# Patient Record
Sex: MALE | Race: WHITE | NOT HISPANIC OR LATINO | Employment: FULL TIME | URBAN - METROPOLITAN AREA
[De-identification: names, ages, dates, MRNs, and addresses within clinical notes are randomized per-mention and may not be internally consistent; named-entity substitution may affect disease eponyms.]

---

## 2017-01-04 ENCOUNTER — TRANSFERRED RECORDS (OUTPATIENT)
Dept: HEALTH INFORMATION MANAGEMENT | Facility: CLINIC | Age: 57
End: 2017-01-04

## 2017-01-04 LAB — EJECTION FRACTION: 28

## 2017-01-12 ENCOUNTER — TELEPHONE (OUTPATIENT)
Dept: INTERNAL MEDICINE | Facility: CLINIC | Age: 57
End: 2017-01-12

## 2017-01-12 ENCOUNTER — OFFICE VISIT (OUTPATIENT)
Dept: INTERNAL MEDICINE | Facility: CLINIC | Age: 57
End: 2017-01-12
Payer: COMMERCIAL

## 2017-01-12 VITALS
TEMPERATURE: 97.8 F | HEART RATE: 81 BPM | SYSTOLIC BLOOD PRESSURE: 102 MMHG | OXYGEN SATURATION: 96 % | DIASTOLIC BLOOD PRESSURE: 75 MMHG

## 2017-01-12 DIAGNOSIS — M79.2 NEUROPATHIC PAIN: ICD-10-CM

## 2017-01-12 DIAGNOSIS — E11.9 WELL CONTROLLED DIABETES MELLITUS (H): ICD-10-CM

## 2017-01-12 DIAGNOSIS — I10 BENIGN ESSENTIAL HYPERTENSION: ICD-10-CM

## 2017-01-12 DIAGNOSIS — G47.33 OSA (OBSTRUCTIVE SLEEP APNEA): ICD-10-CM

## 2017-01-12 DIAGNOSIS — S82.301E TYPE I OR II OPEN FRACTURE OF DISTAL END OF RIGHT TIBIA WITH ROUTINE HEALING, UNSPECIFIED FRACTURE MORPHOLOGY, SUBSEQUENT ENCOUNTER: Primary | ICD-10-CM

## 2017-01-12 DIAGNOSIS — I48.91 ATRIAL FIBRILLATION, UNSPECIFIED TYPE (H): ICD-10-CM

## 2017-01-12 PROCEDURE — 99214 OFFICE O/P EST MOD 30 MIN: CPT | Performed by: INTERNAL MEDICINE

## 2017-01-12 RX ORDER — OXYCODONE HYDROCHLORIDE 5 MG/1
10 TABLET ORAL EVERY 4 HOURS PRN
Qty: 108 TABLET | Refills: 0 | Status: SHIPPED | OUTPATIENT
Start: 2017-01-12 | End: 2017-05-22

## 2017-01-12 RX ORDER — DULOXETIN HYDROCHLORIDE 60 MG/1
CAPSULE, DELAYED RELEASE ORAL DAILY
COMMUNITY
End: 2018-11-19

## 2017-01-12 RX ORDER — SPIRONOLACTONE 25 MG
20 TABLET ORAL DAILY
COMMUNITY

## 2017-01-12 RX ORDER — LOSARTAN POTASSIUM 100 MG/1
100 TABLET ORAL DAILY
COMMUNITY
End: 2017-01-12

## 2017-01-12 RX ORDER — LEVOTHYROXINE SODIUM 100 UG/1
100 TABLET ORAL DAILY
COMMUNITY
End: 2017-05-22

## 2017-01-12 RX ORDER — FUROSEMIDE 40 MG
40 TABLET ORAL DAILY
COMMUNITY
End: 2017-05-05

## 2017-01-12 NOTE — PROGRESS NOTES
SUBJECTIVE:                                                    Rhett Elizabeth is a 56 year old male who presents to clinic today for the following health issues:        Two recent Hospitalizations, Follow-up Visit:    Hospital/Nursing Home/IP Rehab Facility: [facility] (in December) and then [facility] (in January)  Date of Admission: 12/25/2016 [facility]; 1.4.17 [facility].  Date of Discharge: 12/28/2016; 1.8.17 [facility].   Reason(s) for Admission: A-fib with RVR plus CHF at [facility];  Right tibial fracture s/p surgery at [facility].             Problems taking medications regularly:  None       Medication changes since discharge: yes additional meds       Problems adhering to non-medication therapy:  None    Summary of hospitalization:  CareEverywhere information obtained and reviewed  Diagnostic Tests/Treatments reviewed.  Follow up needed: none  Other Healthcare Providers Involved in Patient s Care:         None  Update since discharge: improved. Daughter is here with the patient, is helping the patient out at home.His wife is helping him, as well.       Post Discharge Medication Reconciliation: discharge medications reconciled and changed, per note/orders (see AVS).  Plan of care communicated with patient and family     Coding guidelines for this visit:  Type of Medical   Decision Making Face-to-Face Visit       within 7 Days of discharge Face-to-Face Visit        within 14 days of discharge   Moderate Complexity [id number]   High Complexity [id number]           A fib with RVR and CHF at [facility]: no s/s of Cad or of arrhythmia now. Has follow-up Cardiology appointment on 1.16.17.  Right tibial fracture s/e surgery; he has home P.T. And OT. He has follow-up with Orthopedics-the patient called his wife who checked by phone and confirmed the appointment will be on  1.20.17. He has a history of neuropathic chronic pain and opioids which per the daughter is managed by [facility] (recently? Reported that [facility]  was managing it but also requested a referral as the patient is switching healthcare systems) and his Neurologist (ie,these 2 specialists are the ones who usually prescribe the opioids), and an Internist; he is apparently in the process of switching PCPs.  He is asking for a bridge of his current dose of opioids after surgery, to last him until his Orthopedics appointment.  We will run a  today and will give enough (as per the documented discharge summary amount) to last him until 1.20.17 (the date of the ortho appointment) at which time, it will be managed by Orthopedics +/- his Neurologist. A referral to [facility] was placed today.                Problem list and histories reviewed & adjusted, as indicated.  Additional history: as documented    There is no problem list on file for this patient.    History reviewed. No pertinent past surgical history.    Social History   Substance Use Topics     Smoking status: Current Every Day Smoker     Types: Dip, chew, snus or snuff     Smokeless tobacco: Not on file     Alcohol Use: No     History reviewed. No pertinent family history.      Current Outpatient Prescriptions   Medication Sig Dispense Refill     aspirin 81 MG tablet Take by mouth daily       Glucose Blood (ACCU-CHEK MANUEL PLUS VI)        DULoxetine (CYMBALTA) 60 MG EC capsule Take by mouth daily       furosemide (LASIX) 40 MG tablet Take 40 mg by mouth daily       insulin aspart (NOVOLOG PEN) 100 UNIT/ML injection Inject 10 Units Subcutaneous 3 times daily (with meals)       blood glucose monitoring (ACCU-CHEK MULTICLIX) lancets 1 each by In Vitro route Use to test blood sugaras directed.       levothyroxine (SYNTHROID/LEVOTHROID) 100 MCG tablet Take 100 mcg by mouth daily       Lutein 20 MG CAPS        metFORMIN (GLUCOPHAGE) 500 MG tablet Take 500 mg by mouth 2 times daily (with meals)       Multiple Vitamin (MULTIVITAMINS PO)        NORTRIPTYLINE HCL PO Take 25 mg by mouth At Bedtime       Omega-3 Fatty Acids  (OMEGA-3 FISH OIL PO) Take 1 g by mouth       PREGABALIN PO Take 150 mg by mouth 2 times daily       SIMVASTATIN PO Take 20 mg by mouth       UNABLE TO FIND cpap       oxyCODONE (ROXICODONE) 5 MG IR tablet Take 2 tablets (10 mg) by mouth every 4 hours as needed for pain maximum 12 tablet(s) per day. To last until Orthopedic appointment. 108 tablet 0     Cholecalciferol (VITAMIN D-3 PO) Take 1,000 Units by mouth daily       Acetaminophen (TYLENOL PO) Take 1,000 mg by mouth 4 times daily       Methocarbamol (ROBAXIN PO) Take 500 mg by mouth every 6 hours as needed for muscle spasms       AMIODARONE HCL PO Take 200 mg by mouth 2 times daily       Metoprolol Succinate (TOPROL XL PO) Take 50 mg by mouth daily       rivaroxaban ANTICOAGULANT (XARELTO) 20 MG TABS tablet Take 20 mg by mouth daily (with dinner)       testosterone (TESTOSTERONE) 4 MG/24HR 24 hr patch Place 1 patch onto the skin At Bedtime       ==============================================================  ROS:  Constitutional, HEENT, cardiovascular, pulmonary, GI, , musculoskeletal, neuro, skin, endocrine and psych systems are negative, except as otherwise noted.       OBJECTIVE:                                                    /75 mmHg  Pulse 81  Temp(Src) 97.8  F (36.6  C) (Oral)  SpO2 96%  There is no height or weight on file to calculate BMI.     GENERAL APPEARANCE: healthy, alert and in no distress; patient is in a wheelchair today [d/t the broken leg]  EYES: Eyes grossly normal to inspection, and conjunctivae and sclerae normal  HENT: head normocephalic and atraumatic and mouth without ulcers or lesions, oropharynx clear and oral mucous membranes moist  NECK: no noticeable adenopathy, no asymmetry, masses, or scars   RESP: lungs clear to auscultation - no rales, rhonchi or wheezes  CV: regular rate and rhythm, normal S1 S2, no S3 or S4, no murmur, click or rub, no peripheral edema and peripheral pulses strong  ABDOMEN: soft, nontender, no  hepatosplenomegaly, no masses and bowel sounds normal  MS:   right lower extremity in a cast, elevated.  no musculoskeletal defects are noted and gait is age appropriate without ataxia  SKIN: no suspicious lesions or rashes  NEURO: mentation intact and speech normal  PSYCH: mentation appears normal and affect normal/bright.        ASSESSMENT/PLAN:                                                        ICD-10-CM    1. Type I or II open fracture of distal end of right tibia with routine healing, unspecified fracture morphology, subsequent encounter S82.301E    2. Neuropathic pain M79.2 PAIN MANAGEMENT EXTERNAL REFERRAL     oxyCODONE (ROXICODONE) 5 MG IR tablet   3. Atrial fibrillation, unspecified type (H) I48.91    4. Well controlled diabetes mellitus (H) E11.9    5. Benign essential hypertension I10    6. LANEY (obstructive sleep apnea) G47.33        Type I or II open fracture of distal end of right tibia with routine healing, unspecified fracture morphology, subsequent encounter    (M79.2) Neuropathic pain  (primary encounter diagnosis)  Comment:   see HPI;   healing progressing as expected.  Unclear if the fall which resulted in the leg fx correlated with hypoglycemia (see below).  Plan:   As per orders above and patient instructions below. -will run the  today.  PAIN MANAGEMENT EXTERNAL REFERRAL, oxyCODONE         (ROXICODONE) 5 MG IR tablet            (I48.91) Atrial fibrillation, unspecified type (H)  Comment: rate-controlled asympt; euvolemic  Plan: continue current regimen and As per orders above and patient instructions below.     (E11.9) Well controlled diabetes mellitus (H)  Comment:   Last a1c 6.2% 9/2016  He rarely uses his Novolog (sliding scale) nowadays   per outside records recent A1Cs are good but also history of intermittent hypoglycemic episodes   He had his JUNG d.pratibha and his metformin decreased to 500mg bid in 12/2016 but still has hypoglycemic episodes:checkes his BS  3-4 times a day and  Has BS  "< 80 almost daily. Per the daughter this is actually an ongoing issue for the past few months at least. His main symptom during these  episoes is \"cold hands\"  Plan: As per orders above and patient instructions below.     (I10) Benign essential hypertension  Comment: controlled asympt  Plan: continue current regimen and As per orders above and patient instructions below.     (G47.33) LANEY (obstructive sleep apnea)  Comment: report of recent apneic episodes at night, during sleep; the patient has a CPAP; he follows with a Sleep Specialist  Plan: As per orders above and patient instructions below.      Patient Instructions   I placed the referral to [facility] clinic. We will be able to prescribe oxycodone 10mg every 4 hours until your follow-up appointment with Orthopedics, once we know when that appointment is.  You have indicated that this appointment is on 1.20.17.    Please decrease your Metformin from 500mg twice a day to 500mg daily and let us know if your blood are still often below 80 or if it often above 180.    Keep the Cardiology appointment on 1.16.17.    Contact your Sleep doctor regarding your apneic episodes.    Please measure your blood pressures at home and let us know if they are consistently more than 140/90 or if they are ever less than 90/60 or if your pulse is ever less than 55.     We advise a return visit within a month.                          Gwen Saldana MD  [facility]  "

## 2017-01-12 NOTE — PATIENT INSTRUCTIONS
I placed the referral to [facility] clinic. We will be able to prescribe oxycodone 10mg every 4 hours until your follow-up appointment with Orthopedics, once we know when that appointment is.  You have indicated that this appointment is on 1.20.17.    Please decrease your Metformin from 500mg twice a day to 500mg daily and let us know if your blood are still often below 80 or if it often above 180.    Keep the Cardiology appointment on 1.16.17.    Contact your Sleep doctor regarding your apneic episodes.    Please measure your blood pressures at home and let us know if they are consistently more than 140/90 or if they are ever less than 90/60 or if your pulse is ever less than 55.     We advise a return visit within a month.

## 2017-01-12 NOTE — NURSING NOTE
Chief Complaint   Patient presents with     Hospital F/U     [facility]       Initial There were no vitals taken for this visit. There is no height or weight on file to calculate BMI.  BP completed using cuff size: penny Pruett CMA

## 2017-01-12 NOTE — MR AVS SNAPSHOT
After Visit Summary   1/12/2017    Rhett Elizabeth    MRN: [id number]           Patient Information     Date Of Birth          1960        Visit Information        Provider Department      1/12/2017 1:30 PM Gwen Saldana MD [facility]        Today's Diagnoses     Neuropathic pain    -  1       Care Instructions    I placed the referral to [facility] clinic. We will be able to prescribe oxycodone 10mg every 4 hours until your follow-up appointment with Orthopedics, once we know when that appointment is.  You have indicated that this appointment is on 1.20.17.    Please decrease your Metformin from 500mg twice a day to 500mg daily and let us know if your blood are still often below 80 or if it often above 180.    Keep the Cardiology appointment on 1.16.17.    Contact your Sleep doctor regarding your apneic episodes.    Please measure your blood pressures at home and let us know if they are consistently more than 140/90 or if they are ever less than 90/60 or if your pulse is ever less than 55.     We advise a return visit within a month.              Follow-ups after your visit        Additional Services     PAIN MANAGEMENT EXTERNAL REFERRAL       Your provider has referred you to: [facility]: [facility] - [facility] [place] [phone]   [email or web address]  [facility] [phone]   [email or web address]  [facility] [phone]   [email or web address]    Please be aware that coverage of these services is subject to the terms and limitations of your health insurance plan.  Call member services at your health plan with any benefit or coverage questions.      Please bring the following with you to your appointment:    (1) Any  "X-Rays, CTs or MRIs which have been performed.  Contact the facility where they were done to arrange for  prior to your scheduled appointment.  Any new CT, MRI or other procedures ordered by your specialist must be performed at a [place] facility or coordinated by your clinic's referral office.    (2) List of current medications   (3) This referral request   (4) Any documents/labs given to you for this referral                  Who to contact     If you have questions or need follow up information about today's clinic visit or your schedule please contact [facility] directly at [phone].  Normal or non-critical lab and imaging results will be communicated to you by MyChart, letter or phone within 4 business days after the clinic has received the results. If you do not hear from us within 7 days, please contact the clinic through [employer] or phone. If you have a critical or abnormal lab result, we will notify you by phone as soon as possible.  Submit refill requests through [employer] or call your pharmacy and they will forward the refill request to us. Please allow 3 business days for your refill to be completed.          Additional Information About Your Visit        MyChart Information     [employer] lets you send messages to your doctor, view your test results, renew your prescriptions, schedule appointments and more. To sign up, go to [email or web address] . Click on \"Log in\" on the left side of the screen, which will take you to the Welcome page. Then click on \"Sign up Now\" on the right side of the page.     You will be asked to enter the access code listed below, as well as some personal information. Please follow the directions to create your username and password.     Your access code is: [email or web address]  Expires: 2017  3:00 PM     Your access code will  in 90 days. If you need help or a new code, please call your [facility] or [phone].        Care EveryWhere ID     " This is your Care EveryWhere ID. This could be used by other organizations to access your [place] medical records  [id number]        Your Vitals Were     Pulse Temperature Pulse Oximetry             81 97.8  F (36.6  C) (Oral) 96%          Blood Pressure from Last 3 Encounters:   01/12/17 102/75    Weight from Last 3 Encounters:   No data found for Wt              We Performed the Following     PAIN MANAGEMENT EXTERNAL REFERRAL          Today's Medication Changes          These changes are accurate as of: 1/12/17  3:00 PM.  If you have any questions, ask your nurse or doctor.               Start taking these medicines.        Dose/Directions    oxyCODONE 5 MG IR tablet   Commonly known as:  ROXICODONE   Used for:  Neuropathic pain   Started by:  Gwen Saldana MD        Dose:  10 mg   Take 2 tablets (10 mg) by mouth every 4 hours as needed for pain maximum 12 tablet(s) per day. To last until Orthopedic appointment.   Quantity:  108 tablet   Refills:  0            Where to get your medicines      Some of these will need a paper prescription and others can be bought over the counter.  Ask your nurse if you have questions.     Bring a paper prescription for each of these medications    - oxyCODONE 5 MG IR tablet             Primary Care Provider Office Phone #    [facility] [phone]       No address on file        Thank you!     Thank you for [facility]  for your care. Our goal is always to provide you with excellent care. Hearing back from our patients is one way we can continue to improve our services. Please take a few minutes to complete the written survey that you may receive in the mail after your visit with us. Thank you!             Your Updated Medication List - Protect others around you: Learn how to safely use, store and throw away your medicines at www.disposemymeds.org.          This list is accurate as of: 1/12/17  3:00 PM.  Always use your most  recent med list.                   Brand Name Dispense Instructions for use    ACCU-CHEK MANUEL PLUS VI          aspirin 81 MG tablet      Take by mouth daily       blood glucose monitoring lancets      1 each by In Vitro route Use to test blood sugaras directed.       cholecalciferol 1000 UNIT tablet    vitamin D     Take by mouth daily       DULoxetine 60 MG EC capsule    CYMBALTA     Take by mouth daily       furosemide 40 MG tablet    LASIX     Take 40 mg by mouth daily       insulin aspart 100 UNIT/ML injection    NovoLOG PEN     Inject 10 Units Subcutaneous 3 times daily (with meals)       levothyroxine 100 MCG tablet    SYNTHROID/LEVOTHROID     Take 100 mcg by mouth daily       Lutein 20 MG Caps          metFORMIN 500 MG tablet    GLUCOPHAGE     Take 500 mg by mouth 2 times daily (with meals)       MULTIVITAMINS PO          NORTRIPTYLINE HCL PO      Take 25 mg by mouth At Bedtime       OMEGA-3 FISH OIL PO      Take 1 g by mouth       oxyCODONE 5 MG IR tablet    ROXICODONE    108 tablet    Take 2 tablets (10 mg) by mouth every 4 hours as needed for pain maximum 12 tablet(s) per day. To last until Orthopedic appointment.       PREGABALIN PO      Take 150 mg by mouth 2 times daily       SIMVASTATIN PO      Take 20 mg by mouth       UNABLE TO FIND      cpap

## 2017-01-15 PROBLEM — I48.91 ATRIAL FIBRILLATION, UNSPECIFIED TYPE (H): Status: ACTIVE | Noted: 2017-01-15

## 2017-01-15 PROBLEM — I10 BENIGN ESSENTIAL HYPERTENSION: Status: ACTIVE | Noted: 2017-01-15

## 2017-01-15 PROBLEM — E11.9 WELL CONTROLLED DIABETES MELLITUS (H): Status: ACTIVE | Noted: 2017-01-15

## 2017-01-15 PROBLEM — S82.301E: Status: ACTIVE | Noted: 2017-01-15

## 2017-01-15 PROBLEM — M79.2 NEUROPATHIC PAIN: Status: ACTIVE | Noted: 2017-01-15

## 2017-01-15 PROBLEM — E11.40 TYPE 2 DIABETES MELLITUS WITH DIABETIC NEUROPATHY, WITHOUT LONG-TERM CURRENT USE OF INSULIN (H): Status: ACTIVE | Noted: 2017-01-15

## 2017-01-15 PROBLEM — G47.33 OSA (OBSTRUCTIVE SLEEP APNEA): Status: ACTIVE | Noted: 2017-01-15

## 2017-01-17 ENCOUNTER — MEDICAL CORRESPONDENCE (OUTPATIENT)
Dept: HEALTH INFORMATION MANAGEMENT | Facility: CLINIC | Age: 57
End: 2017-01-17

## 2017-01-30 ENCOUNTER — MEDICAL CORRESPONDENCE (OUTPATIENT)
Dept: HEALTH INFORMATION MANAGEMENT | Facility: CLINIC | Age: 57
End: 2017-01-30

## 2017-01-31 ENCOUNTER — TELEPHONE (OUTPATIENT)
Dept: INTERNAL MEDICINE | Facility: CLINIC | Age: 57
End: 2017-01-31

## 2017-01-31 DIAGNOSIS — M79.2 NEUROPATHIC PAIN: Primary | ICD-10-CM

## 2017-01-31 RX ORDER — DULOXETIN HYDROCHLORIDE 60 MG/1
60 CAPSULE, DELAYED RELEASE ORAL DAILY
Qty: 30 CAPSULE | Status: CANCELLED | OUTPATIENT
Start: 2017-01-31

## 2017-01-31 RX ORDER — PREGABALIN 25 MG/1
150 CAPSULE ORAL 2 TIMES DAILY
Status: CANCELLED | OUTPATIENT
Start: 2017-01-31

## 2017-01-31 NOTE — TELEPHONE ENCOUNTER
Per Mya, patient is using both meds for neuropathy. Confirmed dosing for both meds with Mya. They are as pending.    There is no consent to communicate on file. Gave her info on how to complete this going forward. Will call patient once Dr. Gwen Saldana has reviewed message.     To provider to advise.  Shauna Varma, CHELLEN RN

## 2017-01-31 NOTE — TELEPHONE ENCOUNTER
Spouse calling patient is running low on his Cymbalta, Lyrica needs scrip called into pharmacy as first time spouse states Dr Saldana is filling this for patient. Please call with questions or to discuss.

## 2017-01-31 NOTE — TELEPHONE ENCOUNTER
Thank you for printing the -I will take a look at it when I am in clinic tomorrow.    In regards to Cymbalta, please ask the patient to contact his Neurologist for a refill (as per chart review, his Neurologist manages the Cymbalta).    In regards to the Lyrica, though the patient is requesting a refill of only the Lyrica and not of the Gabapentin, I have to review the  (that is the most easily available information right now and both Lyrica and Gabapentin will be on it), to see which doses he is on, as in the vast majority of cases, a patient is either on one or the other-use of both concomittantly is redundant and may even be unsafe, given their similarity.    Please let me know if the patient mentioned who has been prescribing his Lyrica and Gabapentin, most recently.    We should ask him to fill out an CHRIS for [facility] and for his Neurologist, so we can take a look at their notes and get a better idea of the longterm pain treatment plan.     Thank you,  Gwen Saldana MD

## 2017-01-31 NOTE — TELEPHONE ENCOUNTER
is placed on providers desk.  Per writers conversation, patient not requesting gabapentin, the 2 requested meds were Cymbalta and Lyrica.    To provider to advise.  RUDDY Alonzo RN

## 2017-01-31 NOTE — TELEPHONE ENCOUNTER
I have only seen this patient once (you may review my OV note) and my understanding was that he had several doctors manage his chronic pain, including [facility], his Neurologist and his prior PCP.  (I do not see any ROIs scanned in our system).     Gabapentin is not on his medication list and it is, as you have already noted, unusual for a patient to be on both Lyrica and Gabapentin. (Furthermore, the patient is on Cymbalta, and Pamelor and has had perioperative opioids recently...).    It appears that we can access his prior PCP notes via ROKT-I see that there was a refill request for Cymbalta sent to his PCP in October 2016 and that his PCP referred the patient to his Neurologist for refills of this medication.  Please ask the patient who has been filling his gabapentin and his lyrica and ask him to fill out an CHRIS for [facility] and for his Neurologist, so we can take a look at their notes and get a better idea of the longterm pain treatment plan.       I know that we ran the  about 2 weeks ago, but given the unusual reported medication regimen of gabapentin and Lyrica, and as the patient is currently in the midst of a transition of care, please rerun a  of this patient for the past 2 months (for all controlled substances) and place it on my desk.     Thank you,  Gwen Saldana MD

## 2017-02-01 ENCOUNTER — TRANSFERRED RECORDS (OUTPATIENT)
Dept: HEALTH INFORMATION MANAGEMENT | Facility: CLINIC | Age: 57
End: 2017-02-01

## 2017-02-01 NOTE — TELEPHONE ENCOUNTER
Ok, thank you for checking.  I looked at the  and the gabapentin is not on it-so he should not be on it.   The  shows that he gets Lyrica as 300mg capsules, 60 caps per month, and fills it aournd the 4th-5th of the month. The prescribing physician is Dr Robert Esquivel, who is a Neurologist-so please advise the patient to contact this physician for refills of both the Cymbalta and the Lyrica.    Thank you,  Gwen Saldana MD

## 2017-02-01 NOTE — TELEPHONE ENCOUNTER
Called patient, discussed the below. He did not understand why after one visit Dr. Gwen Saldana cannot refill all of his meds. After reviewing the below and discussing why we need notes to care for him responsibility and safely, patient stated he understood the plan.    1. Patient denied that Cymbalta was filed by neuro, then he grabbed the med bottle and saw neuro filled med.    2. He will call both [facility] and neuro and do CHRIS to us. Our address, phone and fax info was provided to patient.    3. Patient stated he does not know who was prescribing gabapentin and when he was last on it.     Shauna Varma, BSN RN

## 2017-02-01 NOTE — TELEPHONE ENCOUNTER
Called patient, informed pt of providers note as written below, pt verbalized good understanding.      He stated he will first call his insurance to see if Dr. ANDREAS Esquivel is a covered provider. He will ask him for the refill of both meds. If Dr. ANDREAS Esquivel is not covered provider, he will ask who is a covered provider and will request Dr. Gwen Saldana to write referral to his insurances preferred neurologist.   RUDDY Whittaker RN

## 2017-02-13 ENCOUNTER — MEDICAL CORRESPONDENCE (OUTPATIENT)
Dept: HEALTH INFORMATION MANAGEMENT | Facility: CLINIC | Age: 57
End: 2017-02-13

## 2017-02-20 ENCOUNTER — MEDICAL CORRESPONDENCE (OUTPATIENT)
Dept: HEALTH INFORMATION MANAGEMENT | Facility: CLINIC | Age: 57
End: 2017-02-20

## 2017-02-23 ENCOUNTER — TRANSFERRED RECORDS (OUTPATIENT)
Dept: HEALTH INFORMATION MANAGEMENT | Facility: CLINIC | Age: 57
End: 2017-02-23

## 2017-02-28 ENCOUNTER — TELEPHONE (OUTPATIENT)
Dept: OTHER | Facility: CLINIC | Age: 57
End: 2017-02-28

## 2017-02-28 NOTE — TELEPHONE ENCOUNTER
2/28/2017    Call Regarding Onboarding [employer]    Attempt 1    Message on voicemail     Comments: 2 DEP [18+]      Outreach   KV

## 2017-03-07 DIAGNOSIS — E11.40 TYPE 2 DIABETES MELLITUS WITH DIABETIC NEUROPATHY, WITHOUT LONG-TERM CURRENT USE OF INSULIN (H): Primary | ICD-10-CM

## 2017-03-07 NOTE — TELEPHONE ENCOUNTER
Per 1/2/17 ov, patient was to follow up in 1 month.     We made a follow up appointment for 3/22.    Since med is 1. Historical, and 2. patient is a Month overdue for follow up, Cannot refill per RN protocol     To provider to advise.   Shauna Varma, CHELLEN RN

## 2017-03-09 NOTE — TELEPHONE ENCOUNTER
Call Regarding Onboarding [employer]    Attempt 2    Message on voicemail     Comments: 2 dep      Outreach   Thais Nazario

## 2017-03-22 ENCOUNTER — OFFICE VISIT (OUTPATIENT)
Dept: INTERNAL MEDICINE | Facility: CLINIC | Age: 57
End: 2017-03-22
Payer: COMMERCIAL

## 2017-03-22 ENCOUNTER — MEDICAL CORRESPONDENCE (OUTPATIENT)
Dept: HEALTH INFORMATION MANAGEMENT | Facility: CLINIC | Age: 57
End: 2017-03-22

## 2017-03-22 VITALS
SYSTOLIC BLOOD PRESSURE: 112 MMHG | OXYGEN SATURATION: 99 % | WEIGHT: 315 LBS | HEART RATE: 99 BPM | TEMPERATURE: 99 F | DIASTOLIC BLOOD PRESSURE: 81 MMHG

## 2017-03-22 DIAGNOSIS — E11.40 TYPE 2 DIABETES MELLITUS WITH DIABETIC NEUROPATHY, WITHOUT LONG-TERM CURRENT USE OF INSULIN (H): Primary | ICD-10-CM

## 2017-03-22 DIAGNOSIS — E03.8 OTHER SPECIFIED HYPOTHYROIDISM: ICD-10-CM

## 2017-03-22 DIAGNOSIS — Z11.59 NEED FOR HEPATITIS C SCREENING TEST: ICD-10-CM

## 2017-03-22 LAB
ANION GAP SERPL CALCULATED.3IONS-SCNC: 10 MMOL/L (ref 3–14)
BUN SERPL-MCNC: 11 MG/DL (ref 7–30)
CALCIUM SERPL-MCNC: 9.9 MG/DL (ref 8.5–10.1)
CHLORIDE SERPL-SCNC: 100 MMOL/L (ref 94–109)
CHOLEST SERPL-MCNC: 114 MG/DL
CO2 SERPL-SCNC: 31 MMOL/L (ref 20–32)
CREAT SERPL-MCNC: 1.03 MG/DL (ref 0.66–1.25)
CREAT UR-MCNC: 250 MG/DL
GFR SERPL CREATININE-BSD FRML MDRD: 75 ML/MIN/1.7M2
GLUCOSE SERPL-MCNC: 149 MG/DL (ref 70–99)
HBA1C MFR BLD: 6.6 % (ref 4.3–6)
HDLC SERPL-MCNC: 44 MG/DL
LDLC SERPL CALC-MCNC: 52 MG/DL
MICROALBUMIN UR-MCNC: 130 MG/L
MICROALBUMIN/CREAT UR: 52 MG/G CR (ref 0–17)
NONHDLC SERPL-MCNC: 70 MG/DL
POTASSIUM SERPL-SCNC: 4.4 MMOL/L (ref 3.4–5.3)
SODIUM SERPL-SCNC: 141 MMOL/L (ref 133–144)
TRIGL SERPL-MCNC: 92 MG/DL
TSH SERPL DL<=0.005 MIU/L-ACNC: 1.64 MU/L (ref 0.4–4)

## 2017-03-22 PROCEDURE — 82043 UR ALBUMIN QUANTITATIVE: CPT | Performed by: INTERNAL MEDICINE

## 2017-03-22 PROCEDURE — 36415 COLL VENOUS BLD VENIPUNCTURE: CPT | Performed by: INTERNAL MEDICINE

## 2017-03-22 PROCEDURE — 86803 HEPATITIS C AB TEST: CPT | Performed by: INTERNAL MEDICINE

## 2017-03-22 PROCEDURE — 80048 BASIC METABOLIC PNL TOTAL CA: CPT | Performed by: INTERNAL MEDICINE

## 2017-03-22 PROCEDURE — 99214 OFFICE O/P EST MOD 30 MIN: CPT | Performed by: INTERNAL MEDICINE

## 2017-03-22 PROCEDURE — 84443 ASSAY THYROID STIM HORMONE: CPT | Performed by: INTERNAL MEDICINE

## 2017-03-22 PROCEDURE — 83036 HEMOGLOBIN GLYCOSYLATED A1C: CPT | Performed by: INTERNAL MEDICINE

## 2017-03-22 PROCEDURE — 80061 LIPID PANEL: CPT | Performed by: INTERNAL MEDICINE

## 2017-03-22 NOTE — MR AVS SNAPSHOT
After Visit Summary   3/22/2017    Rhett Elizabeth    MRN: [id number]           Patient Information     Date Of Birth          1960        Visit Information        Provider Department      3/22/2017 2:20 PM Gwen Saldana MD [facility]        Today's Diagnoses     Type 2 diabetes mellitus with diabetic neuropathy, without long-term current use of insulin (H)    -  1    Need for hepatitis C screening test        Other specified hypothyroidism          Care Instructions      We will let you know your test results as discussed: by phone for urgent results, otherwise by postal mail.  You have indicated that you have a follow-up appointment with your Cardiologist within the next 2 weeks and they are planning to start you on: Losartan.  Please keep that appointment.  If your hemoglobin A1C is below 7%, we will advise the next visit in 6 months and if it is 7% or above, we advise the next visit in 3 months.        Follow-ups after your visit        Who to contact     If you have questions or need follow up information about today's clinic visit or your schedule please contact [facility] directly at [phone].  Normal or non-critical lab and imaging results will be communicated to you by MyChart, letter or phone within 4 business days after the clinic has received the results. If you do not hear from us within 7 days, please contact the clinic through [employer] or phone. If you have a critical or abnormal lab result, we will notify you by phone as soon as possible.  Submit refill requests through [employer] or call your pharmacy and they will forward the refill request to us. Please allow 3 business days for your refill to be completed.          Additional Information About Your Visit        [employer] Information     [employer] lets you send messages to your doctor, view your test results, renew your prescriptions, schedule appointments and more. To sign up, go to  [email or web address] . Click on \"Log in\" on the left side of the screen, which will take you to the Welcome page. Then click on \"Sign up Now\" on the right side of the page.     You will be asked to enter the access code listed below, as well as some personal information. Please follow the directions to create your username and password.     Your access code is: [email or web address]  Expires: 2017  4:00 PM     Your access code will  in 90 days. If you need help or a new code, please call your [place] clinic or [phone].        Care EveryWhere ID     This is your Care EveryWhere ID. This could be used by other organizations to access your [facility] medical records  [id number]        Your Vitals Were     Pulse Temperature Pulse Oximetry             99 99  F (37.2  C) (Oral) 99%          Blood Pressure from Last 3 Encounters:   17 112/81   17 102/75    Weight from Last 3 Encounters:   17 (!) 321 lb (145.6 kg)              We Performed the Following     Albumin Random Urine Quantitative     Basic metabolic panel     Hemoglobin A1c     Hepatitis C antibody     Lipid Profile with reflex to direct LDL     TSH with free T4 reflex          Where to get your medicines      These medications were sent to [facility]/pharmacy #[id number] - [address],  [address] [address]     Phone:  [phone]     metFORMIN 500 MG tablet          Primary Care Provider Office Phone #    [facility] [phone]       No address on file        Thank you!     Thank you for choosing [facility]  for your care. Our goal is always to provide you with excellent care. Hearing back from our patients is one way we can continue to improve our services. Please take a few minutes to complete the written survey that you may receive in the mail after your visit with us. Thank you!             Your Updated Medication List - Protect " others around you: Learn how to safely use, store and throw away your medicines at www.disposemymeds.org.          This list is accurate as of: 3/22/17  4:21 PM.  Always use your most recent med list.                   Brand Name Dispense Instructions for use    ACCU-CHEK MANUEL PLUS VI          AMIODARONE HCL PO      Take 200 mg by mouth 2 times daily       aspirin 81 MG tablet      Take by mouth daily       blood glucose monitoring lancets      1 each by In Vitro route Use to test blood sugaras directed.       DULoxetine 60 MG EC capsule    CYMBALTA     Take by mouth daily       furosemide 40 MG tablet    LASIX     Take 40 mg by mouth daily       insulin aspart 100 UNIT/ML injection    NovoLOG PEN     Inject 10 Units Subcutaneous 3 times daily (with meals) Uses sliding scale. Usually about 2 units       levothyroxine 100 MCG tablet    SYNTHROID/LEVOTHROID     Take 100 mcg by mouth daily       Lutein 20 MG Caps      Patient states he takes 150 mg tablet twice a day.       metFORMIN 500 MG tablet    GLUCOPHAGE    90 tablet    Take 1 tablet (500 mg) by mouth daily (with breakfast)       MULTIVITAMINS PO          NORTRIPTYLINE HCL PO      Take 25 mg by mouth At Bedtime       OMEGA-3 FISH OIL PO      Take 1 g by mouth       oxyCODONE 5 MG IR tablet    ROXICODONE    108 tablet    Take 2 tablets (10 mg) by mouth every 4 hours as needed for pain maximum 12 tablet(s) per day. To last until Orthopedic appointment.       PREGABALIN PO      Take 150 mg by mouth 2 times daily       rivaroxaban ANTICOAGULANT 20 MG Tabs tablet    XARELTO     Take 20 mg by mouth daily (with dinner)       ROBAXIN PO      Take 500 mg by mouth every 6 hours as needed for muscle spasms Reported on 3/22/2017       SIMVASTATIN PO      Take 20 mg by mouth       testosterone 4 MG/24HR 24 hr patch   Generic drug:  testosterone      Place 1 patch onto the skin At Bedtime Reported on 3/22/2017       TOPROL XL PO      Take 75 mg by mouth daily       TYLENOL  PO      Take 1,000 mg by mouth 4 times daily       UNABLE TO FIND      cpap       VITAMIN D-3 PO      Take 1,000 Units by mouth daily

## 2017-03-22 NOTE — LETTER
[facility]  [address] [facility] [id number] [phone]        March 23, 2017    Rhett Elizabeth  [address] [address] [address]            Dear Rhett,     You do not have Hepatitis C.  The test for Hepatitis C test was done, as you will recall, because we are screening people born within a certain time period for this infection, according to new medical guidelines.  You have some extra protein in your urine-this may be due to your diabetes.  If the Cardiologist starts you on Losartan, it may help improve this number.  Your thyroid hormone level is within normal limits.   Your cholesterol levels are within normal limits for you.  Your kidney function and blood electrolytes are within normal limits.   Your hemoglobin A1C is less than 7%-please continue your current diabetes regimen and we will plan on seeing you in 6 months.  Otherwise, please continue the treatment plan that we discussed at your last clinic visit and let me know if you have any questions via [employer] or by calling [phone].      Gwen Saldana MD/ms      Results for orders placed or performed in visit on 03/22/17   Basic metabolic panel   Result Value Ref Range    Sodium 141 133 - 144 mmol/L    Potassium 4.4 3.4 - 5.3 mmol/L    Chloride 100 94 - 109 mmol/L    Carbon Dioxide 31 20 - 32 mmol/L    Anion Gap 10 3 - 14 mmol/L    Glucose 149 (H) 70 - 99 mg/dL    Urea Nitrogen 11 7 - 30 mg/dL    Creatinine 1.03 0.66 - 1.25 mg/dL    GFR Estimate 75 >60 mL/min/1.7m2    GFR Estimate If Black >90   GFR Calc   >60 mL/min/1.7m2    Calcium 9.9 8.5 - 10.1 mg/dL   Hemoglobin A1c   Result Value Ref Range    Hemoglobin A1C 6.6 (H) 4.3 - 6.0 %   Lipid Profile with reflex to direct LDL   Result Value Ref Range    Cholesterol 114 <200 mg/dL    Triglycerides 92 <150 mg/dL    HDL Cholesterol 44 >39 mg/dL    LDL Cholesterol Calculated 52 <100 mg/dL    Non HDL Cholesterol 70 <130 mg/dL   Albumin Random Urine  Quantitative   Result Value Ref Range    Creatinine Urine 250 mg/dL    Albumin Urine mg/L 130 mg/L    Albumin Urine mg/g Cr 52.00 (H) 0 - 17 mg/g Cr   TSH with free T4 reflex   Result Value Ref Range    TSH 1.64 0.40 - 4.00 mU/L   Hepatitis C antibody   Result Value Ref Range    Hepatitis C Antibody  NR     Nonreactive   Assay performance characteristics have not been established for newborns,   infants, and children

## 2017-03-22 NOTE — NURSING NOTE
Chief Complaint   Patient presents with     RECHECK     Chronic conditions- pastient is fasting.       Initial /81 (BP Location: Left arm, Patient Position: Other (Comments), Cuff Size: Adult Large)  Pulse 99  Temp 99  F (37.2  C) (Oral)  Wt (!) 321 lb (145.6 kg)  SpO2 99% There is no height or weight on file to calculate BMI.  Medication Reconciliation: complete    Shonda Pruett CMA

## 2017-03-22 NOTE — PROGRESS NOTES
SUBJECTIVE:                                                    Rhett Elizabeth is a 56 year old male who presents to clinic today for the following health issues:        Diabetes Follow-up    Patient is checking blood sugars: three times daily.   Blood sugar testing frequency justification: routine  Results are as follows:              Diabetic concerns: occasional lightheaded when stand up     Symptoms of hypoglycemia (low blood sugar): none     Paresthesias (numbness or burning in feet) or sores: Yes a lot     Date of last diabetic eye exam: < 1 year     BS better since we decr the metformin dose.    Hyperlipidemia Follow-Up      Rate your low fat/cholesterol diet?: good    Taking statin?  Yes, no muscle aches from statin    Other lipid medications/supplements?:  Fish oil/Omega 3,      Hypertension Follow-up      Outpatient blood pressures are being checked at home.  Results are 130/80 average.    Low Salt Diet: low salt         Amount of exercise or physical activity: None    Problems taking medications regularly: No    Medication side effects: none    Diet: regular (no restrictions) and low salt    Patient denies chest pain, chest pressure, shortness of breath, palpitations, headaches, visual changes, or any noted lower extremity swelling.     Chronic pain:  Patient is on opioids (hydrocodone prn and tramadol scheduled) and Cymbalta-all Rxed by Neurology  Also on Lyrica Rxed by Neurology  Also on Pamelor (rxed by a Tiffany Price)        Problem list and histories reviewed & adjusted, as indicated.  Additional history: as documented    Patient Active Problem List   Diagnosis     Neuropathic pain     Benign essential hypertension     Atrial fibrillation, unspecified type (H)     LANEY (obstructive sleep apnea)     Well controlled diabetes mellitus (H)     Type 2 diabetes mellitus with diabetic neuropathy, without long-term current use of insulin (H)     Type I or II open fracture of distal end of right  tibia with routine healing, unspecified fracture morphology, subsequent encounter     History reviewed. No pertinent surgical history.    Social History   Substance Use Topics     Smoking status: Current Every Day Smoker     Types: Dip, chew, snus or snuff     Smokeless tobacco: Not on file     Alcohol use No     History reviewed. No pertinent family history.      Current Outpatient Prescriptions   Medication Sig Dispense Refill     metFORMIN (GLUCOPHAGE) 500 MG tablet Take 1 tablet (500 mg) by mouth daily (with breakfast) 90 tablet 1     aspirin 81 MG tablet Take by mouth daily       Glucose Blood (ACCU-CHEK MANUEL PLUS VI)        DULoxetine (CYMBALTA) 60 MG EC capsule Take by mouth daily       furosemide (LASIX) 40 MG tablet Take 40 mg by mouth daily       insulin aspart (NOVOLOG PEN) 100 UNIT/ML injection Inject 10 Units Subcutaneous 3 times daily (with meals) Uses sliding scale. Usually about 2 units       blood glucose monitoring (ACCU-CHEK MULTICLIX) lancets 1 each by In Vitro route Use to test blood sugaras directed.       levothyroxine (SYNTHROID/LEVOTHROID) 100 MCG tablet Take 100 mcg by mouth daily       Lutein 20 MG CAPS Patient states he takes 150 mg tablet twice a day.       Multiple Vitamin (MULTIVITAMINS PO)        NORTRIPTYLINE HCL PO Take 25 mg by mouth At Bedtime       Omega-3 Fatty Acids (OMEGA-3 FISH OIL PO) Take 1 g by mouth       PREGABALIN PO Take 150 mg by mouth 2 times daily       SIMVASTATIN PO Take 20 mg by mouth       UNABLE TO FIND cpap       Cholecalciferol (VITAMIN D-3 PO) Take 1,000 Units by mouth daily       Acetaminophen (TYLENOL PO) Take 1,000 mg by mouth 4 times daily       AMIODARONE HCL PO Take 200 mg by mouth 2 times daily       Metoprolol Succinate (TOPROL XL PO) Take 75 mg by mouth daily        rivaroxaban ANTICOAGULANT (XARELTO) 20 MG TABS tablet Take 20 mg by mouth daily (with dinner)       oxyCODONE (ROXICODONE) 5 MG IR tablet Take 2 tablets (10 mg) by mouth every 4 hours  as needed for pain maximum 12 tablet(s) per day. To last until Orthopedic appointment. (Patient not taking: Reported on 3/22/2017) 108 tablet 0     Methocarbamol (ROBAXIN PO) Take 500 mg by mouth every 6 hours as needed for muscle spasms Reported on 3/22/2017       testosterone (TESTOSTERONE) 4 MG/24HR 24 hr patch Place 1 patch onto the skin At Bedtime Reported on 3/22/2017         Reviewed and updated as needed this visit by clinical staff       Reviewed and updated as needed this visit by Provider         ==============================================================  ROS:  Constitutional, HEENT, cardiovascular, pulmonary, GI, , musculoskeletal, neuro, skin, endocrine and psych systems are negative, except as otherwise noted.       OBJECTIVE:                                                    /81 (BP Location: Left arm, Patient Position: Other (Comments), Cuff Size: Adult Large)  Pulse 99  Temp 99  F (37.2  C) (Oral)  Wt (!) 321 lb (145.6 kg)  SpO2 99%  There is no height or weight on file to calculate BMI.     GENERAL APPEARANCE: healthy, alert and in no distress  EYES: Eyes grossly normal to inspection, and conjunctivae and sclerae normal  HENT: head normocephalic and atraumatic and mouth without ulcers or lesions, oropharynx clear and oral mucous membranes moist  NECK: no noticeable adenopathy, no asymmetry, masses, or scars   RESP: lungs clear to auscultation - no rales, rhonchi or wheezes  CV: regular rate and rhythm, normal S1 S2, no S3 or S4, no murmur, click or rub, no peripheral edema and peripheral pulses strong  ABDOMEN: soft, nontender, no hepatosplenomegaly, no masses and bowel sounds normal  MS: no musculoskeletal defects are noted and gait is age appropriate without ataxia  SKIN: no suspicious lesions or rashes  NEURO: mentation intact and speech normal  PSYCH: mentation appears normal and affect normal/bright.    Results for orders placed or performed in visit on 03/22/17   Basic  metabolic panel   Result Value Ref Range    Sodium 141 133 - 144 mmol/L    Potassium 4.4 3.4 - 5.3 mmol/L    Chloride 100 94 - 109 mmol/L    Carbon Dioxide 31 20 - 32 mmol/L    Anion Gap 10 3 - 14 mmol/L    Glucose 149 (H) 70 - 99 mg/dL    Urea Nitrogen 11 7 - 30 mg/dL    Creatinine 1.03 0.66 - 1.25 mg/dL    GFR Estimate 75 >60 mL/min/1.7m2    GFR Estimate If Black >90   GFR Calc   >60 mL/min/1.7m2    Calcium 9.9 8.5 - 10.1 mg/dL   Hemoglobin A1c   Result Value Ref Range    Hemoglobin A1C 6.6 (H) 4.3 - 6.0 %   Lipid Profile with reflex to direct LDL   Result Value Ref Range    Cholesterol 114 <200 mg/dL    Triglycerides 92 <150 mg/dL    HDL Cholesterol 44 >39 mg/dL    LDL Cholesterol Calculated 52 <100 mg/dL    Non HDL Cholesterol 70 <130 mg/dL   Albumin Random Urine Quantitative   Result Value Ref Range    Creatinine Urine 250 mg/dL    Albumin Urine mg/L 130 mg/L    Albumin Urine mg/g Cr 52.00 (H) 0 - 17 mg/g Cr   TSH with free T4 reflex   Result Value Ref Range    TSH 1.64 0.40 - 4.00 mU/L   Hepatitis C antibody   Result Value Ref Range    Hepatitis C Antibody  NR     Nonreactive   Assay performance characteristics have not been established for newborns,   infants, and children          ASSESSMENT/PLAN:                                                        ICD-10-CM    1. Type 2 diabetes mellitus with diabetic neuropathy, without long-term current use of insulin (H) E11.40 Basic metabolic panel     Hemoglobin A1c     Lipid Profile with reflex to direct LDL     Albumin Random Urine Quantitative     metFORMIN (GLUCOPHAGE) 500 MG tablet   2. Need for hepatitis C screening test Z11.59 Hepatitis C antibody   3. Other specified hypothyroidism E03.8 TSH with free T4 reflex     Time spent coordinating care was approximately 30 minutes out of a total of approximately 40 minutes (which was the total duration of the appointment) including the diagnosis, prognosis and treatment of the above medical  conditions.     (E11.40) Type 2 diabetes mellitus with diabetic neuropathy, without long-term current use of insulin (H)  (primary encounter diagnosis)    Lab Results   Component Value Date    A1C 6.6 03/22/2017       Comment: controlled with complications as noted  Plan:   As per orders above and patient instructions below. Follow-up in 6 months      Basic metabolic panel, Hemoglobin A1c, Lipid         Profile with reflex to direct LDL, Albumin         Random Urine Quantitative, metFORMIN         (GLUCOPHAGE) 500 MG tablet            (Z11.59) Need for hepatitis C screening test  Comment: negative:  Plan: Hepatitis C antibody            (E03.8) Other specified hypothyroidism  Comment: TSH within normal limits; no reported symptoms of hypo or hyperthyroidism     Plan: Continue current regimen.      Patient Instructions     We will let you know your test results as discussed: by phone for urgent results, otherwise by postal mail.  You have indicated that you have a follow-up appointment with your Cardiologist within the next 2 weeks and they are planning to start you on: Losartan.  Please keep that appointment.  If your hemoglobin A1C is below 7%, we will advise the next visit in 6 months and if it is 7% or above, we advise the next visit in 3 months.                    Gwen Saldana MD  [facility]

## 2017-03-22 NOTE — PATIENT INSTRUCTIONS
We will let you know your test results as discussed: by phone for urgent results, otherwise by postal mail.  You have indicated that you have a follow-up appointment with your Cardiologist within the next 2 weeks and they are planning to start you on: Losartan.  Please keep that appointment.  If your hemoglobin A1C is below 7%, we will advise the next visit in 6 months and if it is 7% or above, we advise the next visit in 3 months.

## 2017-03-22 NOTE — Clinical Note
Please abstract the following data from this visit with this patient into the appropriate field in Epic:  Colonoscopy done on this date: 4/20/02 (approximately), by this group: [facility], results were Normal.

## 2017-03-23 LAB — HCV AB SERPL QL IA: NORMAL

## 2017-03-23 NOTE — PROGRESS NOTES
Please send letter informing the patient and attach results:    Dear Rhett,     You do not have Hepatitis C.  The test for Hepatitis C test was done, as you will recall, because we are screening people born within a certain time period for this infection, according to new medical guidelines.  You have some extra protein in your urine-this may be due to your diabetes.  If the Cardiologist starts you on Losartan, it may help improve this number.  Your thyroid hormone level is within normal limits.   Your cholesterol levels are within normal limits for you.  Your kidney function and blood electrolytes are within normal limits.   Your hemoglobin A1C is less than 7%-please continue your current diabetes regimen and we will plan on seeing you in 6 months.  Otherwise, please continue the treatment plan that we discussed at your last clinic visit and let me know if you have any questions via On[employer] or by calling [phone].      Gwen Saldana MD

## 2017-03-31 ENCOUNTER — TELEPHONE (OUTPATIENT)
Dept: FAMILY MEDICINE | Facility: CLINIC | Age: 57
End: 2017-03-31

## 2017-03-31 DIAGNOSIS — S82.301E TYPE I OR II OPEN FRACTURE OF DISTAL END OF RIGHT TIBIA WITH ROUTINE HEALING, UNSPECIFIED FRACTURE MORPHOLOGY, SUBSEQUENT ENCOUNTER: Primary | ICD-10-CM

## 2017-03-31 NOTE — TELEPHONE ENCOUNTER
Called and informed Sami at [facility] that referral was done. Faxed referral to [phone].Belen Laboy MA/JOVITA

## 2017-03-31 NOTE — TELEPHONE ENCOUNTER
Need a referral to see Dr Tatiana Brown/[facility].  Referral is needed for [employer] purposes (in network) fax# [phone].

## 2017-03-31 NOTE — TELEPHONE ENCOUNTER
Spoke with Sami at [facility]. Patient has seen Dr. Brown for ankle surgery after fracturing his ankle in January. There is an area that is not healing well and they would like to order a bone growth stimulator to hopefully avoid surgery again. Insurance is requesting a referral be placed to Dr. Brown from the primary provider even though this patient has been seeing Dr. Brown for the past 3 months.     Referral has been pended and will routing to primary provider to review this request.     Lisette Gupta RN   [facility]

## 2017-04-06 NOTE — TELEPHONE ENCOUNTER
4/6/2017    Call Regarding Onboarding [employer]    Attempt 3    Message on voicemail     Comments:       Outreach   cara

## 2017-04-07 ENCOUNTER — TRANSFERRED RECORDS (OUTPATIENT)
Dept: HEALTH INFORMATION MANAGEMENT | Facility: CLINIC | Age: 57
End: 2017-04-07

## 2017-05-04 NOTE — PATIENT INSTRUCTIONS
You have indicated that you had already stopped your aspirin and Xarelto on 5.3.17. You may restart aspirin and Xarelto as soon as your surgeon tells you that it is OK to do so.  You have indicated that you take the Lasix (the water pill) only as needed for short-term weight gain-please avoid taking the lasix before surgery.  Please stop taking Metformin on the day of surgery and you may restart it when you are discharged home.    Please bring your CPAP machine with you, when you go in for surgery.     We do advise at least once every 6 month visits for chronic conditions.    Before Your Surgery      Call your surgeon if there is any change in your health. This includes signs of a cold or flu (such as a sore throat, runny nose, cough, rash or fever).    Do not smoke, drink alcohol or take over the counter medicine (unless your surgeon or primary care doctor tells you to) for the 24 hours before and after surgery.    If you take prescribed drugs: Follow your doctor s orders about which medicines to take and which to stop until after surgery.    Eating and drinking prior to surgery: follow the instructions from your surgeon    Take a shower or bath the night before surgery. Use the soap your surgeon gave you to gently clean your skin. If you do not have soap from your surgeon, use your regular soap. Do not shave or scrub the surgery site.  Wear clean pajamas and have clean sheets on your bed.

## 2017-05-04 NOTE — PROGRESS NOTES
[facility]  [address] [facility] [address]  [phone]  Dept: [phone]    PRE-OP EVALUATION:  Today's date: 2017    Rhett Elizabeth (: 1960) presents for pre-operative evaluation assessment as requested by orthopedic surgeon.  The patient requires evaluation and anesthesia risk assessment prior to undergoing surgery/procedure for treatment of right lower extremity with hardware and possible hardware infection.  Proposed procedure: right lower extremity, removal of old hardware and placement of new hardware.    Date of Surgery/ Procedure: 2017  Time of Surgery/ Procedure: 7:30AM  Hospital/Surgical Facility: [facility]  Fax number for surgical facility: [phone]  Primary Physician: [facility] Dr BERTRAM Saldana  Type of Anesthesia Anticipated: General    Patient has a Health Care Directive or Living Will:  NO    1. NO - Do you have a history of heart attack, stroke, stent, bypass or surgery on an artery in the head, neck, heart or legs?  2. NO - Do you ever have any pain or discomfort in your chest?  3. NO - Do you have a history of  Heart Failure?  4. NO - Are you troubled by shortness of breath when: walking on the level, up a slight hill or at night?  5. NO - Do you currently have a cold, bronchitis or other respiratory infection?  6. NO - Do you have a cough, shortness of breath or wheezing?  7. NO - Do you sometimes get pains in the calves of your legs when you walk?  8. NO - Do you or anyone in your family have previous history of blood clots?  9. NO - Do you or does anyone in your family have a serious bleeding problem such as prolonged bleeding following surgeries or cuts?  10. NO - Have you ever had problems with anemia or been told to take iron pills?  11. NO - Have you had any abnormal blood loss such as black, tarry or bloody stools, or abnormal vaginal bleeding?  12. NO - Have you ever had a blood transfusion?  13. NO - Have you or  any of your relatives ever had problems with anesthesia?  14. YES - Do you have sleep apnea, excessive snoring or daytime drowsiness? Patient has LANEY is compliant with CPAP  15. NO - Do you have any prosthetic heart valves?  16. NO - Do you have prosthetic joints?  17. NO - Is there any chance that you may be pregnant?      HPI:                                                      Brief HPI related to upcoming procedure: right lower extremity with hardware, now with persistent pain and in need of surgical procedure.No f/c.      DIABETES - Patient has a longstanding history of DiabetesType Type II . Patient is being treated with diet, exercise, metformin and insulin injections and ASA and denies significant side effects. Control has been good. Complicating factors include but are not limited to: foot infections.                                                                                                             .                                                                                                                                                                                   .                                                                                                                 .  CHF - Patient has a longstanding history of systolic CHF of moderate severity. Exacerbating conditions include atrial fibrilation. Currently the patient's condition is same. Current treatment regimen includes beta blocker, ASA and diuretic. The patient denies chest pain, edema, orthopnea, SOB or recent weight gain. Last 2D Echo 1/2017, EKG today.                                                                                                                                                                                                              .                                                                                                                                              .  HYPOTHYROIDISM -  Patient has a longstanding history of chronic Hypothyroidism. Patient has been doing well, noting no tremor, insomnia, hair loss or changes in skin texture. Last TSH value of 1.64. Continues to take medications as directed, without adverse reactions or side effects.                                                                                                                                                                                                                        .                               TSH   Date Value Ref Range Status   03/22/2017 1.64 0.40 - 4.00 mU/L Final   ]                                                                                               .  A-FIB - Patient has a longstanding history of chronic A-fib currently on rhythm control: on amiodarone but also on metoprolol. Patient does not take coumadin for stroke prevention and denies significant symptoms of lightheadedness, palpitations or dyspnea,but is on Xarelto and baby aspirin.                                                                                                                                          .                                                                                                                                     .    MEDICAL HISTORY:                                                      Patient Active Problem List    Diagnosis Date Noted     Neuropathic pain 01/15/2017     Priority: Medium     Benign essential hypertension 01/15/2017     Priority: Medium     Atrial fibrillation, unspecified type (H) 01/15/2017     Priority: Medium     LANEY (obstructive sleep apnea) 01/15/2017     Priority: Medium     Well controlled diabetes mellitus (H) 01/15/2017     Priority: Medium     Type 2 diabetes mellitus with diabetic neuropathy, without long-term current use of insulin (H) 01/15/2017     Priority: Medium     Type I or II open fracture of distal end of right tibia with routine healing, unspecified  fracture morphology, subsequent encounter 01/15/2017     Priority: Medium      No past medical history on file.  No past surgical history on file.  Current Outpatient Prescriptions   Medication Sig Dispense Refill     metFORMIN (GLUCOPHAGE) 500 MG tablet Take 1 tablet (500 mg) by mouth daily (with breakfast) 90 tablet 1     aspirin 81 MG tablet Take by mouth daily       Glucose Blood (ACCU-CHEK MANUEL PLUS VI)        DULoxetine (CYMBALTA) 60 MG EC capsule Take by mouth daily       furosemide (LASIX) 40 MG tablet Take 40 mg by mouth daily       insulin aspart (NOVOLOG PEN) 100 UNIT/ML injection Inject 10 Units Subcutaneous 3 times daily (with meals) Uses sliding scale. Usually about 2 units       blood glucose monitoring (ACCU-CHEK MULTICLIX) lancets 1 each by In Vitro route Use to test blood sugaras directed.       levothyroxine (SYNTHROID/LEVOTHROID) 100 MCG tablet Take 100 mcg by mouth daily       Lutein 20 MG CAPS Patient states he takes 150 mg tablet twice a day.       Multiple Vitamin (MULTIVITAMINS PO)        NORTRIPTYLINE HCL PO Take 25 mg by mouth At Bedtime       Omega-3 Fatty Acids (OMEGA-3 FISH OIL PO) Take 1 g by mouth       PREGABALIN PO Take 150 mg by mouth 2 times daily       SIMVASTATIN PO Take 20 mg by mouth       UNABLE TO FIND cpap       oxyCODONE (ROXICODONE) 5 MG IR tablet Take 2 tablets (10 mg) by mouth every 4 hours as needed for pain maximum 12 tablet(s) per day. To last until Orthopedic appointment. (Patient not taking: Reported on 3/22/2017) 108 tablet 0     Cholecalciferol (VITAMIN D-3 PO) Take 1,000 Units by mouth daily       Acetaminophen (TYLENOL PO) Take 1,000 mg by mouth 4 times daily       Methocarbamol (ROBAXIN PO) Take 500 mg by mouth every 6 hours as needed for muscle spasms Reported on 3/22/2017       AMIODARONE HCL PO Take 200 mg by mouth 2 times daily       Metoprolol Succinate (TOPROL XL PO) Take 75 mg by mouth daily        rivaroxaban ANTICOAGULANT (XARELTO) 20 MG TABS tablet  Take 20 mg by mouth daily (with dinner)       testosterone (TESTOSTERONE) 4 MG/24HR 24 hr patch Place 1 patch onto the skin At Bedtime Reported on 3/22/2017       OTC products: some acetaminophen    Allergies   Allergen Reactions     Ace Inhibitors Cough     Amlodipine      swelling      Latex Allergy: NO    Social History   Substance Use Topics     Smoking status: Current Every Day Smoker     Types: Dip, chew, snus or snuff     Smokeless tobacco: Not on file     Alcohol use No     History   Drug Use No       REVIEW OF SYSTEMS:                                                    Constitutional, neuro, ENT, endocrine, pulmonary, cardiac, gastrointestinal, genitourinary, musculoskeletal, integument and psychiatric systems are negative, except as otherwise noted.    EXAM:                                                    /71 (BP Location: Right arm, Patient Position: Chair, Cuff Size: Adult Large)  Pulse 77  Temp 98.7  F (37.1  C) (Oral)  Wt (!) 334 lb 10.4 oz (151.8 kg)  SpO2 94%    GENERAL APPEARANCE: healthy, alert and no distress     EYES: EOMI,  PERRL     HENT: ear canals and TM's normal and nose and mouth without ulcers or lesions     NECK: no adenopathy, no asymmetry, masses, or scars and thyroid normal to palpation     RESP: lungs clear to auscultation - no rales, rhonchi or wheezes     CV: regular rates and rhythm, normal S1 S2, no S3 or S4 and no murmur, click or rub     ABDOMEN:  soft, nontender, no HSM or masses and bowel sounds normal     MS: extremities normal- no gross deformities noted, no evidence of inflammation in joints, FROM in all extremities.     SKIN:   right lower extremity: 4x5cm area of erythema, tenderness and medial shin deformity  O/w,no suspicious lesions or rashes     NEURO: Normal strength and tone, sensory exam grossly normal, mentation intact and speech normal     PSYCH: mentation appears normal. and affect normal/bright     LYMPHATICS: No axillary, cervical, or  supraclavicular nodes    DIAGNOSTICS:                                                    EKG completed today, reviewed by me:  Sinus  Rhythm  -First degree A-V block   Von = 222  -Nonspecific QRS widening and right axis -consider right ventricular hypertrophy.    -Poor R-wave progression -nonspecific -consider old anterior infarct.  Similar to the 12.27.16 EKG.    Results for orders placed or performed in visit on 05/05/17   Hemoglobin   Result Value Ref Range    Hemoglobin 14.2 13.3 - 17.7 g/dL   Basic metabolic panel   Result Value Ref Range    Sodium  133 - 144 mmol/L     Canceled, Test credited   Test canceled by physician      Potassium  3.4 - 5.3 mmol/L     Canceled, Test credited   Test canceled by physician      Chloride  94 - 109 mmol/L     Canceled, Test credited   Test canceled by physician      Carbon Dioxide  20 - 32 mmol/L     Canceled, Test credited   Test canceled by physician      Anion Gap  6 - 17 mmol/L     Canceled, Test credited   Test canceled by physician      Glucose  70 - 99 mg/dL     Canceled, Test credited   Test canceled by physician      Urea Nitrogen  7 - 30 mg/dL     Canceled, Test credited   Test canceled by physician      Creatinine  0.66 - 1.25 mg/dL     Canceled, Test credited   Test canceled by physician      GFR Estimate  >60 mL/min/1.7m2     Canceled, Test credited   Test canceled by physician      GFR Estimate If Black  >60 mL/min/1.7m2     Canceled, Test credited   Test canceled by physician      Calcium  8.5 - 10.1 mg/dL     Canceled, Test credited   Test canceled by physician     Basic metabolic panel   Result Value Ref Range    Sodium 139 133 - 144 mmol/L    Potassium 4.0 3.4 - 5.3 mmol/L    Chloride 102 94 - 109 mmol/L    Carbon Dioxide 30 20 - 32 mmol/L    Anion Gap 7 3 - 14 mmol/L    Glucose 163 (H) 70 - 99 mg/dL    Urea Nitrogen 12 7 - 30 mg/dL    Creatinine 0.88 0.66 - 1.25 mg/dL    GFR Estimate 89 >60 mL/min/1.7m2    GFR Estimate If Black >90   GFR  Calc   >60 mL/min/1.7m2    Calcium 8.9 8.5 - 10.1 mg/dL        Recent Labs   Lab Test  03/22/17   1624   NA  141   POTASSIUM  4.4   CR  1.03   A1C  6.6*        IMPRESSION:                                                    Reason for surgery/procedure: see above  Diagnosis/reason for consult: see above    The proposed surgical procedure is considered INTERMEDIATE risk.    REVISED CARDIAC RISK INDEX  The patient has the following serious cardiovascular risks for perioperative complications such as (MI, PE, VFib and 3  AV Block):  Congestive Heart Failure (pulmonary edema, PND, s3 afshin, CXR with pulmonary congestion, basilar rales)  Diabetes Mellitus (on Insulin)  INTERPRETATION: 2 risks: Class III (moderate risk - 6.6% complication rate)    The patient has the following additional risks for perioperative complications:  No identified additional risks      ICD-10-CM    1. Preop general physical exam Z01.818 Hemoglobin     Basic metabolic panel     EKG 12-lead complete w/read - Clinics     Basic metabolic panel   2. Type I or II open fracture of distal end of right tibia with routine healing, unspecified fracture morphology, subsequent encounter S82.301E    3. Chronic systolic congestive heart failure (H) I50.22    4. Type 2 diabetes mellitus with diabetic neuropathy, without long-term current use of insulin (H) E11.40    5. Atrial fibrillation, unspecified type (H) I48.91    6. LANEY (obstructive sleep apnea) G47.33    7. Need for prophylactic vaccination against Streptococcus pneumoniae (pneumococcus) Z23        RECOMMENDATIONS:                                                      Due to the history of 1st degree AV block, I would recommend intraoperative and postoperative cardiac monitoring, for 2 to 4 hours after the surgical procedure.     Due to the history of LANEY, I would recommend intraoperative and postoperative oxymetry monitoring, for 2 to 4 hours after the surgical procedure.     Patient instructions:  You  have indicated that you had already stopped your aspirin and Xarelto on 5.3.17. You may restart aspirin and Xarelto as soon as your surgeon tells you that it is OK to do so.  You have indicated that you take the Lasix (the water pill) only as needed for short-term weight gain-please avoid taking the lasix before surgery.  Please stop taking Metformin on the day of surgery and you may restart it when you are discharged home.    Please bring your CPAP machine with you, when you go in for surgery.   [patient was advised to continue his other medications as usual]    We do advise at least once every 6 month visits for chronic conditions.      APPROVAL GIVEN to proceed with proposed procedure, without further diagnostic evaluation       Signed Electronically by: Gwen Saldana MD    Copy of this evaluation report is provided to requesting physician.    [facility] Preop Guidelines

## 2017-05-05 ENCOUNTER — TELEPHONE (OUTPATIENT)
Dept: INTERNAL MEDICINE | Facility: CLINIC | Age: 57
End: 2017-05-05

## 2017-05-05 ENCOUNTER — OFFICE VISIT (OUTPATIENT)
Dept: INTERNAL MEDICINE | Facility: CLINIC | Age: 57
End: 2017-05-05
Payer: COMMERCIAL

## 2017-05-05 VITALS
TEMPERATURE: 98.7 F | WEIGHT: 315 LBS | DIASTOLIC BLOOD PRESSURE: 71 MMHG | OXYGEN SATURATION: 94 % | SYSTOLIC BLOOD PRESSURE: 113 MMHG | HEART RATE: 77 BPM

## 2017-05-05 DIAGNOSIS — S82.301E TYPE I OR II OPEN FRACTURE OF DISTAL END OF RIGHT TIBIA WITH ROUTINE HEALING, UNSPECIFIED FRACTURE MORPHOLOGY, SUBSEQUENT ENCOUNTER: ICD-10-CM

## 2017-05-05 DIAGNOSIS — E11.40 TYPE 2 DIABETES MELLITUS WITH DIABETIC NEUROPATHY, WITHOUT LONG-TERM CURRENT USE OF INSULIN (H): ICD-10-CM

## 2017-05-05 DIAGNOSIS — Z01.818 PREOP GENERAL PHYSICAL EXAM: Primary | ICD-10-CM

## 2017-05-05 DIAGNOSIS — Z23 NEED FOR PROPHYLACTIC VACCINATION AGAINST STREPTOCOCCUS PNEUMONIAE (PNEUMOCOCCUS): ICD-10-CM

## 2017-05-05 DIAGNOSIS — I48.91 ATRIAL FIBRILLATION, UNSPECIFIED TYPE (H): ICD-10-CM

## 2017-05-05 DIAGNOSIS — G47.33 OSA (OBSTRUCTIVE SLEEP APNEA): ICD-10-CM

## 2017-05-05 DIAGNOSIS — I50.22 CHRONIC SYSTOLIC CONGESTIVE HEART FAILURE (H): ICD-10-CM

## 2017-05-05 LAB
ANION GAP SERPL CALCULATED.3IONS-SCNC: 7 MMOL/L (ref 3–14)
ANION GAP SERPL CALCULATED.3IONS-SCNC: NORMAL MMOL/L (ref 6–17)
BUN SERPL-MCNC: 12 MG/DL (ref 7–30)
BUN SERPL-MCNC: NORMAL MG/DL (ref 7–30)
CALCIUM SERPL-MCNC: 8.9 MG/DL (ref 8.5–10.1)
CALCIUM SERPL-MCNC: NORMAL MG/DL (ref 8.5–10.1)
CHLORIDE SERPL-SCNC: 102 MMOL/L (ref 94–109)
CHLORIDE SERPL-SCNC: NORMAL MMOL/L (ref 94–109)
CO2 SERPL-SCNC: 30 MMOL/L (ref 20–32)
CO2 SERPL-SCNC: NORMAL MMOL/L (ref 20–32)
CREAT SERPL-MCNC: 0.88 MG/DL (ref 0.66–1.25)
CREAT SERPL-MCNC: NORMAL MG/DL (ref 0.66–1.25)
GFR SERPL CREATININE-BSD FRML MDRD: 89 ML/MIN/1.7M2
GFR SERPL CREATININE-BSD FRML MDRD: NORMAL ML/MIN/1.7M2
GLUCOSE SERPL-MCNC: 163 MG/DL (ref 70–99)
GLUCOSE SERPL-MCNC: NORMAL MG/DL (ref 70–99)
HGB BLD-MCNC: 14.2 G/DL (ref 13.3–17.7)
POTASSIUM SERPL-SCNC: 4 MMOL/L (ref 3.4–5.3)
POTASSIUM SERPL-SCNC: NORMAL MMOL/L (ref 3.4–5.3)
SODIUM SERPL-SCNC: 139 MMOL/L (ref 133–144)
SODIUM SERPL-SCNC: NORMAL MMOL/L (ref 133–144)

## 2017-05-05 PROCEDURE — 93000 ELECTROCARDIOGRAM COMPLETE: CPT | Performed by: INTERNAL MEDICINE

## 2017-05-05 PROCEDURE — 99215 OFFICE O/P EST HI 40 MIN: CPT | Performed by: INTERNAL MEDICINE

## 2017-05-05 PROCEDURE — 36415 COLL VENOUS BLD VENIPUNCTURE: CPT | Performed by: INTERNAL MEDICINE

## 2017-05-05 PROCEDURE — 85018 HEMOGLOBIN: CPT | Performed by: INTERNAL MEDICINE

## 2017-05-05 PROCEDURE — 80048 BASIC METABOLIC PNL TOTAL CA: CPT | Performed by: INTERNAL MEDICINE

## 2017-05-05 RX ORDER — FUROSEMIDE 40 MG
40 TABLET ORAL PRN
Qty: 30 TABLET | COMMUNITY
Start: 2017-05-05 | End: 2018-02-09

## 2017-05-05 NOTE — TELEPHONE ENCOUNTER
Bhumika from [facility] is calling asking for patients pre-op note. Please fax to [phone]. Nurse will need this ASAP  Please advise  Thank you

## 2017-05-05 NOTE — NURSING NOTE
Chief Complaint   Patient presents with     Pre-Op Exam       Initial /71 (BP Location: Right arm, Patient Position: Chair, Cuff Size: Adult Large)  Pulse 77  Temp 98.7  F (37.1  C) (Oral)  Wt (!) 334 lb 10.4 oz (151.8 kg)  SpO2 94% There is no height or weight on file to calculate BMI.  Medication Reconciliation: complete    Shonda Pruett CMA

## 2017-05-05 NOTE — MR AVS SNAPSHOT
After Visit Summary   5/5/2017    Rhett Elizabeth    MRN: [id number]           Patient Information     Date Of Birth          1960        Visit Information        Provider Department      5/5/2017 9:30 AM Gwen Saldana MD [facility]        Today's Diagnoses     Preop general physical exam    -  1    Need for prophylactic vaccination against Streptococcus pneumoniae (pneumococcus)          Care Instructions    You have indicated that you had already stopped your aspirin and Xarelto on 5.3.17. You may restart aspirin and Xarelto as soon as your surgeon tells you that it is OK to do so.  You have indicated that you take the Lasix (the water pill) only as needed for short-term weight gain-please avoid taking the lasix before surgery.  Please stop taking Metformin on the day of surgery and you may restart it when you are discharged home.    Please bring your CPAP machine with you, when you go in for surgery.       We do advise at least once every 6 month visits for chronic conditions.    Before Your Surgery      Call your surgeon if there is any change in your health. This includes signs of a cold or flu (such as a sore throat, runny nose, cough, rash or fever).    Do not smoke, drink alcohol or take over the counter medicine (unless your surgeon or primary care doctor tells you to) for the 24 hours before and after surgery.    If you take prescribed drugs: Follow your doctor s orders about which medicines to take and which to stop until after surgery.    Eating and drinking prior to surgery: follow the instructions from your surgeon    Take a shower or bath the night before surgery. Use the soap your surgeon gave you to gently clean your skin. If you do not have soap from your surgeon, use your regular soap. Do not shave or scrub the surgery site.  Wear clean pajamas and have clean sheets on your bed.         Follow-ups after your visit        Who to contact     If you  "have questions or need follow up information about today's clinic visit or your schedule please contact [facility] directly at [phone].  Normal or non-critical lab and imaging results will be communicated to you by MyChart, letter or phone within 4 business days after the clinic has received the results. If you do not hear from us within 7 days, please contact the clinic through [employer] or phone. If you have a critical or abnormal lab result, we will notify you by phone as soon as possible.  Submit refill requests through [employer] or call your pharmacy and they will forward the refill request to us. Please allow 3 business days for your refill to be completed.          Additional Information About Your Visit        [employer] Information     [employer] lets you send messages to your doctor, view your test results, renew your prescriptions, schedule appointments and more. To sign up, go to [email or web address] . Click on \"Log in\" on the left side of the screen, which will take you to the Welcome page. Then click on \"Sign up Now\" on the right side of the page.     You will be asked to enter the access code listed below, as well as some personal information. Please follow the directions to create your username and password.     Your access code is: [email or web address]  Expires: 8/3/2017 10:50 AM     Your access code will  in 90 days. If you need help or a new code, please call your [facility] clinic or [phone].        Care EveryWhere ID     This is your Care EveryWhere ID. This could be used by other organizations to access your [facility] medical records  [id number]        Your Vitals Were     Pulse Temperature Pulse Oximetry             77 98.7  F (37.1  C) (Oral) 94%          Blood Pressure from Last 3 Encounters:   17 113/71   17 112/81   17 102/75    Weight from Last 3 Encounters:   17 (!) 334 lb 10.4 oz (151.8 kg)   17 (!) 321 lb (145.6 kg)              We Performed " the Following     Basic metabolic panel     Basic metabolic panel     EKG 12-lead complete w/read - Clinics     Hemoglobin          Today's Medication Changes          These changes are accurate as of: 5/5/17 10:50 AM.  If you have any questions, ask your nurse or doctor.               These medicines have changed or have updated prescriptions.        Dose/Directions    furosemide 40 MG tablet   Commonly known as:  LASIX   This may have changed:    - how much to take  - how to take this  - when to take this  - additional instructions   Changed by:  Gwen Saldana MD        Per Cardiologist, patient take 1 tab (40mg) per day only prn, for a weight gain of over 3 pounds over 1 day   Quantity:  30 tablet   Refills:  0                Primary Care Provider Office Phone #    [facility] [phone]       No address on file        Thank you!     [facility] you for [facility] [place]  [facility]. Our goal is always to provide you with excellent care. Hearing back from our patients is one way we can continue to improve our services. Please take a few minutes to complete the written survey that you may receive in the mail after your visit with us. Thank you!             Your Updated Medication List - Protect others around you: Learn how to safely use, store and throw away your medicines at www.disposemymeds.org.          This list is accurate as of: 5/5/17 10:50 AM.  Always use your most recent med list.                   Brand Name Dispense Instructions for use    ACCU-CHEK MANUEL PLUS VI          AMIODARONE HCL PO      Take 200 mg by mouth 2 times daily       aspirin 81 MG tablet      Take by mouth daily Reported on 5/5/2017       blood glucose monitoring lancets      1 each by In Vitro route Use to test blood sugaras directed.       DULoxetine 60 MG EC capsule    CYMBALTA     Take by mouth daily       ENSURE COMPLETE PO          furosemide 40 MG tablet    LASIX    30 tablet    Per  Cardiologist, patient take 1 tab (40mg) per day only prn, for a weight gain of over 3 pounds over 1 day       insulin aspart 100 UNIT/ML injection    NovoLOG PEN     Inject 10 Units Subcutaneous 3 times daily (with meals) Uses sliding scale. Usually about 2 units       levothyroxine 100 MCG tablet    SYNTHROID/LEVOTHROID     Take 100 mcg by mouth daily       Lutein 20 MG Caps      Patient states he takes 150 mg tablet twice a day.       metFORMIN 500 MG tablet    GLUCOPHAGE    90 tablet    Take 1 tablet (500 mg) by mouth daily (with breakfast)       MULTIVITAMINS PO      Reported on 5/5/2017       NORTRIPTYLINE HCL PO      Take 25 mg by mouth At Bedtime       OMEGA-3 FISH OIL PO      Take 1 g by mouth Reported on 5/5/2017       oxyCODONE 5 MG IR tablet    ROXICODONE    108 tablet    Take 2 tablets (10 mg) by mouth every 4 hours as needed for pain maximum 12 tablet(s) per day. To last until Orthopedic appointment.       PREGABALIN PO      Take 150 mg by mouth 2 times daily       rivaroxaban ANTICOAGULANT 20 MG Tabs tablet    XARELTO     Take 20 mg by mouth daily (with dinner) Reported on 5/5/2017       ROBAXIN PO      Take 500 mg by mouth every 6 hours as needed for muscle spasms Reported on 3/22/2017       SIMVASTATIN PO      Take 20 mg by mouth       TOPROL XL PO      Take 75 mg by mouth daily       TYLENOL PO      Take 1,000 mg by mouth 4 times daily       UNABLE TO FIND      cpap       VITAMIN D-3 PO      Take 1,000 Units by mouth daily

## 2017-05-07 PROBLEM — I50.22 CHRONIC SYSTOLIC CONGESTIVE HEART FAILURE (H): Status: ACTIVE | Noted: 2017-05-07

## 2017-05-07 PROBLEM — E03.9 HYPOTHYROIDISM: Status: ACTIVE | Noted: 2017-05-07

## 2017-05-07 NOTE — TELEPHONE ENCOUNTER
The patient presented less than 1 business day before his scheduled surgery.     The preoperative note was completed on 5.7.17, after the fax below was sent-please fax the *completed* preoperative note first thing on 5.8.17, along with the EKG, for the patient's surgery scheduled for that same morning, ie, 5.8.17 and please call the surgical facility, letting them know that the complete preop form is being faxed now.    In the future, please ensure that a preoperative note is completed (closed) before faxing to the surgical facility.    Thank you,  Gwen Saldana MD

## 2017-05-08 NOTE — TELEPHONE ENCOUNTER
Spoke with Bhumika at [facility] and notified her that I faxed the updated PreOp to [phone].  Stefani Garcia,

## 2017-05-16 ENCOUNTER — OFFICE VISIT (OUTPATIENT)
Dept: FAMILY MEDICINE | Facility: CLINIC | Age: 57
End: 2017-05-16
Payer: COMMERCIAL

## 2017-05-16 VITALS
RESPIRATION RATE: 15 BRPM | HEART RATE: 72 BPM | SYSTOLIC BLOOD PRESSURE: 120 MMHG | DIASTOLIC BLOOD PRESSURE: 68 MMHG | OXYGEN SATURATION: 98 % | TEMPERATURE: 98.4 F

## 2017-05-16 DIAGNOSIS — R47.81 SLURRED SPEECH: ICD-10-CM

## 2017-05-16 DIAGNOSIS — R44.3 HALLUCINATIONS: Primary | ICD-10-CM

## 2017-05-16 PROCEDURE — 99213 OFFICE O/P EST LOW 20 MIN: CPT | Performed by: PHYSICIAN ASSISTANT

## 2017-05-16 RX ORDER — TRAMADOL HYDROCHLORIDE 50 MG/1
TABLET ORAL
Refills: 0 | COMMUNITY
Start: 2017-04-15 | End: 2017-05-22

## 2017-05-16 RX ORDER — MORPHINE SULFATE 15 MG/1
TABLET, FILM COATED, EXTENDED RELEASE ORAL
Refills: 0 | COMMUNITY
Start: 2017-05-10 | End: 2017-05-22

## 2017-05-16 RX ORDER — HYDROCODONE BITARTRATE AND ACETAMINOPHEN 5; 325 MG/1; MG/1
TABLET ORAL
Refills: 0 | COMMUNITY
Start: 2017-05-04 | End: 2017-05-22

## 2017-05-16 ASSESSMENT — PAIN SCALES - GENERAL: PAINLEVEL: EXTREME PAIN (9)

## 2017-05-16 NOTE — MR AVS SNAPSHOT
"              After Visit Summary   2017    Rhett Elizabeth    MRN: [id number]           Patient Information     Date Of Birth          1960        Visit Information        Provider Department      2017 12:00 PM Larissa Klein PA-C [facility]        Today's Diagnoses     Hallucinations    -  1    Slurred speech           Follow-ups after your visit        Who to contact     If you have questions or need follow up information about today's clinic visit or your schedule please contact [facility] directly at [phone].  Normal or non-critical lab and imaging results will be communicated to you by Thu[employer], letter or phone within 4 business days after the clinic has received the results. If you do not hear from us within 7 days, please contact the clinic through [employer] or phone. If you have a critical or abnormal lab result, we will notify you by phone as soon as possible.  Submit refill requests through Exec or call your pharmacy and they will forward the refill request to us. Please allow 3 business days for your refill to be completed.          Additional Information About Your Visit        MyChart Information     Exec lets you send messages to your doctor, view your test results, renew your prescriptions, schedule appointments and more. To sign up, go to [email or web address] . Click on \"Log in\" on the left side of the screen, which will take you to the Welcome page. Then click on \"Sign up Now\" on the right side of the page.     You will be asked to enter the access code listed below, as well as some personal information. Please follow the directions to create your username and password.     Your access code is: [email or web address]  Expires: 8/3/2017 10:50 AM     Your access code will  in 90 days. If you need help or a new code, please call your [facility] or [phone].        Care EveryWhere ID     This is your Care EveryWhere ID. This could be " used by other organizations to access your [facility] medical records  [id number]        Your Vitals Were     Pulse Temperature Respirations Pulse Oximetry          72 98.4  F (36.9  C) (Tympanic) 15 98%         Blood Pressure from Last 3 Encounters:   05/16/17 120/68   05/05/17 113/71   03/22/17 112/81    Weight from Last 3 Encounters:   05/05/17 (!) 334 lb 10.4 oz (151.8 kg)   03/22/17 (!) 321 lb (145.6 kg)              Today, you had the following     No orders found for display       Primary Care Provider Office Phone #    Miracle Pang [facility] [phone]       No address on file        Thank you!     [facility] you for [facility]  [facility]. Our goal is always to provide you with excellent care. Hearing back from our patients is one way we can continue to improve our services. Please take a few minutes to complete the written survey that you may receive in the mail after your visit with us. Thank you!             Your Updated Medication List - Protect others around you: Learn how to safely use, store and throw away your medicines at www.disposemymeds.org.          This list is accurate as of: 5/16/17 12:42 PM.  Always use your most recent med list.                   Brand Name Dispense Instructions for use    ACCU-CHEK MANUEL PLUS VI          AMIODARONE HCL PO      Take 200 mg by mouth 2 times daily       aspirin 81 MG tablet      Take by mouth daily Reported on 5/5/2017       blood glucose monitoring lancets      1 each by In Vitro route Use to test blood sugaras directed.       DULoxetine 60 MG EC capsule    CYMBALTA     Take by mouth daily       ENSURE COMPLETE PO          furosemide 40 MG tablet    LASIX    30 tablet    Reported on 5/16/2017       HYDROcodone-acetaminophen 5-325 MG per tablet    NORCO     TAKE 1 TABLET BY MOUTH EVERY 4-6 HOURS AS NEEDED FOR PAIN.       insulin aspart 100 UNIT/ML injection    NovoLOG PEN     Inject 10 Units Subcutaneous 3 times daily (with meals) Uses  sliding scale. Usually about 2 units       levothyroxine 100 MCG tablet    SYNTHROID/LEVOTHROID     Take 100 mcg by mouth daily       Lutein 20 MG Caps      Patient states he takes 150 mg tablet twice a day.       metFORMIN 500 MG tablet    GLUCOPHAGE    90 tablet    Take 1 tablet (500 mg) by mouth daily (with breakfast)       morphine 15 MG 12 hr tablet    MS CONTIN     TAKE 1-3 TABLETS BY MOUTH EVERY 12 HOURS       MULTIVITAMINS PO      Reported on 5/5/2017       NORTRIPTYLINE HCL PO      Take 25 mg by mouth At Bedtime       OMEGA-3 FISH OIL PO      Take 1 g by mouth Reported on 5/5/2017       oxyCODONE 5 MG IR tablet    ROXICODONE    108 tablet    Take 2 tablets (10 mg) by mouth every 4 hours as needed for pain maximum 12 tablet(s) per day. To last until Orthopedic appointment.       PREGABALIN PO      Take 150 mg by mouth 2 times daily Reported on 5/16/2017       rivaroxaban ANTICOAGULANT 20 MG Tabs tablet    XARELTO     Take 20 mg by mouth daily (with dinner) Reported on 5/5/2017       ROBAXIN PO      Take 500 mg by mouth every 6 hours as needed for muscle spasms Reported on 3/22/2017       SIMVASTATIN PO      Take 20 mg by mouth       TOPROL XL PO      Take 75 mg by mouth daily       traMADol 50 MG tablet    ULTRAM     TAKE 1 TABLET BY MOUTH 4 TIMES A DAY AS NEEDED FOR SEVERE FOOT PAIN       TYLENOL PO      Take 1,000 mg by mouth 4 times daily       UNABLE TO FIND      cpap       VITAMIN D-3 PO      Take 1,000 Units by mouth daily

## 2017-05-16 NOTE — PROGRESS NOTES
SUBJECTIVE:                                                    Rhett Elizabeth is a 56 year old male who presents to clinic today for the following health issues:      Patient is here for possible side effects from pain medication.    Discharged from [facility] on Wed. 5/10/2017 for hardware removal and replacement R tib/fib. On morphine bid and oxycodone tid. Wife said Sat. 5/13/2017 he started to hallucinate. Seeing things, having arguments with people that are not there. He saw his daughter's name on TV and was concerned somebody might come after her.    He denies HA, dizziness, CP, SHORTNESS OF BREATH, vision changes. He says it is harder to speak, almost like his tongue is swollen.     PMX- chronic a fib. CHF, sleep apnea.  Meds- on amiodorone, metropool, coumadin    REVIEW OF SYSTEMS :   GENERAL: No change in weight, sleep or appetite.  Normal energy.  No fever or chills  RESP: No coughing, wheezing or shortness of breath  CV: No chest pains or palpitations  GI: No nausea, vomiting,  heartburn, abdominal pain, diarrhea, constipation or change in bowel habits  : No urinary frequency or dysuria, bladder or kidney problems  Neurologic: No headaches, numbness, tingling, weakness.  Psychiatric: No problems with anxiety, depression or mental health.    OBJECTIVE:  Blood pressure 120/68, pulse 72, temperature 98.4  F (36.9  C), temperature source Tympanic, resp. rate 15, SpO2 98 %.    General: No apparent distress, alert.  Chest:  Lungs clear to auscultation bilaterally.  Cardiovascular: regular rate and rhythm, no murmurs.  Musculoskeletal: no gross deformities, normal muscle tone. Splint on r lower leg  Psychological:  Affect and mentation normal right now. Makes jokes about his wife.  Speech seems somewhat slow.  Neurological: Negative pronator drift. Tongue does not deviate to one side when he sticks it out. Smile symmetric      ASSESSMENT:    ICD-10-CM    1. Hallucinations R44.3    2. Slurred speech R47.81         PLAN: To [facility] ER for further evaluation, called. Wife declines ambulance. She will drive him straight there. Need to rule out something more serious than just side effects from pain meds.  Larissa Klein PA-C

## 2017-05-17 ENCOUNTER — TRANSFERRED RECORDS (OUTPATIENT)
Dept: HEALTH INFORMATION MANAGEMENT | Facility: CLINIC | Age: 57
End: 2017-05-17

## 2017-05-17 LAB — TSH SERPL-ACNC: 0.87 UIU/ML (ref 0.36–3.74)

## 2017-05-18 ENCOUNTER — TELEPHONE (OUTPATIENT)
Dept: INTERNAL MEDICINE | Facility: CLINIC | Age: 57
End: 2017-05-18

## 2017-05-18 LAB
CREAT SERPL-MCNC: 0.85 MG/DL (ref 0.7–1.3)
GFR SERPL CREATININE-BSD FRML MDRD: >60 ML/MIN
POTASSIUM SERPL-SCNC: 3.9 MMOL/L (ref 3.5–5.1)

## 2017-05-18 NOTE — TELEPHONE ENCOUNTER
Per 5/17/17 ov, patient was to proceed to ER. Did patient go?   No ER/hospital follow up appointment pending. Needing more information from Marcy.    Left message for Marcy to call back on their VM, writers direct line given.   Shauna Varma RN

## 2017-05-18 NOTE — TELEPHONE ENCOUNTER
Marcy with [facility] is calling wanting to speak to a nurse to confirm some information about this patient and care that  provides and can continue to provide now with home health care. Please call to advise. Thank you

## 2017-05-19 ENCOUNTER — TELEPHONE (OUTPATIENT)
Dept: INTERNAL MEDICINE | Facility: CLINIC | Age: 57
End: 2017-05-19

## 2017-05-19 NOTE — TELEPHONE ENCOUNTER
[facility] home care nurse called and is taking over care for patient. Patient insurance will not cover home health inc.   Please see 5/19/17 encounter for details.   RUDDY Alonzo RN

## 2017-05-19 NOTE — TELEPHONE ENCOUNTER
After 5/16/7 ov at [place], patient was advised to go to ER. Patient was seen at [facility] and admitted due to Acute encephalopathy r/t polypharmacy. Patient was discharged on 5/18. Discharging physician ordered home care skilled nursing for assessment of  Neurological status, (due to neuro complications while in hospital) and wound care for lower right extremity.      No pending appointment with Dr. Gwen Saldana.     Advised that patient needs to make follow up appointment with Dr. Gwen Saldana.    Will Dr. Gwen Saldana provide orders below?    Thank you, Shauna Varma, CHELLEN RN

## 2017-05-19 NOTE — TELEPHONE ENCOUNTER
"Will be able to approve post-hospitalization orders once I see the patient.  Please have the patient make the appointment ASAP, and please postpone this telephone message, so I can provide the \"verbal\" orders when he is seen.    Thank you,  Gwen Saldana MD   "

## 2017-05-19 NOTE — TELEPHONE ENCOUNTER
Called  Odell, informed her of providers note as written below, she verbalized good understanding.   Offered to make appointment, Odell stated not now, she will call patient now to advise him that he needs hospital follow up appointment.  postponing message for Tuesday of next week.  Shauna Varma, CHELLEN RN

## 2017-05-19 NOTE — TELEPHONE ENCOUNTER
Odell from   Home care is calling to request verbal orders for skilled nursing. Patient was discharged 05/18/17. Please call to advise. Thank you.

## 2017-05-22 ENCOUNTER — TELEPHONE (OUTPATIENT)
Dept: INTERNAL MEDICINE | Facility: CLINIC | Age: 57
End: 2017-05-22

## 2017-05-22 ENCOUNTER — OFFICE VISIT (OUTPATIENT)
Dept: INTERNAL MEDICINE | Facility: CLINIC | Age: 57
End: 2017-05-22
Payer: COMMERCIAL

## 2017-05-22 VITALS
OXYGEN SATURATION: 98 % | DIASTOLIC BLOOD PRESSURE: 72 MMHG | HEART RATE: 72 BPM | TEMPERATURE: 97.7 F | SYSTOLIC BLOOD PRESSURE: 127 MMHG

## 2017-05-22 DIAGNOSIS — D64.9 ANEMIA, UNSPECIFIED TYPE: ICD-10-CM

## 2017-05-22 DIAGNOSIS — R41.0 CONFUSION: Primary | ICD-10-CM

## 2017-05-22 DIAGNOSIS — R74.8 ALKALINE PHOSPHATASE ELEVATION: ICD-10-CM

## 2017-05-22 DIAGNOSIS — E03.9 HYPOTHYROIDISM, UNSPECIFIED TYPE: ICD-10-CM

## 2017-05-22 DIAGNOSIS — E11.40 TYPE 2 DIABETES MELLITUS WITH DIABETIC NEUROPATHY, WITHOUT LONG-TERM CURRENT USE OF INSULIN (H): ICD-10-CM

## 2017-05-22 LAB
ALBUMIN SERPL-MCNC: 3.5 G/DL (ref 3.4–5)
ALP SERPL-CCNC: 187 U/L (ref 40–150)
ALT SERPL W P-5'-P-CCNC: 20 U/L (ref 0–70)
AST SERPL W P-5'-P-CCNC: 21 U/L (ref 0–45)
BASOPHILS # BLD AUTO: 0.1 10E9/L (ref 0–0.2)
BASOPHILS NFR BLD AUTO: 0.4 %
BILIRUB DIRECT SERPL-MCNC: 0.2 MG/DL (ref 0–0.2)
BILIRUB SERPL-MCNC: 0.5 MG/DL (ref 0.2–1.3)
DIFFERENTIAL METHOD BLD: ABNORMAL
EOSINOPHIL # BLD AUTO: 0.2 10E9/L (ref 0–0.7)
EOSINOPHIL NFR BLD AUTO: 1.6 %
ERYTHROCYTE [DISTWIDTH] IN BLOOD BY AUTOMATED COUNT: 14.3 % (ref 10–15)
HCT VFR BLD AUTO: 38.5 % (ref 40–53)
HGB BLD-MCNC: 12.6 G/DL (ref 13.3–17.7)
LYMPHOCYTES # BLD AUTO: 2.8 10E9/L (ref 0.8–5.3)
LYMPHOCYTES NFR BLD AUTO: 19.8 %
MCH RBC QN AUTO: 30.3 PG (ref 26.5–33)
MCHC RBC AUTO-ENTMCNC: 32.7 G/DL (ref 31.5–36.5)
MCV RBC AUTO: 93 FL (ref 78–100)
MONOCYTES # BLD AUTO: 1 10E9/L (ref 0–1.3)
MONOCYTES NFR BLD AUTO: 7.1 %
NEUTROPHILS # BLD AUTO: 10.2 10E9/L (ref 1.6–8.3)
NEUTROPHILS NFR BLD AUTO: 71.1 %
PLATELET # BLD AUTO: 350 10E9/L (ref 150–450)
PROT SERPL-MCNC: 7 G/DL (ref 6.8–8.8)
RBC # BLD AUTO: 4.16 10E12/L (ref 4.4–5.9)
VIT B12 SERPL-MCNC: 1811 PG/ML (ref 193–986)
WBC # BLD AUTO: 14.3 10E9/L (ref 4–11)

## 2017-05-22 PROCEDURE — 36415 COLL VENOUS BLD VENIPUNCTURE: CPT | Performed by: INTERNAL MEDICINE

## 2017-05-22 PROCEDURE — 80076 HEPATIC FUNCTION PANEL: CPT | Performed by: INTERNAL MEDICINE

## 2017-05-22 PROCEDURE — 82607 VITAMIN B-12: CPT | Performed by: INTERNAL MEDICINE

## 2017-05-22 PROCEDURE — 99214 OFFICE O/P EST MOD 30 MIN: CPT | Performed by: INTERNAL MEDICINE

## 2017-05-22 PROCEDURE — 85025 COMPLETE CBC W/AUTO DIFF WBC: CPT | Performed by: INTERNAL MEDICINE

## 2017-05-22 RX ORDER — CYANOCOBALAMIN (VITAMIN B-12) 1000 MCG
2 TABLET, SUBLINGUAL SUBLINGUAL DAILY
COMMUNITY
End: 2018-04-13

## 2017-05-22 RX ORDER — CYCLOBENZAPRINE HCL 5 MG
5 TABLET ORAL 2 TIMES DAILY PRN
COMMUNITY
End: 2017-08-11

## 2017-05-22 RX ORDER — LEVOTHYROXINE SODIUM 100 UG/1
100 TABLET ORAL DAILY
Qty: 90 TABLET | Refills: 3 | Status: SHIPPED | OUTPATIENT
Start: 2017-05-22 | End: 2018-06-28

## 2017-05-22 RX ORDER — UREA 10 %
500 LOTION (ML) TOPICAL DAILY
COMMUNITY
End: 2017-05-22

## 2017-05-22 NOTE — TELEPHONE ENCOUNTER
Patient was seen in clinic today for his post-hospital visit.  Requested orders approved.  Thank you,  Gwen Saldana MD

## 2017-05-22 NOTE — PROGRESS NOTES
SUBJECTIVE:                                                    Rhett Elizabeth is a 56 year old male who presents to clinic today for the following health issues:      PMH as noted, including Type 2 Diabetes Mellitus, a fib on xarelto, CHF on baby aspirin, LANEY, neuropathy,  and right lower extremity bone fracture (see below),     Patient is accompanied by his daughter.    Hospital Follow-up Visit:    Hospital/Nursing Home/IP Rehab Facility: [facility]  Date of Admission: 5/16/2017  Date of Discharge: 5/18/2017  Reason(s) for Admission: toxic encephalopathy            Problems taking medications regularly:  None       Medication changes since discharge: list up to date       Problems adhering to non-medication therapy:  None    Summary of hospitalization:  CareEverywhere information obtained and reviewed  Diagnostic Tests/Treatments reviewed.  Follow up needed: Ortho  Other Healthcare Providers Involved in Patient s Care:         Surgical follow-up appointment - see below  Update since discharge: fluctuating course.     Post Discharge Medication Reconciliation: discharge medications reconciled and changed, per note/orders (see AVS).  Plan of care communicated with patient and family     Coding guidelines for this visit:  Type of Medical   Decision Making Face-to-Face Visit       within 7 Days of discharge Face-to-Face Visit        within 14 days of discharge   Moderate Complexity [id number]   High Complexity [id number]          Patient was hospitalized for confusion and hallucinations, after his right lower extremity orthopedic surgery.  His mental status changes were thought to be d/t his post-operative opioid regimen (please note that his serum ETOH was also positive). His venous blood gases showed a pCO2 of 57, however, the pH was neutral, indicating that the patient likely has this degree of CO2 retention chronically. (although the opioids can certainly worsen CO2 retention). Several medication changes  were made during his hospitalization, including discontinuation of all of his opioids (ie, the prn tramadol, the prn oxycodone and the MS Contin), discontinuation of Robaxin-he still has prn Flexeril ordered, but has not used it.  Discontinuation of Pamelor (which did help him with his chronic neuropathic pain).  His lasix was changed to prn for swelling (though the last time that the patient had RAMIN was in 1/2017)-he has not used it.  He continues on his Cymbalta and Lyrica.   His head CT was within normal limits. His LFTs showed an elevation of the alk phos into the 190s, (as well as a slight elevation of direct bilirubin), though the alk phos elevation may be d/t the recent orthopedic surgery. His EEG was within normal limits. His BMP and magnesium were within normal limits. His blood sugars were in the 150s (he has not been measuring them at home, since his discharge). TSH in the hospital was within normal limits. CXR and UA within normal limits. He was noted to have acute anemia, with Hgb dropping from about 14 to 10.5 post-operatively. He denies any recently noted s/s of bleeding (no dark or bloody stools,etc). Today's CBC is back and shows that his Hgb is rebounding.  He has a history of vitamin B12 deficiency, is currently on supplements (he is not quite sure of his oral, daily dose). Given this history and his recent confusion, we checked his B12 today and it is a bit high, in fact (no known a/e from high B12, so he may continue his current supplements).  Per the hospital notes, the patient has a history of ETOH overuse, but the reported that he has not used ETOH in the past 6 months. However, upon more careful review of the labs showed that his serum ETOH level during this hospitalization was in fact elevated.   Folate level was not checked.  Utox (check for controlled substances) showed presence of opioids only c/w prescribed therapy.  He has a history of LANEY, reports being compliant with CPAP, but he  recently discovered that he is due for CPAP titration (which per the patient, the sleep center had not reminded him about). He and his wife are currently in the process of setting this up.      He still sometimes has hallucinations, though he is aware that they are not real.  This is an improvement from his pre-hospital state, where he was not aware that his hallucinations were not real.       Need for post-op pain control:  See Patient Instructions: advised today to continue prn ice packs and elevation of the right foot; advised to try Arthricream around the incisions.  Patient is also on 1,000mg acetaminophen, 4 times a day. Will recheck LFTs and CBC today-if LFTs normal (or nearly nl) can continue the acetaminophen-given the positive serum ETOH in the hospital, will reinforce for the patient to abstain from all ETOH use while he is using acetaminophen regularly. If Hgb is stable, can also advise for the patient to try to use a higher dose of aspirin (other than his baby aspirin) prn for pain, though this will also need to be a limited dose d/t the patient already being on Xarelto. Patient will ask Orthopedics regarding pain control tomorrow. He has tried lidocaine cream in the past and found it ineffective (though that was more for his chronic neuropathy and not his post-op pain). Would like to avoid restarting any opioids or ramping up prn muscle relaxant use until his hallucinations stop completely.    NOTE: the patient does *not* have CKD. NSAIDs may be considered, keeping in mind the increased bleeding risk while also on Xarelto.      Problem list and histories reviewed & adjusted, as indicated.  Additional history: as documented    Patient Active Problem List   Diagnosis     Neuropathic pain     Benign essential hypertension     Atrial fibrillation, unspecified type (H)     LANEY (obstructive sleep apnea)     Well controlled diabetes mellitus (H)     Type 2 diabetes mellitus with diabetic neuropathy, without  long-term current use of insulin (H)     Type I or II open fracture of distal end of right tibia with routine healing, unspecified fracture morphology, subsequent encounter     Chronic systolic congestive heart failure (H)     Hypothyroidism     History reviewed. No pertinent surgical history.    Social History   Substance Use Topics     Smoking status: Current Every Day Smoker     Types: Dip, chew, snus or snuff     Smokeless tobacco: Not on file     Alcohol use No     History reviewed. No pertinent family history.      Current Outpatient Prescriptions   Medication Sig Dispense Refill     ACE/ARB NOT PRESCRIBED, INTENTIONAL, Please choose reason not prescribed, below       cyclobenzaprine (FLEXERIL) 5 MG tablet Take 5 mg by mouth 2 times daily as needed for muscle spasms       Cyanocobalamin (VITAMIN B-12) 500 MCG SUBL Place 2 tablets under the tongue daily       levothyroxine (SYNTHROID/LEVOTHROID) 100 MCG tablet Take 1 tablet (100 mcg) by mouth daily 90 tablet 3     Nutritional Supplements (ENSURE COMPLETE PO)        furosemide (LASIX) 40 MG tablet 40 mg as needed Reported on 5/16/2017 30 tablet      metFORMIN (GLUCOPHAGE) 500 MG tablet Take 1 tablet (500 mg) by mouth daily (with breakfast) 90 tablet 1     aspirin 81 MG tablet Take by mouth daily Reported on 5/5/2017       Glucose Blood (ACCU-CHEK MANUEL PLUS VI)        DULoxetine (CYMBALTA) 60 MG EC capsule Take by mouth daily       insulin aspart (NOVOLOG PEN) 100 UNIT/ML injection Inject 10 Units Subcutaneous 3 times daily (with meals) Uses sliding scale. Usually about 2 units       blood glucose monitoring (ACCU-CHEK MULTICLIX) lancets 1 each by In Vitro route Use to test blood sugaras directed.       Lutein 20 MG CAPS Patient states he takes 150 mg tablet twice a day.       Omega-3 Fatty Acids (OMEGA-3 FISH OIL PO) Take 1 g by mouth Reported on 5/5/2017       PREGABALIN PO Take 300 mg by mouth 2 times daily Reported on 5/16/2017       SIMVASTATIN PO Take 20  mg by mouth       UNABLE TO FIND cpap       Cholecalciferol (VITAMIN D-3 PO) Take 1,000 Units by mouth daily       Acetaminophen (TYLENOL PO) Take 1,000 mg by mouth 4 times daily       AMIODARONE HCL PO Take 200 mg by mouth 2 times daily       Metoprolol Succinate (TOPROL XL PO) Take 75 mg by mouth daily        rivaroxaban ANTICOAGULANT (XARELTO) 20 MG TABS tablet Take 20 mg by mouth daily (with dinner) Reported on 5/5/2017       Multiple Vitamin (MULTIVITAMINS PO) Reported on 5/22/2017         Reviewed and updated as needed this visit by clinical staff       Reviewed and updated as needed this visit by Provider         ==============================================================  ROS:  Constitutional, HEENT, cardiovascular, pulmonary, GI, , musculoskeletal, neuro, skin, endocrine and psych systems are negative, except as otherwise noted.       OBJECTIVE:                                                    /72 (BP Location: Right arm, Patient Position: Chair, Cuff Size: Adult Regular)  Pulse 72  Temp 97.7  F (36.5  C) (Oral)  SpO2 98%  There is no height or weight on file to calculate BMI.     GEN: not in acute distress  NEURO: mentation and speech within normal limits.   PSYCH: appropriate mood and affect      Results for orders placed or performed in visit on 05/22/17   CBC with platelets and differential   Result Value Ref Range    WBC 14.3 (H) 4.0 - 11.0 10e9/L    RBC Count 4.16 (L) 4.4 - 5.9 10e12/L    Hemoglobin 12.6 (L) 13.3 - 17.7 g/dL    Hematocrit 38.5 (L) 40.0 - 53.0 %    MCV 93 78 - 100 fl    MCH 30.3 26.5 - 33.0 pg    MCHC 32.7 31.5 - 36.5 g/dL    RDW 14.3 10.0 - 15.0 %    Platelet Count 350 150 - 450 10e9/L    Diff Method Automated Method     % Neutrophils 71.1 %    % Lymphocytes 19.8 %    % Monocytes 7.1 %    % Eosinophils 1.6 %    % Basophils 0.4 %    Absolute Neutrophil 10.2 (H) 1.6 - 8.3 10e9/L    Absolute Lymphocytes 2.8 0.8 - 5.3 10e9/L    Absolute Monocytes 1.0 0.0 - 1.3 10e9/L     Absolute Eosinophils 0.2 0.0 - 0.7 10e9/L    Absolute Basophils 0.1 0.0 - 0.2 10e9/L   Hepatic panel (Albumin, ALT, AST, Bili, Alk Phos, TP)   Result Value Ref Range    Bilirubin Direct 0.2 0.0 - 0.2 mg/dL    Bilirubin Total 0.5 0.2 - 1.3 mg/dL    Albumin 3.5 3.4 - 5.0 g/dL    Protein Total 7.0 6.8 - 8.8 g/dL    Alkaline Phosphatase 187 (H) 40 - 150 U/L    ALT 20 0 - 70 U/L    AST 21 0 - 45 U/L   Vitamin B12   Result Value Ref Range    Vitamin B12 1811 (H) 193 - 986 pg/mL        ASSESSMENT/PLAN:                                                        ICD-10-CM    1. Confusion R41.0 CBC with platelets and differential     Hepatic panel (Albumin, ALT, AST, Bili, Alk Phos, TP)     Vitamin B12   2. Anemia, unspecified type D64.9 CBC with platelets and differential   3. Hypothyroidism, unspecified type E03.9 levothyroxine (SYNTHROID/LEVOTHROID) 100 MCG tablet   4. Alkaline phosphatase elevation R74.8 Hepatic panel (Albumin, ALT, AST, Bili, Alk Phos, TP)   5. Type 2 diabetes mellitus with diabetic neuropathy, without long-term current use of insulin (H) E11.40 ACE/ARB NOT PRESCRIBED, INTENTIONAL,     Time spent coordinating care was approximately 30 minutes out of a total of approximately 40 minutes (which was the total duration of the appointment) including the diagnosis, prognosis and treatment of the above medical conditions.     (R41.0) Confusion  (primary encounter diagnosis)  Comment: multifactorial, opioids +/- ETOH  Plan:   As per orders above and patient instructions below. -will remind patient to abstain from ETOH while using acetaminophen regularly.  CBC with platelets and differential, Hepatic         panel (Albumin, ALT, AST, Bili, Alk Phos, TP),         Vitamin B12            (D64.9) Anemia, unspecified type  Comment: improving; no s/s of bleeding  Plan: CBC with platelets and differential        As per orders above and patient instructions below.     (E03.9) Hypothyroidism, unspecified type  Comment: TSH in  the hospital within normal limits   Plan: continue current levothyroxine (SYNTHROID/LEVOTHROID) 100 MCG         tablet            (R74.8) Alkaline phosphatase elevation  Comment: slight elevation most likely d/t the recent orthopedic surgery  Plan:   will remind patient to abstain from ETOH while using acetaminophen regularly.  Hepatic panel (Albumin, ALT, AST, Bili, Alk         Phos, TP)            (E11.40) Type 2 diabetes mellitus with diabetic neuropathy, without long-term current use of insulin (H)  Lab Results   Component Value Date    A1C 6.6 03/22/2017   Comment: currently controlled  Plan: ACE/ARB NOT PRESCRIBED, INTENTIONAL,        Continue current regimen.      Patient Instructions   Please continue your current medications for now.   You can try to use ice packs and over the counter Arthricream near, but not at the incision sites, to have provider some antiinflammatory effects.     Measure your blood sugars daily and let us know of any extremes.     Please have your CPAP machine rechecked soon. Please contact the Sleep doctor (the doctor who prescribed) about this soon.  You have indicated that your sleep center is [facility] in [place].    We will let you know your test results as discussed: by phone for urgent results, otherwise by postal mail.    You may start using miralax as needed, if your bowel movements slow down (and you begin having bowel movements less often than at least once every 3 days).    Please contact our clinic if your confusion or hallucinations get worse.    Otherwise, we will plan on a follow-up visit in a month.                   Gwen Saldana MD  [facility]

## 2017-05-22 NOTE — MR AVS SNAPSHOT
After Visit Summary   5/22/2017    Rhett Elizabeth    MRN: [id number]           Patient Information     Date Of Birth          1960        Visit Information        Provider Department      5/22/2017 11:50 AM Gwen Saldana MD [facility]        Today's Diagnoses     Confusion    -  1    Anemia, unspecified type        Hypothyroidism, unspecified type        Alkaline phosphatase elevation          Care Instructions    Please continue your current medications for now.   You can try to use ice packs and over the counter Arthricream near, but not at the incision sites, to have provider some antiinflammatory effects.     Measure your blood sugars daily and let us know of any extremes.     Please have your CPAP machine rechecked soon. Please contact the Sleep doctor (the doctor who prescribed) about this soon.  You have indicated that your sleep center is [facility] in [place].    We will let you know your test results as discussed: by phone for urgent results, otherwise by postal mail.    You may start using miralax as needed, if your bowel movements slow down (and you begin having bowel movements less often than at least once every 3 days).    Please contact our clinic if your confusion or hallucinations get worse.    Otherwise, we will plan on a follow-up visit in a month.         Follow-ups after your visit        Who to contact     If you have questions or need follow up information about today's clinic visit or your schedule please contact [facility] directly at [phone].  Normal or non-critical lab and imaging results will be communicated to you by MyChart, letter or phone within 4 business days after the clinic has received the results. If you do not hear from us within 7 days, please contact the clinic through MyChart or phone. If you have a critical or abnormal lab result, we will notify you by phone as soon as possible.  Submit  "refill requests through [employer] or call your pharmacy and they will forward the refill request to us. Please allow 3 business days for your refill to be completed.          Additional Information About Your Visit        [employer] Information     [employer] lets you send messages to your doctor, view your test results, renew your prescriptions, schedule appointments and more. To sign up, go to [email or web address] . Click on \"Log in\" on the left side of the screen, which will take you to the Welcome page. Then click on \"Sign up Now\" on the right side of the page.     You will be asked to enter the access code listed below, as well as some personal information. Please follow the directions to create your username and password.     Your access code is: [email or web address]  Expires: 8/3/2017 10:50 AM     Your access code will  in 90 days. If you need help or a new code, please call your [facility] clinic or [phone].        Care EveryWhere ID     This is your Care EveryWhere ID. This could be used by other organizations to access your [facility] medical records  [id number]        Your Vitals Were     Pulse Temperature Pulse Oximetry             72 97.7  F (36.5  C) (Oral) 98%          Blood Pressure from Last 3 Encounters:   17 127/72   17 120/68   17 113/71    Weight from Last 3 Encounters:   17 (!) 334 lb 10.4 oz (151.8 kg)   17 (!) 321 lb (145.6 kg)              We Performed the Following     CBC with platelets and differential     Hepatic panel (Albumin, ALT, AST, Bili, Alk Phos, TP)     Vitamin B12          Today's Medication Changes          These changes are accurate as of: 17  1:30 PM.  If you have any questions, ask your nurse or doctor.               These medicines have changed or have updated prescriptions.        Dose/Directions    Vitamin B-12 500 MCG Subl   This may have changed:  Another medication with the same name was removed. Continue taking this medication, and " follow the directions you see here.   Changed by:  Gwen Saldana MD        Dose:  2 tablet   Place 2 tablets under the tongue daily   Refills:  0            Where to get your medicines      These medications [id number] [place] [facility] [address]  AT [address] OF [facility]  [address] [facility] [facility] [address] , [facility] [address]     Phone:  [phone]     levothyroxine 100 MCG tablet                Primary Care Provider Office Phone # Fax #    Gwen Saldana [phone]       [facility] [address] [address] [address]        Thank you!     Thank you for choosing [facility]  for your care. Our goal is always to provide you with excellent care. Hearing back from our patients is one way we can continue to improve our services. Please take a few minutes to complete the written survey that you may receive in the mail after your visit with us. Thank you!             Your Updated Medication List - Protect others around you: Learn how to safely use, store and throw away your medicines at www.disposemymeds.org.          This list is accurate as of: 5/22/17  1:30 PM.  Always use your most recent med list.                   Brand Name Dispense Instructions for use    ACCU-CHEK MANUEL PLUS VI          AMIODARONE HCL PO      Take 200 mg by mouth 2 times daily       aspirin 81 MG tablet      Take by mouth daily Reported on 5/5/2017       blood glucose monitoring lancets      1 each by In Vitro route Use to test blood sugaras directed.       cyclobenzaprine 5 MG tablet    FLEXERIL     Take 5 mg by mouth 2 times daily as needed for muscle spasms       DULoxetine 60 MG EC capsule    CYMBALTA     Take by mouth daily       ENSURE COMPLETE PO          furosemide 40 MG tablet    LASIX    30 tablet    40 mg as needed Reported on 5/16/2017       insulin aspart 100 UNIT/ML injection    NovoLOG PEN     Inject 10 Units  Subcutaneous 3 times daily (with meals) Uses sliding scale. Usually about 2 units       levothyroxine 100 MCG tablet    SYNTHROID/LEVOTHROID    90 tablet    Take 1 tablet (100 mcg) by mouth daily       Lutein 20 MG Caps      Patient states he takes 150 mg tablet twice a day.       metFORMIN 500 MG tablet    GLUCOPHAGE    90 tablet    Take 1 tablet (500 mg) by mouth daily (with breakfast)       MULTIVITAMINS PO      Reported on 5/22/2017       OMEGA-3 FISH OIL PO      Take 1 g by mouth Reported on 5/5/2017       PREGABALIN PO      Take 300 mg by mouth 2 times daily Reported on 5/16/2017       rivaroxaban ANTICOAGULANT 20 MG Tabs tablet    XARELTO     Take 20 mg by mouth daily (with dinner) Reported on 5/5/2017       SIMVASTATIN PO      Take 20 mg by mouth       TOPROL XL PO      Take 75 mg by mouth daily       TYLENOL PO      Take 1,000 mg by mouth 4 times daily       UNABLE TO FIND      cpap       Vitamin B-12 500 MCG Subl      Place 2 tablets under the tongue daily       VITAMIN D-3 PO      Take 1,000 Units by mouth daily

## 2017-05-22 NOTE — TELEPHONE ENCOUNTER
Please see today's telephone message, verbal orders were already called into [facility] ORTIZ who is covering for Odell today. Odell is not in office today.  Shauna Varma, CHELLEN RN

## 2017-05-22 NOTE — PATIENT INSTRUCTIONS
Please continue your current medications for now.   You can try to use ice packs and over the counter Arthricream near, but not at the incision sites, to have provide some antiinflammatory effects.     Measure your blood sugars daily and let us know of any extremes.     Please have your CPAP machine rechecked soon. Please contact the Sleep doctor (the doctor who prescribed) about this soon.  You have indicated that your sleep center is [facility] in [place].    We will let you know your test results as discussed: by phone for urgent results, otherwise by postal mail.    You may start using miralax as needed, if your bowel movements slow down (and you begin having bowel movements less often than at least once every 3 days).    Please contact our clinic if your confusion or hallucinations get worse.    Otherwise, we will plan on a follow-up visit in a month.

## 2017-05-23 ENCOUNTER — TELEPHONE (OUTPATIENT)
Dept: INTERNAL MEDICINE | Facility: CLINIC | Age: 57
End: 2017-05-23

## 2017-05-24 NOTE — TELEPHONE ENCOUNTER
Spoke with patient.  Made aware of lab results and provider's instructions/note as per below.  He states understanding of instructions.  No questions.  Iman Salamanca RN

## 2017-05-24 NOTE — TELEPHONE ENCOUNTER
Please call and advise the patient as follows, and also send a letter with the same text and attach recent test results [statements which are in bold and in square brackets, like this sentence, is for clinic staff and should not be included in the text of the letter to the patient]:    Dear Rhett,      Your vitamin B12 level is within normal limits.  Your anemia has improved.   Your liver numbers are normal, considering your recent orthopedic surgery (ie, the alkaline phosphatase can increase due to recent bone surgery and subsequent healing).    As you are currently taking acetaminophen (Tylenol) regularly, I strongly urge you to avoid all alcoholic beverages.  The combination of acetaminophen and alcohol can cause damage to your liver.     Otherwise, please continue the treatment plan that we discussed at your last clinic visit and let me know if you have any questions via [employer] or by calling [phone].      Gwen Saldana MD

## 2017-07-10 ENCOUNTER — TELEPHONE (OUTPATIENT)
Dept: INTERNAL MEDICINE | Facility: CLINIC | Age: 57
End: 2017-07-10

## 2017-07-10 NOTE — LETTER
[facility]  [address] [facility] [id number] [phone]          July 10, 2017    Rhett Elizabeth  [address] [facility] [address]            Our records indicate that you are due for a follow up appointment with Dr Saldana.   Monitoring and managing your preventative and chronic health conditions are very important to us.     If you have received your health care elsewhere, please provide us with that information so it can be documented in your chart.    Please call [phone] or message us through your [employer] account to schedule an appointment or provide information for your chart.     I look forward to seeing you and working with you on your health care needs.         Sincerely,       SHA Merchant   on behalf of   Gwen Saldana MD

## 2017-07-10 NOTE — TELEPHONE ENCOUNTER
Panel Management Review      Patient has the following on his problem list:     Diabetes    ASA: Passed    Last A1C  Lab Results   Component Value Date    A1C 6.6 03/22/2017     A1C tested: Passed    Last LDL:    Lab Results   Component Value Date    CHOL 114 03/22/2017     Lab Results   Component Value Date    HDL 44 03/22/2017     Lab Results   Component Value Date    LDL 52 03/22/2017     Lab Results   Component Value Date    TRIG 92 03/22/2017     No results found for: CHOLHDLRATIO  Lab Results   Component Value Date    NHDL 70 03/22/2017       Is the patient on a Statin? YES             Is the patient on Aspirin? YES    Medications     HMG CoA Reductase Inhibitors    SIMVASTATIN PO    Salicylates    aspirin 81 MG tablet          Last three blood pressure readings:  BP Readings from Last 3 Encounters:   05/22/17 127/72   05/16/17 120/68   05/05/17 113/71       Date of last diabetes office visit: 5/22/2017     Tobacco History:     History   Smoking Status     Current Every Day Smoker     Types: Dip, chew, snus or snuff   Smokeless Tobacco     Not on file         Hypertension   Last three blood pressure readings:  BP Readings from Last 3 Encounters:   05/22/17 127/72   05/16/17 120/68   05/05/17 113/71     Blood pressure: Passed    HTN Guidelines:  Age 18-59 BP range:  Less than 140/90  Age 60-85 with Diabetes:  Less than 140/90  Age 60-85 without Diabetes:  less than 150/90      Composite cancer screening  Chart review shows that this patient is due/due soon for the following None  Summary:    Patient is due/failing the following:   smoking    Action needed:   Patient needs office visit for follow up .    Type of outreach:    Sent letter.    Questions for provider review:    None                                                                                                                                    Shonda Pruett CMA       Chart routed to close .

## 2017-08-01 ENCOUNTER — OFFICE VISIT (OUTPATIENT)
Dept: FAMILY MEDICINE | Facility: CLINIC | Age: 57
End: 2017-08-01
Payer: COMMERCIAL

## 2017-08-01 ENCOUNTER — RADIANT APPOINTMENT (OUTPATIENT)
Dept: GENERAL RADIOLOGY | Facility: CLINIC | Age: 57
End: 2017-08-01
Attending: PHYSICIAN ASSISTANT
Payer: COMMERCIAL

## 2017-08-01 ENCOUNTER — TRANSFERRED RECORDS (OUTPATIENT)
Dept: HEALTH INFORMATION MANAGEMENT | Facility: CLINIC | Age: 57
End: 2017-08-01

## 2017-08-01 VITALS
DIASTOLIC BLOOD PRESSURE: 82 MMHG | WEIGHT: 315 LBS | HEART RATE: 68 BPM | SYSTOLIC BLOOD PRESSURE: 135 MMHG | OXYGEN SATURATION: 98 % | TEMPERATURE: 97.3 F

## 2017-08-01 DIAGNOSIS — M79.672 LEFT FOOT PAIN: Primary | ICD-10-CM

## 2017-08-01 DIAGNOSIS — M25.572 ACUTE LEFT ANKLE PAIN: ICD-10-CM

## 2017-08-01 LAB — EJECTION FRACTION: 38

## 2017-08-01 PROCEDURE — 99214 OFFICE O/P EST MOD 30 MIN: CPT | Performed by: PHYSICIAN ASSISTANT

## 2017-08-01 PROCEDURE — 73610 X-RAY EXAM OF ANKLE: CPT | Mod: LT

## 2017-08-01 PROCEDURE — 73630 X-RAY EXAM OF FOOT: CPT | Mod: LT

## 2017-08-01 NOTE — LETTER
[facility]  [address] [facility] [id number] [phone]    2017    Re: Rhett Elizabeth  [address] [facility] [address]  [phone] (home)     : 1960      To Whom It May Concern:      Rhett Elizabeth was seen in clinic today.  I would recommend seated duties for the next 2weeks until he has seen the foot and ankle specialist.  Please feel free to contact me via phone if you have any questions or concerns.      Sincerely,        Tete See GABE Steele

## 2017-08-01 NOTE — NURSING NOTE
Chief Complaint   Patient presents with     Musculoskeletal Problem       Initial /82  Pulse 68  Temp 97.3  F (36.3  C) (Oral)  Wt (!) 330 lb (149.7 kg)  SpO2 98% There is no height or weight on file to calculate BMI.  Medication Reconciliation: complete

## 2017-08-01 NOTE — MR AVS SNAPSHOT
After Visit Summary   8/1/2017    Rhett Elizabeth    MRN: [id number]           Patient Information     Date Of Birth          1960        Visit Information        Provider Department      8/1/2017 9:20 AM Tete Steele PA-C [facility]        Today's Diagnoses     Acute left ankle pain    -  1    Left foot pain           Follow-ups after your visit        Additional Services     ORTHO  REFERRAL       [facility] is referring you to the Orthopedic  Services at [facility].       The  Representative will assist you in the coordination of your Orthopedic and Musculoskeletal Care as prescribed by your physician.    The  Representative will call you within 1 business day to help schedule your appointment, or you may contact the  Representative at:    All areas ~ [phone]     Type of Referral : [facility] / Foot [facility]le [facility]       Timeframe requested: 1 - 2 days    Coverage of these services is subject to the terms and limitations of your health insurance plan.  Please call member services at your health plan with any benefit or coverage questions.      If X-rays, CT or MRI's have been performed, please contact the facility where they were done to arrange for , prior to your scheduled appointment.  Please bring this referral request to your appointment and present it to your specialist.                  Your next 10 appointments already scheduled     Aug 11, 2017  8:30 AM CDT   Office Visit with Gwen Saldana MD   [facility]    [address] [facility] [address] [phone]           Bring a current list of meds and any records pertaining to this visit. For Physicals, please bring immunization records and any forms needing to be filled out. Please arrive 10 minutes early to complete paperwork.              Who to  "contact     If you have questions or need follow up information about today's clinic visit or your schedule please contact [facility] directly at [phone].  Normal or non-critical lab and imaging results will be communicated to you by MyChart, letter or phone within 4 business days after the clinic has received the results. If you do not hear from us within 7 days, please contact the clinic through MyChart or phone. If you have a critical or abnormal lab result, we will notify you by phone as soon as possible.  Submit refill requests through [employer] or call your pharmacy and they will forward the refill request to us. Please allow 3 business days for your refill to be completed.          Additional Information About Your Visit        [employer] Information     [employer] lets you send messages to your doctor, view your test results, renew your prescriptions, schedule appointments and more. To sign up, go to [email or web address] . Click on \"Log in\" on the left side of the screen, which will take you to the Welcome page. Then click on \"Sign up Now\" on the right side of the page.     You will be asked to enter the access code listed below, as well as some personal information. Please follow the directions to create your username and password.     Your access code is: [email or web address]  Expires: 8/3/2017 10:50 AM     Your access code will  in 90 days. If you need help or a new code, please call your [facility] clinic or [phone].        Care EveryWhere ID     This is your Care EveryWhere ID. This could be used by other organizations to access your [facility] medical records  [id number]        Your Vitals Were     Pulse Temperature Pulse Oximetry             68 97.3  F (36.3  C) (Oral) 98%          Blood Pressure from Last 3 Encounters:   17 135/82   17 127/72   17 120/68    Weight from Last 3 Encounters:   17 (!) 330 lb (149.7 kg)   17 (!) 334 lb 10.4 oz (151.8 kg) "   03/22/17 (!) 321 lb (145.6 kg)              We Performed the Following     ORTHO  REFERRAL     XR Ankle Left G/E 3 Views     XR Foot Left G/E 3 Views        Primary Care Provider Office Phone # Fax #    Gwen Saldana [phone]       [facility] [address] [facility] [address]        Equal Access to Services     HANNAH GARCÍA : Hadii aad ku hadasho Soomaali, waaxda luqadaha, qaybta kaalmada adeegyada, waxay idiin hayaan adeeg kharash la'aan . So [facility] [phone].    ATENCIÓN: Si conradla joshua, tiene a tsang disposición servicios gratuitos de asistencia lingüística. Llame al [phone].    We comply with applicable federal civil rights laws and [place] laws. We do not discriminate on the basis of race, color, national origin, age, disability sex, sexual orientation or gender identity.            Thank you!     Thank you for choosing [facility]  for your care. Our goal is always to provide you with excellent care. Hearing back from our patients is one way we can continue to improve our services. Please take a few minutes to complete the written survey that you may receive in the mail after your visit with us. Thank you!             Your Updated Medication List - Protect others around you: Learn how to safely use, store and throw away your medicines at www.disposemymeds.org.          This list is accurate as of: 8/1/17 10:02 AM.  Always use your most recent med list.                   Brand Name Dispense Instructions for use Diagnosis    ACCU-CHEK MANUEL PLUS VI           ACE/ARB NOT PRESCRIBED (INTENTIONAL)      Please choose reason not prescribed, below    Type 2 diabetes mellitus with diabetic neuropathy, without long-term current use of insulin (H)       AMIODARONE HCL PO      Take 200 mg by mouth 2 times daily        aspirin 81 MG tablet      Take by mouth daily Reported on 5/5/2017        blood glucose monitoring lancets      1 each by  In Vitro route Use to test blood sugaras directed.        cyclobenzaprine 5 MG tablet    FLEXERIL     Take 5 mg by mouth 2 times daily as needed for muscle spasms        DULoxetine 60 MG EC capsule    CYMBALTA     Take by mouth daily        ENSURE COMPLETE PO           furosemide 40 MG tablet    LASIX    30 tablet    40 mg as needed Reported on 5/16/2017        insulin aspart 100 UNIT/ML injection    NovoLOG PEN     Inject 10 Units Subcutaneous 3 times daily (with meals) Uses sliding scale. Usually about 2 units        levothyroxine 100 MCG tablet    SYNTHROID/LEVOTHROID    90 tablet    Take 1 tablet (100 mcg) by mouth daily    Hypothyroidism, unspecified type       Lutein 20 MG Caps      Patient states he takes 150 mg tablet twice a day.        metFORMIN 500 MG tablet    GLUCOPHAGE    90 tablet    Take 1 tablet (500 mg) by mouth daily (with breakfast)    Type 2 diabetes mellitus with diabetic neuropathy, without long-term current use of insulin (H)       MULTIVITAMINS PO      Reported on 5/22/2017        OMEGA-3 FISH OIL PO      Take 1 g by mouth Reported on 5/5/2017        PREGABALIN PO      Take 300 mg by mouth 2 times daily Reported on 5/16/2017        rivaroxaban ANTICOAGULANT 20 MG Tabs tablet    XARELTO     Take 20 mg by mouth daily (with dinner) Reported on 5/5/2017        SIMVASTATIN PO      Take 20 mg by mouth        TOPROL XL PO      Take 75 mg by mouth daily        TYLENOL PO      Take 1,000 mg by mouth 4 times daily        UNABLE TO FIND      cpap        Vitamin B-12 500 MCG Subl      Place 2 tablets under the tongue daily        VITAMIN D-3 PO      Take 1,000 Units by mouth daily

## 2017-08-01 NOTE — PROGRESS NOTES
SUBJECTIVE:                                                      HPI  Rhett Elizabeth is a 56 year old male who presents to clinic today for the following health issues:  Joint Pain    Onset: left foot and ankle pain which started today.  No precipitating trauma or injuries.  Was just walking at work when he noticed a crackling sensation.  Reports hx of old L foot fracture that healed with conservative treatment.       Description:   Location: left foot and ankle  Character: Sharp    Intensity: moderate    Progression of Symptoms: better    Accompanying Signs & Symptoms:  Other symptoms: radiation of pain to up the calf.  Also reports numbness, tingling and weakness sensation which he contributes to his diabetic neuropathy.  Some swelling but no redness, drainage or fevers.       History:   Previous similar pain: no       Precipitating factors:   Trauma or overuse: no     Alleviating factors:  Improved by: rest/inactivity, ice and acetaminophen  Therapies Tried and outcome: have helped some       Reviewed PMH.  Patient Active Problem List   Diagnosis     Neuropathic pain     Benign essential hypertension     Atrial fibrillation, unspecified type (H)     LANEY (obstructive sleep apnea)     Well controlled diabetes mellitus (H)     Type 2 diabetes mellitus with diabetic neuropathy, without long-term current use of insulin (H)     Type I or II open fracture of distal end of right tibia with routine healing, unspecified fracture morphology, subsequent encounter     Chronic systolic congestive heart failure (H)     Hypothyroidism     Current Outpatient Prescriptions   Medication Sig Dispense Refill     ACE/ARB NOT PRESCRIBED, INTENTIONAL, Please choose reason not prescribed, below       cyclobenzaprine (FLEXERIL) 5 MG tablet Take 5 mg by mouth 2 times daily as needed for muscle spasms       Cyanocobalamin (VITAMIN B-12) 500 MCG SUBL Place 2 tablets under the tongue daily       levothyroxine (SYNTHROID/LEVOTHROID) 100  MCG tablet Take 1 tablet (100 mcg) by mouth daily 90 tablet 3     Nutritional Supplements (ENSURE COMPLETE PO)        furosemide (LASIX) 40 MG tablet 40 mg as needed Reported on 5/16/2017 30 tablet      metFORMIN (GLUCOPHAGE) 500 MG tablet Take 1 tablet (500 mg) by mouth daily (with breakfast) 90 tablet 1     aspirin 81 MG tablet Take by mouth daily Reported on 5/5/2017       Glucose Blood (ACCU-CHEK MANUEL PLUS VI)        DULoxetine (CYMBALTA) 60 MG EC capsule Take by mouth daily       insulin aspart (NOVOLOG PEN) 100 UNIT/ML injection Inject 10 Units Subcutaneous 3 times daily (with meals) Uses sliding scale. Usually about 2 units       blood glucose monitoring (ACCU-CHEK MULTICLIX) lancets 1 each by In Vitro route Use to test blood sugaras directed.       Lutein 20 MG CAPS Patient states he takes 150 mg tablet twice a day.       Multiple Vitamin (MULTIVITAMINS PO) Reported on 5/22/2017       Omega-3 Fatty Acids (OMEGA-3 FISH OIL PO) Take 1 g by mouth Reported on 5/5/2017       PREGABALIN PO Take 300 mg by mouth 2 times daily Reported on 5/16/2017       SIMVASTATIN PO Take 20 mg by mouth       UNABLE TO FIND cpap       Cholecalciferol (VITAMIN D-3 PO) Take 1,000 Units by mouth daily       Acetaminophen (TYLENOL PO) Take 1,000 mg by mouth 4 times daily       AMIODARONE HCL PO Take 200 mg by mouth 2 times daily       Metoprolol Succinate (TOPROL XL PO) Take 75 mg by mouth daily        rivaroxaban ANTICOAGULANT (XARELTO) 20 MG TABS tablet Take 20 mg by mouth daily (with dinner) Reported on 5/5/2017       Allergies   Allergen Reactions     Amlodipine Difficulty breathing     Swelling of the lips and tongue     Ace Inhibitors Cough       ROS  All other ROS are negative.      Physical Exam   Constitutional: He is oriented to person, place, and time and well-developed, well-nourished, and in no distress. No distress.   Musculoskeletal:        Right ankle: Normal. He exhibits normal range of motion, no swelling, no  ecchymosis, no deformity, no laceration and normal pulse. No tenderness. Achilles tendon normal.        Left ankle: He exhibits normal range of motion, no swelling, no ecchymosis, no deformity, no laceration and normal pulse. Tenderness. Lateral malleolus and AITFL tenderness found. No medial malleolus, no CF ligament, no posterior TFL, no head of 5th metatarsal and no proximal fibula tenderness found. Achilles tendon normal.        Left foot: There is tenderness and bony tenderness (along the 5th metatarsal without stepoffs). There is normal range of motion, no swelling, normal capillary refill, no crepitus, no deformity and no laceration.   Negative homans sign bilaterally.   Neurological: He is alert and oriented to person, place, and time. He has normal sensation, normal strength and normal reflexes. Gait normal. Gait normal.   Skin: Skin is warm, dry and intact.   Distal pulses are 2+ and symmetric.  No peripheral edema.   Nursing note and vitals reviewed.  3V of L ankle:  Degenerative changes.  No obvious fractures or dislocations.  No soft tissue swelling or masses.  Will send for overread.  3V of L foot:  Old fracture of the 5th metacarpal per patient hx.  Heel spurs present.  No acute fractures or dislocations.  No soft tissue swelling or masses.  Will send for overread.        Assessment/Plan:  Left foot pain:  ?Old fracture of the 5th metatarsal with possible malunion vs re-fracture.  He was placed in surgical shoe and will send to podiatry/orthopedics for further evaluation and management.   Recommend RICE and ibuprofen prn pain.  Recheck in clinic if symptoms worsen or if symptoms do not improve.   -     ORTHO  REFERRAL        -     XR Foot Left G/E 3 Views    Acute left ankle pain:  Same treatment as above.  Xrays are negative for acute injuries.  DJD present.      -     XR Ankle Left G/E 3 Views  -     ORTHO  REFERRAL        Tete See GABE Steele

## 2017-08-02 ENCOUNTER — OFFICE VISIT (OUTPATIENT)
Dept: PODIATRY | Facility: CLINIC | Age: 57
End: 2017-08-02
Payer: COMMERCIAL

## 2017-08-02 VITALS
BODY MASS INDEX: 40.43 KG/M2 | HEIGHT: 74 IN | DIASTOLIC BLOOD PRESSURE: 82 MMHG | WEIGHT: 315 LBS | TEMPERATURE: 98.2 F | SYSTOLIC BLOOD PRESSURE: 134 MMHG | HEART RATE: 64 BPM

## 2017-08-02 DIAGNOSIS — M25.572 ACUTE LEFT ANKLE PAIN: Primary | ICD-10-CM

## 2017-08-02 DIAGNOSIS — S99.912A ANKLE INJURY, LEFT, INITIAL ENCOUNTER: ICD-10-CM

## 2017-08-02 DIAGNOSIS — S92.902A FOOT FRACTURE, LEFT, CLOSED, INITIAL ENCOUNTER: ICD-10-CM

## 2017-08-02 PROCEDURE — 99244 OFF/OP CNSLTJ NEW/EST MOD 40: CPT | Performed by: PODIATRIST

## 2017-08-02 NOTE — LETTER
[facility]  [address] [place] [id number]  Phone: [phone]    August 2, 2017        Rhett Elizabeth  [address] [facility] [address]          To whom it may concern:    RE: Rhett Elizabeth    Patient was seen and treated today at our clinic.    Due to injury, please excuse pt from work until his follow up on 8/9/17.    Please contact me for questions or concerns.      Sincerely,        Ang Canales DPM

## 2017-08-02 NOTE — PATIENT INSTRUCTIONS
No weight bearing to L foot/ankle advised.  Follow up in 1 week.    PRICE Therapy    Many aches and pains throughout the foot and ankle can be helped with many simple treatments.  This is usually described as PRICE Therapy.      P - Protection - often times, inflammation/pain in the lower extremity is not able to improve simply because the areas involved are never allowed to rest.  Every step we take can bother the problematic area.  Protecting those areas is an important step in the healing process.  This may involve a walking cast boot, a special insert/orthotic device, an ankle brace, or simply avoiding barefoot walking.    R - Rest - in addition to protecting the foot/ankle, resting is an important, but often times difficult, treatment option.  Getting off your feet when they bother you, and specifically avoiding activities that cause pain/discomfort, are very beneficial to prevent, and treat, foot/ankle pain.      I - Ice - icing regularly can help to decrease inflammation and swelling in the foot, thus decreasing pain.  Using an ice pack or a bag of frozen peas works very well.  Ice for 20 minutes multiple times per day as needed.  Do not place the ice directly on the skin as this can cause tissue damage.    C - Compression - using a compression wrap or an ACE wrap can help to decrease swelling, which can help to decrease pain.  Wearing the wraps is generally not needed at night, but they should be worn on a regular basis when you are going to be on your feet for prolonged periods as gravity tends to pull fluids down to your feet/ankles.    E - Elevation - elevating your lower extremities multiple times daily for 15-20 minutes can help to decrease swelling, which works well in decreasing pain levels.        BLOOD CLOT PREVENTION - WEARING A CAST BOOT    Any brace that extends up to the knee can increase your chance of developing a blood clot. Blood clots generally occur in the large veins of your calf, thigh  or abdomen. A blood clot may form when blood is stagnant in the veins while your leg is immobilized in the brace. Emerson and relaxing the calf muscles by moving the ankle is the best method to avoid stagnant blood. Clarify with your doctor if you should be doing this as this may not be recommended for certain tendon surgeries.    Blood clots or deep venous thrombosis (DVT) can be life threatening if a portion of the clot breaks away and travels through the veins to your lungs. This is called a pulmonary embolism (PE) which can result in death.    Symptoms of a DVT may include swelling, tenderness, a warm feeling or redness in the leg.  DVT can occur in both legs, even if only one side is in a brace. If you have these symptoms, you should call your doctor immediately or go to the Emergency Room to have your leg scanned.     Symptoms of a pulmonary embolism (PE) include chest pain, shortness of breath or the need to breath rapidly that is not associated with exercise, difficulty breathing, rapid heart rate, coughing or coughing up blood, or a feeling of passing out. Chest pain can range from mild to knife-like pain while taking a deep breath. Pulmonary embolism can occur without any symptoms whatsoever. You need to be evaluated in a hospital emergency room immediately if you have symptoms of a PE. Please call 911 as PE is a life-threatening emergency.    Be certain that you understand how much ankle range of motion is allowed. Your foot and ankle problem is being immobilized by the brace, yet we do not want you to develop a blood clot. The velcro strap braces are intended to be removed for periodic exercise of the ankle. Your doctor will tell you if it is okay to do this depending on the type of surgery you had.    Your own personal risk of developing a DVT or PE is dependent on many factors. A personal or family history of previous blood clot or a clotting disorder (such as thrombophilia) puts you at risk. Other  risk factors include history of cancer, current use of estrogen medications (such as birth control pills or post menopausal medications), recent trauma or fracture, diabetes, recent heart attack, COPD, heart failure, pregnancy, obesity, advanced age, smoking, etc. Please make sure your doctor is aware if you have any of these risk factors.          Body Mass Index (BMI)  Many things can cause foot and ankle problems. Foot structure, activity level, foot mechanics and injuries are common causes of pain.  One very important issue that often goes unmentioned is body weight. Extra weight can cause increased stress on muscles, ligaments, bones and tendons. Sometimes just a few extra pounds is all it takes to put one over her/his threshold. Without reducing that stress, it can be difficult to alleviate pain.   Some people are uncomfortable addressing this issue, but we feel it is important for you to think about it. As Foot & Ankle specialists, our job is addressing the lower extremity problem and possible causes.   Regarding extra body weight, we encourage patients to discuss diet and weight management plans with their primary care doctors. It is this team approach that gives you the best opportunity for pain relief and getting you back on your feet.     SMOKING CESSATION    What's in cigarette smoke? - Cigarette smoke contains over 4,000 chemicals. Nicotine is one of the main ingredients which is an insecticide/herbicide. It is poisonous to our nervous system, increases blood clotting risk, and decreases the body's defenses to fight off infection. Another chemical is Carbon Monoxide is an asphyxiating gas that permanently binds to blood cells and blocks the transport of oxygen. This leads to tissue death and decreases your metabolism. Tar is a chemical that coats your lungs and trachea which impairs new oxygen coming in and carbon dioxide getting out of your body.   How does smoking impact surgery? - Smoking is  particularly hazardous with regards to surgery. Surgery puts stress on the body and a smoker's body is already under strain from these chemicals. Putting the two together, especially for an elective surgery, could be a recipe for disaster. Smoking before and after surgery increases your risk of heart problems, slow wound healing, delayed bone healing, blood clots, wound infection and anesthesia complications.   What are the benefits of quitting? - In 20 minutes your blood pressure will drop back down to normal. In 8 hours the carbon monoxide (a toxic gas) levels in your blood stream will drop by half, and oxygen levels will return to normal. In 48 hours your chance of having a heart attack will have decreased. All nicotine will have left your body. Your sense of taste and smell will return to a normal level. In 72 hours your bronchial tubes will relax, and your energy levels will increase. In 2 weeks your circulation will increase, and it will continue to improve for the next 10 weeks.    Recommendations for elective surgery - Ideally, patients should quit smoking 8 weeks before and at least 2 weeks after elective surgery in order to avoid complications. Simply cutting back on the amount of cigarettes smoked per day does not offer any benefit or decrease the risk of poor wound healing, heart problems, and infection. Smokers should also start taking Vitamin C and B for two weeks before surgery and two weeks after surgery.    Ways to Stop Smokin. Nicotine patches, lozenges, or gum  2. Support Groups  3. Medications (see below)    List of Medications:  1. Varenicline Tartrate (CHANTIX)   2. Bupropion HCL (WELLBUTRIN, ZYBAN)   note: make sure Wellbutrin is for smoking cessation and not other issues   3. Nicotine Patch (NICODERM)   4. Nicotine Inhaler (NICOTROL)   5. Nicotine Gum Nicotine Polacrilex   6. Nicotine Lozenge: Nicotine Polacrilex (COMMIT)   * [facility] offers a smoking support group as well!  Please  visit: [email or web address]  If you are interested in these, ask about getting a prescription or talk to your primary care doctor about what may be the best way for you to quit.

## 2017-08-02 NOTE — PROGRESS NOTES
"PATIENT HISTORY:  Rhett Elizabeth is a 56 year old male who presents to clinic for left foot and ankle injury.  I was requested to see this patient for this issue by Tete Steele PA-C.  Pt reports hearing a left foot/ankle \"crack\" when walking at work yesterday.  Hx of prior L 5th metatarsal fracture 11/2016 per pt \"that healed.\"  Pt reports pain is over the lateral ankle primarily where swelling is noted. 5-8/10 pain with walking.  No treatments per pt.  RLE tib/fib ORIF in March per pt.    Review of Systems:  Patient denies fever, chills, rash, wound, stiffness, limping, numbness, weakness, heart burn, blood in stool, chest pain with activity, calf pain when walking, shortness of breath with activity, chronic cough, easy bleeding/bruising, swelling of ankles, excessive thirst, fatigue, depression, anxiety.       PAST MEDICAL HISTORY: Smoking, chronic pain  Patient Active Problem List   Diagnosis     Neuropathic pain     Benign essential hypertension     Atrial fibrillation, unspecified type (H)     LANEY (obstructive sleep apnea)     Well controlled diabetes mellitus (H)     Type 2 diabetes mellitus with diabetic neuropathy, without long-term current use of insulin (H)     Type I or II open fracture of distal end of right tibia with routine healing, unspecified fracture morphology, subsequent encounter     Chronic systolic congestive heart failure (H)     Hypothyroidism        PAST SURGICAL HISTORY: History reviewed. No pertinent surgical history.     MEDICATIONS:   Current Outpatient Prescriptions:      order for [facility], [facility] aircast boot, Disp: 1 Device, Rfl: 0     ACE/ARB NOT PRESCRIBED, INTENTIONAL,, Please choose reason not prescribed, below, Disp: , Rfl:      cyclobenzaprine (FLEXERIL) 5 MG tablet, Take 5 mg by mouth 2 times daily as needed for muscle spasms, Disp: , Rfl:      Cyanocobalamin (VITAMIN B-12) 500 MCG SUBL, Place 2 tablets under the tongue daily, Disp: , Rfl:      levothyroxine (SYNTHROID/LEVOTHROID) " 100 MCG tablet, Take 1 tablet (100 mcg) by mouth daily, Disp: 90 tablet, Rfl: 3     Nutritional Supplements (ENSURE COMPLETE PO), , Disp: , Rfl:      furosemide (LASIX) 40 MG tablet, 40 mg as needed Reported on 5/16/2017, Disp: 30 tablet, Rfl:      metFORMIN (GLUCOPHAGE) 500 MG tablet, Take 1 tablet (500 mg) by mouth daily (with breakfast), Disp: 90 tablet, Rfl: 1     aspirin 81 MG tablet, Take by mouth daily Reported on 5/5/2017, Disp: , Rfl:      Glucose Blood (ACCU-CHEK MANUEL PLUS VI), , Disp: , Rfl:      DULoxetine (CYMBALTA) 60 MG EC capsule, Take by mouth daily, Disp: , Rfl:      insulin aspart (NOVOLOG PEN) 100 UNIT/ML injection, Inject 10 Units Subcutaneous 3 times daily (with meals) Uses sliding scale. Usually about 2 units, Disp: , Rfl:      blood glucose monitoring (ACCU-CHEK MULTICLIX) lancets, 1 each by In Vitro route Use to test blood sugaras directed., Disp: , Rfl:      Lutein 20 MG CAPS, Patient states he takes 150 mg tablet twice a day., Disp: , Rfl:      Multiple Vitamin (MULTIVITAMINS PO), Reported on 5/22/2017, Disp: , Rfl:      Omega-3 Fatty Acids (OMEGA-3 FISH OIL PO), Take 1 g by mouth Reported on 5/5/2017, Disp: , Rfl:      PREGABALIN PO, Take 300 mg by mouth 2 times daily Reported on 5/16/2017, Disp: , Rfl:      SIMVASTATIN PO, Take 20 mg by mouth, Disp: , Rfl:      UNABLE TO FIND, cpap, Disp: , Rfl:      Cholecalciferol (VITAMIN D-3 PO), Take 1,000 Units by mouth daily, Disp: , Rfl:      Acetaminophen (TYLENOL PO), Take 1,000 mg by mouth 4 times daily, Disp: , Rfl:      AMIODARONE HCL PO, Take 200 mg by mouth 2 times daily, Disp: , Rfl:      Metoprolol Succinate (TOPROL XL PO), Take 75 mg by mouth daily , Disp: , Rfl:      rivaroxaban ANTICOAGULANT (XARELTO) 20 MG TABS tablet, Take 20 mg by mouth daily (with dinner) Reported on 5/5/2017, Disp: , Rfl:      ALLERGIES:    Allergies   Allergen Reactions     Amlodipine Difficulty breathing     Swelling of the lips and tongue     Ace Inhibitors  "Cough        SOCIAL HISTORY:   Social History     Social History     Marital status:      Spouse name: N/A     Number of children: N/A     Years of education: N/A     Occupational History     Not on file.     Social History Main Topics     Smoking status: Current Every Day Smoker     Types: Dip, chew, snus or snuff     Smokeless tobacco: Current User     Alcohol use No     Drug use: No     Sexual activity: Not on file     Other Topics Concern     Not on file     Social History Narrative        FAMILY HISTORY: History reviewed. No pertinent family history.     EXAM:Vitals: /82 (Cuff Size: Adult Large)  Pulse 64  Temp 98.2  F (36.8  C) (Oral)  Ht 6' 2\" (1.88 m)  Wt (!) 328 lb (148.8 kg)  BMI 42.11 kg/m2  BMI= Body mass index is 42.11 kg/(m^2).    General appearance: Patient is alert and fully cooperative with history & exam.  No sign of distress is noted during the visit.     Psychiatric: Affect is pleasant & appropriate.  Patient appears motivated to improve health.     Respiratory: Breathing is regular & unlabored while sitting.     HEENT: Hearing is intact to spoken word.  Speech is clear.  No gross evidence of visual impairment that would impact ambulation.     Dermatologic: Skin is intact to both lower extremities without significant lesions, rash or abrasion.  No paronychia or evidence of soft tissue infection is noted.     Vascular: DP & PT pulses are intact & regular bilaterally.  Left lateral ankle edema noted along peroneal tendons.  No foot edema along 5th metatarsal.  CFT and skin temperature are normal to both lower extremities.     Neurologic: Lower extremity sensation is diminished to light touch b/l.     Musculoskeletal: L ankle pain with palpation along the peroneal tendons, distal fibula.  No medial ankle pain.  No pain at the 5th metatarsal base with palpation.  Patient is ambulatory without assistive device or brace.  No gross ankle deformity noted.  No foot or ankle joint " effusion is noted.    XRs of left foot/ankle reviewed with pt:    ANKLE LEFT THREE OR MORE VIEWS August 1, 2017 9:49 AM      HISTORY: Pain in left ankle and joints of left foot.     COMPARISON: None.         IMPRESSION: Mild to moderate degenerative changes in the ankle. No  evidence of fracture. The mortise is congruent. There is plantar and  posterior calcaneal spurring.     LARRY MARTINEZ MD      LEFT FOOT THREE OR MORE VIEWS  8/1/2017 9:56 AM      HISTORY: Pain in left ankle and joints of left foot.     COMPARISON: None.         IMPRESSION: There is an ununited fracture at the proximal shaft of the  fifth metatarsal. Degenerative changes in the ankle. There is  calcaneal spurring.     LARRY MARTINEZ MD     ASSESSMENT: L foot and ankle injury, question acute tendon/ligament injury, possible acute on chronic Noriega fracture     PLAN:  Reviewed patient's chart in epic.  Discussed condition and treatment options including pros and cons.    Ankle seems to be the most acute issue.  Tendon/ligament injury, possible occult fx.  I suspect the 5th metatarsal fracture is chronic as there is no pain or edema.  Neuropathy may be masking some pain.      Noriega fractures are notoriously slow to heal, and nonunion is possible.      Sometimes surgery is needed pending progress/findings.    Advised MRI to clarify acuity of findings.  Instructed pt to let MRI staff know he has hardware in his RLE and to check if this is MRI safe.  This was also placed in the order.      NWB advised.  Pt placed in Aircast boot.  He has crutches.  RICE.    F/u 1 wk.  Pt to be off work until that time.       Ang Canales, PAM, FACFAS

## 2017-08-02 NOTE — MR AVS SNAPSHOT
After Visit Summary   8/2/2017    Rhett Elizabeth    MRN: [id number]           Patient Information     Date Of Birth          1960        Visit Information        Provider Department      8/2/2017 9:40 AM Ang Canales DPM [facility]        Today's Diagnoses     Acute left ankle pain    -  1    Ankle injury, left, initial encounter        Foot fracture, left, closed, initial encounter          Care Instructions    No weight bearing to L foot/ankle advised.  Follow up in 1 week.    PRICE Therapy    Many aches and pains throughout the foot and ankle can be helped with many simple treatments.  This is usually described as PRICE Therapy.      P - Protection - often times, inflammation/pain in the lower extremity is not able to improve simply because the areas involved are never allowed to rest.  Every step we take can bother the problematic area.  Protecting those areas is an important step in the healing process.  This may involve a walking cast boot, a special insert/orthotic device, an ankle brace, or simply avoiding barefoot walking.    R - Rest - in addition to protecting the foot/ankle, resting is an important, but often times difficult, treatment option.  Getting off your feet when they bother you, and specifically avoiding activities that cause pain/discomfort, are very beneficial to prevent, and treat, foot/ankle pain.      I - Ice - icing regularly can help to decrease inflammation and swelling in the foot, thus decreasing pain.  Using an ice pack or a bag of frozen peas works very well.  Ice for 20 minutes multiple times per day as needed.  Do not place the ice directly on the skin as this can cause tissue damage.    C - Compression - using a compression wrap or an ACE wrap can help to decrease swelling, which can help to decrease pain.  Wearing the wraps is generally not needed at night, but they should be worn on a regular basis when you are going to be on  your feet for prolonged periods as gravity tends to pull fluids down to your feet/ankles.    E - Elevation - elevating your lower extremities multiple times daily for 15-20 minutes can help to decrease swelling, which works well in decreasing pain levels.        BLOOD CLOT PREVENTION - WEARING A CAST BOOT    Any brace that extends up to the knee can increase your chance of developing a blood clot. Blood clots generally occur in the large veins of your calf, thigh or abdomen. A blood clot may form when blood is stagnant in the veins while your leg is immobilized in the brace. Emerson and relaxing the calf muscles by moving the ankle is the best method to avoid stagnant blood. Clarify with your doctor if you should be doing this as this may not be recommended for certain tendon surgeries.    Blood clots or deep venous thrombosis (DVT) can be life threatening if a portion of the clot breaks away and travels through the veins to your lungs. This is called a pulmonary embolism (PE) which can result in death.    Symptoms of a DVT may include swelling, tenderness, a warm feeling or redness in the leg.  DVT can occur in both legs, even if only one side is in a brace. If you have these symptoms, you should call your doctor immediately or go to the Emergency Room to have your leg scanned.     Symptoms of a pulmonary embolism (PE) include chest pain, shortness of breath or the need to breath rapidly that is not associated with exercise, difficulty breathing, rapid heart rate, coughing or coughing up blood, or a feeling of passing out. Chest pain can range from mild to knife-like pain while taking a deep breath. Pulmonary embolism can occur without any symptoms whatsoever. You need to be evaluated in a hospital emergency room immediately if you have symptoms of a PE. Please call 911 as PE is a life-threatening emergency.    Be certain that you understand how much ankle range of motion is allowed. Your foot and ankle problem  is being immobilized by the brace, yet we do not want you to develop a blood clot. The velcro strap braces are intended to be removed for periodic exercise of the ankle. Your doctor will tell you if it is okay to do this depending on the type of surgery you had.    Your own personal risk of developing a DVT or PE is dependent on many factors. A personal or family history of previous blood clot or a clotting disorder (such as thrombophilia) puts you at risk. Other risk factors include history of cancer, current use of estrogen medications (such as birth control pills or post menopausal medications), recent trauma or fracture, diabetes, recent heart attack, COPD, heart failure, pregnancy, obesity, advanced age, smoking, etc. Please make sure your doctor is aware if you have any of these risk factors.          Body Mass Index (BMI)  Many things can cause foot and ankle problems. Foot structure, activity level, foot mechanics and injuries are common causes of pain.  One very important issue that often goes unmentioned is body weight. Extra weight can cause increased stress on muscles, ligaments, bones and tendons. Sometimes just a few extra pounds is all it takes to put one over her/his threshold. Without reducing that stress, it can be difficult to alleviate pain.   Some people are uncomfortable addressing this issue, but we feel it is important for you to think about it. As Foot & Ankle specialists, our job is addressing the lower extremity problem and possible causes.   Regarding extra body weight, we encourage patients to discuss diet and weight management plans with their primary care doctors. It is this team approach that gives you the best opportunity for pain relief and getting you back on your feet.     SMOKING CESSATION    What's in cigarette smoke? - Cigarette smoke contains over 4,000 chemicals. Nicotine is one of the main ingredients which is an insecticide/herbicide. It is poisonous to our nervous system,  increases blood clotting risk, and decreases the body's defenses to fight off infection. Another chemical is Carbon Monoxide is an asphyxiating gas that permanently binds to blood cells and blocks the transport of oxygen. This leads to tissue death and decreases your metabolism. Tar is a chemical that coats your lungs and trachea which impairs new oxygen coming in and carbon dioxide getting out of your body.   How does smoking impact surgery? - Smoking is particularly hazardous with regards to surgery. Surgery puts stress on the body and a smoker's body is already under strain from these chemicals. Putting the two together, especially for an elective surgery, could be a recipe for disaster. Smoking before and after surgery increases your risk of heart problems, slow wound healing, delayed bone healing, blood clots, wound infection and anesthesia complications.   What are the benefits of quitting? - In 20 minutes your blood pressure will drop back down to normal. In 8 hours the carbon monoxide (a toxic gas) levels in your blood stream will drop by half, and oxygen levels will return to normal. In 48 hours your chance of having a heart attack will have decreased. All nicotine will have left your body. Your sense of taste and smell will return to a normal level. In 72 hours your bronchial tubes will relax, and your energy levels will increase. In 2 weeks your circulation will increase, and it will continue to improve for the next 10 weeks.    Recommendations for elective surgery - Ideally, patients should quit smoking 8 weeks before and at least 2 weeks after elective surgery in order to avoid complications. Simply cutting back on the amount of cigarettes smoked per day does not offer any benefit or decrease the risk of poor wound healing, heart problems, and infection. Smokers should also start taking Vitamin C and B for two weeks before surgery and two weeks after surgery.    Ways to Stop Smokin. Nicotine  patches, lozenges, or gum  2. Support Groups  3. Medications (see below)    List of Medications:  1. Varenicline Tartrate (CHANTIX)   2. Bupropion HCL (WELLBUTRIN, ZYBAN) - note: make sure Wellbutrin is for smoking cessation and not other issues   3. Nicotine Patch (NICODERM)   4. Nicotine Inhaler (NICOTROL)   5. Nicotine Gum Nicotine Polacrilex   6. Nicotine Lozenge: Nicotine Polacrilex (COMMIT)   * [place] offers a smoking support group as well!  Please visit: [email or web address]  If you are interested in these, ask about getting a prescription or talk to your primary care doctor about what may be the best way for you to quit.                 Follow-ups after your visit        Your next 10 appointments already scheduled     Aug 11, 2017  8:30 AM CDT   [facility]    [address] [facility] [address] [address] [phone]           Bring a current list of meds and any records pertaining to this visit. For Physicals, please bring immunization records and any forms needing to be filled out. Please arrive 10 minutes early to complete paperwork.              Future tests that were ordered for you today     Open Future Orders        Priority Expected Expires Ordered    MR Ankle Left w/o & w Contrast Routine  8/2/2018 8/2/2017            Who to contact     If you have questions or need follow up information about today's clinic visit or your schedule please contact [facility] directly at [phone].  Normal or non-critical lab and imaging results will be communicated to you by MyChart, letter or phone within 4 business days after the clinic has received the results. If you do not hear from us within 7 days, please contact the clinic through MyChart or phone. If you have a critical or abnormal lab result, we will notify you by phone as soon as possible.  Submit refill requests through  "MyChart or call your pharmacy and they will forward the refill request to us. Please allow 3 business days for your refill to be completed.          Additional Information About Your Visit        MyChart Information     [email or web address] lets you send messages to your doctor, view your test results, renew your prescriptions, schedule appointments and more. To sign up, go to [email or web address] . Click on \"Log in\" on the left side of the screen, which will take you to the Welcome page. Then click on \"Sign up Now\" on the right side of the page.     You will be asked to enter the access code listed below, as well as some personal information. Please follow the directions to create your username and password.     Your access code is: [email or web address]  Expires: 8/3/2017 10:50 AM     Your access code will  in 90 days. If you need help or a new code, please call your [place] clinic or [phone].        Care EveryWhere ID     This is your Care EveryWhere ID. This could be used by other organizations to access your [place] medical records  [id number]        Your Vitals Were     Pulse Temperature Height BMI (Body Mass Index)          64 98.2  F (36.8  C) (Oral) 6' 2\" (1.88 m) 42.11 kg/m2         Blood Pressure from Last 3 Encounters:   17 134/82   17 135/82   17 127/72    Weight from Last 3 Encounters:   17 (!) 328 lb (148.8 kg)   17 (!) 330 lb (149.7 kg)   17 (!) 334 lb 10.4 oz (151.8 kg)               Primary Care Provider Office Phone # Fax #    Gwen Saldana [phone]       [facility] [address] [facility] [address]        Equal Access to Services     TONY GARCÍA : Aziza Lucio, pattie roberts, halley jones . So [facility] [phone].    ATENCIÓN: Si habla español, tiene a tsang disposición servicios gratuitos de asistencia lingüística. Llame al [phone].    We " comply with applicable federal civil rights laws and [place] laws. We do not discriminate on the basis of race, color, national origin, age, disability sex, sexual orientation or gender identity.            Thank you!     Thank you for choosing [facility]  for your care. Our goal is always to provide you with excellent care. Hearing back from our patients is one way we can continue to improve our services. Please take a few minutes to complete the written survey that you may receive in the mail after your visit with us. Thank you!             Your Updated Medication List - Protect others around you: Learn how to safely use, store and throw away your medicines at www.disposemymeds.org.          This list is accurate as of: 8/2/17 10:06 AM.  Always use your most recent med list.                   Brand Name Dispense Instructions for use Diagnosis    ACCU-CHEK MANUEL PLUS VI           ACE/ARB NOT PRESCRIBED (INTENTIONAL)      Please choose reason not prescribed, below    Type 2 diabetes mellitus with diabetic neuropathy, without long-term current use of insulin (H)       AMIODARONE HCL PO      Take 200 mg by mouth 2 times daily        aspirin 81 MG tablet      Take by mouth daily Reported on 5/5/2017        blood glucose monitoring lancets      1 each by In Vitro route Use to test blood sugaras directed.        cyclobenzaprine 5 MG tablet    FLEXERIL     Take 5 mg by mouth 2 times daily as needed for muscle spasms        DULoxetine 60 MG EC capsule    CYMBALTA     Take by mouth daily        ENSURE COMPLETE PO           furosemide 40 MG tablet    LASIX    30 tablet    40 mg as needed Reported on 5/16/2017        insulin aspart 100 UNIT/ML injection    NovoLOG PEN     Inject 10 Units Subcutaneous 3 times daily (with meals) Uses sliding scale. Usually about 2 units        levothyroxine 100 MCG tablet    SYNTHROID/LEVOTHROID    90 tablet    Take 1 tablet (100 mcg) by mouth daily    Hypothyroidism,  unspecified type       Lutein 20 MG Caps      Patient states he takes 150 mg tablet twice a day.        metFORMIN 500 MG tablet    GLUCOPHAGE    90 tablet    Take 1 tablet (500 mg) by mouth daily (with breakfast)    Type 2 diabetes mellitus with diabetic neuropathy, without long-term current use of insulin (H)       MULTIVITAMINS PO      Reported on 5/22/2017        OMEGA-3 FISH OIL PO      Take 1 g by mouth Reported on 5/5/2017        PREGABALIN PO      Take 300 mg by mouth 2 times daily Reported on 5/16/2017        rivaroxaban ANTICOAGULANT 20 MG Tabs tablet    XARELTO     Take 20 mg by mouth daily (with dinner) Reported on 5/5/2017        SIMVASTATIN PO      Take 20 mg by mouth        TOPROL XL PO      Take 75 mg by mouth daily        TYLENOL PO      Take 1,000 mg by mouth 4 times daily        UNABLE TO FIND      cpap        Vitamin B-12 500 MCG Subl      Place 2 tablets under the tongue daily        VITAMIN D-3 PO      Take 1,000 Units by mouth daily

## 2017-08-02 NOTE — LETTER
"    8/2/2017         RE: Rhett Elizabeth  [address] [address] [address] [address]        Dear Colleague,    Thank you for referring your patient, Rhett Elizabeth, to the [facility]. Please see a copy of my visit note below.    Weight management plan: Patient was referred to their PCP to discuss a diet and exercise plan.      PATIENT HISTORY:  Rhett Elizabeth is a 56 year old male who presents to clinic for left foot and ankle injury.  I was requested to see this patient for this issue by Tete Steele PA-C.  Pt reports hearing a left foot/ankle \"crack\" when walking at work yesterday.  Hx of prior L 5th metatarsal fracture 11/2016 per pt \"that healed.\"  Pt reports pain is over the lateral ankle primarily where swelling is noted. 5-8/10 pain with walking.  No treatments per pt.  RLE tib/fib ORIF in March per pt.    Review of Systems:  Patient denies fever, chills, rash, wound, stiffness, limping, numbness, weakness, heart burn, blood in stool, chest pain with activity, calf pain when walking, shortness of breath with activity, chronic cough, easy bleeding/bruising, swelling of ankles, excessive thirst, fatigue, depression, anxiety.       PAST MEDICAL HISTORY: Smoking, chronic pain  Patient Active Problem List   Diagnosis     Neuropathic pain     Benign essential hypertension     Atrial fibrillation, unspecified type (H)     LANEY (obstructive sleep apnea)     Well controlled diabetes mellitus (H)     Type 2 diabetes mellitus with diabetic neuropathy, without long-term current use of insulin (H)     Type I or II open fracture of distal end of right tibia with routine healing, unspecified fracture morphology, subsequent encounter     Chronic systolic congestive heart failure (H)     Hypothyroidism        PAST SURGICAL HISTORY: History reviewed. No pertinent surgical history.     MEDICATIONS:   Current Outpatient Prescriptions:      order for DME, Tall aircast boot, Disp: 1 Device, Rfl: 0     ACE/ARB " NOT PRESCRIBED, INTENTIONAL,, Please choose reason not prescribed, below, Disp: , Rfl:      cyclobenzaprine (FLEXERIL) 5 MG tablet, Take 5 mg by mouth 2 times daily as needed for muscle spasms, Disp: , Rfl:      Cyanocobalamin (VITAMIN B-12) 500 MCG SUBL, Place 2 tablets under the tongue daily, Disp: , Rfl:      levothyroxine (SYNTHROID/LEVOTHROID) 100 MCG tablet, Take 1 tablet (100 mcg) by mouth daily, Disp: 90 tablet, Rfl: 3     Nutritional Supplements (ENSURE COMPLETE PO), , Disp: , Rfl:      furosemide (LASIX) 40 MG tablet, 40 mg as needed Reported on 5/16/2017, Disp: 30 tablet, Rfl:      metFORMIN (GLUCOPHAGE) 500 MG tablet, Take 1 tablet (500 mg) by mouth daily (with breakfast), Disp: 90 tablet, Rfl: 1     aspirin 81 MG tablet, Take by mouth daily Reported on 5/5/2017, Disp: , Rfl:      Glucose Blood (ACCU-CHEK MANUEL PLUS VI), , Disp: , Rfl:      DULoxetine (CYMBALTA) 60 MG EC capsule, Take by mouth daily, Disp: , Rfl:      insulin aspart (NOVOLOG PEN) 100 UNIT/ML injection, Inject 10 Units Subcutaneous 3 times daily (with meals) Uses sliding scale. Usually about 2 units, Disp: , Rfl:      blood glucose monitoring (ACCU-CHEK MULTICLIX) lancets, 1 each by In Vitro route Use to test blood sugaras directed., Disp: , Rfl:      Lutein 20 MG CAPS, Patient states he takes 150 mg tablet twice a day., Disp: , Rfl:      Multiple Vitamin (MULTIVITAMINS PO), Reported on 5/22/2017, Disp: , Rfl:      Omega-3 Fatty Acids (OMEGA-3 FISH OIL PO), Take 1 g by mouth Reported on 5/5/2017, Disp: , Rfl:      PREGABALIN PO, Take 300 mg by mouth 2 times daily Reported on 5/16/2017, Disp: , Rfl:      SIMVASTATIN PO, Take 20 mg by mouth, Disp: , Rfl:      UNABLE TO FIND, cpap, Disp: , Rfl:      Cholecalciferol (VITAMIN D-3 PO), Take 1,000 Units by mouth daily, Disp: , Rfl:      Acetaminophen (TYLENOL PO), Take 1,000 mg by mouth 4 times daily, Disp: , Rfl:      AMIODARONE HCL PO, Take 200 mg by mouth 2 times daily, Disp: , Rfl:       "Metoprolol Succinate (TOPROL XL PO), Take 75 mg by mouth daily , Disp: , Rfl:      rivaroxaban ANTICOAGULANT (XARELTO) 20 MG TABS tablet, Take 20 mg by mouth daily (with dinner) Reported on 5/5/2017, Disp: , Rfl:      ALLERGIES:    Allergies   Allergen Reactions     Amlodipine Difficulty breathing     Swelling of the lips and tongue     Ace Inhibitors Cough        SOCIAL HISTORY:   Social History     Social History     Marital status:      Spouse name: N/A     Number of children: N/A     Years of education: N/A     Occupational History     Not on file.     Social History Main Topics     Smoking status: Current Every Day Smoker     Types: Dip, chew, snus or snuff     Smokeless tobacco: Current User     Alcohol use No     Drug use: No     Sexual activity: Not on file     Other Topics Concern     Not on file     Social History Narrative        FAMILY HISTORY: History reviewed. No pertinent family history.     EXAM:Vitals: /82 (Cuff Size: Adult Large)  Pulse 64  Temp 98.2  F (36.8  C) (Oral)  Ht 6' 2\" (1.88 m)  Wt (!) 328 lb (148.8 kg)  BMI 42.11 kg/m2  BMI= Body mass index is 42.11 kg/(m^2).    General appearance: Patient is alert and fully cooperative with history & exam.  No sign of distress is noted during the visit.     Psychiatric: Affect is pleasant & appropriate.  Patient appears motivated to improve health.     Respiratory: Breathing is regular & unlabored while sitting.     HEENT: Hearing is intact to spoken word.  Speech is clear.  No gross evidence of visual impairment that would impact ambulation.     Dermatologic: Skin is intact to both lower extremities without significant lesions, rash or abrasion.  No paronychia or evidence of soft tissue infection is noted.     Vascular: DP & PT pulses are intact & regular bilaterally.  Left lateral ankle edema noted along peroneal tendons.  No foot edema along 5th metatarsal.  CFT and skin temperature are normal to both lower extremities.   "   Neurologic: Lower extremity sensation is diminished to light touch b/l.     Musculoskeletal: L ankle pain with palpation along the peroneal tendons, distal fibula.  No medial ankle pain.  No pain at the 5th metatarsal base with palpation.  Patient is ambulatory without assistive device or brace.  No gross ankle deformity noted.  No foot or ankle joint effusion is noted.    XRs of left foot/ankle reviewed with pt:    ANKLE LEFT THREE OR MORE VIEWS August 1, 2017 9:49 AM      HISTORY: Pain in left ankle and joints of left foot.     COMPARISON: None.         IMPRESSION: Mild to moderate degenerative changes in the ankle. No  evidence of fracture. The mortise is congruent. There is plantar and  posterior calcaneal spurring.     LARRY MARTINEZ MD      LEFT FOOT THREE OR MORE VIEWS  8/1/2017 9:56 AM      HISTORY: Pain in left ankle and joints of left foot.     COMPARISON: None.         IMPRESSION: There is an ununited fracture at the proximal shaft of the  fifth metatarsal. Degenerative changes in the ankle. There is  calcaneal spurring.     LARRY MARTINEZ MD     ASSESSMENT: L foot and ankle injury, question acute tendon/ligament injury, possible acute on chronic Noriega fracture     PLAN:  Reviewed patient's chart in epic.  Discussed condition and treatment options including pros and cons.    Ankle seems to be the most acute issue.  Tendon/ligament injury, possible occult fx.  I suspect the 5th metatarsal fracture is chronic as there is no pain or edema.  Neuropathy may be masking some pain.      Noriega fractures are notoriously slow to heal, and nonunion is possible.      Sometimes surgery is needed pending progress/findings.    Advised MRI to clarify acuity of findings.  Instructed pt to let MRI staff know he has hardware in his RLE and to check if this is MRI safe.  This was also placed in the order.      NWB advised.  Pt placed in [employer] boot.  He has crutches.  RICE.    F/u 1 wk.  Pt to be off work until that  time.       Ang Canales DPM, FACFAS      Again, thank you for allowing me to participate in the care of your patient.        Sincerely,        Ang Canales DPM

## 2017-08-07 NOTE — PROGRESS NOTES
SUBJECTIVE:                                                    Rhett Elizabeth is a 56 year old male who presents to clinic today for the following health issues:        Diabetes Follow-up    Patient is checking blood sugars: twice daily.    Blood sugar testing frequency justification: Uncontrolled diabetes  Results are as follows:       am - 100       suppertime - 120        Diabetic concerns: None     Symptoms of hypoglycemia (low blood sugar): none     Paresthesias (numbness or burning in feet) or sores: Yes      Date of last diabetic eye exam: due    Note: patient had tested + for ETOH during an ED visit for confusion. He reports not recent ETOH use today.      Hyperlipidemia Follow-Up      Rate your low fat/cholesterol diet?: good    Taking statin?  Yes, no muscle aches from statin    Other lipid medications/supplements?:  Fish oil/Omega 3, without side effects    Hypertension Follow-up      Outpatient blood pressures are being checked at home.  Results are 130/80.  Low Salt Diet: no added salt      Amount of exercise or physical activity: not much due to foot injury    Problems taking medications regularly: No    Medication side effects: none  Diet: low salt, low fat/cholesterol and diabetic    Patient denies chest pain, chest pressure, shortness of breath, palpitations, headaches, visual changes, or any noted lower extremity swelling.     Problem list and histories reviewed & adjusted, as indicated.  Additional history: as documented    Patient Active Problem List   Diagnosis     Neuropathic pain     Benign essential hypertension     Atrial fibrillation, unspecified type (H)     LANEY (obstructive sleep apnea)     Well controlled diabetes mellitus (H)     Type 2 diabetes mellitus with diabetic neuropathy, without long-term current use of insulin (H)     Type I or II open fracture of distal end of right tibia with routine healing, unspecified fracture morphology, subsequent encounter     Chronic systolic  congestive heart failure (H)     Hypothyroidism     History reviewed. No pertinent surgical history.    Social History   Substance Use Topics     Smoking status: Current Every Day Smoker     Types: Dip, chew, snus or snuff     Smokeless tobacco: Current User     Alcohol use No     History reviewed. No pertinent family history.      Current Outpatient Prescriptions   Medication Sig Dispense Refill     losartan (COZAAR) 25 MG tablet Take 25 mg by mouth daily       order for DME Tall aircast boot 1 Device 0     ACE/ARB NOT PRESCRIBED, INTENTIONAL, Please choose reason not prescribed, below       Cyanocobalamin (VITAMIN B-12) 500 MCG SUBL Place 2 tablets under the tongue daily       levothyroxine (SYNTHROID/LEVOTHROID) 100 MCG tablet Take 1 tablet (100 mcg) by mouth daily 90 tablet 3     Nutritional Supplements (ENSURE COMPLETE PO)        furosemide (LASIX) 40 MG tablet 40 mg as needed Reported on 5/16/2017 30 tablet      metFORMIN (GLUCOPHAGE) 500 MG tablet Take 1 tablet (500 mg) by mouth daily (with breakfast) 90 tablet 1     aspirin 81 MG tablet Take by mouth daily Reported on 5/5/2017       Glucose Blood (ACCU-CHEK MANUEL PLUS VI)        DULoxetine (CYMBALTA) 60 MG EC capsule Take by mouth daily       blood glucose monitoring (ACCU-CHEK MULTICLIX) lancets 1 each by In Vitro route Use to test blood sugaras directed.       Lutein 20 MG CAPS Take 20 mg by mouth daily 8/11/2017- patient states he takes 1 20 mg tablet daily  Patient states he takes 150 mg tablet twice a day.       Multiple Vitamin (MULTIVITAMINS PO) Reported on 5/22/2017       Omega-3 Fatty Acids (OMEGA-3 FISH OIL PO) Take 1 g by mouth Reported on 5/5/2017       PREGABALIN PO Take 300 mg by mouth 2 times daily Reported on 5/16/2017       SIMVASTATIN PO Take 20 mg by mouth       UNABLE TO FIND cpap       Cholecalciferol (VITAMIN D-3 PO) Take 1,000 Units by mouth daily       Acetaminophen (TYLENOL PO) Take 1,000 mg by mouth 4 times daily       Metoprolol  Succinate (TOPROL XL PO) Take 50 mg by mouth daily        rivaroxaban ANTICOAGULANT (XARELTO) 20 MG TABS tablet Take 20 mg by mouth daily (with dinner) Reported on 5/5/2017       insulin aspart (NOVOLOG PEN) 100 UNIT/ML injection Inject 10 Units Subcutaneous 3 times daily (with meals) Uses sliding scale. Usually about 2 units           Reviewed and updated as needed this visit by clinical staff       Reviewed and updated as needed this visit by Provider         ==============================================================  ROS:  Constitutional, HEENT, cardiovascular, pulmonary, GI, , musculoskeletal, neuro, skin, endocrine and psych systems are negative, except as otherwise noted.         OBJECTIVE:                                                    /80 (BP Location: Right arm, Patient Position: Chair, Cuff Size: Adult Large)  Pulse 62  Temp 96.8  F (36  C) (Oral)  Wt (!) 336 lb 6.4 oz (152.6 kg)  SpO2 98%  BMI 43.19 kg/m2  Body mass index is 43.19 kg/(m^2).     GENERAL APPEARANCE: healthy, alert and in no distress  EYES: Eyes grossly normal to inspection, and conjunctivae and sclerae normal  HENT: head normocephalic and atraumatic and mouth without ulcers or lesions, oropharynx clear and oral mucous membranes moist  NECK: no noticeable adenopathy, no asymmetry, masses, or scars   RESP: lungs clear to auscultation - no rales, rhonchi or wheezes  CV: regular rate and rhythm, normal S1 S2, no S3 or S4, no murmur, click or rub, no peripheral edema and peripheral pulses strong  ABDOMEN: soft, nontender, no hepatosplenomegaly, no masses and bowel sounds normal  MS: no musculoskeletal defects are noted and gait is age appropriate without ataxia  SKIN:   right lower extremity skin changes are noted.   O/w, no suspicious lesions or rashes  NEURO: mentation intact and speech normal  PSYCH: mentation appears normal and affect normal/bright.     Results for orders placed or performed in visit on 08/11/17    Albumin Random Urine Quantitative   Result Value Ref Range    Creatinine Urine 72 mg/dL    Albumin Urine mg/L 9 mg/L    Albumin Urine mg/g Cr 12.81 0 - 17 mg/g Cr   Basic metabolic panel   Result Value Ref Range    Sodium 135 133 - 144 mmol/L    Potassium 4.1 3.4 - 5.3 mmol/L    Chloride 98 94 - 109 mmol/L    Carbon Dioxide 29 20 - 32 mmol/L    Anion Gap 8 3 - 14 mmol/L    Glucose 197 (H) 70 - 99 mg/dL    Urea Nitrogen 9 7 - 30 mg/dL    Creatinine 0.90 0.66 - 1.25 mg/dL    GFR Estimate 87 >60 mL/min/1.7m2    GFR Estimate If Black >90   GFR Calc   >60 mL/min/1.7m2    Calcium 9.0 8.5 - 10.1 mg/dL   Hemoglobin A1c   Result Value Ref Range    Hemoglobin A1C 7.3 (H) 4.3 - 6.0 %        ASSESSMENT/PLAN:                                                        ICD-10-CM    1. Well controlled diabetes mellitus (H) E11.9 Albumin Random Urine Quantitative     Basic metabolic panel     Hemoglobin A1c   2. Tobacco use Z72.0    3. Need for prophylactic vaccination against Streptococcus pneumoniae (pneumococcus) Z23 Pneumococcal vaccine 23 valent PPSV23  (Pneumovax) [56313]     ADMIN: Vaccine, Initial (71428)   4. Need for vaccination Z23      (E11.9) Well controlled diabetes mellitus (H)  (primary encounter diagnosis)  Lab Results   Component Value Date    A1C 7.3 08/11/2017    A1C 6.6 03/22/2017     Comment: still relatively well-controlled (now without lows).  Plan:   As per orders above and patient instructions below.   Albumin Random Urine Quantitative, Basic         metabolic panel, Hemoglobin A1c            (Z72.0) Tobacco use  Comment: as noted   Plan:   As per orders above and patient instructions below.   Will likely advise a return visit in 3 months to reassess DM control (as A1C is going up) and reassess tobacco use    (Z23) Need for prophylactic vaccination against Streptococcus pneumoniae (pneumococcus)  Comment: given today:  Plan: Pneumococcal vaccine 23 valent PPSV23          (Pneumovax) [[id number]],  ADMIN: Vaccine, Initial         (80425)            (Z23) Need for vaccination  Comment:   Plan:        Patient Instructions     We will let you know your test results as discussed: by phone for urgent results, otherwise by postal mail.  We'll advise the next visit based on the lab results.     We do advise at least once every 6 month visits for chronic conditions.    Have eye exams once a year at least (due to diabetes).    I strongly urge you to quit smoking. See tips below:      How to Quit Smoking  Smoking is one of the hardest habits to break. About half of all people who have ever smoked have been able to quit. Most people who still smoke want to quit. Here are some of the best ways to stop smoking.    Keep trying  It takes most smokers about eight tries before they can quit entirely. It s important not to give up.  Go cold turkey  Most former smokers quit cold turkey (all at once). Trying to cut back gradually doesn't seem to work as well, perhaps because it continues the smoking habit. Also, it is possible to inhale more while smoking fewer cigarettes. This results in the same amount of nicotine in your body!  Get support  Support programs can be a big help, especially for heavy smokers. These groups offer lectures, ways to change behavior, and peer support. Here are some ways to find a support program:    Free national quitline: 800-QUIT-NOW (854-522-4958).    [facility] quit-smoking programs.    American Lung Association: (769.559.3059).    American Cancer Society (129-605-0218).  Support at home is important too. Nonsmokers can offer praise and encouragement. If the smoker in your life finds it hard to quit, encourage them to keep trying!  Over-the-counter medicines  Nicotine replacement therapy may make quitting easier. Certain aids, such as the nicotine patch, gum, and lozenges, are available without a prescription. It is best to use these under a doctor s care, though. The skin patch provides a steady  "supply of nicotine. Nicotine gum and lozenges give temporary bursts of low levels of nicotine. Both methods reduce the craving for cigarettes. Warning: If you have nausea, vomiting, dizziness, weakness, or a fast heartbeat, stop using these products and see your doctor.  Prescription medicines  After reviewing your smoking patterns and prior attempts to quit, your doctor may offer a prescription medicine such as bupropion, varenicline, a nicotine inhaler, or nasal spray. Each has advantages and side effects. Your doctor can review these with you.  Health benefits of quitting  The benefits of quitting start right away and keep improving the longer you go without smoking. These benefits occur at any age.  So whether you are 17 or 70, quitting is a good decision. Some of the benefits include:    20 minutes: Blood pressure and pulse return to normal.    8 hours: Oxygen levels return to normal.    2 days: Ability to smell and taste begin to improve as damaged nerves regrow.    2 to 3 weeks: Circulation and lung function improve.    1 to 9 months: Coughing, congestion, and shortness of breath decrease; tiredness decreases.    1 year: Risk of heart attack decreases by half.    5 years: Risk of lung cancer decreases by half; risk of stroke becomes the same as a nonsmoker s.  For more on how to quit smoking, try these online resources:     Smokefree.gov    \"Clearing the Air\" booklet from the National Cancer [place]: smokefree.gov/sites/default/files/pdf/clearing-the-air-accessible.pdf  Date Last Reviewed: 4/28/2015 2000-2017 [employer]. [address] [place] [address], [place] [id number]. All rights reserved. This information is not intended as a substitute for professional medical care. Always follow your healthcare professional's instructions.        Planning to Quit Smoking  Your healthcare provider may have told you that you need to give up tobacco. Only you can decide if and when you are ready to quit. " Quitting is hard to do. But the benefits will be worth it. When you  decide to quit, come up with a plan that s right for you. Discuss your plan with your healthcare provider. And talk to your provider about medicines to help you quit.    Line up support  To quit smoking, you ll need a plan and some help. Pick a date within the next 2 to 4 weeks to quit. Use the time between now and that date to arrange for support.    Classes and counselors. Quit-smoking classes  people like you through the process. Get to know others in a class, and support each other beyond the class. Telephone counseling also helps you keep on track. Ask your healthcare provider, local hospital, or public health department to put you in touch with a class and a phone counselor.    Family and friends. Tell your family and friends about your quit date. Ask them to support your change. If they smoke, arrange to see them in smoke-free places. Forbid smoking in your home and car.     Finding something to replace cigarettes may be hard to do. Be aware that some things you choose may be as harmful as cigarettes:    Smokeless (chewing) tobacco is just as harmful as regular tobacco. Tobacco should not be used as a substitute for cigarettes.    Herbal medicines or teas may affect how your body handles nicotine. Talk to your healthcare provider before using these products.    E-cigarettes have less toxins than the smoke from a regular cigarette. But the FDA says that these devices may still have substances that can cause cancer. E-cigarettes are not well regulated. They have not been studied enough to know if they are a good aid to help you stop smoking. Talk with your healthcare provider before using these products.      Quit-smoking products  Many products can help you quit smoking. Some are prescription medicines that help curb your cravings and withdrawal symptoms. Other products slowly lessen the level of nicotine your body absorbs. Nicotine is  the highly addictive substance found in cigarettes, cigars, and chewing tobacco. Nicotine replacement products can help get your body used to slowly decreasing amounts of nicotine after you quit smoking. These products include a nicotine patch, gum, lozenge, nasal spray, and inhaler. Be sure you follow the directions for your medicine or product carefully. Your healthcare provider may tell you to start taking the prescription medicine a week before you plan to quit. Do not smoke while you use nicotine products. Doing so can be very harmful to your health.  For more information    https://smokefree.gov/kxbm-hd-ny-expert    National Cancer [place] Smoking Quitline: 877-44U-QUIT (841-592-7347)   Date Last Reviewed: 2/1/2017 2000-2017 [employer]. [address] [place] [address], [place] [address]. All rights reserved. This information is not intended as a substitute for professional medical care. Always follow your healthcare professional's instructions.        Tips for Quitting Smoking (Cardiovascular)  Quitting smoking is a gift to yourself, one of the best things you can do to keep your heart disease from getting worse. Smoking reduces oxygen flow to your heart by speeding the buildup of plaque and changing the health of your blood vessels. This increases your risk for heart attack, also known as acute myocardial infarction, or AMI. Quitting helps reduce smoking's harmful effects. You may have tried to quit before, but don t give up. Try again. Many smokers try 4 or 5 times before they succeed. It is never too early to benefit from smoking cessation, especially if you already have chronic conditions such as high blood pressure and high cholesterol that put you at increased risk for cardiovascular disease.     You ll have the best chance of success if you join a stop-smoking group and have the support of your doctor, family and friends.     Line up help    Ask for the support of your family and  friends.    Join a smoking cessation class, or ask your healthcare provider for a referral to a psychologist who specializes in helping people quit smoking.     Ask your healthcare provider about nicotine replacement products and prescription medicines that can help you quit.  Set a quit date    Choose a date within the next 2 to 4 weeks.    After picking a day, carmelita it in bold letters on a calendar.     Your quit list  Ideas to stop smoking include:  1. Start by giving up cigarettes at the times you least need them.  2. Keeping a piece of fruit close by at the times you are most vulnerable to reach for a cigarette.  3. Using a nicotine replacement product instead of a cigarette.  Write down a few more ideas.     Set limits    Limit where you can smoke. Pick one room or a porch, and smoke only in that place.    Make smoking outdoors a house rule. Other smokers won t tempt you as much.    Speak to smokers around you about your intent to stop smoking so they can show consideration for you and limit their smoking around you.    Hang a list of   quit benefits  in the spot where you smoke. Put one on the refrigerator and one on your car dashboard.     For more information    smokefree.gov/nywo-za-ay-expert    National Cancer [place] Smoking Quitline: 877-44U-QUIT (348-422-1352)      Date Last Reviewed: 3/26/2016    2061-7595 [employer]. [address] [place] [address], [place] [id number]. All rights reserved. This information is not intended as a substitute for professional medical care. Always follow your healthcare professional's instructions.                        Gwen Saldana MD  [facility]

## 2017-08-08 ENCOUNTER — TRANSFERRED RECORDS (OUTPATIENT)
Dept: HEALTH INFORMATION MANAGEMENT | Facility: CLINIC | Age: 57
End: 2017-08-08

## 2017-08-11 ENCOUNTER — OFFICE VISIT (OUTPATIENT)
Dept: INTERNAL MEDICINE | Facility: CLINIC | Age: 57
End: 2017-08-11
Payer: COMMERCIAL

## 2017-08-11 VITALS
DIASTOLIC BLOOD PRESSURE: 80 MMHG | HEART RATE: 62 BPM | TEMPERATURE: 96.8 F | BODY MASS INDEX: 43.19 KG/M2 | SYSTOLIC BLOOD PRESSURE: 133 MMHG | OXYGEN SATURATION: 98 % | WEIGHT: 315 LBS

## 2017-08-11 DIAGNOSIS — Z23 NEED FOR PROPHYLACTIC VACCINATION AGAINST STREPTOCOCCUS PNEUMONIAE (PNEUMOCOCCUS): ICD-10-CM

## 2017-08-11 DIAGNOSIS — Z23 NEED FOR VACCINATION: ICD-10-CM

## 2017-08-11 DIAGNOSIS — E11.9 WELL CONTROLLED DIABETES MELLITUS (H): Primary | ICD-10-CM

## 2017-08-11 DIAGNOSIS — Z72.0 TOBACCO USE: ICD-10-CM

## 2017-08-11 LAB
ANION GAP SERPL CALCULATED.3IONS-SCNC: 8 MMOL/L (ref 3–14)
BUN SERPL-MCNC: 9 MG/DL (ref 7–30)
CALCIUM SERPL-MCNC: 9 MG/DL (ref 8.5–10.1)
CHLORIDE SERPL-SCNC: 98 MMOL/L (ref 94–109)
CO2 SERPL-SCNC: 29 MMOL/L (ref 20–32)
CREAT SERPL-MCNC: 0.9 MG/DL (ref 0.66–1.25)
CREAT UR-MCNC: 72 MG/DL
GFR SERPL CREATININE-BSD FRML MDRD: 87 ML/MIN/1.7M2
GLUCOSE SERPL-MCNC: 197 MG/DL (ref 70–99)
HBA1C MFR BLD: 7.3 % (ref 4.3–6)
MICROALBUMIN UR-MCNC: 9 MG/L
MICROALBUMIN/CREAT UR: 12.81 MG/G CR (ref 0–17)
POTASSIUM SERPL-SCNC: 4.1 MMOL/L (ref 3.4–5.3)
SODIUM SERPL-SCNC: 135 MMOL/L (ref 133–144)

## 2017-08-11 PROCEDURE — 36415 COLL VENOUS BLD VENIPUNCTURE: CPT | Performed by: INTERNAL MEDICINE

## 2017-08-11 PROCEDURE — 82043 UR ALBUMIN QUANTITATIVE: CPT | Performed by: INTERNAL MEDICINE

## 2017-08-11 PROCEDURE — 83036 HEMOGLOBIN GLYCOSYLATED A1C: CPT | Performed by: INTERNAL MEDICINE

## 2017-08-11 PROCEDURE — 80048 BASIC METABOLIC PNL TOTAL CA: CPT | Performed by: INTERNAL MEDICINE

## 2017-08-11 PROCEDURE — 90471 IMMUNIZATION ADMIN: CPT | Performed by: INTERNAL MEDICINE

## 2017-08-11 PROCEDURE — 99214 OFFICE O/P EST MOD 30 MIN: CPT | Mod: 25 | Performed by: INTERNAL MEDICINE

## 2017-08-11 PROCEDURE — 90732 PPSV23 VACC 2 YRS+ SUBQ/IM: CPT | Performed by: INTERNAL MEDICINE

## 2017-08-11 RX ORDER — LOSARTAN POTASSIUM 25 MG/1
25 TABLET ORAL DAILY
COMMUNITY
Start: 2017-08-08 | End: 2018-11-19

## 2017-08-11 NOTE — MR AVS SNAPSHOT
After Visit Summary   8/11/2017    Rhett Elizabeth    MRN: [id number]           Patient Information     Date Of Birth          1960        Visit Information        Provider Department      8/11/2017 8:30 AM Gwen Saldana MD [facility]        Today's Diagnoses     Need for prophylactic vaccination against Streptococcus pneumoniae (pneumococcus)    -  1    Need for vaccination        Well controlled diabetes mellitus (H)          Care Instructions    We will let you know your test results as discussed: by phone for urgent results, otherwise by postal mail.  We'll advise the next visit based on the lab results.     We do advise at least once every 6 month visits for chronic conditions.    Have eye exams once a year at least (due to diabetes).    I strongly urge you to quit smoking. See tips below:      How to Quit Smoking  Smoking is one of the hardest habits to break. About half of all people who have ever smoked have been able to quit. Most people who still smoke want to quit. Here are some of the best ways to stop smoking.    Keep trying  It takes most smokers about eight tries before they can quit entirely. It s important not to give up.  Go cold turkey  Most former smokers quit cold turkey (all at once). Trying to cut back gradually doesn't seem to work as well, perhaps because it continues the smoking habit. Also, it is possible to inhale more while smoking fewer cigarettes. This results in the same amount of nicotine in your body!  Get support  Support programs can be a big help, especially for heavy smokers. These groups offer lectures, ways to change behavior, and peer support. Here are some ways to find a support program:    Free national quitline: 800-QUIT-NOW (320-678-3278).    Hospital quit-smoking programs.    American Lung Association: (511.693.4735).    American Cancer Society (003-901-2120).  Support at home is important too. Nonsmokers can offer  "praise and encouragement. If the smoker in your life finds it hard to quit, encourage them to keep trying!  Over-the-counter medicines  Nicotine replacement therapy may make quitting easier. Certain aids, such as the nicotine patch, gum, and lozenges, are available without a prescription. It is best to use these under a doctor s care, though. The skin patch provides a steady supply of nicotine. Nicotine gum and lozenges give temporary bursts of low levels of nicotine. Both methods reduce the craving for cigarettes. Warning: If you have nausea, vomiting, dizziness, weakness, or a fast heartbeat, stop using these products and see your doctor.  Prescription medicines  After reviewing your smoking patterns and prior attempts to quit, your doctor may offer a prescription medicine such as bupropion, varenicline, a nicotine inhaler, or nasal spray. Each has advantages and side effects. Your doctor can review these with you.  Health benefits of quitting  The benefits of quitting start right away and keep improving the longer you go without smoking. These benefits occur at any age.  So whether you are 17 or 70, quitting is a good decision. Some of the benefits include:    20 minutes: Blood pressure and pulse return to normal.    8 hours: Oxygen levels return to normal.    2 days: Ability to smell and taste begin to improve as damaged nerves regrow.    2 to 3 weeks: Circulation and lung function improve.    1 to 9 months: Coughing, congestion, and shortness of breath decrease; tiredness decreases.    1 year: Risk of heart attack decreases by half.    5 years: Risk of lung cancer decreases by half; risk of stroke becomes the same as a nonsmoker s.  For more on how to quit smoking, try these online resources:     Smokefree.gov    \"Clearing the Air\" booklet from the National Cancer [place]: smokefree.gov/sites/default/files/pdf/clearing-the-air-accessible.pdf  Date Last Reviewed: 4/28/2015 2000-2017 [employer], " [employer]. [address] [place] [address]. All rights reserved. This information is not intended as a substitute for professional medical care. Always follow your healthcare professional's instructions.        Planning to Quit Smoking  Your healthcare provider may have told you that you need to give up tobacco. Only you can decide if and when you are ready to quit. Quitting is hard to do. But the benefits will be worth it. When you  decide to quit, come up with a plan that s right for you. Discuss your plan with your healthcare provider. And talk to your provider about medicines to help you quit.    Line up support  To quit smoking, you ll need a plan and some help. Pick a date within the next 2 to 4 weeks to quit. Use the time between now and that date to arrange for support.    Classes and counselors. Quit-smoking classes  people like you through the process. Get to know others in a class, and support each other beyond the class. Telephone counseling also helps you keep on track. Ask your healthcare provider, local hospital, or public health department to put you in touch with a class and a phone counselor.    Family and friends. Tell your family and friends about your quit date. Ask them to support your change. If they smoke, arrange to see them in smoke-free places. Forbid smoking in your home and car.     Finding something to replace cigarettes may be hard to do. Be aware that some things you choose may be as harmful as cigarettes:    Smokeless (chewing) tobacco is just as harmful as regular tobacco. Tobacco should not be used as a substitute for cigarettes.    Herbal medicines or teas may affect how your body handles nicotine. Talk to your healthcare provider before using these products.    E-cigarettes have less toxins than the smoke from a regular cigarette. But the FDA says that these devices may still have substances that can cause cancer. E-cigarettes are not well regulated. They have not been  studied enough to know if they are a good aid to help you stop smoking. Talk with your healthcare provider before using these products.      Quit-smoking products  Many products can help you quit smoking. Some are prescription medicines that help curb your cravings and withdrawal symptoms. Other products slowly lessen the level of nicotine your body absorbs. Nicotine is the highly addictive substance found in cigarettes, cigars, and chewing tobacco. Nicotine replacement products can help get your body used to slowly decreasing amounts of nicotine after you quit smoking. These products include a nicotine patch, gum, lozenge, nasal spray, and inhaler. Be sure you follow the directions for your medicine or product carefully. Your healthcare provider may tell you to start taking the prescription medicine a week before you plan to quit. Do not smoke while you use nicotine products. Doing so can be very harmful to your health.  For more information    https://smokefree.gov/irat-tw-hl-expert    National Cancer [place] Smoking Quitline: 877-44U-QUIT (928-267-1156)   Date Last Reviewed: 2/1/2017 2000-2017 [employer]. [address] [place] [address]. All rights reserved. This information is not intended as a substitute for professional medical care. Always follow your healthcare professional's instructions.        Tips for Quitting Smoking (Cardiovascular)  Quitting smoking is a gift to yourself, one of the best things you can do to keep your heart disease from getting worse. Smoking reduces oxygen flow to your heart by speeding the buildup of plaque and changing the health of your blood vessels. This increases your risk for heart attack, also known as acute myocardial infarction, or AMI. Quitting helps reduce smoking's harmful effects. You may have tried to quit before, but don t give up. Try again. Many smokers try 4 or 5 times before they succeed. It is never too early to benefit from smoking  cessation, especially if you already have chronic conditions such as high blood pressure and high cholesterol that put you at increased risk for cardiovascular disease.     You ll have the best chance of success if you join a stop-smoking group and have the support of your doctor, family and friends.     Line up help    Ask for the support of your family and friends.    Join a smoking cessation class, or ask your healthcare provider for a referral to a psychologist who specializes in helping people quit smoking.     Ask your healthcare provider about nicotine replacement products and prescription medicines that can help you quit.  Set a quit date    Choose a date within the next 2 to 4 weeks.    After picking a day, carmelita it in bold letters on a calendar.     Your quit list  Ideas to stop smoking include:  1. Start by giving up cigarettes at the times you least need them.  2. Keeping a piece of fruit close by at the times you are most vulnerable to reach for a cigarette.  3. Using a nicotine replacement product instead of a cigarette.  Write down a few more ideas.     Set limits    Limit where you can smoke. Pick one room or a porch, and smoke only in that place.    Make smoking outdoors a house rule. Other smokers won t tempt you as much.    Speak to smokers around you about your intent to stop smoking so they can show consideration for you and limit their smoking around you.    Hang a list of   quit benefits  in the spot where you smoke. Put one on the refrigerator and one on your car dashboard.     For more information    smokefree.gov/rlkm-rb-hl-expert    National Cancer [place] Smoking Quitline: 877-44U-QUIT (864-266-8228)      Date Last Reviewed: 3/26/2016    0757-6126 [employer]. [address] [place] [address], [place] [address]. All rights reserved. This information is not intended as a substitute for professional medical care. Always follow your healthcare professional's instructions.                 "Follow-ups after your visit        Your next 10 appointments already scheduled     Aug 16, 2017  7:20 AM CDT   Return Visit with Ang Canales DPM   [facility]    [address] [place] [id number] [phone]              Who to contact     If you have questions or need follow up information about today's clinic visit or your schedule please contact [facility] directly at [phone].  Normal or non-critical lab and imaging results will be communicated to you by MyChart, letter or phone within 4 business days after the clinic has received the results. If you do not hear from us within 7 days, please contact the clinic through [employer] outdoorshart or phone. If you have a critical or abnormal lab result, we will notify you by phone as soon as possible.  Submit refill requests through Savedaily or call your pharmacy and they will forward the refill request to us. Please allow 3 business days for your refill to be completed.          Additional Information About Your Visit        MyCharSanaexpert Information     Savedaily lets you send messages to your doctor, view your test results, renew your prescriptions, schedule appointments and more. To sign up, go to [email or web address] . Click on \"Log in\" on the left side of the screen, which will take you to the Welcome page. Then click on \"Sign up Now\" on the right side of the page.     You will be asked to enter the access code listed below, as well as some personal information. Please follow the directions to create your username and password.     Your access code is: [email or web address]  Expires: 2017  6:31 AM     Your access code will  in 90 days. If you need help or a new code, please call your [facility] or [phone].        Care EveryWhere ID     This is your Care EveryWhere ID. This could be used by other organizations to access your [place] medical records  [id number]        Your " Vitals Were     Pulse Temperature Pulse Oximetry BMI (Body Mass Index)          62 96.8  F (36  C) (Oral) 98% 43.19 kg/m2         Blood Pressure from Last 3 Encounters:   08/11/17 133/80   08/02/17 134/82   08/01/17 135/82    Weight from Last 3 Encounters:   08/11/17 (!) 336 lb 6.4 oz (152.6 kg)   08/02/17 (!) 328 lb (148.8 kg)   08/01/17 (!) 330 lb (149.7 kg)              We Performed the Following     ADMIN: Vaccine, Initial (08113)     Albumin Random Urine Quantitative     Basic metabolic panel     Hemoglobin A1c     Pneumococcal vaccine 23 valent PPSV23  (Pneumovax) [69252]          Today's Medication Changes          These changes are accurate as of: 8/11/17  9:19 AM.  If you have any questions, ask your nurse or doctor.               Stop taking these medicines if you haven't already. Please contact your care team if you have questions.     AMIODARONE HCL PO   Stopped by:  Gwen Saldana MD                    Primary Care Provider Office Phone # Fax #    Gwen Saldana [phone] [address] [facility] [address]        Equal Access to Services     HANNAH GARCÍA : Hadii connie grayson hadasho Soomaali, waaxda luqadaha, qaybta kaalmada adeegyada, halley winchester. So [facility] [phone].    ATENCIÓN: Si habla español, tiene a tsang disposición servicios gratuitos de asistencia lingüística. Llame al [phone].    We comply with applicable federal civil rights laws and [place] laws. We do not discriminate on the basis of race, color, national origin, age, disability sex, sexual orientation or gender identity.            Thank you!     Thank you for choosing [facility]  for your care. Our goal is always to provide you with excellent care. Hearing back from our patients is one way we can continue to improve our services. Please take a few minutes to complete the written survey that you may receive in the mail after your visit with  us. Thank you!             Your Updated Medication List - Protect others around you: Learn how to safely use, store and throw away your medicines at www.disposemymeds.org.          This list is accurate as of: 8/11/17  9:19 AM.  Always use your most recent med list.                   Brand Name Dispense Instructions for use Diagnosis    ACCU-CHEK MANUEL PLUS VI           ACE/ARB NOT PRESCRIBED (INTENTIONAL)      Please choose reason not prescribed, below    Type 2 diabetes mellitus with diabetic neuropathy, without long-term current use of insulin (H)       aspirin 81 MG tablet      Take by mouth daily Reported on 5/5/2017        blood glucose monitoring lancets      1 each by In Vitro route Use to test blood sugaras directed.        DULoxetine 60 MG EC capsule    CYMBALTA     Take by mouth daily        ENSURE COMPLETE PO           furosemide 40 MG tablet    LASIX    30 tablet    40 mg as needed Reported on 5/16/2017        insulin aspart 100 UNIT/ML injection    NovoLOG PEN     Inject 10 Units Subcutaneous 3 times daily (with meals) Uses sliding scale. Usually about 2 units        levothyroxine 100 MCG tablet    SYNTHROID/LEVOTHROID    90 tablet    Take 1 tablet (100 mcg) by mouth daily    Hypothyroidism, unspecified type       losartan 25 MG tablet    COZAAR     Take 25 mg by mouth daily        Lutein 20 MG Caps      Take 20 mg by mouth daily 8/11/2017- patient states he takes 1 20 mg tablet daily Patient states he takes 150 mg tablet twice a day.        metFORMIN 500 MG tablet    GLUCOPHAGE    90 tablet    Take 1 tablet (500 mg) by mouth daily (with breakfast)    Type 2 diabetes mellitus with diabetic neuropathy, without long-term current use of insulin (H)       MULTIVITAMINS PO      Reported on 5/22/2017        OMEGA-3 FISH OIL PO      Take 1 g by mouth Reported on 5/5/2017        order for DME     1 Device    Tall aircast boot    Acute left ankle pain, Ankle injury, left, initial encounter, Foot fracture, left,  closed, initial encounter       PREGABALIN PO      Take 300 mg by mouth 2 times daily Reported on 5/16/2017        rivaroxaban ANTICOAGULANT 20 MG Tabs tablet    XARELTO     Take 20 mg by mouth daily (with dinner) Reported on 5/5/2017        SIMVASTATIN PO      Take 20 mg by mouth        TOPROL XL PO      Take 50 mg by mouth daily        TYLENOL PO      Take 1,000 mg by mouth 4 times daily        UNABLE TO FIND      cpap        Vitamin B-12 500 MCG Subl      Place 2 tablets under the tongue daily        VITAMIN D-3 PO      Take 1,000 Units by mouth daily

## 2017-08-11 NOTE — NURSING NOTE
"Chief Complaint   Patient presents with     RECHECK     Chronic conditions       Initial /80 (BP Location: Right arm, Patient Position: Chair, Cuff Size: Adult Large)  Pulse 62  Temp 96.8  F (36  C) (Oral)  Wt (!) 336 lb 6.4 oz (152.6 kg)  SpO2 98%  BMI 43.19 kg/m2 Estimated body mass index is 43.19 kg/(m^2) as calculated from the following:    Height as of 8/2/17: 6' 2\" (1.88 m).    Weight as of this encounter: 336 lb 6.4 oz (152.6 kg).  Medication Reconciliation: complete    Shonda Pruett CMA    Screening Questionnaire for Adult Immunization    Are you sick today?   No   Do you have allergies to medications, food, a vaccine component or latex?   No   Have you ever had a serious reaction after receiving a vaccination?   No   Do you have a long-term health problem with heart disease, lung disease, asthma, kidney disease, metabolic disease (e.g. diabetes), anemia, or other blood disorder?   Yes   Do you have cancer, leukemia, HIV/AIDS, or any other immune system problem?   No   In the past 3 months, have you taken medications that affect  your immune system, such as prednisone, other steroids, or anticancer drugs; drugs for the treatment of rheumatoid arthritis, Crohn s disease, or psoriasis; or have you had radiation treatments?   No   Have you had a seizure, or a brain or other nervous system problem?   No   During the past year, have you received a transfusion of blood or blood     products, or been given immune (gamma) globulin or antiviral drug?   No   For women: Are you pregnant or is there a chance you could become        pregnant during the next month?   No   Have you received any vaccinations in the past 4 weeks?   No     Immunization questionnaire was positive for at least one answer.  Notified Dr york.      MNVFC doesn't apply on this patient    Per orders of   injection of Pneumovax 23, given by Shonda Pruett. Patient instructed to remain in clinic for 15 minutes afterwards, and " to report any adverse reaction to me immediately.       Screening performed by Shonda Pruett on 8/11/2017 at 9:04 AM.

## 2017-08-11 NOTE — PATIENT INSTRUCTIONS
We will let you know your test results as discussed: by phone for urgent results, otherwise by postal mail.  We'll advise the next visit based on the lab results.     We do advise at least once every 6 month visits for chronic conditions.    Have eye exams once a year at least (due to diabetes).    I strongly urge you to quit smoking. See tips below:      How to Quit Smoking  Smoking is one of the hardest habits to break. About half of all people who have ever smoked have been able to quit. Most people who still smoke want to quit. Here are some of the best ways to stop smoking.    Keep trying  It takes most smokers about eight tries before they can quit entirely. It s important not to give up.  Go cold turkey  Most former smokers quit cold turkey (all at once). Trying to cut back gradually doesn't seem to work as well, perhaps because it continues the smoking habit. Also, it is possible to inhale more while smoking fewer cigarettes. This results in the same amount of nicotine in your body!  Get support  Support programs can be a big help, especially for heavy smokers. These groups offer lectures, ways to change behavior, and peer support. Here are some ways to find a support program:    Free national quitline: 800-QUIT-NOW (486-005-3590).    Hospital quit-smoking programs.    American Lung Association: (287.998.9140).    American Cancer Society (036-197-9321).  Support at home is important too. Nonsmokers can offer praise and encouragement. If the smoker in your life finds it hard to quit, encourage them to keep trying!  Over-the-counter medicines  Nicotine replacement therapy may make quitting easier. Certain aids, such as the nicotine patch, gum, and lozenges, are available without a prescription. It is best to use these under a doctor s care, though. The skin patch provides a steady supply of nicotine. Nicotine gum and lozenges give temporary bursts of low levels of nicotine. Both methods reduce the craving for  "cigarettes. Warning: If you have nausea, vomiting, dizziness, weakness, or a fast heartbeat, stop using these products and see your doctor.  Prescription medicines  After reviewing your smoking patterns and prior attempts to quit, your doctor may offer a prescription medicine such as bupropion, varenicline, a nicotine inhaler, or nasal spray. Each has advantages and side effects. Your doctor can review these with you.  Health benefits of quitting  The benefits of quitting start right away and keep improving the longer you go without smoking. These benefits occur at any age.  So whether you are 17 or 70, quitting is a good decision. Some of the benefits include:    20 minutes: Blood pressure and pulse return to normal.    8 hours: Oxygen levels return to normal.    2 days: Ability to smell and taste begin to improve as damaged nerves regrow.    2 to 3 weeks: Circulation and lung function improve.    1 to 9 months: Coughing, congestion, and shortness of breath decrease; tiredness decreases.    1 year: Risk of heart attack decreases by half.    5 years: Risk of lung cancer decreases by half; risk of stroke becomes the same as a nonsmoker s.  For more on how to quit smoking, try these online resources:     Smokefree.gov    \"Clearing the Air\" booklet from the National Cancer [place]: smokefree.gov/sites/default/files/pdf/clearing-the-air-accessible.pdf  Date Last Reviewed: 4/28/2015 2000-2017 [employer]. [address] [place] [address]. All rights reserved. This information is not intended as a substitute for professional medical care. Always follow your healthcare professional's instructions.        Planning to Quit Smoking  Your healthcare provider may have told you that you need to give up tobacco. Only you can decide if and when you are ready to quit. Quitting is hard to do. But the benefits will be worth it. When you  decide to quit, come up with a plan that s right for you. Discuss " your plan with your healthcare provider. And talk to your provider about medicines to help you quit.    Line up support  To quit smoking, you ll need a plan and some help. Pick a date within the next 2 to 4 weeks to quit. Use the time between now and that date to arrange for support.    Classes and counselors. Quit-smoking classes  people like you through the process. Get to know others in a class, and support each other beyond the class. Telephone counseling also helps you keep on track. Ask your healthcare provider, local hospital, or public health department to put you in touch with a class and a phone counselor.    Family and friends. Tell your family and friends about your quit date. Ask them to support your change. If they smoke, arrange to see them in smoke-free places. Forbid smoking in your home and car.     Finding something to replace cigarettes may be hard to do. Be aware that some things you choose may be as harmful as cigarettes:    Smokeless (chewing) tobacco is just as harmful as regular tobacco. Tobacco should not be used as a substitute for cigarettes.    Herbal medicines or teas may affect how your body handles nicotine. Talk to your healthcare provider before using these products.    E-cigarettes have less toxins than the smoke from a regular cigarette. But the FDA says that these devices may still have substances that can cause cancer. E-cigarettes are not well regulated. They have not been studied enough to know if they are a good aid to help you stop smoking. Talk with your healthcare provider before using these products.      Quit-smoking products  Many products can help you quit smoking. Some are prescription medicines that help curb your cravings and withdrawal symptoms. Other products slowly lessen the level of nicotine your body absorbs. Nicotine is the highly addictive substance found in cigarettes, cigars, and chewing tobacco. Nicotine replacement products can help get your body  used to slowly decreasing amounts of nicotine after you quit smoking. These products include a nicotine patch, gum, lozenge, nasal spray, and inhaler. Be sure you follow the directions for your medicine or product carefully. Your healthcare provider may tell you to start taking the prescription medicine a week before you plan to quit. Do not smoke while you use nicotine products. Doing so can be very harmful to your health.  For more information    https://smokefree.gov/ityk-rm-ln-expert    National Cancer [place] Smoking Quitline: 877-44U-QUIT (469-008-2206)   Date Last Reviewed: 2/1/2017 2000-2017 [employer]. [address] [place] [address], [place] [address]. All rights reserved. This information is not intended as a substitute for professional medical care. Always follow your healthcare professional's instructions.        Tips for Quitting Smoking (Cardiovascular)  Quitting smoking is a gift to yourself, one of the best things you can do to keep your heart disease from getting worse. Smoking reduces oxygen flow to your heart by speeding the buildup of plaque and changing the health of your blood vessels. This increases your risk for heart attack, also known as acute myocardial infarction, or AMI. Quitting helps reduce smoking's harmful effects. You may have tried to quit before, but don t give up. Try again. Many smokers try 4 or 5 times before they succeed. It is never too early to benefit from smoking cessation, especially if you already have chronic conditions such as high blood pressure and high cholesterol that put you at increased risk for cardiovascular disease.     You ll have the best chance of success if you join a stop-smoking group and have the support of your doctor, family and friends.     Line up help    Ask for the support of your family and friends.    Join a smoking cessation class, or ask your healthcare provider for a referral to a psychologist who specializes in helping people  quit smoking.     Ask your healthcare provider about nicotine replacement products and prescription medicines that can help you quit.  Set a quit date    Choose a date within the next 2 to 4 weeks.    After picking a day, carmelita it in bold letters on a calendar.     Your quit list  Ideas to stop smoking include:  1. Start by giving up cigarettes at the times you least need them.  2. Keeping a piece of fruit close by at the times you are most vulnerable to reach for a cigarette.  3. Using a nicotine replacement product instead of a cigarette.  Write down a few more ideas.     Set limits    Limit where you can smoke. Pick one room or a porch, and smoke only in that place.    Make smoking outdoors a house rule. Other smokers won t tempt you as much.    Speak to smokers around you about your intent to stop smoking so they can show consideration for you and limit their smoking around you.    Hang a list of   quit benefits  in the spot where you smoke. Put one on the refrigerator and one on your car dashboard.     For more information    smokefree.gov/cdty-rx-fe-expert    National Cancer [place] Smoking Quitline: 877-44U-QUIT (078-841-7217)      Date Last Reviewed: 3/26/2016    7914-8265 [employer]. [address] [place] [address], [place] [address]. All rights reserved. This information is not intended as a substitute for professional medical care. Always follow your healthcare professional's instructions.

## 2017-08-14 NOTE — PROGRESS NOTES
Dear Rhett,     Your test results are attached.      Please notify e via MyChart or contact the clinic at [phone] if you have any questions.    Gwen Saldana MD

## 2017-08-16 ENCOUNTER — RADIANT APPOINTMENT (OUTPATIENT)
Dept: GENERAL RADIOLOGY | Facility: CLINIC | Age: 57
End: 2017-08-16
Attending: PODIATRIST
Payer: COMMERCIAL

## 2017-08-16 ENCOUNTER — OFFICE VISIT (OUTPATIENT)
Dept: PODIATRY | Facility: CLINIC | Age: 57
End: 2017-08-16
Payer: COMMERCIAL

## 2017-08-16 VITALS
WEIGHT: 315 LBS | SYSTOLIC BLOOD PRESSURE: 128 MMHG | DIASTOLIC BLOOD PRESSURE: 80 MMHG | BODY MASS INDEX: 40.43 KG/M2 | HEIGHT: 74 IN

## 2017-08-16 DIAGNOSIS — S86.312D PERONEAL TENDON TEAR, LEFT, SUBSEQUENT ENCOUNTER: ICD-10-CM

## 2017-08-16 DIAGNOSIS — S99.912D INJURY OF ANKLE AND FOOT, LEFT, SUBSEQUENT ENCOUNTER: ICD-10-CM

## 2017-08-16 DIAGNOSIS — S92.902D FOOT FRACTURE, LEFT, WITH ROUTINE HEALING, SUBSEQUENT ENCOUNTER: Primary | ICD-10-CM

## 2017-08-16 DIAGNOSIS — S99.922D INJURY OF ANKLE AND FOOT, LEFT, SUBSEQUENT ENCOUNTER: ICD-10-CM

## 2017-08-16 DIAGNOSIS — S81.801A WOUND, OPEN, LEG, RIGHT, INITIAL ENCOUNTER: ICD-10-CM

## 2017-08-16 PROCEDURE — 99214 OFFICE O/P EST MOD 30 MIN: CPT | Performed by: PODIATRIST

## 2017-08-16 PROCEDURE — 73630 X-RAY EXAM OF FOOT: CPT | Mod: LT

## 2017-08-16 NOTE — LETTER
"    8/16/2017         RE: Rhett Elizabeth  [id number] [facility] [address]        Dear Colleague,    Thank you for referring your patient, Rhett Elizabeth, to the [facility]. Please see a copy of my visit note below.    Weight management plan: Patient was referred to their PCP to discuss a diet and exercise plan.     BERTRAM Carvalho MA August 16, 2017 7:43 AM      PATIENT HISTORY:  Rhett Elizabeth is a 56 year old male who presents to clinic for recheck of L foot and ankle injury.  Pt reports hearing a left foot/ankle \"crack\" when walking at work 8/2/17.  Hx of prior L 5th metatarsal fracture 11/2016 per pt \"that healed.\"   Here to discuss MRI.  Pt with minimal pain today.  He is in a boot, but admits to wt bearing.  He has been working, despite advice to be off work.  He doesn't have light duty options.  Pt also reports new wound to right anterior shin area, near where he had prior tib fib ORIF.  This is recurrent per pt.  Denies fevers, new injury.  He is limping,  Hx of DM with neuropathy.  Last HbA1c 7.3.  Nonsmoker.  Denies related family hx.       EXAM:Vitals: /80 (BP Location: Right arm, Patient Position: Chair, Cuff Size: Adult Large)  Ht 6' 2\" (1.88 m)  Wt (!) 336 lb 6.4 oz (152.6 kg)  BMI 43.19 kg/m2  BMI= Body mass index is 43.19 kg/(m^2).    General appearance: Patient is alert and fully cooperative with history & exam.  No sign of distress is noted during the visit.     Dermatologic: 2cm diameter wound w/skin breakdown to right anterior shin near prior surgical site.  No other wounds b/l.   No paronychia or evidence of soft tissue infection is noted.      Vascular: DP & PT pulses are intact & regular bilaterally.  Left lateral ankle edema improved today.  No foot edema along 5th metatarsal.  CFT and skin temperature are normal to both lower extremities.      Neurologic: Lower extremity sensation is diminished to light touch b/l.      Musculoskeletal: L ankle pain with " palpation along the peroneal tendons is minimal on exam.  Eversion strength of foot 5/5.  No medial ankle pain.  No pain at the 5th metatarsal base with palpation.  No gross ankle deformity noted.  No foot or ankle joint effusion is noted.    XRs of left foot reviewed with pt:  No significant change to L 5th metatarsal base fx.      MR reviewed with pt:    Impression:  1. High-grade, essentially full-thickness split tearing of the  peroneal brevis from myotendinous junction with marked tendinosis and  associated tenosynovitis. There is minimal distal reconstitution.  2. Constellation of findings consistent with remote ankle injury  involving the syndesmotic, spring, lateral and deltoid ligamentous  complexes including complete tearing of the anterior talofibular  ligament.  3. Redemonstration of fracture through the proximal shaft of the fifth  metatarsal with bony edema proximal and distal to the fracture site.   4. Bony edema in the base and proximal shaft of the second metatarsal  and to a lesser extent the proximal third and fourth metatarsals,  consistent with stress reaction.  5. 3 mm osteochondral lesions with overlying full-thickness cartilage  defect in the medial talar dome.     I have personally reviewed the examination and initial interpretation  and I agree with the findings.     BAUTISTA ROJAS     ASSESSMENT: L foot/ankle injury with peroneal tendon tear, ligamentous injuries of the ankle, likely acute on chronic 5th metatarsal base/Noriega fracture.    DM with hx of neuropathy.     New R lower leg wound.     PLAN:  Reviewed patient's chart in epic.  Discussed condition and treatment options including pros and cons.    We discussed findings in detail.  Discussed options for L foot/ankle including surgical repair vs nonoperative repair.    I am more reluctant to do surgical repair given his high A1C/risk of complications, and due to his neuropathy/lack of pain.  Discussed risk nonunion with 5th met  base fracture.  Pt elected to proceed with nonoperative care.  RICE.  NWB in [facility] advised.  F/u in 4 wks.    Pt to be off work during this time.  Advised f/u with Ortho regarding his recurrent RLE wound near prior surgical area; keep clean, dry, dressed daily and monitor for infection.     Ang Canales DPM, FACFAS          Again, thank you for allowing me to participate in the care of your patient.        Sincerely,        Ang Canales DPM

## 2017-08-16 NOTE — PROGRESS NOTES
"PATIENT HISTORY:  Rhett Elizabeth is a 56 year old male who presents to clinic for recheck of L foot and ankle injury.  Pt reports hearing a left foot/ankle \"crack\" when walking at work 8/2/17.  Hx of prior L 5th metatarsal fracture 11/2016 per pt \"that healed.\"  Here to discuss MRI.  Pt with minimal pain today.  He is in a boot, but admits to wt bearing.  He has been working, despite advice to be off work.  He doesn't have light duty options and has to work on his feet.  Pt also reports new wound to right anterior shin area, near where he had prior tib fib ORIF.  This is recurrent per pt.  Denies fevers, new injury.  He is limping,  Hx of DM with neuropathy.  Last HbA1c 7.3.  Nonsmoker.  Denies related family hx.       EXAM:Vitals: /80 (BP Location: Right arm, Patient Position: Chair, Cuff Size: Adult Large)  Ht 6' 2\" (1.88 m)  Wt (!) 336 lb 6.4 oz (152.6 kg)  BMI 43.19 kg/m2  BMI= Body mass index is 43.19 kg/(m^2).    General appearance: Patient is alert and fully cooperative with history & exam.  No sign of distress is noted during the visit.     Dermatologic: 2cm diameter wound w/skin breakdown to right anterior shin near prior surgical site.  No other wounds b/l.   No paronychia or evidence of soft tissue infection is noted.      Vascular: DP & PT pulses are intact & regular bilaterally.  Left lateral ankle edema improved today.  No foot edema along 5th metatarsal.  CFT and skin temperature are normal to both lower extremities.      Neurologic: Lower extremity sensation is diminished to light touch b/l.      Musculoskeletal: L ankle pain with palpation along the peroneal tendons is minimal on exam.  Eversion strength of foot 5/5.  No medial ankle pain.  No pain at the 5th metatarsal base with palpation.  No gross ankle deformity noted.  No foot or ankle joint effusion is noted.    XRs of left foot reviewed with pt:  No significant change to L 5th metatarsal base fx.      MR reviewed with " pt:    Impression:  1. High-grade, essentially full-thickness split tearing of the  peroneal brevis from myotendinous junction with marked tendinosis and  associated tenosynovitis. There is minimal distal reconstitution.  2. Constellation of findings consistent with remote ankle injury  involving the syndesmotic, spring, lateral and deltoid ligamentous  complexes including complete tearing of the anterior talofibular  ligament.  3. Redemonstration of fracture through the proximal shaft of the fifth  metatarsal with bony edema proximal and distal to the fracture site.   4. Bony edema in the base and proximal shaft of the second metatarsal  and to a lesser extent the proximal third and fourth metatarsals,  consistent with stress reaction.  5. 3 mm osteochondral lesions with overlying full-thickness cartilage  defect in the medial talar dome.     I have personally reviewed the examination and initial interpretation  and I agree with the findings.     BAUTISTA ROJAS     ASSESSMENT: L foot/ankle injury with peroneal tendon tear, ligamentous injuries of the ankle, likely acute on chronic 5th metatarsal base/Noriega fracture.    DM with hx of neuropathy.     New R lower leg wound.     PLAN:  Reviewed patient's chart in epic.  Discussed condition and treatment options including pros and cons.    We discussed findings in detail.  Discussed options for L foot/ankle including surgical repair vs nonoperative repair.    I am more reluctant to do surgical repair given his high A1C/risk of complications, and due to his neuropathy/lack of pain.  Discussed risk nonunion with 5th met base fracture.  Pt elected to proceed with nonoperative care.  RICE.  NWB in [facility] advised.  F/u in 4 wks.    Pt to be off work during this time.  Advised f/u with Ortho regarding his recurrent RLE wound near prior surgical area; keep clean, dry, dressed daily and monitor for infection.     Ang Canales, PAM, FACFAS

## 2017-08-16 NOTE — MR AVS SNAPSHOT
"              After Visit Summary   8/16/2017    Rhett Elizabeth    MRN: [id number]           Patient Information     Date Of Birth          1960        Visit Information        Provider Department      8/16/2017 7:20 AM Ang Canales DPM [facility]        Today's Diagnoses     Foot fracture, left, with routine healing, subsequent encounter    -  1    Peroneal tendon tear, left, subsequent encounter          Care Instructions    Non weight bearing in boot.  Follow up in 4 weeks.          Follow-ups after your visit        Your next 10 appointments already scheduled     Sep 07, 2017  8:00 AM CDT   [facility]    [address] [address] [phone]              Who to contact     If you have questions or need follow up information about today's clinic visit or your schedule please contact [facility] directly at [phone].  Normal or non-critical lab and imaging results will be communicated to you by MyChart, letter or phone within 4 business days after the clinic has received the results. If you do not hear from us within 7 days, please contact the clinic through [employer] or phone. If you have a critical or abnormal lab result, we will notify you by phone as soon as possible.  Submit refill requests through [employer] or call your pharmacy and they will forward the refill request to us. Please allow 3 business days for your refill to be completed.          Additional Information About Your Visit        MyChart Information     [employer] lets you send messages to your doctor, view your test results, renew your prescriptions, schedule appointments and more. To sign up, go to [email or web address] . Click on \"Log in\" on the left side of the screen, which will take you to the Welcome page. Then click on \"Sign up Now\" on the right side of the page.     You will be asked to " "enter the access code listed below, as well as some personal information. Please follow the directions to create your username and password.     Your access code is: [email or web address]  Expires: 2017  6:31 AM     Your access code will  in 90 days. If you need help or a new code, please call your [place] clinic or [phone].        Care EveryWhere ID     This is your Care EveryWhere ID. This could be used by other organizations to access your [place] medical records  [id number]        Your Vitals Were     Height BMI (Body Mass Index)                6' 2\" (1.88 m) 43.19 kg/m2           Blood Pressure from Last 3 Encounters:   17 128/80   17 133/80   17 134/82    Weight from Last 3 Encounters:   17 (!) 336 lb 6.4 oz (152.6 kg)   17 (!) 336 lb 6.4 oz (152.6 kg)   17 (!) 328 lb (148.8 kg)              We Performed the Following     XR Foot Left G/E 3 Views        Primary Care Provider Office Phone # Fax #    Gwen Saldana [phone] [address] [facility] [address]        Equal Access to Services     [facility]: Hadii aad ku hadasho Soomaali, waaxda luqadaha, qaybta kaalmada adeegyada, halley ortiz haypaul church . So [facility] [phone].    ATENCIÓN: Si habla español, tiene a tsang disposición servicios gratuitos de asistencia lingüística. Llame al [phone].    We comply with applicable federal civil rights laws and [place] laws. We do not discriminate on the basis of race, color, national origin, age, disability sex, sexual orientation or gender identity.            Thank you!     Thank you for choosing [facility]  for your care. Our goal is always to provide you with excellent care. Hearing back from our patients is one way we can continue to improve our services. Please take a few minutes to complete the written survey that you may receive in the mail after your visit with us. Thank you!      "        Your Updated Medication List - Protect others around you: Learn how to safely use, store and throw away your medicines at www.disposemymeds.org.          This list is accurate as of: 8/16/17  7:58 AM.  Always use your most recent med list.                   Brand Name Dispense Instructions for use Diagnosis    ACCU-CHEK MANUEL PLUS VI           ACE/ARB NOT PRESCRIBED (INTENTIONAL)      Please choose reason not prescribed, below    Type 2 diabetes mellitus with diabetic neuropathy, without long-term current use of insulin (H)       aspirin 81 MG tablet      Take by mouth daily Reported on 5/5/2017        blood glucose monitoring lancets      1 each by In Vitro route Use to test blood sugaras directed.        DULoxetine 60 MG EC capsule    CYMBALTA     Take by mouth daily        ENSURE COMPLETE PO           furosemide 40 MG tablet    LASIX    30 tablet    40 mg as needed Reported on 5/16/2017        insulin aspart 100 UNIT/ML injection    NovoLOG PEN     Inject 10 Units Subcutaneous 3 times daily (with meals) Uses sliding scale. Usually about 2 units        levothyroxine 100 MCG tablet    SYNTHROID/LEVOTHROID    90 tablet    Take 1 tablet (100 mcg) by mouth daily    Hypothyroidism, unspecified type       losartan 25 MG tablet    COZAAR     Take 25 mg by mouth daily        Lutein 20 MG Caps      Take 20 mg by mouth daily 8/11/2017- patient states he takes 1 20 mg tablet daily Patient states he takes 150 mg tablet twice a day.        metFORMIN 500 MG tablet    GLUCOPHAGE    90 tablet    Take 1 tablet (500 mg) by mouth daily (with breakfast)    Type 2 diabetes mellitus with diabetic neuropathy, without long-term current use of insulin (H)       MULTIVITAMINS PO      Reported on 5/22/2017        OMEGA-3 FISH OIL PO      Take 1 g by mouth Reported on 5/5/2017        order for DME     1 Device    Tall aircast boot    Acute left ankle pain, Ankle injury, left, initial encounter, Foot fracture, left, closed, initial  encounter       PREGABALIN PO      Take 300 mg by mouth 2 times daily Reported on 5/16/2017        rivaroxaban ANTICOAGULANT 20 MG Tabs tablet    XARELTO     Take 20 mg by mouth daily (with dinner) Reported on 5/5/2017        SIMVASTATIN PO      Take 20 mg by mouth        TOPROL XL PO      Take 50 mg by mouth daily        TYLENOL PO      Take 1,000 mg by mouth 4 times daily        UNABLE TO FIND      cpap        Vitamin B-12 500 MCG Subl      Place 2 tablets under the tongue daily        VITAMIN D-3 PO      Take 1,000 Units by mouth daily

## 2017-08-16 NOTE — LETTER
[address] [address]  Phone: [phone]    August 16, 2017        Rhett Elizabeth  [address] [address] [address]          To whom it may concern:    RE: Rhett Elizabeth    Patient was seen and treated today at our clinic.    He will need to be non weight bearing on his left foot in a CAM boot until his follow up in 4 weeks.  I recommend no work during this time.    Please contact me for questions or concerns.      Sincerely,        Ang Canales DPM

## 2017-08-16 NOTE — NURSING NOTE
"Chief Complaint   Patient presents with     RECHECK     Follow up L foot Fx       Initial /80 (BP Location: Right arm, Patient Position: Chair, Cuff Size: Adult Large)  Ht 6' 2\" (1.88 m)  Wt (!) 336 lb 6.4 oz (152.6 kg)  BMI 43.19 kg/m2 Estimated body mass index is 43.19 kg/(m^2) as calculated from the following:    Height as of this encounter: 6' 2\" (1.88 m).    Weight as of this encounter: 336 lb 6.4 oz (152.6 kg).  Medication Reconciliation: unable or not appropriate to perform    BERTRAM Carvalho MA August 16, 2017 7:42 AM  "

## 2017-08-16 NOTE — PROGRESS NOTES
Weight management plan: Patient was referred to their PCP to discuss a diet and exercise plan.     BERTRAM Caravlho MA August 16, 2017 7:43 AM

## 2017-08-17 ENCOUNTER — TELEPHONE (OUTPATIENT)
Dept: PODIATRY | Facility: CLINIC | Age: 57
End: 2017-08-17

## 2017-08-17 NOTE — TELEPHONE ENCOUNTER
New signed letter saved in Epic with changed phone number in body of letter, but unable to change number in header.  Numbers on other letter were automatically populated by Epic.    Please print from [facility] and fax per instructions.    Thanks.

## 2017-08-17 NOTE — TELEPHONE ENCOUNTER
"Reason for Call:  Other Note for Employer    Detailed comments: Patient saw Dr Canales at the [facility] on 08/16/2017.  Patient is making a request that the letter written by Dr Canales be faxed to: [facility], Attention: Ramo Cleary the fax number is [phone].  Patient states he tried calling the number on the letter [phone] and it states \"You have reached a number that has not been assigned.\"  He is requesting that the number be changed to a working number on the letter. so that if needed his employer can contact the clinic.  Patient is also wanting a call back to let him know that the fax has indeed fabrizio faxed to his employer.    Phone Number Patient can be reached at: Home number on file [phone] (home)    Best Time: ASA  Needs this done today please!    Can we leave a detailed message on this number? YES    Call taken on 8/17/2017 at 10:40 AM by Natalia Calvert      "

## 2017-08-17 NOTE — LETTER
[address] [address]  Phone: [phone]    August 17, 2017        Rhett Elizabeth  [address] [address] [address]          To whom it may concern:    RE: Rhett Elizabeth    Patient was seen and treated 8/16/17 at our clinic.    He will need to be non weight bearing on his left foot in a CAM boot until his follow up in 4 weeks.  I recommend no work during this time.    Please contact me for questions or concerns.      Sincerely,        Ang Canales DPM

## 2017-08-22 ENCOUNTER — TELEPHONE (OUTPATIENT)
Dept: PODIATRY | Facility: CLINIC | Age: 57
End: 2017-08-22

## 2017-08-22 NOTE — TELEPHONE ENCOUNTER
Faxed letter to [phone]. Left voicemail for pt to call back. Please inform pt that letter has been faxed.  BERTRAM Carvalho MA August 22, 2017 12:54 PM

## 2017-08-22 NOTE — TELEPHONE ENCOUNTER
Patient called in and stated that his work has never received the fax, the first fax number was written down wrong. The correct number is [phone]. Please refax to this number.    Thank you  Janelle Ochoa

## 2017-08-30 ENCOUNTER — TELEPHONE (OUTPATIENT)
Dept: INTERNAL MEDICINE | Facility: CLINIC | Age: 57
End: 2017-08-30

## 2017-08-30 NOTE — TELEPHONE ENCOUNTER
Spouse oli is calling would like to know if patients health screening form for insurance has been faxed to ins, visit occurred 08/11/17. Please call to discuss. Thank you.

## 2017-08-31 ENCOUNTER — TELEPHONE (OUTPATIENT)
Dept: INTERNAL MEDICINE | Facility: CLINIC | Age: 57
End: 2017-08-31

## 2017-08-31 NOTE — TELEPHONE ENCOUNTER
Reason for Call:  Other office visit     Detailed comments: spouse Apurva would like a copy of the confirmation sheet that shows notes were sent to [facility] be faxed to her @ [phone]    Phone Number Patient can be reached at: Home number on file [phone] (home)    Best Time:     Can we leave a detailed message on this number? YES    Call taken on 8/31/2017 at 1:52 PM by Anette Sinclair

## 2017-08-31 NOTE — TELEPHONE ENCOUNTER
I spoke to the patient and he states [employer] did not receive the first fax of his health screening form from us.  I faxed it again to [employer] @ [phone].  Stefani Garcia,

## 2017-09-07 ENCOUNTER — OFFICE VISIT (OUTPATIENT)
Dept: OPTOMETRY | Facility: CLINIC | Age: 57
End: 2017-09-07
Payer: COMMERCIAL

## 2017-09-07 DIAGNOSIS — H52.13 MYOPIA OF BOTH EYES: ICD-10-CM

## 2017-09-07 DIAGNOSIS — H52.223 REGULAR ASTIGMATISM OF BOTH EYES: ICD-10-CM

## 2017-09-07 DIAGNOSIS — E11.9 CONTROLLED TYPE 2 DIABETES MELLITUS WITHOUT COMPLICATION, WITHOUT LONG-TERM CURRENT USE OF INSULIN (H): Primary | ICD-10-CM

## 2017-09-07 DIAGNOSIS — H52.4 PRESBYOPIA: ICD-10-CM

## 2017-09-07 DIAGNOSIS — H35.362 DRUSEN OF MACULA, LEFT: ICD-10-CM

## 2017-09-07 DIAGNOSIS — H04.123 DRY EYES: ICD-10-CM

## 2017-09-07 PROCEDURE — 92004 COMPRE OPH EXAM NEW PT 1/>: CPT | Performed by: OPTOMETRIST

## 2017-09-07 PROCEDURE — 92015 DETERMINE REFRACTIVE STATE: CPT | Performed by: OPTOMETRIST

## 2017-09-07 ASSESSMENT — CONF VISUAL FIELD
METHOD: COUNTING FINGERS
OS_NORMAL: 1
OD_NORMAL: 1

## 2017-09-07 ASSESSMENT — REFRACTION_WEARINGRX
OS_SPHERE: -4.00
OD_SPHERE: -2.50
OS_AXIS: 080
OD_ADD: +2.25
OD_AXIS: 135
SPECS_TYPE: PAL
OD_CYLINDER: +1.75
OS_CYLINDER: +2.50
OS_ADD: +2.25

## 2017-09-07 ASSESSMENT — REFRACTION_MANIFEST
OD_CYLINDER: +2.75
METHOD_AUTOREFRACTION: 1
OS_SPHERE: -4.00
OS_CYLINDER: +2.50
OD_CYLINDER: +2.75
OS_AXIS: 074
OD_AXIS: 135
OS_AXIS: 075
OS_SPHERE: -4.25
OD_SPHERE: -3.50
OS_CYLINDER: +2.50
OD_SPHERE: -3.50
OD_AXIS: 129

## 2017-09-07 ASSESSMENT — TONOMETRY
IOP_METHOD: APPLANATION
OD_IOP_MMHG: 16
OS_IOP_MMHG: 16

## 2017-09-07 ASSESSMENT — EXTERNAL EXAM - RIGHT EYE: OD_EXAM: NORMAL

## 2017-09-07 ASSESSMENT — EXTERNAL EXAM - LEFT EYE: OS_EXAM: NORMAL

## 2017-09-07 ASSESSMENT — VISUAL ACUITY
OS_CC+: -1
OD_CC: 20/30
OS_CC: 20/25-2
OS_SC: 20/200
OS_SC+: -2
CORRECTION_TYPE: GLASSES
METHOD: SNELLEN - LINEAR
OS_CC: 20/20
OD_SC: 20/125
OD_CC: 20/20

## 2017-09-07 ASSESSMENT — KERATOMETRY
OS_AXISANGLE2_DEGREES: 167
OD_K1POWER_DIOPTERS: 43.00
OD_AXISANGLE2_DEGREES: 36
OD_K2POWER_DIOPTERS: 45.00
OS_K1POWER_DIOPTERS: 43.00
OS_K2POWER_DIOPTERS: 45.00

## 2017-09-07 ASSESSMENT — SLIT LAMP EXAM - LIDS
COMMENTS: NORMAL
COMMENTS: NORMAL

## 2017-09-07 ASSESSMENT — CUP TO DISC RATIO
OS_RATIO: 0.6
OD_RATIO: 0.6

## 2017-09-07 NOTE — PATIENT INSTRUCTIONS
Patient was advised of today's exam findings.  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  [facility]   [address] [place] [address] [phone]

## 2017-09-07 NOTE — PROGRESS NOTES
"Chief Complaint   Patient presents with     Diabetic Eye Exam     New to Eye Dept     Primary care provider Dr Cruz, sees her every 6 months    Lab Results   Component Value Date    A1C 7.3 08/11/2017    A1C 6.6 03/22/2017       Last Eye Exam: 1-2 years ago , last seen 9-11-14 [facility], OCT and salguero visual field tests done. History of being glaucoma suspect  Patient reports history 10 yrs ago of a \"bleed\" in one eye, not sure which one.     Dilated Previously: Yes    What are you currently using to see?  Glasses, wears glasses all of the time     Distance Vision Acuity: Patient unsure about vision changes, said that he has watering issues and so that makes it hard to tell     Near Vision Acuity: Satisfied with vision while reading and using computer with glasses, slight change if any     Eye Comfort: watery with any wind, worse in upon waking, C-PAP used, has dry mouth   Do you use eye drops? : No  Occupation or Hobbies:  - not much computer    [employer] Assistant      Medical, surgical and family histories reviewed and updated 9/7/2017.       OBJECTIVE: See Ophthalmology exam    ASSESSMENT:    ICD-10-CM    1. Controlled type 2 diabetes mellitus without complication, without long-term current use of insulin (H) E11.9 EYE EXAM (SIMPLE-NONBILLABLE)     REFRACTION   2. Dry eyes H04.123 EYE EXAM (SIMPLE-NONBILLABLE)     REFRACTION     polyethylene glycol 0.4%- propylene glycol 0.3% (SYSTANE ULTRA) 0.4-0.3 % SOLN ophthalmic solution   3. Drusen of macula, left H35.362 EYE EXAM (SIMPLE-NONBILLABLE)     REFRACTION   4. Myopia of both eyes H52.13 EYE EXAM (SIMPLE-NONBILLABLE)     REFRACTION   5. Regular astigmatism of both eyes H52.223 EYE EXAM (SIMPLE-NONBILLABLE)     REFRACTION   6. Presbyopia H52.4 EYE EXAM (SIMPLE-NONBILLABLE)     REFRACTION      PLAN:    Rhett Elizabeth aware  eye exam results will be sent to Gwen Saldana.  Patient Instructions   Patient " was advised of today's exam findings.  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  [facility]   [address] [place], [place] [address] [phone]

## 2017-09-07 NOTE — MR AVS SNAPSHOT
After Visit Summary   9/7/2017    Rhett Elizabeth    MRN: [id number]           Patient Information     Date Of Birth          1960        Visit Information        Provider Department      9/7/2017 8:00 AM Shonda Howard OD [facility]        Care Instructions    Patient was advised of today's exam findings.  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  [facility]   [address] [place], [place] [address] [phone]            Follow-ups after your visit        Your next 10 appointments already scheduled     Sep 13, 2017 10:00 AM CDT   [facility] Visit with Ang Canales DPM   [facility]    [address] [place] [address] [phone]              Who to contact     If you have questions or need follow up information about today's clinic visit or your schedule please contact [facility] directly at [phone].  Normal or non-critical lab and imaging results will be communicated to you by MyChart, letter or phone within 4 business days after the clinic has received the results. If you do not hear from us within 7 days, please contact the clinic through MyChart or phone. If you have a critical or abnormal lab result, we will notify you by phone as soon as possible.  Submit refill requests through [employer] or call your pharmacy and they will forward the refill request to us. Please allow 3 business days for your refill to be completed.          Additional Information About Your Visit        [employer] Information     [employer] lets you send  "messages to your doctor, view your test results, renew your prescriptions, schedule appointments and more. To sign up, go to [email or web address] . Click on \"Log in\" on the left side of the screen, which will take you to the Welcome page. Then click on \"Sign up Now\" on the right side of the page.     You will be asked to enter the access code listed below, as well as some personal information. Please follow the directions to create your username and password.     Your access code is: [email or web address]  Expires: 2017  6:31 AM     Your access code will  in 90 days. If you need help or a new code, please call your [place] clinic or [phone].        Care EveryWhere ID     This is your Care EveryWhere ID. This could be used by other organizations to access your [place] medical records  [id number]         Blood Pressure from Last 3 Encounters:   17 128/80   17 133/80   17 134/82    Weight from Last 3 Encounters:   17 (!) 152.6 kg (336 lb 6.4 oz)   17 (!) 152.6 kg (336 lb 6.4 oz)   17 (!) 148.8 kg (328 lb)              Today, you had the following     No orders found for display       Primary Care Provider Office Phone # Fax #    Gwen Saldana [phone] [address] [facility] [address]        Equal Access to Services     TONY GARCÍA : Hadii connie dominguezo Sodenise, waaxda luqadaha, qaybta kaalmada brianne, halley [place]. So [facility] [phone].    ATENCIÓN: Si habla español, tiene a tsang disposición servicios gratuitos de asistencia lingüística. Llame al [phone].    We comply with applicable federal civil rights laws and [place] laws. We do not discriminate on the basis of race, color, national origin, age, disability sex, sexual orientation or gender identity.            Thank you!     Thank you for choosing [facility]  for your care. Our goal is always to provide you with " excellent care. Hearing back from our patients is one way we can continue to improve our services. Please take a few minutes to complete the written survey that you may receive in the mail after your visit with us. Thank you!             Your Updated Medication List - Protect others around you: Learn how to safely use, store and throw away your medicines at www.disposemymeds.org.          This list is accurate as of: 9/7/17  9:39 AM.  Always use your most recent med list.                   Brand Name Dispense Instructions for use Diagnosis    ACCU-CHEK MANUEL PLUS VI           ACE/ARB NOT PRESCRIBED (INTENTIONAL)      Please choose reason not prescribed, below    Type 2 diabetes mellitus with diabetic neuropathy, without long-term current use of insulin (H)       aspirin 81 MG tablet      Take by mouth daily Reported on 5/5/2017        blood glucose monitoring lancets      1 each by In Vitro route Use to test blood sugaras directed.        DULoxetine 60 MG EC capsule    CYMBALTA     Take by mouth daily        ENSURE COMPLETE PO           furosemide 40 MG tablet    LASIX    30 tablet    40 mg as needed Reported on 5/16/2017        insulin aspart 100 UNIT/ML injection    NovoLOG PEN     Inject 10 Units Subcutaneous 3 times daily (with meals) Uses sliding scale. Usually about 2 units        levothyroxine 100 MCG tablet    SYNTHROID/LEVOTHROID    90 tablet    Take 1 tablet (100 mcg) by mouth daily    Hypothyroidism, unspecified type       losartan 25 MG tablet    COZAAR     Take 25 mg by mouth daily        Lutein 20 MG Caps      Take 20 mg by mouth daily 8/11/2017- patient states he takes 1 20 mg tablet daily Patient states he takes 150 mg tablet twice a day.        metFORMIN 500 MG tablet    GLUCOPHAGE    90 tablet    Take 1 tablet (500 mg) by mouth daily (with breakfast)    Type 2 diabetes mellitus with diabetic neuropathy, without long-term current use of insulin (H)       MULTIVITAMINS PO      Reported on 5/22/2017         OMEGA-3 FISH OIL PO      Take 1 g by mouth Reported on 5/5/2017        order for DME     1 Device    Tall aircast boot    Acute left ankle pain, Ankle injury, left, initial encounter, Foot fracture, left, closed, initial encounter       PREGABALIN PO      Take 300 mg by mouth 2 times daily Reported on 5/16/2017        rivaroxaban ANTICOAGULANT 20 MG Tabs tablet    XARELTO     Take 20 mg by mouth daily (with dinner) Reported on 5/5/2017        SIMVASTATIN PO      Take 20 mg by mouth        TOPROL XL PO      Take 50 mg by mouth daily        TYLENOL PO      Take 1,000 mg by mouth 4 times daily        UNABLE TO FIND      cpap        Vitamin B-12 500 MCG Subl      Place 2 tablets under the tongue daily        VITAMIN D-3 PO      Take 1,000 Units by mouth daily

## 2017-09-08 PROBLEM — H04.123 DRY EYES: Status: ACTIVE | Noted: 2017-09-08

## 2017-09-08 PROBLEM — H35.362: Status: ACTIVE | Noted: 2017-09-08

## 2017-09-08 ASSESSMENT — REFRACTION_MANIFEST
OS_ADD: +2.50
OD_ADD: +2.50

## 2017-09-08 ASSESSMENT — PACHYMETRY
OD_CT(UM): .501
OS_CT(UM): .509

## 2017-09-13 ENCOUNTER — OFFICE VISIT (OUTPATIENT)
Dept: PODIATRY | Facility: CLINIC | Age: 57
End: 2017-09-13
Payer: COMMERCIAL

## 2017-09-13 ENCOUNTER — RADIANT APPOINTMENT (OUTPATIENT)
Dept: GENERAL RADIOLOGY | Facility: CLINIC | Age: 57
End: 2017-09-13
Attending: PODIATRIST
Payer: COMMERCIAL

## 2017-09-13 VITALS
DIASTOLIC BLOOD PRESSURE: 70 MMHG | HEIGHT: 74 IN | SYSTOLIC BLOOD PRESSURE: 127 MMHG | BODY MASS INDEX: 40.43 KG/M2 | WEIGHT: 315 LBS

## 2017-09-13 DIAGNOSIS — E11.40 TYPE 2 DIABETES MELLITUS WITH DIABETIC NEUROPATHY, WITHOUT LONG-TERM CURRENT USE OF INSULIN (H): ICD-10-CM

## 2017-09-13 DIAGNOSIS — S92.902D FOOT FRACTURE, LEFT, WITH ROUTINE HEALING, SUBSEQUENT ENCOUNTER: Primary | ICD-10-CM

## 2017-09-13 DIAGNOSIS — S86.312D PERONEAL TENDON TEAR, LEFT, SUBSEQUENT ENCOUNTER: ICD-10-CM

## 2017-09-13 PROCEDURE — 73630 X-RAY EXAM OF FOOT: CPT | Mod: LT

## 2017-09-13 PROCEDURE — 99213 OFFICE O/P EST LOW 20 MIN: CPT | Performed by: PODIATRIST

## 2017-09-13 NOTE — PATIENT INSTRUCTIONS
Follow up in 2 weeks.  Continue Non-weight bearing in boot.    DR. CANTU'S CLINIC LOCATIONS     MONDAY  [place] TUESDAY  [place]   [address]   [address]   [place] [address] BHARTI Christine [address]   [phone]  [phone]  [phone]       [facility]EDNESDAY  [facility] [phone]   [address] APPOINTMENTS: [phone]   [place] [address] BILLING QUESTIONS: [phone]   [phone]           Body Mass Index (BMI)  Many things can cause foot and ankle problems. Foot structure, activity level, foot mechanics and injuries are common causes of pain.  One very important issue that often goes unmentioned is body weight. Extra weight can cause increased stress on muscles, ligaments, bones and tendons. Sometimes just a few extra pounds is all it takes to put one over her/his threshold. Without reducing that stress, it can be difficult to alleviate pain.   Some people are uncomfortable addressing this issue, but we feel it is important for you to think about it. As Foot & Ankle specialists, our job is addressing the lower extremity problem and possible causes.   Regarding extra body weight, we encourage patients to discuss diet and weight management plans with their primary care doctors. It is this team approach that gives you the best opportunity for pain relief and getting you back on your feet.     SMOKING CESSATION    What's in cigarette smoke? - Cigarette smoke contains over 4,000 chemicals. Nicotine is one of the main ingredients which is an insecticide/herbicide. It is poisonous to our nervous system, increases blood clotting risk, and decreases the body's defenses to fight off infection. Another chemical is Carbon Monoxide is an asphyxiating gas that permanently binds to blood cells and blocks the transport of oxygen. This leads to tissue death and decreases your metabolism. Tar is a chemical that coats your lungs  and trachea which impairs new oxygen coming in and carbon dioxide getting out of your body.   How does smoking impact surgery? - Smoking is particularly hazardous with regards to surgery. Surgery puts stress on the body and a smoker's body is already under strain from these chemicals. Putting the two together, especially for an elective surgery, could be a recipe for disaster. Smoking before and after surgery increases your risk of heart problems, slow wound healing, delayed bone healing, blood clots, wound infection and anesthesia complications.   What are the benefits of quitting? - In 20 minutes your blood pressure will drop back down to normal. In 8 hours the carbon monoxide (a toxic gas) levels in your blood stream will drop by half, and oxygen levels will return to normal. In 48 hours your chance of having a heart attack will have decreased. All nicotine will have left your body. Your sense of taste and smell will return to a normal level. In 72 hours your bronchial tubes will relax, and your energy levels will increase. In 2 weeks your circulation will increase, and it will continue to improve for the next 10 weeks.    Recommendations for elective surgery - Ideally, patients should quit smoking 8 weeks before and at least 2 weeks after elective surgery in order to avoid complications. Simply cutting back on the amount of cigarettes smoked per day does not offer any benefit or decrease the risk of poor wound healing, heart problems, and infection. Smokers should also start taking Vitamin C and B for two weeks before surgery and two weeks after surgery.    Ways to Stop Smokin. Nicotine patches, lozenges, or gum  2. Support Groups  3. Medications (see below)    List of Medications:  1. Varenicline Tartrate (CHANTIX)   2. Bupropion HCL (WELLBUTRIN, ZYBAN)   note: make sure Wellbutrin is for smoking cessation and not other issues   3. Nicotine Patch (NICODERM)   4. Nicotine Inhaler (NICOTROL)   5. Nicotine  Gum Nicotine Polacrilex   6. Nicotine Lozenge: Nicotine Polacrilex (COMMIT)   * [employer] offers a smoking support group as well!  Please visit: [email or web address]  If you are interested in these, ask about getting a prescription or talk to your primary care doctor about what may be the best way for you to quit.

## 2017-09-13 NOTE — NURSING NOTE
"Chief Complaint   Patient presents with     Foot Pain     4 week follow up L foot stress Fx       Initial /70 (BP Location: Right arm, Patient Position: Chair, Cuff Size: Adult Large)  Ht 6' 2\" (1.88 m)  Wt (!) 336 lb (152.4 kg)  BMI 43.14 kg/m2 Estimated body mass index is 43.14 kg/(m^2) as calculated from the following:    Height as of this encounter: 6' 2\" (1.88 m).    Weight as of this encounter: 336 lb (152.4 kg).  Medication Reconciliation: unable or not appropriate to perform    AArmando Carvalho MA September 13, 2017 10:01 AM  "

## 2017-09-13 NOTE — PROGRESS NOTES
"PATIENT HISTORY:  Rhett Elizabeth is a 56 year old male who presents to clinic for recheck of L foot and ankle injury, peroneal tendon tear, likely acute on chronic 5th met fx.  Pt reports hearing a left foot/ankle \"crack\" when walking at work 8/2/17.  Hx of prior L 5th metatarsal fracture 11/2016 per pt \"that healed.\" Presents walking in the boot, but states he has been off the foot.  He is off work.  Denies fevers, new injury.  He is limping,  Hx of DM with neuropathy.  Last HbA1c 7.3.  Nonsmoker.  Denies related family hx.       EXAM:Vitals: Ht 6' 2\" (1.88 m)  Wt (!) 336 lb (152.4 kg)  BMI 43.14 kg/m2  BMI= Body mass index is 43.14 kg/(m^2).    General appearance: Patient is alert and fully cooperative with history & exam.  No sign of distress is noted during the visit.     Dermatologic: Small areas of skin breakdown right anterior shin near prior surgical site.  No other wounds b/l.   No paronychia or evidence of soft tissue infection is noted.      Vascular: DP & PT pulses are intact & regular on the left.  Left lateral ankle edema improved today.  No foot edema along 5th metatarsal.  CFT and skin temperature are normal to both lower extremities.      Neurologic: Lower extremity sensation is diminished to light touch b/l.      Musculoskeletal: No L ankle pain with palpation along the peroneal tendons.  Eversion strength of foot 5/5.  No medial ankle pain.  No pain at the 5th metatarsal base with palpation.  No gross ankle deformity noted.  No foot or ankle joint effusion is noted.    XRs of left foot reviewed with pt:  No significant change to L 5th metatarsal base fx.      MR:    Impression:  1. High-grade, essentially full-thickness split tearing of the  peroneal brevis from myotendinous junction with marked tendinosis and  associated tenosynovitis. There is minimal distal reconstitution.  2. Constellation of findings consistent with remote ankle injury  involving the syndesmotic, spring, lateral and " deltoid ligamentous  complexes including complete tearing of the anterior talofibular  ligament.  3. Redemonstration of fracture through the proximal shaft of the fifth  metatarsal with bony edema proximal and distal to the fracture site.   4. Bony edema in the base and proximal shaft of the second metatarsal  and to a lesser extent the proximal third and fourth metatarsals,  consistent with stress reaction.  5. 3 mm osteochondral lesions with overlying full-thickness cartilage  defect in the medial talar dome.     I have personally reviewed the examination and initial interpretation  and I agree with the findings.     BAUTISTA ROJAS     ASSESSMENT: L foot/ankle injury with peroneal tendon tear, ligamentous injuries of the ankle, likely acute on chronic 5th metatarsal base/Noriega fracture.    DM with hx of neuropathy.     R lower leg wounds.     PLAN:  Reviewed patient's chart in epic.  Discussed condition and treatment options including pros and cons.    We discussed findings in detail.  Discussed risk of nonunion; this may be less of a concern for him given neuropathy.  Continue with nonoperative care.  RICE.  NWB in [facility] advised.  F/u in 2 wks.    Pt to be off work during this time.  Advised f/u with Ortho regarding his recurrent RLE wound near prior surgical area; keep clean, dry, dressed daily and monitor for infection.     Ang Canales, PAM, FACFAS

## 2017-09-13 NOTE — LETTER
"    9/13/2017         RE: Rhett Elizabeth  [address] [facility] [address]        Dear Colleague,    Thank you for referring your patient, Rhett Elizabeth, to the [facility]. Please see a copy of my visit note below.    Weight management plan: Patient was referred to their PCP to discuss a diet and exercise plan.         BERTRAM Carvalho MA September 13, 2017 9:54 AM      PATIENT HISTORY:  Rhett Elizabeth is a 56 year old male who presents to clinic for recheck of L foot and ankle injury, peroneal tendon tear, likely acute on chronic 5th met fx.  Pt reports hearing a left foot/ankle \"crack\" when walking at work 8/2/17.  Hx of prior L 5th metatarsal fracture 11/2016 per pt \"that healed.\" Presents walking in the boot, but states he has been off the foot.  He is off work.  Denies fevers, new injury.  He is limping,  Hx of DM with neuropathy.  Last HbA1c 7.3.  Nonsmoker.  Denies related family hx.       EXAM:Vitals: Ht 6' 2\" (1.88 m)  Wt (!) 336 lb (152.4 kg)  BMI 43.14 kg/m2  BMI= Body mass index is 43.14 kg/(m^2).    General appearance: Patient is alert and fully cooperative with history & exam.  No sign of distress is noted during the visit.     Dermatologic: Small areas of skin breakdown right anterior shin near prior surgical site.  No other wounds b/l.   No paronychia or evidence of soft tissue infection is noted.      Vascular: DP & PT pulses are intact & regular  on the left.  Left lateral ankle edema improved today.  No foot edema along 5th metatarsal.  CFT and skin temperature are normal to both lower extremities.      Neurologic: Lower extremity sensation is diminished to light touch b/l.      Musculoskeletal: No L ankle pain with palpation along the peroneal tendons.  Eversion strength of foot 5/5.  No medial ankle pain.  No pain at the 5th metatarsal base with palpation.  No gross ankle deformity noted.  No foot or ankle joint effusion is noted.    XRs of left foot reviewed with " pt:  No significant change to L 5th metatarsal base fx.      MR:    Impression:  1. High-grade, essentially full-thickness split tearing of the  peroneal brevis from myotendinous junction with marked tendinosis and  associated tenosynovitis. There is minimal distal reconstitution.  2. Constellation of findings consistent with remote ankle injury  involving the syndesmotic, spring, lateral and deltoid ligamentous  complexes including complete tearing of the anterior talofibular  ligament.  3. Redemonstration of fracture through the proximal shaft of the fifth  metatarsal with bony edema proximal and distal to the fracture site.   4. Bony edema in the base and proximal shaft of the second metatarsal  and to a lesser extent the proximal third and fourth metatarsals,  consistent with stress reaction.  5. 3 mm osteochondral lesions with overlying full-thickness cartilage  defect in the medial talar dome.     I have personally reviewed the examination and initial interpretation  and I agree with the findings.     BAUTISTA ROJAS     ASSESSMENT: L foot/ankle injury with peroneal tendon tear, ligamentous injuries of the ankle, likely acute on chronic 5th metatarsal base/Noriega fracture.    DM with hx of neuropathy.     R lower leg wounds.     PLAN:  Reviewed patient's chart in epic.  Discussed condition and treatment options including pros and cons.    We discussed findings in detail.  Discussed risk of nonunion; this may be less of a concern for him given neuropathy.  Continue with nonoperative care.  RICE.  NWB in [facility] advised.  F/u in 2 wks.    Pt to be off work during this time.  Advised f/u with Ortho regarding his recurrent RLE wound near prior surgical area; keep clean, dry, dressed daily and monitor for infection.     Ang Canales DPM, FACFAS          Again, thank you for allowing me to participate in the care of your patient.        Sincerely,        Ang Canales DPM

## 2017-09-13 NOTE — MR AVS SNAPSHOT
After Visit Summary   9/13/2017    Rhett Elizabeth    MRN: [id number]           Patient Information     Date Of Birth          1960        Visit Information        Provider Department      9/13/2017 10:00 AM Ang Cantu DPM [facility]        Today's Diagnoses     Foot fracture, left, with routine healing, subsequent encounter    -  1    Peroneal tendon tear, left, subsequent encounter          Care Instructions    Follow up in 2 weeks.    DR. CANTU'S CLINIC LOCATIONS     MONDAY  [place] TUESDAY  [place]   [address] [facility]   [address] [place] [address] [address]   [place] [address] [place] [address]   [phone]  [phone]       WEDNESDAY  [place] SCHEDULE SURGERY: [phone]   [address] [facility] APPOINTMENTS: [phone]   [place] [address] BILLING QUESTIONS: [phone]   [phone]           Body Mass Index (BMI)  Many things can cause foot and ankle problems. Foot structure, activity level, foot mechanics and injuries are common causes of pain.  One very important issue that often goes unmentioned is body weight. Extra weight can cause increased stress on muscles, ligaments, bones and tendons. Sometimes just a few extra pounds is all it takes to put one over her/his threshold. Without reducing that stress, it can be difficult to alleviate pain.   Some people are uncomfortable addressing this issue, but we feel it is important for you to think about it. As Foot & Ankle specialists, our job is addressing the lower extremity problem and possible causes.   Regarding extra body weight, we encourage patients to discuss diet and weight management plans with their primary care doctors. It is this team approach that gives you the best opportunity for pain relief and getting you back on your feet.     SMOKING CESSATION    What's in cigarette smoke? - Cigarette smoke contains over 4,000 chemicals.  Nicotine is one of the main ingredients which is an insecticide/herbicide. It is poisonous to our nervous system, increases blood clotting risk, and decreases the body's defenses to fight off infection. Another chemical is Carbon Monoxide is an asphyxiating gas that permanently binds to blood cells and blocks the transport of oxygen. This leads to tissue death and decreases your metabolism. Tar is a chemical that coats your lungs and trachea which impairs new oxygen coming in and carbon dioxide getting out of your body.   How does smoking impact surgery? - Smoking is particularly hazardous with regards to surgery. Surgery puts stress on the body and a smoker's body is already under strain from these chemicals. Putting the two together, especially for an elective surgery, could be a recipe for disaster. Smoking before and after surgery increases your risk of heart problems, slow wound healing, delayed bone healing, blood clots, wound infection and anesthesia complications.   What are the benefits of quitting? - In 20 minutes your blood pressure will drop back down to normal. In 8 hours the carbon monoxide (a toxic gas) levels in your blood stream will drop by half, and oxygen levels will return to normal. In 48 hours your chance of having a heart attack will have decreased. All nicotine will have left your body. Your sense of taste and smell will return to a normal level. In 72 hours your bronchial tubes will relax, and your energy levels will increase. In 2 weeks your circulation will increase, and it will continue to improve for the next 10 weeks.    Recommendations for elective surgery - Ideally, patients should quit smoking 8 weeks before and at least 2 weeks after elective surgery in order to avoid complications. Simply cutting back on the amount of cigarettes smoked per day does not offer any benefit or decrease the risk of poor wound healing, heart problems, and infection. Smokers should also start taking  "Vitamin C and B for two weeks before surgery and two weeks after surgery.    Ways to Stop Smokin. Nicotine patches, lozenges, or gum  2. Support Groups  3. Medications (see below)    List of Medications:  1. Varenicline Tartrate (CHANTIX)   2. Bupropion HCL (WELLBUTRIN, ZYBAN) - note: make sure Wellbutrin is for smoking cessation and not other issues   3. Nicotine Patch (NICODERM)   4. Nicotine Inhaler (NICOTROL)   5. Nicotine Gum Nicotine Polacrilex   6. Nicotine Lozenge: Nicotine Polacrilex (COMMIT)   * [place] offers a smoking support group as well!  Please visit: [email or web address]  If you are interested in these, ask about getting a prescription or talk to your primary care doctor about what may be the best way for you to quit.                 Follow-ups after your visit        Who to contact     If you have questions or need follow up information about today's clinic visit or your schedule please contact [facility] directly at [phone].  Normal or non-critical lab and imaging results will be communicated to you by [email or web address], letter or phone within 4 business days after the clinic has received the results. If you do not hear from us within 7 days, please contact the clinic through [employer] or phone. If you have a critical or abnormal lab result, we will notify you by phone as soon as possible.  Submit refill requests through [employer] or call your pharmacy and they will forward the refill request to us. Please allow 3 business days for your refill to be completed.          Additional Information About Your Visit        [employer] Information     [employer] lets you send messages to your doctor, view your test results, renew your prescriptions, schedule appointments and more. To sign up, go to [email or web address] . Click on \"Log in\" on the left side of the screen, which will take you to the Welcome page. Then click on \"Sign up Now\" on the right side of the page. " "    You will be asked to enter the access code listed below, as well as some personal information. Please follow the directions to create your username and password.     Your access code is: [email or web address]  Expires: 2017  6:31 AM     Your access code will  in 90 days. If you need help or a new code, please call your [place] clinic or [phone].        Care EveryWhere ID     This is your Care EveryWhere ID. This could be used by other organizations to access your [place] medical records  [id number]        Your Vitals Were     Height BMI (Body Mass Index)                6' 2\" (1.88 m) 43.14 kg/m2           Blood Pressure from Last 3 Encounters:   17 127/70   17 128/80   17 133/80    Weight from Last 3 Encounters:   17 (!) 336 lb (152.4 kg)   17 (!) 336 lb 6.4 oz (152.6 kg)   17 (!) 336 lb 6.4 oz (152.6 kg)              We Performed the Following     XR Foot Left G/E 3 Views        Primary Care Provider Office Phone # Fax #    Gwen Saldana [phone] [address] [facility] [address]        Equal Access to Services     [facility] RAQUEL : Hadii aad ku hadasho Soomaali, waaxda luqadaha, qaybta kaalmada adeegyada, [facility] . So [facility] [phone].    ATENCIÓN: Si habla español, tiene a tsang disposición servicios gratuitos de asistencia lingüística. Llame al [phone].    We comply with applicable federal civil rights laws and [place] laws. We do not discriminate on the basis of race, color, national origin, age, disability sex, sexual orientation or gender identity.            Thank you!     Thank you for choosing [facility]  for your care. Our goal is always to provide you with excellent care. Hearing back from our patients is one way we can continue to improve our services. Please take a few minutes to complete the written survey that you may receive in the mail after your visit " with us. Thank you!             Your Updated Medication List - Protect others around you: Learn how to safely use, store and throw away your medicines at www.disposemymeds.org.          This list is accurate as of: 9/13/17 10:14 AM.  Always use your most recent med list.                   Brand Name Dispense Instructions for use Diagnosis    ACCU-CHEK MANUEL PLUS VI           ACE/ARB NOT PRESCRIBED (INTENTIONAL)      Please choose reason not prescribed, below    Type 2 diabetes mellitus with diabetic neuropathy, without long-term current use of insulin (H)       aspirin 81 MG tablet      Take by mouth daily Reported on 5/5/2017        blood glucose monitoring lancets      1 each by In Vitro route Use to test blood sugaras directed.        DULoxetine 60 MG EC capsule    CYMBALTA     Take by mouth daily        ENSURE COMPLETE PO           furosemide 40 MG tablet    LASIX    30 tablet    40 mg as needed Reported on 5/16/2017        insulin aspart 100 UNIT/ML injection    NovoLOG PEN     Inject 10 Units Subcutaneous 3 times daily (with meals) Uses sliding scale. Usually about 2 units        levothyroxine 100 MCG tablet    SYNTHROID/LEVOTHROID    90 tablet    Take 1 tablet (100 mcg) by mouth daily    Hypothyroidism, unspecified type       losartan 25 MG tablet    COZAAR     Take 25 mg by mouth daily        Lutein 20 MG Caps      Take 20 mg by mouth daily 8/11/2017- patient states he takes 1 20 mg tablet daily Patient states he takes 150 mg tablet twice a day.        metFORMIN 500 MG tablet    GLUCOPHAGE    90 tablet    Take 1 tablet (500 mg) by mouth daily (with breakfast)    Type 2 diabetes mellitus with diabetic neuropathy, without long-term current use of insulin (H)       MULTIVITAMINS PO      Reported on 5/22/2017        OMEGA-3 FISH OIL PO      Take 1 g by mouth Reported on 5/5/2017        order for DME     1 Device    Tall aircast boot    Acute left ankle pain, Ankle injury, left, initial encounter, Foot fracture,  left, closed, initial encounter       polyethylene glycol 0.4%- propylene glycol 0.3% 0.4-0.3 % Soln ophthalmic solution    SYSTANE ULTRA     Place 1 drop into both eyes 3 times daily    Dry eyes       PREGABALIN PO      Take 300 mg by mouth 2 times daily Reported on 5/16/2017        rivaroxaban ANTICOAGULANT 20 MG Tabs tablet    XARELTO     Take 20 mg by mouth daily (with dinner) Reported on 5/5/2017        SIMVASTATIN PO      Take 20 mg by mouth        TOPROL XL PO      Take 50 mg by mouth daily        TYLENOL PO      Take 1,000 mg by mouth 4 times daily        UNABLE TO FIND      cpap        Vitamin B-12 500 MCG Subl      Place 2 tablets under the tongue daily        VITAMIN D-3 PO      Take 1,000 Units by mouth daily

## 2017-09-13 NOTE — PROGRESS NOTES
Weight management plan: Patient was referred to their PCP to discuss a diet and exercise plan.         BERTRAM Carvalho MA September 13, 2017 9:54 AM

## 2017-09-13 NOTE — LETTER
[address] [address]  Phone: [phone]    September 13, 2017        Rhett Elizabeth  [address] [facility] [address]          To whom it may concern:    RE: Rhett Elizabeth    Patient was seen and treated today at our clinic.    Due to injury he will need to be off work for the next 4 weeks.  He is to be non weightbearing in his boot until follow up in 2 weeks.    Please contact me for questions or concerns.      Sincerely,        Ang Canales DPM

## 2017-09-26 ENCOUNTER — TRANSFERRED RECORDS (OUTPATIENT)
Dept: HEALTH INFORMATION MANAGEMENT | Facility: CLINIC | Age: 57
End: 2017-09-26

## 2017-09-26 ENCOUNTER — TELEPHONE (OUTPATIENT)
Dept: INTERNAL MEDICINE | Facility: CLINIC | Age: 57
End: 2017-09-26

## 2017-09-26 DIAGNOSIS — E78.5 HYPERLIPIDEMIA, UNSPECIFIED HYPERLIPIDEMIA TYPE: Primary | ICD-10-CM

## 2017-09-26 RX ORDER — SIMVASTATIN 5 MG
20 TABLET ORAL AT BEDTIME
Qty: 90 TABLET | Refills: 1 | Status: SHIPPED | OUTPATIENT
Start: 2017-09-26 | End: 2017-09-26

## 2017-09-26 RX ORDER — SIMVASTATIN 20 MG
20 TABLET ORAL AT BEDTIME
Qty: 90 TABLET | Refills: 1 | Status: SHIPPED | OUTPATIENT
Start: 2017-09-26 | End: 2018-03-29

## 2017-09-26 NOTE — TELEPHONE ENCOUNTER
There is no known contraindication to statins for this patient -I sent a refill.  Thank you,  Gwen Saldana MD

## 2017-09-26 NOTE — TELEPHONE ENCOUNTER
Simvastatin 20 mg is listed as historical.   Cannot refill per RN protocol   Per med reconciliation Last dispensed 7/27/17 #30 by BALTAZAR TOMAS MD.  Have cards continue to prescribe or PCP?  RUDDY Alonzo RN

## 2017-09-26 NOTE — TELEPHONE ENCOUNTER
Called patient, informed pt of providers note as written below, pt verbalized good understanding. He will call cards to let them know he is taking.     The prescription sent this morning was for simvastatin 5 mg, take 4 tabs (20 mg) by mouth daily. #90 with 1 refill -this is only a 22.5 day supply with one refill    The pending prescription is for simvastatin 20 mg, take 1 tab (20 mg) by mouth daily. #90 with 1 refill.     To provider to advise.   CHELLE AlonzoN RN

## 2017-09-26 NOTE — TELEPHONE ENCOUNTER
Patient is calling to see if he is supposed to be taking the medication Simvastatin 20mg once per day. Patient was at cardiologist appointment today and was asked about this medication, and was not sure if he was taken off this medication or if he is to be taking this. If  wants him to be taking this, can her nurse call in a refill to [facility]? Please call patient to discuss and advise asap. Thank you!!

## 2017-09-27 ENCOUNTER — OFFICE VISIT (OUTPATIENT)
Dept: PODIATRY | Facility: CLINIC | Age: 57
End: 2017-09-27
Payer: COMMERCIAL

## 2017-09-27 ENCOUNTER — RADIANT APPOINTMENT (OUTPATIENT)
Dept: GENERAL RADIOLOGY | Facility: CLINIC | Age: 57
End: 2017-09-27
Attending: PODIATRIST
Payer: COMMERCIAL

## 2017-09-27 VITALS
WEIGHT: 315 LBS | HEIGHT: 74 IN | BODY MASS INDEX: 40.43 KG/M2 | DIASTOLIC BLOOD PRESSURE: 66 MMHG | SYSTOLIC BLOOD PRESSURE: 101 MMHG | HEART RATE: 97 BPM

## 2017-09-27 DIAGNOSIS — E11.40 TYPE 2 DIABETES MELLITUS WITH DIABETIC NEUROPATHY, WITHOUT LONG-TERM CURRENT USE OF INSULIN (H): ICD-10-CM

## 2017-09-27 DIAGNOSIS — S86.312D PERONEAL TENDON TEAR, LEFT, SUBSEQUENT ENCOUNTER: ICD-10-CM

## 2017-09-27 DIAGNOSIS — S92.902D FOOT FRACTURE, LEFT, WITH ROUTINE HEALING, SUBSEQUENT ENCOUNTER: Primary | ICD-10-CM

## 2017-09-27 DIAGNOSIS — L03.115 CELLULITIS OF RIGHT LOWER EXTREMITY: ICD-10-CM

## 2017-09-27 PROCEDURE — 99214 OFFICE O/P EST MOD 30 MIN: CPT | Performed by: PODIATRIST

## 2017-09-27 PROCEDURE — 73630 X-RAY EXAM OF FOOT: CPT | Mod: LT

## 2017-09-27 RX ORDER — CEPHALEXIN 500 MG/1
500 CAPSULE ORAL 3 TIMES DAILY
Qty: 30 CAPSULE | Refills: 0 | Status: SHIPPED | OUTPATIENT
Start: 2017-09-27 | End: 2018-02-09

## 2017-09-27 NOTE — PATIENT INSTRUCTIONS
Begin protected weight bearing as tolerated on the left side with CAM boot.  Follow up with Ortho ASAP regarding your right leg.  Seek immediate care for worsening redness, drainage, swelling, pain, fever, chills, nausea, vomiting.

## 2017-09-27 NOTE — LETTER
"    9/27/2017         RE: Rhett Elizabeth  [address] [facility] [address]        Dear Colleague,    Thank you for referring your patient, Rhett Elizabeth, to the [facility]. Please see a copy of my visit note below.    Weight management plan: Patient was referred to their PCP to discuss a diet and exercise plan.     BERTRAM Carvalho MA September 27, 2017 10:38 AM      PATIENT HISTORY:  Rhett Elizabeth is a 56 year old male who presents to clinic for recheck of L foot and ankle injury, peroneal tendon tear, likely acute on chronic 5th met fx.  Pt reports hearing a left foot/ankle \"crack\" when walking at work 8/2/17.  Hx of prior L 5th metatarsal fracture 11/2016 per pt \"that healed.\"  He is off work.  Denies fevers, new injury.  He reports being primarily NWB in his boot.  Hx of DM with neuropathy.  Last HbA1c 7.3.  Nonsmoker.  Denies related family hx.       EXAM:Vitals: /66 (BP Location: Left arm, Patient Position: Chair, Cuff Size: Adult Large)  Pulse 97  Ht 6' 2\" (1.88 m)  Wt (!) 341 lb (154.7 kg)  BMI 43.78 kg/m2  BMI= Body mass index is 43.78 kg/(m^2).    General appearance: Patient is alert and fully cooperative with history & exam.  No sign of distress is noted during the visit.     Dermatologic: Small area of skin breakdown right anterior shin near prior surgical site.  No other wounds b/l.    Some local erythema and swelling here.      Vascular: DP & PT pulses are intact & regular on the left.  Left lateral ankle edema improved today.  No foot edema along 5th metatarsal.  CFT and skin temperature are normal to both lower extremities.      Neurologic: Lower extremity sensation is diminished to light touch b/l.      Musculoskeletal: No L ankle pain with palpation along the peroneal tendons.  Eversion strength of foot 5/5.  No medial ankle pain.  No pain at the 5th metatarsal base with palpation.   No RLE pain.  No gross ankle deformity noted.  No foot or ankle joint " effusion is noted.    XRs of left foot reviewed with pt:  Mild increased bone density at L 5th metatarsal base fx.      MR:    Impression:  1. High-grade, essentially full-thickness split tearing of the  peroneal brevis from myotendinous junction with marked tendinosis and  associated tenosynovitis. There is minimal distal reconstitution.  2. Constellation of findings consistent with remote ankle injury  involving the syndesmotic, spring, lateral and deltoid ligamentous  complexes including complete tearing of the anterior talofibular  ligament.  3. Redemonstration of fracture through the proximal shaft of the fifth  metatarsal with bony edema proximal and distal to the fracture site.   4. Bony edema in the base and proximal shaft of the second metatarsal  and to a lesser extent the proximal third and fourth metatarsals,  consistent with stress reaction.  5. 3 mm osteochondral lesions with overlying full-thickness cartilage  defect in the medial talar dome.     I have personally reviewed the examination and initial interpretation  and I agree with the findings.     BAUTISTA ROJAS     ASSESSMENT: L foot/ankle injury with peroneal tendon tear, ligamentous injuries of the ankle, likely acute on chronic 5th metatarsal base/Noriega fracture.    DM with hx of neuropathy.     R lower leg wound, cellulitis.     PLAN:  Reviewed patient's chart in epic.  Discussed condition and treatment options including pros and cons.    We discussed findings in detail.  Again discussed risk of nonunion; this may be less of a concern for him given neuropathy.  Continue with nonoperative care.  RICE.  OK to begin wt bearing as tolerated in the boot.  F/u in 2 wks.    Pt to be off work during this time.  Advised f/u with Ortho asap regarding his recurrent RLE wound near prior surgical area; keep clean, dry, dressed daily.  Will start Keflex for some suspected cellulitis.  Seek immediate care for worsening redness, drainage, swelling, pain,  fever, chills, nausea, vomiting.  PT ordered at pt's request.       Ang Canales DPM, FACFAS          Again, thank you for allowing me to participate in the care of your patient.        Sincerely,        Ang Canales DPM

## 2017-09-27 NOTE — NURSING NOTE
"Chief Complaint   Patient presents with     Foot Pain     2 week follow up L foot Fx       Initial /66 (BP Location: Left arm, Patient Position: Chair, Cuff Size: Adult Large)  Pulse 97  Ht 6' 2\" (1.88 m)  Wt (!) 341 lb (154.7 kg)  BMI 43.78 kg/m2 Estimated body mass index is 43.78 kg/(m^2) as calculated from the following:    Height as of this encounter: 6' 2\" (1.88 m).    Weight as of this encounter: 341 lb (154.7 kg).  Medication Reconciliation: unable or not appropriate to perform    BERTRAM Carvalho MA September 27, 2017 10:38 AM  "

## 2017-09-27 NOTE — MR AVS SNAPSHOT
After Visit Summary   9/27/2017    Rhett Elizabeth    MRN: [id number]           Patient Information     Date Of Birth          1960        Visit Information        Provider Department      9/27/2017 10:15 AM Ang Canales DPM [facility]        Today's Diagnoses     Foot fracture, left, with routine healing, subsequent encounter    -  1    Type 2 diabetes mellitus with diabetic neuropathy, without long-term current use of insulin (H)        Peroneal tendon tear, left, subsequent encounter        Cellulitis of right lower extremity          Care Instructions    Begin protected weight bearing as tolerated on the left side with CAM boot.  Follow up with Ortho ASAP regarding your right leg.  Seek immediate care for worsening redness, drainage, swelling, pain, fever, chills, nausea, vomiting.            Follow-ups after your visit        Follow-up notes from your care team     Return in about 2 weeks (around 10/11/2017).      Who to contact     If you have questions or need follow up information about today's clinic visit or your schedule please contact [facility] directly at [phone].  Normal or non-critical lab and imaging results will be communicated to you by [employer], letter or phone within 4 business days after the clinic has received the results. If you do not hear from us within 7 days, please contact the clinic through [employer] or phone. If you have a critical or abnormal lab result, we will notify you by phone as soon as possible.  Submit refill requests through [employer] or call your pharmacy and they will forward the refill request to us. Please allow 3 business days for your refill to be completed.          Additional Information About Your Visit        MyChart Information     [employer] lets you send messages to your doctor, view your test results, renew your prescriptions, schedule appointments and more. To sign up, go to  "[email or web address] . Click on \"Log in\" on the left side of the screen, which will take you to the Welcome page. Then click on \"Sign up Now\" on the right side of the page.     You will be asked to enter the access code listed below, as well as some personal information. Please follow the directions to create your username and password.     Your access code is: [email or web address]  Expires: 2017  6:31 AM     Your access code will  in 90 days. If you need help or a new code, please call your [place] clinic or [phone].        Care EveryWhere ID     This is your Care EveryWhere ID. This could be used by other organizations to access your [place] medical records  [id number]        Your Vitals Were     Pulse Height BMI (Body Mass Index)             97 6' 2\" (1.88 m) 43.78 kg/m2          Blood Pressure from Last 3 Encounters:   17 101/66   17 127/70   17 128/80    Weight from Last 3 Encounters:   17 (!) 341 lb (154.7 kg)   17 (!) 336 lb (152.4 kg)   17 (!) 336 lb 6.4 oz (152.6 kg)              We Performed the Following     XR Foot Left G/E 3 Views          Today's Medication Changes          These changes are accurate as of: 17 11:01 AM.  If you have any questions, ask your nurse or doctor.               Start taking these medicines.        Dose/Directions    cephALEXin 500 MG capsule   Commonly known as:  KEFLEX   Used for:  Cellulitis of right lower extremity   Started by:  Ang Canales DPM        Dose:  500 mg   Take 1 capsule (500 mg) by mouth 3 times daily   Quantity:  30 capsule   Refills:  0            Where to get your medicines      These [id number] [place] [facility] [address],  [address] [place] [address], , [facility] [address]     Phone:  [phone]     cephALEXin 500 MG capsule                Primary Care Provider Office Phone # Fax #    Gwen Saldana MD " [phone]       [address] [address] [facility] [address]        Equal Access to Services     HANNAH GARCÍA : Hadii aad ku hadjens Lucio, wajodida armando, candido kajaimeda brianne, halley kang laCrystalpaul winchester. So [facility] [phone].    ATENCIÓN: Si habla español, tiene a tsang disposición servicios gratuitos de asistencia lingüística. Llame al [phone].    We comply with applicable federal civil rights laws and [place] laws. We do not discriminate on the basis of race, color, national origin, age, disability sex, sexual orientation or gender identity.            Thank you!     Thank you for choosing [facility]  for your care. Our goal is always to provide you with excellent care. Hearing back from our patients is one way we can continue to improve our services. Please take a few minutes to complete the written survey that you may receive in the mail after your visit with us. Thank you!             Your Updated Medication List - Protect others around you: Learn how to safely use, store and throw away your medicines at www.disposemymeds.org.          This list is accurate as of: 9/27/17 11:01 AM.  Always use your most recent med list.                   Brand Name Dispense Instructions for use Diagnosis    ACCU-CHEK MANUEL PLUS VI           ACE/ARB NOT PRESCRIBED (INTENTIONAL)      Please choose reason not prescribed, below    Type 2 diabetes mellitus with diabetic neuropathy, without long-term current use of insulin (H)       aspirin 81 MG tablet      Take by mouth daily Reported on 5/5/2017        blood glucose monitoring lancets      1 each by In Vitro route Use to test blood sugaras directed.        cephALEXin 500 MG capsule    KEFLEX    30 capsule    Take 1 capsule (500 mg) by mouth 3 times daily    Cellulitis of right lower extremity       DULoxetine 60 MG EC capsule    CYMBALTA     Take by mouth daily        ENSURE COMPLETE PO           furosemide 40 MG tablet     LASIX    30 tablet    40 mg as needed Reported on 5/16/2017        insulin aspart 100 UNIT/ML injection    NovoLOG PEN     Inject 10 Units Subcutaneous 3 times daily (with meals) Uses sliding scale. Usually about 2 units        levothyroxine 100 MCG tablet    SYNTHROID/LEVOTHROID    90 tablet    Take 1 tablet (100 mcg) by mouth daily    Hypothyroidism, unspecified type       losartan 25 MG tablet    COZAAR     Take 25 mg by mouth daily        Lutein 20 MG Caps      Take 20 mg by mouth daily 8/11/2017- patient states he takes 1 20 mg tablet daily Patient states he takes 150 mg tablet twice a day.        metFORMIN 500 MG tablet    GLUCOPHAGE    90 tablet    Take 1 tablet (500 mg) by mouth daily (with breakfast)    Type 2 diabetes mellitus with diabetic neuropathy, without long-term current use of insulin (H)       MULTIVITAMINS PO      Reported on 5/22/2017        OMEGA-3 FISH OIL PO      Take 1 g by mouth Reported on 5/5/2017        order for DME     1 Device    Tall aircast boot    Acute left ankle pain, Ankle injury, left, initial encounter, Foot fracture, left, closed, initial encounter       polyethylene glycol 0.4%- propylene glycol 0.3% 0.4-0.3 % Soln ophthalmic solution    SYSTANE ULTRA     Place 1 drop into both eyes 3 times daily    Dry eyes       PREGABALIN PO      Take 300 mg by mouth 2 times daily Reported on 5/16/2017        rivaroxaban ANTICOAGULANT 20 MG Tabs tablet    XARELTO     Take 20 mg by mouth daily (with dinner) Reported on 5/5/2017        simvastatin 20 MG tablet    ZOCOR    90 tablet    Take 1 tablet (20 mg) by mouth At Bedtime    Hyperlipidemia, unspecified hyperlipidemia type       TOPROL XL PO      Take 50 mg by mouth daily        TYLENOL PO      Take 1,000 mg by mouth 4 times daily        UNABLE TO FIND      cpap        Vitamin B-12 500 MCG Subl      Place 2 tablets under the tongue daily        VITAMIN D-3 PO      Take 1,000 Units by mouth daily

## 2017-09-27 NOTE — PROGRESS NOTES
"PATIENT HISTORY:  Rhett Elizabeth is a 56 year old male who presents to clinic for recheck of L foot and ankle injury, peroneal tendon tear, likely acute on chronic 5th met fx.  Pt reports hearing a left foot/ankle \"crack\" when walking at work 8/2/17.  Hx of prior L 5th metatarsal fracture 11/2016 per pt \"that healed.\"  He is off work.  Denies fevers, new injury.  He reports being primarily NWB in his boot.  Hx of DM with neuropathy.  Last HbA1c 7.3.  Nonsmoker.  Denies related family hx.       EXAM:Vitals: /66 (BP Location: Left arm, Patient Position: Chair, Cuff Size: Adult Large)  Pulse 97  Ht 6' 2\" (1.88 m)  Wt (!) 341 lb (154.7 kg)  BMI 43.78 kg/m2  BMI= Body mass index is 43.78 kg/(m^2).    General appearance: Patient is alert and fully cooperative with history & exam.  No sign of distress is noted during the visit.     Dermatologic: Small area of skin breakdown right anterior shin near prior surgical site.  No other wounds b/l.   Some local erythema and swelling here.      Vascular: DP & PT pulses are intact & regular on the left.  Left lateral ankle edema improved today.  No foot edema along 5th metatarsal.  CFT and skin temperature are normal to both lower extremities.      Neurologic: Lower extremity sensation is diminished to light touch b/l.      Musculoskeletal: No L ankle pain with palpation along the peroneal tendons.  Eversion strength of foot 5/5.  No medial ankle pain.  No pain at the 5th metatarsal base with palpation.  No RLE pain.  No gross ankle deformity noted.  No foot or ankle joint effusion is noted.    XRs of left foot reviewed with pt:  Mild increased bone density at L 5th metatarsal base fx.      MR:    Impression:  1. High-grade, essentially full-thickness split tearing of the  peroneal brevis from myotendinous junction with marked tendinosis and  associated tenosynovitis. There is minimal distal reconstitution.  2. Constellation of findings consistent with remote ankle " injury  involving the syndesmotic, spring, lateral and deltoid ligamentous  complexes including complete tearing of the anterior talofibular  ligament.  3. Redemonstration of fracture through the proximal shaft of the fifth  metatarsal with bony edema proximal and distal to the fracture site.   4. Bony edema in the base and proximal shaft of the second metatarsal  and to a lesser extent the proximal third and fourth metatarsals,  consistent with stress reaction.  5. 3 mm osteochondral lesions with overlying full-thickness cartilage  defect in the medial talar dome.     I have personally reviewed the examination and initial interpretation  and I agree with the findings.     BAUTISTA ROJAS     ASSESSMENT: L foot/ankle injury with peroneal tendon tear, ligamentous injuries of the ankle, likely acute on chronic 5th metatarsal base/Noriega fracture.    DM with hx of neuropathy.     R lower leg wound, cellulitis.     PLAN:  Reviewed patient's chart in epic.  Discussed condition and treatment options including pros and cons.    We discussed findings in detail.  Again discussed risk of nonunion; this may be less of a concern for him given neuropathy.  Continue with nonoperative care.  RICE.  OK to begin wt bearing as tolerated in the boot.  F/u in 2 wks.    Pt to be off work during this time.  Advised f/u with Ortho asap regarding his recurrent RLE wound near prior surgical area; keep clean, dry, dressed daily.  Will start Keflex for some suspected cellulitis.  Seek immediate care for worsening redness, drainage, swelling, pain, fever, chills, nausea, vomiting.  PT ordered at pt's request.       Ang Canales, PAM, FACFAS

## 2017-09-28 ENCOUNTER — TRANSFERRED RECORDS (OUTPATIENT)
Dept: HEALTH INFORMATION MANAGEMENT | Facility: CLINIC | Age: 57
End: 2017-09-28

## 2017-10-03 ENCOUNTER — THERAPY VISIT (OUTPATIENT)
Dept: PHYSICAL THERAPY | Facility: CLINIC | Age: 57
End: 2017-10-03
Payer: COMMERCIAL

## 2017-10-03 DIAGNOSIS — R26.9 GAIT ABNORMALITY: ICD-10-CM

## 2017-10-03 DIAGNOSIS — S92.902D FOOT FRACTURE, LEFT, WITH ROUTINE HEALING, SUBSEQUENT ENCOUNTER: ICD-10-CM

## 2017-10-03 DIAGNOSIS — M79.672 LEFT FOOT PAIN: Primary | ICD-10-CM

## 2017-10-03 PROCEDURE — 97161 PT EVAL LOW COMPLEX 20 MIN: CPT | Mod: GP | Performed by: PHYSICAL THERAPIST

## 2017-10-03 PROCEDURE — 97110 THERAPEUTIC EXERCISES: CPT | Mod: GP | Performed by: PHYSICAL THERAPIST

## 2017-10-03 NOTE — PROGRESS NOTES
[facility] Initial Evaluation    Subjective:    Patient is a 56 year old male presenting with rehab left ankle/foot hpi. The history is provided by the patient. No  was used.   Rhett Elizabeth is a 56 year old male with a left ankle and left foot condition.  Condition occurred with:  Other reason (walking).  Condition occurred: in the community.  This is a new condition  Patient reports onset of left foot pain beginning on 8/31/17 when he was walking and heard a crack in the foot. He reports that he does have neuropathy so doesn't always feel his feet; he isn't sure if he twisted the ankle. Most recently saw physician for this problem on 9/27/17.    Patient reports pain:  Posterior.  Radiates to:  No radiation.  Pain is described as aching and is intermittent and reported as 5/10.  Associated symptoms:  Loss of motion/stiffness and loss of strength. Pain is worse during the day.  Symptoms are exacerbated by walking, standing and weight bearing and relieved by rest.  Since onset symptoms are gradually improving.  Special tests:  X-ray and MRI (5th metatarsal fx, peroneal tendon tear).      General health as reported by patient is fair.  Pertinent medical history includes:  History of fractures, diabetes, overweight, high blood pressure, heart problems, thyroid problems and other (chewing, dizziness, numbness/tingling).  Medical allergies: yes (Norflex).  Other surgeries include:  Orthopedic surgery.  Current medications:  Cardiac medication, thyroid medication, sleep medication, pain medication, anti-depressants and high blood pressure medication.  Current occupation is   .  Patient is currently not working due to present treatment problem.  Primary job tasks include:  Prolonged standing, lifting, repetitive tasks, operating a machine and other (pushing/pulling, computer work).    Barriers include:  None as reported by patient.    Red flags:  None as reported by  patient.                        Objective:      Gait:  Patient ambulates into therapy today without CAM boot.  Gait Type:  Antalgic   Weight Bearing Status:  WBAT   Assistive Devices:  None  Deviations:  Ankle:  Push off decr L and push off decr RGeneral Deviations:  Stance time decr and stride length decr    Flexibility/Screens:       Lower Extremity:  Decreased left lower extremity flexibility:Gastroc and Soleus            Ankle/Foot Evaluation  ROM:    AROM:    Dorsiflexion:  Left:   12  Right:   12  Plantarflexion:  Left:  30    Right:  45  Inversion:  Left:  40     Right:  40  Eversion:  8     Right:  8        Strength:    Dorsiflexion:  Right: 5-/5    Pain:-  Plantarflexion: Right: 5-/5   Pain:-  Inversion:Right: 5-/5   Pain:-  Eversion:Right: 5-/5   Pain:-              Strength wnl ankle: Strength testing deferred on left ankle.      PALPATION: normal    EDEMA: normal                                                              General     ROS    Assessment/Plan:      Patient is a 56 year old male with left side ankle complaints.    Patient has the following significant findings with corresponding treatment plan.                Diagnosis 1:  Left foot fracture    Pain -  self management, education and home program  Decreased ROM/flexibility - manual therapy, therapeutic exercise and home program  Decreased strength - therapeutic exercise, therapeutic activities and home program  Impaired gait - gait training  Impaired muscle performance - neuro re-education and home program  Decreased function - therapeutic activities and home program    Therapy Evaluation Codes:   1) History comprised of:   Personal factors that impact the plan of care:      None.    Comorbidity factors that impact the plan of care are:      Diabetes, Heart problems, High blood pressure and Overweight.     Medications impacting care: Cardiac and High blood pressure.  2) Examination of Body Systems comprised of:   Body structures and  functions that impact the plan of care:      Ankle.   Activity limitations that impact the plan of care are:      Standing and Walking.  3) Clinical presentation characteristics are:   Stable/Uncomplicated.  4) Decision-Making    Low complexity using standardized patient assessment instrument and/or measureable assessment of functional outcome.  Cumulative Therapy Evaluation is: Low complexity.    Previous and current functional limitations:  (See Goal Flow Sheet for this information)    Short term and Long term goals: (See Goal Flow Sheet for this information)     Communication ability:  Patient appears to be able to clearly communicate and understand verbal and written communication and follow directions correctly.  Treatment Explanation - The following has been discussed with the patient:   RX ordered/plan of care  Anticipated outcomes  Possible risks and side effects  This patient would benefit from PT intervention to resume normal activities.   Rehab potential is good.    Frequency:  1 X week, once daily  Duration:  for 4 weeks tapering to 2 X a month over 4 weeks  Discharge Plan:  Achieve all LTG.  Independent in home treatment program.  Reach maximal therapeutic benefit.    Please refer to the daily flowsheet for treatment today, total treatment time and time spent performing 1:1 timed codes.

## 2017-10-03 NOTE — MR AVS SNAPSHOT
After Visit Summary   10/3/2017    Rhett Elizabeth    MRN: [id number]           Patient Information     Date Of Birth          1960        Visit Information        Provider Department      10/3/2017 10:40 AM Zoran Wright PT [facility] [place] [facility]        Today's Diagnoses     Left foot pain    -  1    Foot fracture, left, with routine healing, subsequent encounter        Gait abnormality           Follow-ups after your visit        Your next 10 appointments already scheduled     Oct 10, 2017 10:40 AM CDT   JACKIE Extremity with Zoran Wright PT   [facility] [place] [facility] ([facility] [place])    [address] [facility] [address]  [place] [address]   [phone]            Oct 11, 2017 10:30 AM CDT   Return Visit with Ang Canales DPM   [facility] ([facility]    [address] [place] [id number]   [phone]            Oct 17, 2017 10:40 AM CDT   JACKIE Extremity with Zoran Wright PT   [facility] [place] [facility] ([facility] [place])    [address] [facility] [address]  [place] [address]   [phone]            Oct 24, 2017 10:00 AM CDT   JACKIE Extremity with Zoran Wright PT   [facility] [place] [facility] ([facility] [place])    [address] [facility] [address]  [place] [address]   [phone]              Who to contact     If you have questions or need follow up information about today's clinic visit or your schedule please contact [facility] directly at [phone].  Normal or non-critical lab and imaging results will be communicated to you by MyChart, letter or phone within 4 business days after the clinic has received the results. If you do not hear from us within 7 days, please contact the clinic through MyChart or phone. If you have a critical or abnormal lab result, we will notify you by phone as soon as possible.  Submit  "refill requests through Lotame or call your pharmacy and they will forward the refill request to us. Please allow 3 business days for your refill to be completed.          Additional Information About Your Visit        Lotame Information     Lotame lets you send messages to your doctor, view your test results, renew your prescriptions, schedule appointments and more. To sign up, go to [email or web address] . Click on \"Log in\" on the left side of the screen, which will take you to the Welcome page. Then click on \"Sign up Now\" on the right side of the page.     You will be asked to enter the access code listed below, as well as some personal information. Please follow the directions to create your username and password.     Your access code is: [email or web address]  Expires: 2017  6:31 AM     Your access code will  in 90 days. If you need help or a new code, please call your [facility] clinic or [phone].        Care EveryWhere ID     This is your Care EveryWhere ID. This could be used by other organizations to access your [facility] medical records  [id number]         Blood Pressure from Last 3 Encounters:   17 101/66   17 127/70   17 128/80    Weight from Last 3 Encounters:   17 (!) 154.7 kg (341 lb)   17 (!) 152.4 kg (336 lb)   17 (!) 152.6 kg (336 lb 6.4 oz)              We Performed the Following     HC PT EVAL, LOW COMPLEXITY     JACKIE INITIAL EVAL REPORT     THERAPEUTIC EXERCISES        Primary Care Provider Office Phone # Fax #    Gwen Saldana [phone] [address] [facility] [address]        Equal Access to Services     HANNAH GARCÍA : Aziza Lucio, pattie roberts, halley jones. So [facility] [phone].    ATENCIÓN: Si habla español, tiene a tsang disposición servicios gratuitos de asistencia lingüística. Llame al [phone].    We comply with " applicable federal civil rights laws and [place] laws. We do not discriminate on the basis of race, color, national origin, age, disability, sex, sexual orientation, or gender identity.            Thank you!     Thank you for choosing [facility]  for your care. Our goal is always to provide you with excellent care. Hearing back from our patients is one way we can continue to improve our services. Please take a few minutes to complete the written survey that you may receive in the mail after your visit with us. Thank you!             Your Updated Medication List - Protect others around you: Learn how to safely use, store and throw away your medicines at www.disposemymeds.org.          This list is accurate as of: 10/3/17 11:59 PM.  Always use your most recent med list.                   Brand Name Dispense Instructions for use Diagnosis    ACCU-CHEK MANUEL PLUS VI           ACE/ARB NOT PRESCRIBED (INTENTIONAL)      Please choose reason not prescribed, below    Type 2 diabetes mellitus with diabetic neuropathy, without long-term current use of insulin (H)       aspirin 81 MG tablet      Take by mouth daily Reported on 5/5/2017        blood glucose monitoring lancets      1 each by In Vitro route Use to test blood sugaras directed.        cephALEXin 500 MG capsule    KEFLEX    30 capsule    Take 1 capsule (500 mg) by mouth 3 times daily    Cellulitis of right lower extremity       DULoxetine 60 MG EC capsule    CYMBALTA     Take by mouth daily        ENSURE COMPLETE PO           furosemide 40 MG tablet    LASIX    30 tablet    40 mg as needed Reported on 5/16/2017        insulin aspart 100 UNIT/ML injection    NovoLOG PEN     Inject 10 Units Subcutaneous 3 times daily (with meals) Uses sliding scale. Usually about 2 units        levothyroxine 100 MCG tablet    SYNTHROID/LEVOTHROID    90 tablet    Take 1 tablet (100 mcg) by mouth daily    Hypothyroidism, unspecified type       losartan 25  MG tablet    COZAAR     Take 25 mg by mouth daily        Lutein 20 MG Caps      Take 20 mg by mouth daily 8/11/2017- patient states he takes 1 20 mg tablet daily Patient states he takes 150 mg tablet twice a day.        metFORMIN 500 MG tablet    GLUCOPHAGE    90 tablet    Take 1 tablet (500 mg) by mouth daily (with breakfast)    Type 2 diabetes mellitus with diabetic neuropathy, without long-term current use of insulin (H)       MULTIVITAMINS PO      Reported on 5/22/2017        OMEGA-3 FISH OIL PO      Take 1 g by mouth Reported on 5/5/2017        order for DME     1 Device    Tall aircast boot    Acute left ankle pain, Ankle injury, left, initial encounter, Foot fracture, left, closed, initial encounter       polyethylene glycol 0.4%- propylene glycol 0.3% 0.4-0.3 % Soln ophthalmic solution    SYSTANE ULTRA     Place 1 drop into both eyes 3 times daily    Dry eyes       PREGABALIN PO      Take 300 mg by mouth 2 times daily Reported on 5/16/2017        rivaroxaban ANTICOAGULANT 20 MG Tabs tablet    XARELTO     Take 20 mg by mouth daily (with dinner) Reported on 5/5/2017        simvastatin 20 MG tablet    ZOCOR    90 tablet    Take 1 tablet (20 mg) by mouth At Bedtime    Hyperlipidemia, unspecified hyperlipidemia type       TOPROL XL PO      Take 50 mg by mouth daily        TYLENOL PO      Take 1,000 mg by mouth 4 times daily        UNABLE TO FIND      cpap        Vitamin B-12 500 MCG Subl      Place 2 tablets under the tongue daily        VITAMIN D-3 PO      Take 1,000 Units by mouth daily

## 2017-10-09 DIAGNOSIS — E11.40 TYPE 2 DIABETES MELLITUS WITH DIABETIC NEUROPATHY, WITHOUT LONG-TERM CURRENT USE OF INSULIN (H): ICD-10-CM

## 2017-10-10 ENCOUNTER — THERAPY VISIT (OUTPATIENT)
Dept: PHYSICAL THERAPY | Facility: CLINIC | Age: 57
End: 2017-10-10
Payer: COMMERCIAL

## 2017-10-10 DIAGNOSIS — S92.902D FOOT FRACTURE, LEFT, WITH ROUTINE HEALING, SUBSEQUENT ENCOUNTER: ICD-10-CM

## 2017-10-10 DIAGNOSIS — R26.9 GAIT ABNORMALITY: ICD-10-CM

## 2017-10-10 DIAGNOSIS — M79.672 LEFT FOOT PAIN: ICD-10-CM

## 2017-10-10 PROCEDURE — 97110 THERAPEUTIC EXERCISES: CPT | Mod: GP | Performed by: PHYSICAL THERAPIST

## 2017-10-11 ENCOUNTER — OFFICE VISIT (OUTPATIENT)
Dept: PODIATRY | Facility: CLINIC | Age: 57
End: 2017-10-11
Payer: COMMERCIAL

## 2017-10-11 ENCOUNTER — RADIANT APPOINTMENT (OUTPATIENT)
Dept: GENERAL RADIOLOGY | Facility: CLINIC | Age: 57
End: 2017-10-11
Attending: PODIATRIST
Payer: COMMERCIAL

## 2017-10-11 VITALS
SYSTOLIC BLOOD PRESSURE: 103 MMHG | HEIGHT: 74 IN | WEIGHT: 315 LBS | DIASTOLIC BLOOD PRESSURE: 67 MMHG | HEART RATE: 77 BPM | BODY MASS INDEX: 40.43 KG/M2

## 2017-10-11 DIAGNOSIS — S86.312D TEAR OF PERONEAL TENDON, LEFT, SUBSEQUENT ENCOUNTER: ICD-10-CM

## 2017-10-11 DIAGNOSIS — E11.40 TYPE 2 DIABETES MELLITUS WITH DIABETIC NEUROPATHY, WITHOUT LONG-TERM CURRENT USE OF INSULIN (H): ICD-10-CM

## 2017-10-11 DIAGNOSIS — S92.902D CLOSED FRACTURE OF LEFT FOOT WITH ROUTINE HEALING, SUBSEQUENT ENCOUNTER: Primary | ICD-10-CM

## 2017-10-11 PROCEDURE — 73630 X-RAY EXAM OF FOOT: CPT | Mod: LT

## 2017-10-11 PROCEDURE — 99213 OFFICE O/P EST LOW 20 MIN: CPT | Performed by: PODIATRIST

## 2017-10-11 NOTE — PATIENT INSTRUCTIONS
"Increase activity gradually as tolerated in supportive shoes.    DIABETES AND YOUR FEET    What effect does diabetes have on the feet?   Diabetes can result in several problems in the feet including ulcers (open sores) and amputations.  Two of the most important reasons why people develop foot problems when they have diabetes is :  1.  Neuropathy (loss of feeling) and 2.  Vascular disease (loss or decrease of blood flow).    What is neuropathy?  Neuropathy is a term used to describe a loss of nerve function.  Patients with diabetes are at risk of developing neuropathy if their sugars continue to run high and are above the normal value.  One theory for neuropathy is that the \"extra\" sugar in the body enters the nerves and is broken down.  These by-products build up in the nerve causing it to swell and impair nerve function.  Often times, this can be prevented by controlling your sugars, dieting and exercise.    How do I know if I have neuropathy?  When a person develops neuropathy, they usually begin to feel numbness or tingling in their feet and sometime in their legs.  Other symptoms may include painful burning or hot feet, tingling or feeling like insects or ants are crawling on your feet or legs.  If the diabetes is sever and the sugars run high for long periods of time, neuropathy can also occur in the hands.    What is vascular disease?  Vascular disease is a term used to describe a loss or decrease in circulation (blood flow).  There is a problem in getting blood and oxygen to areas that need it.  Similar to neuropathy, sugars can build up in the walls of the arteries (blood vessels) and cause them to become swollen, thickened and hardened.  This decreases the amount of blood that can go to an area that needs it.  Though this is common in the legs of diabetic patients, it can also affect other arteries (blood vessels) in the body such as in the heart and eyes.    How do I know if I have vascular disease?  In " the legs, vascular disease usually results in cramping.  Patients who develop leg cramps after walking the same distance every time (i.e. One block, half a mile, etc.) need to let their doctors know so that their circulation may be checked.  Cramps causing severe pain in the feet and/or legs while sleeping or cramps that go away when you stand may also be a sign of poor blood circulation.  Occasional cramping in cold weather or on rare occasions with activity may not be due to poor circulation, but you should inform your doctor.    How can these problems be prevented?  The key to prevention is good blood sugar control.  Poor blood sugar control is a big reason many of these problems start.  Physical activity (exercise) is a very good way to help decrease your blood sugars.  Exercise can lower your blood sugar, blood pressure, and cholesterol.  It also reduces your risk for heart disease and stroke, relieves stress, and strengthens your heart, muscles and bones.  In addition, regular activity helps insulin work better, improves your blood circulation, and keeps your joints flexible.  If you're trying to lose weight, a combination of exercise and wise food choices can help you reach your target weight and maintain it.      Diabetic Foot Care Recommendations    The following are recommendations for avoiding serious foot problems or injury    DO'S    1.Wash your feet with lukewarm water and a mild soap and then dry them thoroughly, especially between the toes.      2. Examine your feet daily looking for cuts, corns, blisters, cracks, etc..., especially after wearing new shoes.  Make sure to look between your toes.  If you cannot see the bottom of your feet, set a mirror on the floor and hold your foot over it, or ask a spouse, friend or family member to examine your feet for you.  Contact your doctor immediately if new problems are noted or if sores are not healing.      3.  Immediately apply moisturizer to the tops and  bottoms of your feet, avoiding areas between the toes.  Hand lotion (Intensive Care, Oneyda, Eucerin, Neutrogena, Curel, etc...) is sufficient unless your doctor prescribes a medicated lotion.  Apply sunscreen to your feet when going swimming outside.      4.Use clean comfortable shoes, wear white socks (if you have any bleeding or drainage, you will see it on white socks).  Socks should not have thick seams or cut off the circulation around the leg.  Break in new shoes slowly and rotate with older shoes until broken in.  Check the inside of your shoes with your hand to look for areas of irritation or objects that may have fallen into your shoes.        5. Keep slippers by the side of your bed for use during the night.      6. Shoes should be fitted by a professional and should not cause areas of irritation.  Check your feet regularly when wearing a new pair of shoes and replace them as needed.      7.  Talk to your doctor about proper exercise.  Exercise and stretching stimulate blood flow to your feet and maintain proper glucose levels.      8.  Monitor your blood glucose level as instructed by your doctor.  Notify your doctor immediately if your blood sugar is abnormally high or low.      9.  Cut your nails straight across, but then gently round any sharp edges with a cardboard nail file.  If you have neuropathy, peripheral vascular disease or cannot see that well to trim your own toenails, see a medical professional for care.    DON'Ts    1.  Do not soak your feet if you have an open sore.  Use only lukewarm water and always check the temperature with your hand as hot water can easily burn your feet.        2.  Never use a hot water bottle or heating pad on your feet.  Also do not apply cold compresses to your feet.  With decreased sensation, you could burn or freeze your feet.        3.  Do not apply any of these to your feet:     - over the counter medicine for corns or warts     -  Harsh chemicals like boric  acid     -  Do not self-treat corns, cuts, blisters or infections.  Always consult your doctor.        4.  Do not wear sandals, slippers or walk barefoot, especially on hot sand or concrete or other harsh surfaces.      5.  If you smoke, stop!!!

## 2017-10-11 NOTE — MR AVS SNAPSHOT
"              After Visit Summary   10/11/2017    Rhett Elizabeth    MRN: [id number]           Patient Information     Date Of Birth          1960        Visit Information        Provider Department      10/11/2017 10:30 AM Ang Canales DPM [facility]        Today's Diagnoses     Closed fracture of left foot with routine healing, subsequent encounter    -  1    Type 2 diabetes mellitus with diabetic neuropathy, without long-term current use of insulin (H)        Tear of peroneal tendon, left, subsequent encounter          Care Instructions    Increase activity gradually as tolerated in supportive shoes.    DIABETES AND YOUR FEET    What effect does diabetes have on the feet?   Diabetes can result in several problems in the feet including ulcers (open sores) and amputations.  Two of the most important reasons why people develop foot problems when they have diabetes is :  1.  Neuropathy (loss of feeling) and 2.  Vascular disease (loss or decrease of blood flow).    What is neuropathy?  Neuropathy is a term used to describe a loss of nerve function.  Patients with diabetes are at risk of developing neuropathy if their sugars continue to run high and are above the normal value.  One theory for neuropathy is that the \"extra\" sugar in the body enters the nerves and is broken down.  These by-products build up in the nerve causing it to swell and impair nerve function.  Often times, this can be prevented by controlling your sugars, dieting and exercise.    How do I know if I have neuropathy?  When a person develops neuropathy, they usually begin to feel numbness or tingling in their feet and sometime in their legs.  Other symptoms may include painful burning or hot feet, tingling or feeling like insects or ants are crawling on your feet or legs.  If the diabetes is sever and the sugars run high for long periods of time, neuropathy can also occur in the hands.    What is vascular disease?  " Vascular disease is a term used to describe a loss or decrease in circulation (blood flow).  There is a problem in getting blood and oxygen to areas that need it.  Similar to neuropathy, sugars can build up in the walls of the arteries (blood vessels) and cause them to become swollen, thickened and hardened.  This decreases the amount of blood that can go to an area that needs it.  Though this is common in the legs of diabetic patients, it can also affect other arteries (blood vessels) in the body such as in the heart and eyes.    How do I know if I have vascular disease?  In the legs, vascular disease usually results in cramping.  Patients who develop leg cramps after walking the same distance every time (i.e. One block, half a mile, etc.) need to let their doctors know so that their circulation may be checked.  Cramps causing severe pain in the feet and/or legs while sleeping or cramps that go away when you stand may also be a sign of poor blood circulation.  Occasional cramping in cold weather or on rare occasions with activity may not be due to poor circulation, but you should inform your doctor.    How can these problems be prevented?  The key to prevention is good blood sugar control.  Poor blood sugar control is a big reason many of these problems start.  Physical activity (exercise) is a very good way to help decrease your blood sugars.  Exercise can lower your blood sugar, blood pressure, and cholesterol.  It also reduces your risk for heart disease and stroke, relieves stress, and strengthens your heart, muscles and bones.  In addition, regular activity helps insulin work better, improves your blood circulation, and keeps your joints flexible.  If you're trying to lose weight, a combination of exercise and wise food choices can help you reach your target weight and maintain it.      Diabetic Foot Care Recommendations    The following are recommendations for avoiding serious foot problems or  injury    DO'S    1.Wash your feet with lukewarm water and a mild soap and then dry them thoroughly, especially between the toes.      2. Examine your feet daily looking for cuts, corns, blisters, cracks, etc..., especially after wearing new shoes.  Make sure to look between your toes.  If you cannot see the bottom of your feet, set a mirror on the floor and hold your foot over it, or ask a spouse, friend or family member to examine your feet for you.  Contact your doctor immediately if new problems are noted or if sores are not healing.      3.  Immediately apply moisturizer to the tops and bottoms of your feet, avoiding areas between the toes.  Hand lotion (Intensive Care, Oneyda, Eucerin, Neutrogena, Curel, etc...) is sufficient unless your doctor prescribes a medicated lotion.  Apply sunscreen to your feet when going swimming outside.      4.Use clean comfortable shoes, wear white socks (if you have any bleeding or drainage, you will see it on white socks).  Socks should not have thick seams or cut off the circulation around the leg.  Break in new shoes slowly and rotate with older shoes until broken in.  Check the inside of your shoes with your hand to look for areas of irritation or objects that may have fallen into your shoes.        5. Keep slippers by the side of your bed for use during the night.      6. Shoes should be fitted by a professional and should not cause areas of irritation.  Check your feet regularly when wearing a new pair of shoes and replace them as needed.      7.  Talk to your doctor about proper exercise.  Exercise and stretching stimulate blood flow to your feet and maintain proper glucose levels.      8.  Monitor your blood glucose level as instructed by your doctor.  Notify your doctor immediately if your blood sugar is abnormally high or low.      9.  Cut your nails straight across, but then gently round any sharp edges with a cardboard nail file.  If you have neuropathy, peripheral  vascular disease or cannot see that well to trim your own toenails, see a medical professional for care.    DON'Ts    1.  Do not soak your feet if you have an open sore.  Use only lukewarm water and always check the temperature with your hand as hot water can easily burn your feet.        2.  Never use a hot water bottle or heating pad on your feet.  Also do not apply cold compresses to your feet.  With decreased sensation, you could burn or freeze your feet.        3.  Do not apply any of these to your feet:     - over the counter medicine for corns or warts     -  Harsh chemicals like boric acid     -  Do not self-treat corns, cuts, blisters or infections.  Always consult your doctor.        4.  Do not wear sandals, slippers or walk barefoot, especially on hot sand or concrete or other harsh surfaces.      5.  If you smoke, stop!!!                    Follow-ups after your visit        Additional Services     ORTHOTICS REFERRAL       Please be aware that coverage of these services is subject to the terms and limitations of your health insurance plan.  Call member services at your health plan with any benefit or coverage questions.      Please bring the following to your appointment:    >>   Any x-rays, CTs or MRIs which have been performed.  Contact the facility where they were done to arrange for  prior to your scheduled appointment.  Any new CT, MRI or other procedures ordered by your specialist must be performed at a [facility] facility or coordinated by your clinic's referral office.    >>   List of current medications   >>   This referral request   >>   Any documents/labs given to you for this referral    ==This Referral PRINTS in the [facility] (ORTHOTICS & PROSTHETICS) Central scheduling office ==     The [facility] Central Scheduling staff will contact patient to arrange appointments. Central Scheduling Phone #:  [place]  [phone]     Orthotics: diabetic shoes and inserts  "                 Follow-up notes from your care team     Return if symptoms worsen or fail to improve.      Your next 10 appointments already scheduled     Oct 17, 2017 10:40 AM CDT   JACKIE Extremity with Zoran Wright PT   [facility] [place] [facility] ([facility] [place])    [address]  [address]  [place] [address]   [phone]            Oct 24, 2017 10:00 AM CDT   JACKIE Extremity with Zoran Wright PT   [facility] [place] [facility] ([facility] [place])    [address]  [place] [address]   [phone]              Who to contact     If you have questions or need follow up information about today's clinic visit or your schedule please contact [facility] directly at [phone].  Normal or non-critical lab and imaging results will be communicated to you by MyChart, letter or phone within 4 business days after the clinic has received the results. If you do not hear from us within 7 days, please contact the clinic through MyChart or phone. If you have a critical or abnormal lab result, we will notify you by phone as soon as possible.  Submit refill requests through [employer] or call your pharmacy and they will forward the refill request to us. Please allow 3 business days for your refill to be completed.          Additional Information About Your Visit        MyChart Information     [employer] lets you send messages to your doctor, view your test results, renew your prescriptions, schedule appointments and more. To sign up, go to [email or web address] . Click on \"Log in\" on the left side of the screen, which will take you to the Welcome page. Then click on \"Sign up Now\" on the right side of the page.     You will be asked to enter the access code listed below, as well as some personal information. Please follow the directions to create your username and password.     Your access code is: [email or web address]  Expires: 11/2/2017  6:31 AM     Your " "access code will  in 90 days. If you need help or a new code, please call your [facility] clinic or [phone].        Care EveryWhere ID     This is your Care EveryWhere ID. This could be used by other organizations to access your [facility] medical records  [id number]        Your Vitals Were     Pulse Height BMI (Body Mass Index)             77 6' 2\" (1.88 m) 43.78 kg/m2          Blood Pressure from Last 3 Encounters:   10/11/17 103/67   17 101/66   17 127/70    Weight from Last 3 Encounters:   10/11/17 (!) 341 lb (154.7 kg)   17 (!) 341 lb (154.7 kg)   17 (!) 336 lb (152.4 kg)              We Performed the Following     ORTHOTICS REFERRAL     XR Foot Left G/E 3 Views        Primary Care Provider Office Phone # Fax #    Gwen Saldana [phone] [address] [address]        Equal Access to Services     [facility]: Hadii aad ku hadasho Soomaali, waaxda luqadaha, qaybta kaalmada adeegyakelly, [facility] . [facility] [phone].    ATENCIÓN: Si habla español, tiene a tsang disposición servicios gratuitos de asistencia lingüística. [facility] [phone].    We comply with applicable federal civil rights laws and [place] laws. We do not discriminate on the basis of race, color, national origin, age, disability, sex, sexual orientation, or gender identity.            Thank you!     Thank you for choosing [facility]  for your care. Our goal is always to provide you with excellent care. Hearing back from our patients is one way we can continue to improve our services. Please take a few minutes to complete the written survey that you may receive in the mail after your visit with us. Thank you!             Your Updated Medication List - Protect others around you: Learn how to safely use, store and throw away your medicines at www.disposemymeds.org.          This list is accurate as of: 10/11/17 " 11:05 AM.  Always use your most recent med list.                   Brand Name Dispense Instructions for use Diagnosis    ACCU-CHEK MANUEL PLUS VI           ACE/ARB NOT PRESCRIBED (INTENTIONAL)      Please choose reason not prescribed, below    Type 2 diabetes mellitus with diabetic neuropathy, without long-term current use of insulin (H)       aspirin 81 MG tablet      Take by mouth daily Reported on 5/5/2017        blood glucose monitoring lancets      1 each by In Vitro route Use to test blood sugaras directed.        cephALEXin 500 MG capsule    KEFLEX    30 capsule    Take 1 capsule (500 mg) by mouth 3 times daily    Cellulitis of right lower extremity       DULoxetine 60 MG EC capsule    CYMBALTA     Take by mouth daily        ENSURE COMPLETE PO           furosemide 40 MG tablet    LASIX    30 tablet    40 mg as needed Reported on 5/16/2017        insulin aspart 100 UNIT/ML injection    NovoLOG PEN     Inject 10 Units Subcutaneous 3 times daily (with meals) Uses sliding scale. Usually about 2 units        levothyroxine 100 MCG tablet    SYNTHROID/LEVOTHROID    90 tablet    Take 1 tablet (100 mcg) by mouth daily    Hypothyroidism, unspecified type       losartan 25 MG tablet    COZAAR     Take 25 mg by mouth daily        Lutein 20 MG Caps      Take 20 mg by mouth daily 8/11/2017- patient states he takes 1 20 mg tablet daily Patient states he takes 150 mg tablet twice a day.        metFORMIN 500 MG tablet    GLUCOPHAGE    90 tablet    TAKE 1 TABLET (500 MG) BY MOUTH DAILY (WITH BREAKFAST)    Type 2 diabetes mellitus with diabetic neuropathy, without long-term current use of insulin (H)       MULTIVITAMINS PO      Reported on 5/22/2017        OMEGA-3 FISH OIL PO      Take 1 g by mouth Reported on 5/5/2017        order for DME     1 Device    Tall aircast boot    Acute left ankle pain, Ankle injury, left, initial encounter, Foot fracture, left, closed, initial encounter       polyethylene glycol 0.4%- propylene  glycol 0.3% 0.4-0.3 % Soln ophthalmic solution    SYSTANE ULTRA     Place 1 drop into both eyes 3 times daily    Dry eyes       PREGABALIN PO      Take 300 mg by mouth 2 times daily Reported on 5/16/2017        rivaroxaban ANTICOAGULANT 20 MG Tabs tablet    XARELTO     Take 20 mg by mouth daily (with dinner) Reported on 5/5/2017        simvastatin 20 MG tablet    ZOCOR    90 tablet    Take 1 tablet (20 mg) by mouth At Bedtime    Hyperlipidemia, unspecified hyperlipidemia type       TOPROL XL PO      Take 50 mg by mouth daily        TYLENOL PO      Take 1,000 mg by mouth 4 times daily        UNABLE TO FIND      cpap        Vitamin B-12 500 MCG Subl      Place 2 tablets under the tongue daily        VITAMIN D-3 PO      Take 1,000 Units by mouth daily

## 2017-10-11 NOTE — PROGRESS NOTES
"PATIENT HISTORY:  Rhett Elizabeth is a 56 year old male who presents to clinic for recheck of L foot and ankle injury, peroneal tendon tear, likely acute on chronic 5th met fx.  Pt reports hearing a left foot/ankle \"crack\" when walking at work 8/2/17.  Hx of prior L 5th metatarsal fracture 11/2016 per pt \"that healed.\"  He is off work.  Denies fevers, new injury.  He presents in regular shoes but has been WB in the boot.  Hx of DM with neuropathy.  Last HbA1c 7.3.  Nonsmoker.  Denies related family hx.  He did f/u with his Ortho surgeon regarding RLE.     EXAM:Vitals: /67 (BP Location: Left arm, Patient Position: Chair, Cuff Size: Adult Large)  Pulse 77  Ht 6' 2\" (1.88 m)  Wt (!) 341 lb (154.7 kg)  BMI 43.78 kg/m2  BMI= Body mass index is 43.78 kg/(m^2).    General appearance: Patient is alert and fully cooperative with history & exam.  No sign of distress is noted during the visit.     Dermatologic: Small areas of eschar right anterior shin near prior surgical site.  No other wounds b/l.   Erythema of R improved.  No significant SOI.      Vascular: DP & PT pulses are intact & regular on the left.  No left ankle edema. No foot edema along 5th metatarsal.  CFT and skin temperature are normal to both lower extremities.      Neurologic: Lower extremity sensation is diminished to light touch b/l.      Musculoskeletal: No L ankle pain with palpation along the peroneal tendons.  Eversion strength of foot 5/5.  No medial ankle pain.  No pain at the 5th metatarsal base with palpation.  No RLE pain.  No gross ankle deformity noted.  No foot or ankle joint effusion is noted.    XRs of left foot reviewed with pt:  There appears to be some bone bridging at the L 5th metatarsal base fx.       ASSESSMENT: L foot/ankle injury with peroneal tendon tear, ligamentous injuries of the ankle, likely acute on chronic 5th metatarsal base/Noriega fracture.    DM with hx of neuropathy.     PLAN:  Reviewed patient's chart in " epic.  Discussed condition and treatment options including pros and cons.    We discussed findings in detail.  OK to transition to supportive shoes as tolerated.  Pt may return to work.  He states he has lighter duty options to start.  Referral for DM shoes/inserts.  Continue PT.  Discussed diabetic foot care and prevention.   Discussed risk of ulceration, infection, amputation related to neuropathy.  I recommended a foot check at least once a year.  No barefoot walking.  Check feet daily.  I encouraged proper diabetes management including diet, blood sugar control.  F/u prn.     Ang Canales DPM, FACFAS

## 2017-10-11 NOTE — LETTER
[address] [address]  Phone: [phone]    October 11, 2017        Rhett Elizabeth  [address] [address] [address]          To whom it may concern:    RE: Rhett Elizabeth    Patient was seen and treated today at our clinic.    He may return to work without restriction on 10/15/17.    Please contact me for questions or concerns.      Sincerely,        Ang Canales DPM

## 2017-10-11 NOTE — PROGRESS NOTES
Weight management plan: Patient was referred to their PCP to discuss a diet and exercise plan.     BERTRAM Carvalho MA October 11, 2017 10:25 AM

## 2017-10-11 NOTE — NURSING NOTE
"Chief Complaint   Patient presents with     Fracture     2 week follow up L foot Fx       Initial /67 (BP Location: Left arm, Patient Position: Chair, Cuff Size: Adult Large)  Pulse 77  Ht 6' 2\" (1.88 m)  Wt (!) 341 lb (154.7 kg)  BMI 43.78 kg/m2 Estimated body mass index is 43.78 kg/(m^2) as calculated from the following:    Height as of this encounter: 6' 2\" (1.88 m).    Weight as of this encounter: 341 lb (154.7 kg).  Medication Reconciliation: unable or not appropriate to perform    AArmando Carvalho MA October 11, 2017 10:24 AM  "

## 2017-10-11 NOTE — LETTER
"    10/11/2017         RE: Rhett Elizabeth  [address] [address] [address]        Dear Colleague,    Thank you for referring your patient, Rhett Elizabeth, to the [facility]. Please see a copy of my visit note below.    Weight management plan: Patient was referred to their PCP to discuss a diet and exercise plan.     BERTRAM Carvalho MA October 11, 2017 10:25 AM      PATIENT HISTORY:  Rhett Elizabeth is a 56 year old male who presents to clinic for recheck of L foot and ankle injury, peroneal tendon tear, likely acute on chronic 5th met fx.  Pt reports hearing a left foot/ankle \"crack\" when walking at work 8/2/17.  Hx of prior L 5th metatarsal fracture 11/2016 per pt \"that healed.\"  He is off work.  Denies fevers, new injury.  He presents in regular shoes but has been WB in the boot.  Hx of DM with neuropathy.  Last HbA1c 7.3.  Nonsmoker.  Denies related family hx.  He did f/u with his Ortho surgeon regarding RLE.     EXAM:Vitals: /67 (BP Location: Left arm, Patient Position: Chair, Cuff Size: Adult Large)  Pulse 77  Ht 6' 2\" (1.88 m)  Wt (!) 341 lb (154.7 kg)  BMI 43.78 kg/m2  BMI= Body mass index is 43.78 kg/(m^2).    General appearance: Patient is alert and fully cooperative with history & exam.  No sign of distress is noted during the visit.     Dermatologic: Small areas of eschar right anterior shin near prior surgical site.  No other wounds b/l.   Erythema of R improved.  No significant SOI.      Vascular: DP & PT pulses are intact & regular on the left.   No left ankle edema. No foot edema along 5th metatarsal.  CFT and skin temperature are normal to both lower extremities.      Neurologic: Lower extremity sensation is diminished to light touch b/l.      Musculoskeletal: No L ankle pain with palpation along the peroneal tendons.  Eversion strength of foot 5/5.  No medial ankle pain.  No pain at the 5th metatarsal base with palpation.  No RLE pain.  No gross ankle deformity " noted.  No foot or ankle joint effusion is noted.    XRs of left foot reviewed with pt:  There appears to be some bone bridging at the L 5th metatarsal base fx.       ASSESSMENT: L foot/ankle injury with peroneal tendon tear, ligamentous injuries of the ankle, likely acute on chronic 5th metatarsal base/Noriega fracture.    DM with hx of neuropathy.     PLAN:  Reviewed patient's chart in epic.  Discussed condition and treatment options including pros and cons.    We discussed findings in detail.  OK to transition to supportive shoes as tolerated.  Pt may return to work.  He states he has lighter duty options to start.  Referral for DM shoes/inserts.  Continue PT.  Discussed diabetic foot care and prevention.   Discussed risk of ulceration, infection, amputation related to neuropathy.  I recommended a foot check at least once a year.  No barefoot walking.  Check feet daily.  I encouraged proper diabetes management including diet, blood sugar control.  F/u prn.     Ang Canales DPM, FACFAS                Again, thank you for allowing me to participate in the care of your patient.        Sincerely,        Ang Canales DPM

## 2017-10-11 NOTE — LETTER
[address] [address]  Phone: [phone]    October 11, 2017        Rhett Elizabeth  [address] [address] [facility] [address]          To whom it may concern:    RE: Rhett Elizabeth    MRI of patient's left ankle on 8/10/17 suggested acute on chronic fracture of the 5th metatarsal bone, ligamentous injury, and acute tearing of the peroneus brevis tendon.    Please contact me for questions or concerns.      Sincerely,        Ang Canales DPM

## 2017-10-17 ENCOUNTER — THERAPY VISIT (OUTPATIENT)
Dept: PHYSICAL THERAPY | Facility: CLINIC | Age: 57
End: 2017-10-17
Payer: COMMERCIAL

## 2017-10-17 DIAGNOSIS — M79.672 LEFT FOOT PAIN: ICD-10-CM

## 2017-10-17 DIAGNOSIS — R26.9 GAIT ABNORMALITY: ICD-10-CM

## 2017-10-17 DIAGNOSIS — S92.902D FOOT FRACTURE, LEFT, WITH ROUTINE HEALING, SUBSEQUENT ENCOUNTER: ICD-10-CM

## 2017-10-17 PROCEDURE — 97110 THERAPEUTIC EXERCISES: CPT | Mod: GP | Performed by: PHYSICAL THERAPIST

## 2017-10-17 PROCEDURE — 97112 NEUROMUSCULAR REEDUCATION: CPT | Mod: GP | Performed by: PHYSICAL THERAPIST

## 2017-10-18 ENCOUNTER — TELEPHONE (OUTPATIENT)
Dept: INTERNAL MEDICINE | Facility: CLINIC | Age: 57
End: 2017-10-18

## 2017-10-18 NOTE — TELEPHONE ENCOUNTER
Spouse calling, they are in need of guidance on how to get Rhett's cpap supplies restarted. Please call to discuss.

## 2017-10-18 NOTE — TELEPHONE ENCOUNTER
No consent to communicate with Darlyn. She stated patient had a sleep study done last spring, then patient moved. He needs home oxygen to get orders for his supplies. How to navigate? Writer stated that she needs to speak with patient. He is at home. She will  the release of information for patient to sign.     Per chart review, patient had sleep study done 2/25/16 at [facility].  Left message for patient to call back on their VM. Writers direct line given, Ziggy ORTIZ [phone]. Shauna Varma RN

## 2017-10-19 NOTE — TELEPHONE ENCOUNTER
Patient returned call at 9:27 am and left a voicemail.     Left message for patient to call back on their VM. Writers direct line given, Ziggy ORTIZ [phone]. Shauna Varma RN

## 2017-10-20 NOTE — TELEPHONE ENCOUNTER
Called patient, he was advised to call the [facility] as they are the original prescribed of the supplies, and epic med list does not have hx of any DME orders for supplies. He was given [facility] contact information and will call them for their orders. Patient verbalized understanding  He will call back if there is any issues in getting orders.   RUDDY Alonzo RN

## 2017-10-24 ENCOUNTER — THERAPY VISIT (OUTPATIENT)
Dept: PHYSICAL THERAPY | Facility: CLINIC | Age: 57
End: 2017-10-24
Payer: COMMERCIAL

## 2017-10-24 DIAGNOSIS — S92.902D FOOT FRACTURE, LEFT, WITH ROUTINE HEALING, SUBSEQUENT ENCOUNTER: ICD-10-CM

## 2017-10-24 DIAGNOSIS — R26.9 GAIT ABNORMALITY: ICD-10-CM

## 2017-10-24 DIAGNOSIS — M79.672 LEFT FOOT PAIN: ICD-10-CM

## 2017-10-24 PROCEDURE — 97110 THERAPEUTIC EXERCISES: CPT | Mod: GP | Performed by: PHYSICAL THERAPIST

## 2017-10-24 PROCEDURE — 97112 NEUROMUSCULAR REEDUCATION: CPT | Mod: GP | Performed by: PHYSICAL THERAPIST

## 2017-10-24 NOTE — MR AVS SNAPSHOT
"              After Visit Summary   10/24/2017    Rhett Elizabeth    MRN: [id number]           Patient Information     Date Of Birth          1960        Visit Information        Provider Department      10/24/2017 10:00 AM Zoran Wright PT [facility]        Today's Diagnoses     Gait abnormality        Left foot pain        Foot fracture, left, with routine healing, subsequent encounter           Follow-ups after your visit        Who to contact     If you have questions or need follow up information about today's clinic visit or your schedule please contact [facility] directly at [phone].  Normal or non-critical lab and imaging results will be communicated to you by Decuratehart, letter or phone within 4 business days after the clinic has received the results. If you do not hear from us within 7 days, please contact the clinic through Decuratehart or phone. If you have a critical or abnormal lab result, we will notify you by phone as soon as possible.  Submit refill requests through Funbuilt or call your pharmacy and they will forward the refill request to us. Please allow 3 business days for your refill to be completed.          Additional Information About Your Visit        MyChart Information     Funbuilt lets you send messages to your doctor, view your test results, renew your prescriptions, schedule appointments and more. To sign up, go to [email or web address] . Click on \"Log in\" on the left side of the screen, which will take you to the Welcome page. Then click on \"Sign up Now\" on the right side of the page.     You will be asked to enter the access code listed below, as well as some personal information. Please follow the directions to create your username and password.     Your access code is: [email or web address]  Expires: 2017  6:31 AM     Your access code will  in 90 days. If you need help or a new code, please call your [place] " clinic or [phone].        Care EveryWhere ID     This is your Care EveryWhere ID. This could be used by other organizations to access your [place] medical records  [id number]         Blood Pressure from Last 3 Encounters:   10/11/17 103/67   09/27/17 101/66   09/13/17 127/70    Weight from Last 3 Encounters:   10/11/17 (!) 154.7 kg (341 lb)   09/27/17 (!) 154.7 kg (341 lb)   09/13/17 (!) 152.4 kg (336 lb)              We Performed the Following     NEUROMUSCULAR RE-EDUCATION     THERAPEUTIC EXERCISES        Primary Care Provider Office Phone # Fax #    Gwen Saldana [phone] [address] [facility] [address]        Equal Access to Services     TONY GARCÍA : Hadii aad ku hadasho Soomaali, waaxda luqadaha, qaybta kaalmada adeegyada, [employer] . So [facility] [phone].    ATENCIÓN: Si habla español, tiene a tsang disposición servicios gratuitos de asistencia lingüística. [facility] [phone].    We comply with applicable federal civil rights laws and [place] laws. We do not discriminate on the basis of race, color, national origin, age, disability, sex, sexual orientation, or gender identity.            Thank you!     Thank you for choosing [facility]  for your care. Our goal is always to provide you with excellent care. Hearing back from our patients is one way we can continue to improve our services. Please take a few minutes to complete the written survey that you may receive in the mail after your visit with us. Thank you!             Your Updated Medication List - Protect others around you: Learn how to safely use, store and throw away your medicines at www.disposemymeds.org.          This list is accurate as of: 10/24/17 10:42 AM.  Always use your most recent med list.                   Brand Name Dispense Instructions for use Diagnosis    ACCU-CHEK MANUEL PLUS VI           ACE/ARB/ARNI NOT PRESCRIBED  (INTENTIONAL)      Please choose reason not prescribed, below    Type 2 diabetes mellitus with diabetic neuropathy, without long-term current use of insulin (H)       aspirin 81 MG tablet      Take by mouth daily Reported on 5/5/2017        blood glucose monitoring lancets      1 each by In Vitro route Use to test blood sugaras directed.        cephALEXin 500 MG capsule    KEFLEX    30 capsule    Take 1 capsule (500 mg) by mouth 3 times daily    Cellulitis of right lower extremity       DULoxetine 60 MG EC capsule    CYMBALTA     Take by mouth daily        ENSURE COMPLETE PO           furosemide 40 MG tablet    LASIX    30 tablet    40 mg as needed Reported on 5/16/2017        insulin aspart 100 UNIT/ML injection    NovoLOG PEN     Inject 10 Units Subcutaneous 3 times daily (with meals) Uses sliding scale. Usually about 2 units        levothyroxine 100 MCG tablet    SYNTHROID/LEVOTHROID    90 tablet    Take 1 tablet (100 mcg) by mouth daily    Hypothyroidism, unspecified type       losartan 25 MG tablet    COZAAR     Take 25 mg by mouth daily        Lutein 20 MG Caps      Take 20 mg by mouth daily 8/11/2017- patient states he takes 1 20 mg tablet daily Patient states he takes 150 mg tablet twice a day.        metFORMIN 500 MG tablet    GLUCOPHAGE    90 tablet    TAKE 1 TABLET (500 MG) BY MOUTH DAILY (WITH BREAKFAST)    Type 2 diabetes mellitus with diabetic neuropathy, without long-term current use of insulin (H)       MULTIVITAMINS PO      Reported on 5/22/2017        OMEGA-3 FISH OIL PO      Take 1 g by mouth Reported on 5/5/2017        order for DME     1 Device    Tall aircast boot    Acute left ankle pain, Ankle injury, left, initial encounter, Foot fracture, left, closed, initial encounter       polyethylene glycol 0.4%- propylene glycol 0.3% 0.4-0.3 % Soln ophthalmic solution    SYSTANE ULTRA     Place 1 drop into both eyes 3 times daily    Dry eyes       PREGABALIN PO      Take 300 mg by mouth 2 times daily  Reported on 5/16/2017        rivaroxaban ANTICOAGULANT 20 MG Tabs tablet    XARELTO     Take 20 mg by mouth daily (with dinner) Reported on 5/5/2017        simvastatin 20 MG tablet    ZOCOR    90 tablet    Take 1 tablet (20 mg) by mouth At Bedtime    Hyperlipidemia, unspecified hyperlipidemia type       TOPROL XL PO      Take 50 mg by mouth daily        TYLENOL PO      Take 1,000 mg by mouth 4 times daily        UNABLE TO FIND      cpap        Vitamin B-12 500 MCG Subl      Place 2 tablets under the tongue daily        VITAMIN D-3 PO      Take 1,000 Units by mouth daily

## 2017-11-09 ENCOUNTER — TELEPHONE (OUTPATIENT)
Dept: FAMILY MEDICINE | Facility: CLINIC | Age: 57
End: 2017-11-09

## 2017-11-09 NOTE — TELEPHONE ENCOUNTER
Panel Management Review      Patient has the following on his problem list:     Diabetes    ASA: Passed    Last A1C  Lab Results   Component Value Date    A1C 7.3 08/11/2017    A1C 6.6 03/22/2017     A1C tested: Passed    Last LDL:    Lab Results   Component Value Date    CHOL 114 03/22/2017     Lab Results   Component Value Date    HDL 44 03/22/2017     Lab Results   Component Value Date    LDL 52 03/22/2017     Lab Results   Component Value Date    TRIG 92 03/22/2017     No results found for: CHOLHDLRATIO  Lab Results   Component Value Date    NHDL 70 03/22/2017       Is the patient on a Statin? YES             Is the patient on Aspirin? YES    Medications     HMG CoA Reductase Inhibitors    simvastatin (ZOCOR) 20 MG tablet    Salicylates    aspirin 81 MG tablet          Last three blood pressure readings:  BP Readings from Last 3 Encounters:   10/11/17 103/67   09/27/17 101/66   09/13/17 127/70       Date of last diabetes office visit: 08/11/2017     Tobacco History:     History   Smoking Status     Current Every Day Smoker     Types: Dip, chew, snus or snuff   Smokeless Tobacco     Current User             Composite cancer screening  Chart review shows that this patient is due/due soon for the following None  Summary:    Patient is due/failing the following:   none    Action needed:   none    Type of outreach:    none    Questions for provider review:    None                                                                                                                                    Tracy Mcdaniel M.A.       Chart routed to n/a .

## 2017-11-14 ENCOUNTER — TRANSFERRED RECORDS (OUTPATIENT)
Dept: HEALTH INFORMATION MANAGEMENT | Facility: CLINIC | Age: 57
End: 2017-11-14

## 2018-01-11 ENCOUNTER — DOCUMENTATION ONLY (OUTPATIENT)
Dept: INTERNAL MEDICINE | Facility: CLINIC | Age: 58
End: 2018-01-11

## 2018-01-12 ENCOUNTER — TRANSFERRED RECORDS (OUTPATIENT)
Dept: HEALTH INFORMATION MANAGEMENT | Facility: CLINIC | Age: 58
End: 2018-01-12

## 2018-01-17 LAB — EJECTION FRACTION: 21

## 2018-02-03 LAB
CHOLEST SERPL-MCNC: 114 MG/DL
HDLC SERPL-MCNC: 34 MG/DL
LDLC SERPL CALC-MCNC: 57 MG/DL
TRIGL SERPL-MCNC: 117 MG/DL

## 2018-02-09 ENCOUNTER — OFFICE VISIT (OUTPATIENT)
Dept: INTERNAL MEDICINE | Facility: CLINIC | Age: 58
End: 2018-02-09
Payer: COMMERCIAL

## 2018-02-09 VITALS
SYSTOLIC BLOOD PRESSURE: 120 MMHG | TEMPERATURE: 97 F | HEART RATE: 67 BPM | DIASTOLIC BLOOD PRESSURE: 80 MMHG | HEIGHT: 74 IN | WEIGHT: 315 LBS | BODY MASS INDEX: 40.43 KG/M2 | OXYGEN SATURATION: 98 % | RESPIRATION RATE: 20 BRPM

## 2018-02-09 DIAGNOSIS — I48.91 ATRIAL FIBRILLATION, UNSPECIFIED TYPE (H): ICD-10-CM

## 2018-02-09 DIAGNOSIS — E66.01 MORBID OBESITY (H): ICD-10-CM

## 2018-02-09 DIAGNOSIS — I50.22 CHRONIC SYSTOLIC CONGESTIVE HEART FAILURE (H): ICD-10-CM

## 2018-02-09 DIAGNOSIS — E11.40 TYPE 2 DIABETES MELLITUS WITH DIABETIC NEUROPATHY, WITHOUT LONG-TERM CURRENT USE OF INSULIN (H): Primary | ICD-10-CM

## 2018-02-09 LAB
ALBUMIN SERPL-MCNC: 3.8 G/DL (ref 3.4–5)
ALP SERPL-CCNC: 151 U/L (ref 40–150)
ALT SERPL W P-5'-P-CCNC: 34 U/L (ref 0–70)
ANION GAP SERPL CALCULATED.3IONS-SCNC: 6 MMOL/L (ref 3–14)
AST SERPL W P-5'-P-CCNC: 34 U/L (ref 0–45)
BILIRUB SERPL-MCNC: 0.6 MG/DL (ref 0.2–1.3)
BUN SERPL-MCNC: 14 MG/DL (ref 7–30)
CALCIUM SERPL-MCNC: 9 MG/DL (ref 8.5–10.1)
CHLORIDE SERPL-SCNC: 98 MMOL/L (ref 94–109)
CO2 SERPL-SCNC: 33 MMOL/L (ref 20–32)
CREAT SERPL-MCNC: 1.06 MG/DL (ref 0.66–1.25)
GFR SERPL CREATININE-BSD FRML MDRD: 72 ML/MIN/1.7M2
GLUCOSE SERPL-MCNC: 179 MG/DL (ref 70–99)
POTASSIUM SERPL-SCNC: 3.7 MMOL/L (ref 3.4–5.3)
PROT SERPL-MCNC: 8 G/DL (ref 6.8–8.8)
SODIUM SERPL-SCNC: 137 MMOL/L (ref 133–144)

## 2018-02-09 PROCEDURE — 36415 COLL VENOUS BLD VENIPUNCTURE: CPT | Performed by: INTERNAL MEDICINE

## 2018-02-09 PROCEDURE — 80053 COMPREHEN METABOLIC PANEL: CPT | Performed by: INTERNAL MEDICINE

## 2018-02-09 PROCEDURE — 99214 OFFICE O/P EST MOD 30 MIN: CPT | Performed by: INTERNAL MEDICINE

## 2018-02-09 RX ORDER — TORSEMIDE 20 MG/1
20 TABLET ORAL DAILY
Refills: 3 | COMMUNITY
Start: 2018-01-26 | End: 2018-11-19

## 2018-02-09 NOTE — LETTER
My Heart Failure Action Plan   Name: Rhett Elizabeth    YOB: 1960   Date: 2/9/2018    My doctor: Gwen Saldana     [facility]     [address] [facility] [address] [phone]  My Diagnosis:    My Ejection Fraction:    My Exercise Goal: 30 minutes daily  .     My Weight Goal:   Wt Readings from Last 2 Encounters:   02/09/18 (!) 339 lb (153.8 kg)   10/11/17 (!) 341 lb (154.7 kg)     Weigh yourself daily using the same scale. If you gain more than 2 pounds in 24 hours or 5 pounds in a week     My Diet Goal    Emergency Room Visits:    Our goal is to improve your quality of life and help you avoid a visit to the emergency room or hospital.  If we work together, we can achieve this goal. But, if you feel you need to call 911 or go to the emergency room, please do so.  If you go to the emergency room, please bring your list of medicines and your daily weight chart with you.       GREEN ZONE     Doing well today    Weight gained is no more than 2 pounds a day or 5 pounds a week.    No swelling in feet, ankles, legs or stomach.    No more swelling than usual.    No more trouble breathing than usual.    No change in my sleep.    No other problems. Actions:    I am doing fine.  I will take my medicine, follow my diet, see my doctor, exercise, and watch for symptoms.           YELLOW ZONE         Having a bad day or flare up    Weight gain of more than 2 pounds in one day or 5 pounds in one week.    New swelling in ankle, leg, knee or thigh.    Bloating in belly, pants feel tighter.    Swelling in hands or face.    Coughing or trouble breathing while walking or talking.    Harder to breathe last night.    Have trouble sleeping, wake up short of breath.    Much more tired than usual.    Not eating.    Pain in my chest or bad leg cramps.    Feel weak or dizzy. Actions:    I need to take action and call my doctor or nurse today.                 RED ZONE         Need  medical care now    Weight gain of 5 pounds overnight.    Chest pain or pressure that does not go away.    Feel less alert.    Wheezing or have trouble breathing when at rest.    Cannot sleep lying down.    Cannot take my water pill.    Pass out or faint. Actions:    I need to call my doctor or nurse now!    Call 911 if I have chest pain or cannot breathe.        Electronically signed by: Indra Jacobsen, February 9, 2018

## 2018-02-09 NOTE — PATIENT INSTRUCTIONS
Please stop the Novolog, (due to your reported blood sugars of below 80 which occur 1-2 times per week).  Start Lantus at 10 units at bedtime, instead.   Restart metformin at a higher dose of 500mg *twice* a day.  We will let you know your test results by MyChart-we will advise the next visit based on the lab results (and will also let you know if you need to take any more potassium supplements; and if any further adjustments in metformin are necessary).    Please call to schedule an appointment with the Diabetic Educator: [phone]  Please make sure to bring your glucometer to your next meeting with me or the Diabetes Educator. The following blood sugar readings would be especially: fasting in the morning, and before a meal.      Please return to clinic in 6 weeks (we may advise a visit sooner, based on today's lab/test results).

## 2018-02-09 NOTE — MR AVS SNAPSHOT
After Visit Summary   2/9/2018    Rhett Elizabeth    MRN: [id number]           Patient Information     Date Of Birth          1960        Visit Information        Provider Department      2/9/2018 7:30 AM Gwen Saldana MD [facility] [place]        Today's Diagnoses     Type 2 diabetes mellitus with diabetic neuropathy, without long-term current use of insulin (H)          Care Instructions    Please stop the Novolog, (due to your reported blood sugars of below 80 which occur 1-2 times per week).  Start Lantus at 10 units at bedtime, instead.   Restart metformin at a higher dose of 500mg *twice* a day.  We will let you know your test results by MyChart-we will advise the next visit based on the lab results (and will also let you know if you need to take any more potassium supplements; and if any further adjustments in metformin are necessary).    Please call to schedule an appointment with the Diabetic Educator: [phone]  Please make sure to bring your glucometer to your next meeting with me or the Diabetes Educator. The following blood sugar readings would be especially: fasting in the morning, and before a meal.      Please return to clinic in 6 weeks (we may advise a visit sooner, based on today's lab/test results).                  Follow-ups after your visit        Additional Services     DIABETES EDUCATOR REFERRAL       DIABETES SELF MANAGEMENT TRAINING (DSMT)      Your provider has referred you to Diabetes Education: FMG: Diabetes Education - All [facility] [phone]   [email or web address]     If an urgent visit is needed or A1C is above 12, Care Team to call the Diabetes  Education Team at [phone] or send an In Basket message to the Diabetes Education Pool (P DIAB ED-PATIENT CARE).    A  will call you to make your appointment. If it has been more than 3 business days since your referral  was placed, please call the above phone number to schedule.    Type of training and number of hours: Previous Diagnosis: Follow-up DSMT - 2 hours.    Medicare covers: 10 hours of initial DSMT in 12 month period from the time of first visit, plus 2 hours of follow-up DSMT annually, and additional hours as requested for insulin training.    Diabetes Type: Type 2 - On Oral Medication and recently on insulin.             Diabetes Co-Morbidities: dyslipidemia, hypertension, kidney disease and neuropathy               A1C Goal:  <7.0       A1C is: Lab Results       Component                Value               Date                       A1C                      7.3                 08/11/2017              Diabetes Education Topics: Comprehensive Knowledge Assessment and Instruction    Special Educational Needs Requiring Individual DSMT: None       MEDICAL NUTRITION THERAPY (MNT) for Diabetes    Medical Nutrition Therapy with a Registered Dietitian can be provided in coordination with Diabetes Self-Management Training to assist in achieving optimal diabetes management.    MNT Type and Hours: Do not initiate MNT at this time.                       Medicare will cover: 3 hours initial MNT in 12 month period after first visit, plus 2 hours of follow-up MNT annually    Please be aware that coverage of these services is subject to the terms and limitations of your health insurance plan.  Call member services at your health plan to determine Diabetes Self-Management Training (Codes  &amp; ) and Medical Nutrition Therapy (Codes 88111 & 63243) benefits and ask which blood glucose monitor brands are covered by your plan.  Please bring the following with you to your appointment:    (1)  List of current medications   (2)  List of Blood Glucose Monitor brands that are covered by your insurance plan  (3)  Blood Glucose Monitor and log book  (4)   Food records for the 3 days prior to your visit    The Certified Diabetes Educator  "may make diabetes medication adjustments per the CDE Protocol and Collaborative Practice Agreement.                  Who to contact     If you have questions or need follow up information about today's clinic visit or your schedule please contact [facility] directly at [phone].  Normal or non-critical lab and imaging results will be communicated to you by Startup Stock Exchangehart, letter or phone within 4 business days after the clinic has received the results. If you do not hear from us within 7 days, please contact the clinic through iWOPIt or phone. If you have a critical or abnormal lab result, we will notify you by phone as soon as possible.  Submit refill requests through Mobilio or call your pharmacy and they will forward the refill request to us. Please allow 3 business days for your refill to be completed.          Additional Information About Your Visit        Mobilio Information     Mobilio gives you secure access to your electronic health record. If you see a primary care provider, you can also send messages to your care team and make appointments. If you have questions, please call your primary care clinic.  If you do not have a primary care provider, please call [phone] and they will assist you.        Care EveryWhere ID     This is your Care EveryWhere ID. This could be used by other organizations to access your [place] medical records  [id number]        Your Vitals Were     Pulse Temperature Respirations Height Pulse Oximetry BMI (Body Mass Index)    67 97  F (36.1  C) (Oral) 20 6' 2\" (1.88 m) 98% 43.53 kg/m2       Blood Pressure from Last 3 Encounters:   02/09/18 120/80   10/11/17 103/67   09/27/17 101/66    Weight from Last 3 Encounters:   02/09/18 (!) 339 lb (153.8 kg)   10/11/17 (!) 341 lb (154.7 kg)   09/27/17 (!) 341 lb (154.7 kg)              We Performed the Following     DIABETES EDUCATOR REFERRAL          Today's Medication Changes          These changes are accurate as of 2/9/18 "  8:15 AM.  If you have any questions, ask your nurse or doctor.               Start taking these medicines.        Dose/Directions    insulin glargine 100 UNIT/ML injection   Commonly known as:  LANTUS SOLOSTAR   Used for:  Type 2 diabetes mellitus with diabetic neuropathy, without long-term current use of insulin (H)   Started by:  Gwen Saldana MD        Dose:  10 Units   Inject 10 Units Subcutaneous At Bedtime   Quantity:  3 mL   Refills:  1         These medicines have changed or have updated prescriptions.        Dose/Directions    metFORMIN 500 MG tablet   Commonly known as:  GLUCOPHAGE   This may have changed:  See the new instructions.   Used for:  Type 2 diabetes mellitus with diabetic neuropathy, without long-term current use of insulin (H)   Changed by:  Gwen Saldana MD        Dose:  500 mg   Take 1 tablet (500 mg) by mouth 2 times daily (with meals)   Quantity:  180 tablet   Refills:  0         Stop taking these medicines if you haven't already. Please contact your care team if you have questions.     cephALEXin 500 MG capsule   Commonly known as:  KEFLEX   Stopped by:  Gwen Saldana MD           insulin aspart 100 UNIT/ML injection   Commonly known as:  NovoLOG PEN   Stopped by:  wGen Saldana MD                Where to get your medicines      These medications were sent to [facility]/pharmacy #[id number] - [address],  [address], [facility] [address]     Phone:  [phone]     insulin glargine 100 UNIT/ML injection    metFORMIN 500 MG tablet                Primary Care Provider Office Phone # Fax #    Gwen Saldana [phone] [address] [facility] [address]        Equal Access to Services     TONY GARCÍA : Aziza Lucio, pattie roberts, candido decker, halley [place]. So [facility]  [phone].    ATENCIÓN: Si lyndsey lewis, tiene a tsang disposición servicios gratuitos de asistencia lingüística. Yordy david [phone].    We comply with applicable federal civil rights laws and [place] laws. We do not discriminate on the basis of race, color, national origin, age, disability, sex, sexual orientation, or gender identity.            Thank you!     Thank you for choosing [facility]  [facility]. Our goal is always to provide you with excellent care. Hearing back from our patients is one way we can continue to improve our services. Please take a few minutes to complete the written survey that you may receive in the mail after your visit with us. Thank you!             Your Updated Medication List - Protect others around you: Learn how to safely use, store and throw away your medicines at www.disposemymeds.org.          This list is accurate as of 2/9/18  8:15 AM.  Always use your most recent med list.                   Brand Name Dispense Instructions for use Diagnosis    ACCU-CHEK MANUEL PLUS VI           aspirin 81 MG tablet      Take by mouth daily Reported on 5/5/2017        blood glucose monitoring lancets      1 each by In Vitro route Use to test blood sugaras directed.        DULoxetine 60 MG EC capsule    CYMBALTA     Take by mouth daily        ENSURE COMPLETE PO           insulin glargine 100 UNIT/ML injection    LANTUS SOLOSTAR    3 mL    Inject 10 Units Subcutaneous At Bedtime    Type 2 diabetes mellitus with diabetic neuropathy, without long-term current use of insulin (H)       levothyroxine 100 MCG tablet    SYNTHROID/LEVOTHROID    90 tablet    Take 1 tablet (100 mcg) by mouth daily    Hypothyroidism, unspecified type       losartan 25 MG tablet    COZAAR     Take 25 mg by mouth daily        Lutein 20 MG Caps      Take 20 mg by mouth daily 8/11/2017- patient states he takes 1 20 mg tablet daily Patient states he takes 150 mg tablet twice a day.        metFORMIN 500 MG  tablet    GLUCOPHAGE    180 tablet    Take 1 tablet (500 mg) by mouth 2 times daily (with meals)    Type 2 diabetes mellitus with diabetic neuropathy, without long-term current use of insulin (H)       MULTIVITAMINS PO      Reported on 5/22/2017        OMEGA-3 FISH OIL PO      Take 1 g by mouth Reported on 5/5/2017        order for DME     1 Device    Tall aircast boot    Acute left ankle pain, Ankle injury, left, initial encounter, Foot fracture, left, closed, initial encounter       polyethylene glycol 0.4%- propylene glycol 0.3% 0.4-0.3 % Soln ophthalmic solution    SYSTANE ULTRA     Place 1 drop into both eyes 3 times daily    Dry eyes       PREGABALIN PO      Take 300 mg by mouth 2 times daily Reported on 5/16/2017        rivaroxaban ANTICOAGULANT 20 MG Tabs tablet    XARELTO     Take 20 mg by mouth daily (with dinner) Reported on 5/5/2017        simvastatin 20 MG tablet    ZOCOR    90 tablet    Take 1 tablet (20 mg) by mouth At Bedtime    Hyperlipidemia, unspecified hyperlipidemia type       TOPROL XL PO      Take 50 mg by mouth daily        torsemide 20 MG tablet    DEMADEX     Take 20 mg by mouth daily        TYLENOL PO      Take 1,000 mg by mouth 4 times daily        UNABLE TO FIND      cpap        Vitamin B-12 500 MCG Subl      Place 2 tablets under the tongue daily        VITAMIN D-3 PO      Take 1,000 Units by mouth daily

## 2018-02-09 NOTE — PROGRESS NOTES
"  SUBJECTIVE:   Rhett Elizabeth is a 57 year old male who presents to clinic today for the following health issues:          Hospital Follow-up Visit:    Hospital/Nursing Home/IP Rehab Facility: [facility]  Date of Admission: 02/02/18  Date of Discharge: 02/03/18  Reason(s) for Admission: Chronic systolic heart failure (HCC)            Problems taking medications regularly:  None       Medication changes since discharge: None       Problems adhering to non-medication therapy:  None    Summary of hospitalization:  CareEverywhere information obtained and reviewed  Diagnostic Tests/Treatments reviewed.  Follow up needed: see Patient Instructions  Other Healthcare Providers Involved in Patient s Care:         None  Update since discharge: improved.     Post Discharge Medication Reconciliation: discharge medications reconciled and changed, per note/orders (see AVS).  Plan of care communicated with patient     Coding guidelines for this visit:  Type of Medical   Decision Making Face-to-Face Visit       within 7 Days of discharge Face-to-Face Visit        within 14 days of discharge   Moderate Complexity [id number]   High Complexity 09701 [id number]          Hospital discharge summary:  \"HOSPITAL COURSE:  Patient is a pleasant 57 year old male with a PMH significant for non-ischemic cardiomyopathy with an EF of 21%, atrial fibrillation on Xarelto, HTN, HLD, DMII with neuorpahty and hyperglycemia, hypothyroidism, and LANEY on Cpap who presented to the ER with exhaustion and shortness of breath. His BNP was not elevated. His potassium was low at 2.9 and his Cr was elevated at 1.6; he appeared dry. He recently had his diuretic regimen changed from PRN torsemide to torsemide 20 BID. It was noted that his glucose was 329 on admission. He is on metformin and sliding scale novolog for diabetic management, however he only checks his blood sugar before breakfast and dinner and therefore only takes the sliding scale at those times. " "His A1C was 10.2. His metformin was held at discharge because his GFR was 45. This can be resumed once his Cr/GFR improves a bit. A BMP should be checked again in a couple of days to assess his renal fxn and his potassium. In the meantime, he was instructed to be particularly careful about his carbohydrate intake, limiting it to 30 grams of carbs per meal. He is to take his blood sugar 4 times a day (qac/hs) and treat with sliding scale. His losartan was kept in spite of the RAMIN as he is on the boarder and I expect his renal fxn to improve with holding of torsemide for two more days and resuming it at 20 mg daily rather than BID. He has been prescribed potassium supplementation for 5 days. A BMP should be done in the next week to ensure his renal fxn and electrolytes have normalized. He is to follow up with cardiology in a week. He is being assessed for a defibrillator given his EF.      Hospital: Discharge Instructions   1. Do NOT take your torsemide until Monday. Then, start taking it once a day rather than twice a day.   2. Take your weight daily and record. Bring the record to your follow up cardiology appt.   3. I have prescribed potassium to you for the next 5 days twice a day. Take with food.  4. Hold your metformin until your labs are repeated. Your kidney function is such that you cannot take the medicine at this time. While off metformin, be particularly good about maintaining a low carbohydrate diet. Try not to have more than 30 grams of carbohydrates at a meal...\"       When the patient was at  6 months ago, his A1C was much better at 7.2%. It is unclear what contributed to his rise in A1C in the interim; patient is somewhat of a limited historian. Some dietary indiscretion appears to be there. However, the patient is also reporting low blood sugars below 80, 1-2 times per week, on his Novolog 3 units TID which he takes for a blood sugar above 100. Denies recent ETOH use.    We will have the patient " meet with [facility], to help optimize his regimen.         Reviewed and updated as needed this visit by clinical staff  Tobacco  Allergies  Meds  Problems  Soc Hx      Reviewed and updated as needed this visit by Provider  Tobacco  Meds  Problems         Patient Active Problem List   Diagnosis     Neuropathic pain     Benign essential hypertension     Atrial fibrillation, unspecified type (H)     LANEY (obstructive sleep apnea)     Well controlled diabetes mellitus (H)     Type 2 diabetes mellitus with diabetic neuropathy, without long-term current use of insulin (H)     Type I or II open fracture of distal end of right tibia with routine healing, unspecified fracture morphology, subsequent encounter     Chronic systolic congestive heart failure (H)     Hypothyroidism     Drusen of macula, left     Dry eyes     Left foot pain     Foot fracture, left, with routine healing, subsequent encounter     Gait abnormality     Morbid obesity (H)       Past Medical History:   Diagnosis Date     Diabetes (H)      Hypertension      Macular degeneration        Past Surgical History:   Procedure Laterality Date     STRABISMUS SURGERY         Family History   Problem Relation Age of Onset     Glaucoma No family hx of      Macular Degeneration No family hx of        Social History   Substance Use Topics     Smoking status: Never Smoker     Smokeless tobacco: Current User     Types: Snuff     Alcohol use No       Current Outpatient Prescriptions   Medication     metFORMIN (GLUCOPHAGE) 500 MG tablet     insulin glargine (LANTUS SOLOSTAR) 100 UNIT/ML injection     simvastatin (ZOCOR) 20 MG tablet     polyethylene glycol 0.4%- propylene glycol 0.3% (SYSTANE ULTRA) 0.4-0.3 % SOLN ophthalmic solution     losartan (COZAAR) 25 MG tablet     order for DME     Cyanocobalamin (VITAMIN B-12) 500 MCG SUBL     levothyroxine (SYNTHROID/LEVOTHROID) 100 MCG tablet     Nutritional Supplements (ENSURE COMPLETE PO)     aspirin 81 MG tablet      "Glucose Blood (ACCU-CHEK MANUEL PLUS VI)     DULoxetine (CYMBALTA) 60 MG EC capsule     blood glucose monitoring (ACCU-CHEK MULTICLIX) lancets     Lutein 20 MG CAPS     Multiple Vitamin (MULTIVITAMINS PO)     Omega-3 Fatty Acids (OMEGA-3 FISH OIL PO)     PREGABALIN PO     UNABLE TO FIND     Cholecalciferol (VITAMIN D-3 PO)     Acetaminophen (TYLENOL PO)     Metoprolol Succinate (TOPROL XL PO)     rivaroxaban ANTICOAGULANT (XARELTO) 20 MG TABS tablet     torsemide (DEMADEX) 20 MG tablet     No current facility-administered medications for this visit.          ROS:  Constitutional, HEENT, cardiovascular, pulmonary, GI, , musculoskeletal, neuro, skin, endocrine and psych systems are negative, except as otherwise noted.     OBJECTIVE:                                                    /80  Pulse 67  Temp 97  F (36.1  C) (Oral)  Resp 20  Ht 6' 2\" (1.88 m)  Wt (!) 339 lb (153.8 kg)  SpO2 98%  BMI 43.53 kg/m2     GENERAL APPEARANCE: healthy, alert and in no distress  EYES: Eyes grossly normal to inspection, and conjunctivae and sclerae normal  HENT: head normocephalic and atraumatic and mouth without ulcers or lesions, oropharynx clear and oral mucous membranes moist  NECK: no noticeable adenopathy, no asymmetry, masses, or scars   RESP: lungs clear to auscultation - no rales, rhonchi or wheezes  CV: regular rate and rhythm, normal S1 S2, no S3 or S4, no murmur, click or rub, no peripheral edema and peripheral pulses strong  ABDOMEN: soft, nontender, no hepatosplenomegaly, no masses and bowel sounds normal  MS: no musculoskeletal defects are noted and gait is age appropriate without ataxia  SKIN: no suspicious lesions or rashes  NEURO: mentation intact and speech normal  PSYCH: mentation appears normal and affect normal/bright.    Results for orders placed or performed in visit on 02/09/18   Comprehensive metabolic panel   Result Value Ref Range    Sodium 137 133 - 144 mmol/L    Potassium 3.7 3.4 - 5.3 mmol/L "    Chloride 98 94 - 109 mmol/L    Carbon Dioxide 33 (H) 20 - 32 mmol/L    Anion Gap 6 3 - 14 mmol/L    Glucose 179 (H) 70 - 99 mg/dL    Urea Nitrogen 14 7 - 30 mg/dL    Creatinine 1.06 0.66 - 1.25 mg/dL    GFR Estimate 72 >60 mL/min/1.7m2    GFR Estimate If Black 87 >60 mL/min/1.7m2    Calcium 9.0 8.5 - 10.1 mg/dL    Bilirubin Total 0.6 0.2 - 1.3 mg/dL    Albumin 3.8 3.4 - 5.0 g/dL    Protein Total 8.0 6.8 - 8.8 g/dL    Alkaline Phosphatase 151 (H) 40 - 150 U/L    ALT 34 0 - 70 U/L    AST 34 0 - 45 U/L       No results found for this or any previous visit (from the past 744 hour(s)).      ASSESSMENT/PLAN:                                                        ICD-10-CM    1. Type 2 diabetes mellitus with diabetic neuropathy, without long-term current use of insulin (H) E11.40 metFORMIN (GLUCOPHAGE) 500 MG tablet     DIABETES EDUCATOR REFERRAL     insulin glargine (LANTUS SOLOSTAR) 100 UNIT/ML injection     Comprehensive metabolic panel   2. Chronic systolic congestive heart failure (H) I50.22    3. Atrial fibrillation, unspecified type (H) I48.91    4. Morbid obesity (H) E66.01      1: widely fluctuating blood sugar. P: As per orders above and patient instructions below.  2,3,4: stable/euvolemic/well-controlled. PLAN: As per orders above and patient instructions below.    Patient Instructions   Please stop the Novolog, (due to your reported blood sugars of below 80 which occur 1-2 times per week).  Start Lantus at 10 units at bedtime, instead.   Restart metformin at a higher dose of 500mg *twice* a day.  We will let you know your test results [facility]-we will advise the next visit based on the lab results (and will also let you know if you need to take any more potassium supplements; and if any further adjustments in metformin are necessary).    Please call to schedule an appointment with the Diabetic Educator: [phone]  Please make sure to bring your glucometer to your next meeting with me or the Diabetes  Educator. The following blood sugar readings would be especially: fasting in the morning, and before a meal.      Please return to clinic in 6 weeks (we may advise a visit sooner, based on today's lab/test results).              Gwen Saldana MD    [address] [address] [phone]

## 2018-02-11 NOTE — PROGRESS NOTES
Dear Rhett,     Your test results are attached.      Your kidney and liver function and blood electrolytes are within normal limits.     There is no need for more potassium supplements.      Please continue the treatment plan that we discussed at your last clinic visit and let me know if you have any questions via [employer] or by calling [phone].      We will see you on 3.23.18.    Gwen Saldana MD

## 2018-02-15 ENCOUNTER — TELEPHONE (OUTPATIENT)
Dept: INTERNAL MEDICINE | Facility: CLINIC | Age: 58
End: 2018-02-15

## 2018-02-15 DIAGNOSIS — E11.40 TYPE 2 DIABETES MELLITUS WITH DIABETIC NEUROPATHY, WITHOUT LONG-TERM CURRENT USE OF INSULIN (H): ICD-10-CM

## 2018-02-15 NOTE — TELEPHONE ENCOUNTER
The patients insurance no longer wants to cover Lantus Solostar.  Do you want me to start a PA or try an alternative?  These are the covered alternatives, Basaglar, Levemir or Trisiba.  Please advise.  Thank you.  Stefani Garcia,

## 2018-02-16 NOTE — TELEPHONE ENCOUNTER
Will change to [facility], order sent.  Pt needs to schedule follow up within 2 weeks but at least 4 days after changing.    Sent to local [facility].

## 2018-02-16 NOTE — TELEPHONE ENCOUNTER
"Patient returned call, informed pt of providers note as written below, pt verbalized good understanding, but needs clarification.     patient stated he never started the lantus as this was first prescribed at 2/9/18 OV and is not covered under his insurance.     Per 2/9/18 ov  Patient Instructions   Please stop the Novolog, (due to your reported blood sugars of below 80 which occur 1-2 times per week).  Start Lantus at 10 units at bedtime, instead.   Restart metformin at a higher dose of 500mg *twice* a day.    He does have a follow up appointment with diabetic ed 2/22 and with pcp 3/23/18. Is it okay to follow the plan per Dr. Gwen Saldana versus \"follow up in 2 weeks but at least 4 days after changing.\"    Thank you, Shauna Varma, BSN RN   "

## 2018-02-16 NOTE — TELEPHONE ENCOUNTER
Called patient, informed pt of providers note as written below, pt verbalized good understanding.  Also reminded patient of his 2 pending appointments.   RUDDY Alonzo RN

## 2018-02-16 NOTE — TELEPHONE ENCOUNTER
Left message for patient to call back on their VM. Writers direct line given, Ziggy ORTIZ [phone]. Shauna Varma RN

## 2018-02-22 ENCOUNTER — ALLIED HEALTH/NURSE VISIT (OUTPATIENT)
Dept: EDUCATION SERVICES | Facility: CLINIC | Age: 58
End: 2018-02-22
Payer: COMMERCIAL

## 2018-02-22 DIAGNOSIS — E11.9 DIABETES MELLITUS WITHOUT COMPLICATION (H): ICD-10-CM

## 2018-02-22 DIAGNOSIS — E11.40 TYPE 2 DIABETES MELLITUS WITH DIABETIC NEUROPATHY, WITHOUT LONG-TERM CURRENT USE OF INSULIN (H): Primary | ICD-10-CM

## 2018-02-22 PROCEDURE — G0108 DIAB MANAGE TRN  PER INDIV: HCPCS

## 2018-02-22 PROCEDURE — 99207 ZZC DROP WITH A PROCEDURE: CPT

## 2018-02-22 RX ORDER — LANCETS
EACH MISCELLANEOUS
Qty: 102 EACH | Refills: 11 | Status: SHIPPED | OUTPATIENT
Start: 2018-02-22 | End: 2018-06-07

## 2018-02-22 NOTE — MR AVS SNAPSHOT
After Visit Summary   2/22/2018    Rhett Elizabeth    MRN: [id number]           Patient Information     Date Of Birth          1960        Visit Information        Provider Department      2/22/2018 8:30 AM AN DIABETIC ED RESOURCE [facility]        Care Instructions    My Diabetes Care Goals:    Healthy Eating: sharon seeds, eating 3 meals each day, snacks and space them 4-5 hrs apart.      Being Active: Per cardiology.     Monitoring: check when you wake up  ()and 2 hrs after one of your meals.  (<180)    Taking Medication: Metformin 500 mg take 1 pill with breakfast and 1 pill with dinner.  Tresiba U100 increase to 14 units daily take it at 9:00 am.     Follow up:  Follow-up diabetes education appointment scheduled on Tuesday April 3 @ 8:30.      Bring blood glucose meter and logbook with you to all doctor and follow-up appointments.     [facility] for the [facility] Area:  For Your Diabetes Education and Nutrition Appointments Call:  [phone]   For Diabetes Education or Nutrition Related Questions:   Phone: [phone]  E-mail: [email or web address]  Fax: [phone]   If you need a medication refill please contact your pharmacy. Please allow 3 business days for your refills to be completed.    Instructions for emailing the Diabetes Educators    If you need to communicate a non-urgent message to a Diabetes Educator via email, please send to [email or web address].    Please follow the following email guidelines:    Subject line: Secure: your clinic name (example: Secure: [facility])  In the email please include: First name, middle initial, last name and date of birth.    We will be in touch with you within one (1) business day.             Follow-ups after your visit        Your next 10 appointments already scheduled     Mar 09, 2018  8:00 AM CST   New Sleep Patient with [facility] ([facility])    [address] [address]   [phone]            Mar 23, 2018  7:10 AM CDT   Office Visit with Gwen Saldana MD   [facility]    [address] [facility] [address] [phone]           Bring a current list of meds and any records pertaining to this visit. For Physicals, please bring immunization records and any forms needing to be filled out. Please arrive 10 minutes early to complete paperwork.            Apr 03, 2018  8:30 AM CDT   Diabetic Education with AN DIABETIC ED RESOURCE   [facility] ([facility])    [address] [facility] [address] [phone]              Who to contact     If you have questions or need follow up information about today's clinic visit or your schedule please contact [facility] directly at [phone].  Normal or non-critical lab and imaging results will be communicated to you by [employer], letter or phone within 4 business days after the clinic has received the results. If you do not hear from us within 7 days, please contact the clinic through [employer] or phone. If you have a critical or abnormal lab result, we will notify you by phone as soon as possible.  Submit refill requests through [employer] or call your pharmacy and they will forward the refill request to us. Please allow 3 business days for your refill to be completed.          Additional Information About Your Visit        [employer] Information     [employer] gives you secure access to your electronic health record. If you see a primary care provider, you can also send messages to your care team and make appointments. If you have questions, please call your primary care clinic.  If you do not have a primary care provider, please call [phone] and they will assist you.        Care EveryWhere ID     This  is your Care EveryWhere ID. This could be used by other organizations to access your [facility] medical records  [id number]         Blood Pressure from Last 3 Encounters:   02/09/18 120/80   10/11/17 103/67   09/27/17 101/66    Weight from Last 3 Encounters:   02/09/18 (!) 153.8 kg (339 lb)   10/11/17 (!) 154.7 kg (341 lb)   09/27/17 (!) 154.7 kg (341 lb)              Today, you had the following     No orders found for display       Primary Care Provider Office Phone # Fax #    Gwen Saldana [phone] [address] [facility] [address]        Equal Access to Services     HANNAH GARCÍA : Hadii aad ku hadasho Soomaali, waaxda luqadaha, qaybta kaalmada adeegyada, [employer] . So [facility] [phone].    ATENCIÓN: Si habla español, tiene a tsang disposición servicios gratuitos de asistencia lingüística. Llame al [phone].    We comply with applicable federal civil rights laws and [place] laws. We do not discriminate on the basis of race, color, national origin, age, disability, sex, sexual orientation, or gender identity.            Thank you!     Thank you for choosing [facility]  for your care. Our goal is always to provide you with excellent care. Hearing back from our patients is one way we can continue to improve our services. Please take a few minutes to complete the written survey that you may receive in the mail after your visit with us. Thank you!             Your Updated Medication List - Protect others around you: Learn how to safely use, store and throw away your medicines at www.disposemymeds.org.          This list is accurate as of 2/22/18  9:39 AM.  Always use your most recent med list.                   Brand Name Dispense Instructions for use Diagnosis    ACCU-CHEK MANUEL PLUS VI           aspirin 81 MG tablet      Take by mouth daily Reported on 5/5/2017        blood glucose monitoring lancets      1 each  by In Vitro route Use to test blood sugaras directed.        DULoxetine 60 MG EC capsule    CYMBALTA     Take by mouth daily        ENSURE COMPLETE PO           insulin degludec 100 UNIT/ML pen    TRESIBA    3 mL    Inject 10 Units Subcutaneous At Bedtime    Type 2 diabetes mellitus with diabetic neuropathy, without long-term current use of insulin (H)       levothyroxine 100 MCG tablet    SYNTHROID/LEVOTHROID    90 tablet    Take 1 tablet (100 mcg) by mouth daily    Hypothyroidism, unspecified type       losartan 25 MG tablet    COZAAR     Take 25 mg by mouth daily        Lutein 20 MG Caps      Take 20 mg by mouth daily 8/11/2017- patient states he takes 1 20 mg tablet daily Patient states he takes 150 mg tablet twice a day.        metFORMIN 500 MG tablet    GLUCOPHAGE    180 tablet    Take 1 tablet (500 mg) by mouth 2 times daily (with meals)    Type 2 diabetes mellitus with diabetic neuropathy, without long-term current use of insulin (H)       MULTIVITAMINS PO      Reported on 5/22/2017        OMEGA-3 FISH OIL PO      Take 1 g by mouth Reported on 5/5/2017        order for DME     1 Device    Tall aircast boot    Acute left ankle pain, Ankle injury, left, initial encounter, Foot fracture, left, closed, initial encounter       polyethylene glycol 0.4%- propylene glycol 0.3% 0.4-0.3 % Soln ophthalmic solution    SYSTANE ULTRA     Place 1 drop into both eyes 3 times daily    Dry eyes       PREGABALIN PO      Take 300 mg by mouth 2 times daily Reported on 5/16/2017        rivaroxaban ANTICOAGULANT 20 MG Tabs tablet    XARELTO     Take 20 mg by mouth daily (with dinner) Reported on 5/5/2017        simvastatin 20 MG tablet    ZOCOR    90 tablet    Take 1 tablet (20 mg) by mouth At Bedtime    Hyperlipidemia, unspecified hyperlipidemia type       TOPROL XL PO      Take 50 mg by mouth daily        torsemide 20 MG tablet    DEMADEX     Take 20 mg by mouth daily        TYLENOL PO      Take 1,000 mg by mouth 4 times daily         UNABLE TO FIND      cpap        Vitamin B-12 500 MCG Subl      Place 2 tablets under the tongue daily        VITAMIN D-3 PO      Take 1,000 Units by mouth daily

## 2018-02-22 NOTE — PATIENT INSTRUCTIONS
My Diabetes Care Goals:    Healthy Eating: sharon seeds, eating 3 meals each day, snacks and space them 4-5 hrs apart.      Being Active: Per cardiology.     Monitoring: check when you wake up  ()and 2 hrs after one of your meals.  (<180)    Taking Medication: Metformin 500 mg take 1 pill with breakfast and 1 pill with dinner.  Tresiba U100 increase to 14 units daily take it at 9:00 am.     Follow up:  Follow-up diabetes education appointment scheduled on Tuesday April 3 @ 8:30.      Bring blood glucose meter and logbook with you to all doctor and follow-up appointments.     [facility] for the [facility] Area:  For Your Diabetes Education and Nutrition Appointments Call:  [phone]   For Diabetes Education or Nutrition Related Questions:   Phone: [phone]  E-mail: [email or web address]  Fax: [phone]   If you need a medication refill please contact your pharmacy. Please allow 3 business days for your refills to be completed.    Instructions for emailing the Diabetes Educators    If you need to communicate a non-urgent message to a Diabetes Educator via email, please send to [email or web address].    Please follow the following email guidelines:    Subject line: Secure: your clinic name (example: Secure: [place])  In the email please include: First name, middle initial, last name and date of birth.    We will be in touch with you within one (1) business day.

## 2018-02-22 NOTE — PROGRESS NOTES
Diabetes Self Management Training: Individual Review Visit    Rhett Elizabeth presents today for education, evaluation of glucose control and modification of medication(s) related to Type 2 diabetes.    He is accompanied by self    Patient's diabetes management related comments/concerns: to learn more about diabetes    Patient's emotional response to diabetes: expresses readiness to learn, concern for health and well-being and confidence diabetes can be controlled    Patient would like this visit to be focused around the following diabetes-related behaviors and goals: Diabetes pathophysiology, Assistance with making lifestyle changes, Self-care behavioral goal setting, Healthy Eating, Being Active, Monitoring, Taking Medication, Problem Solving, Reducing Risks and Healthy Coping    ASSESSMENT:  Patient Problem List and Family Medical History reviewed for relevant medical history, current medical status, and diabetes risk factors.    Current Diabetes Management per Patient:  Taking diabetes medications?   yes:     Diabetes Medication(s)     Biguanides Sig    metFORMIN (GLUCOPHAGE) 500 MG tablet Take 1 tablet (500 mg) by mouth 2 times daily (with meals)    Insulin Sig    insulin degludec (TRESIBA) 100 UNIT/ML pen Inject 10 Units Subcutaneous At Bedtime      , Oral Medications: Metformin - Dose: 500 mg , Time: breakfast and supper, Injectable Medications: Tresiba 0-0-0-10    *Abbreviated insulin dose documentation key: Insulin Trade Name (tdqgwvyhz-tqfqc-zbswzo-bedtime) - i.e. Humalog 5-5-5-0 (Humalog 5 units at breakfast, 5 units at lunch, and 5 units at dinner).    Past Diabetes Education: Yes    Patient glucose self monitoring as follows: three times daily.   BG meter: Accu-Chek Marilyn meter  BG results: see blood glucose log attached to this encounter     BG values are: Not in goal  Patient's most recent   Lab Results   Component Value Date    A1C 7.3 08/11/2017    is not meeting goal of  "<7.0    Nutrition:  Patient has an inconsistent intake of carbohydrates    Breakfast - shake, Equate meal, 24 gms of carbs, dark chocolate chips and 2 bananas.    Lunch - none   Dinner - Equate, and granola bars. Or apple and some salad   Snacks - none    Beverages: water,     Cultural/Worship diet restrictions: No     Biggest Challenge to Healthy Eating: knowing what to eat    Physical Activity:    Type: was walking 4.5 miles each day.      Diabetes Risk Factors:  family history, age over 45 years, overweight/obesity and inactivity    Diabetes Complications:  Not discussed today.    Vitals:  There were no vitals taken for this visit.  Estimated body mass index is 43.53 kg/(m^2) as calculated from the following:    Height as of 2/9/18: 1.88 m (6' 2\").    Weight as of 2/9/18: 153.8 kg (339 lb).   Last 3 BP:   BP Readings from Last 3 Encounters:   02/09/18 120/80   10/11/17 103/67   09/27/17 101/66       History   Smoking Status     Never Smoker   Smokeless Tobacco     Current User     Types: Snuff       Labs:  Lab Results   Component Value Date    A1C 7.3 08/11/2017     Lab Results   Component Value Date     02/09/2018     Lab Results   Component Value Date    LDL 52 03/22/2017     HDL Cholesterol   Date Value Ref Range Status   03/22/2017 44 >39 mg/dL Final   ]  GFR Estimate   Date Value Ref Range Status   02/09/2018 72 >60 mL/min/1.7m2 Final     Comment:     Non  GFR Calc     GFR Estimate If Black   Date Value Ref Range Status   02/09/2018 87 >60 mL/min/1.7m2 Final     Comment:      GFR Calc     Lab Results   Component Value Date    CR 1.06 02/09/2018     No results found for: MICROALBUMIN    Socio/Economic Considerations:    Support system: family and spouse/significant other    Health Beliefs and Attitudes:   Patient Activation Measure Survey Score:  No flowsheet data found.    Stage of Change: PREPARATION (Decided to change - considering how)      Diabetes knowledge and " skills assessment:     Patient is knowledgeable in diabetes management concepts related to: Healthy Eating, Being Active, Monitoring and Taking Medication    Patient needs further education on the following diabetes management concepts: Healthy Eating, Being Active, Monitoring and Taking Medication    Barriers to Learning Assessment: No Barriers identified    Based on learning assessment above, most appropriate setting for further diabetes education would be: Individual setting.    INTERVENTION:   Education provided today on:  AADE Self-Care Behaviors:  Healthy Eating: carbohydrate counting, consistency in amount, composition, and timing of food intake, weight reduction, heart healthy diet, eating out, portion control, plate planning method and label reading  Being Active: relationship to blood glucose and describe appropriate activity program  Monitoring: purpose, proper technique, log and interpret results, individual blood glucose targets and frequency of monitoring  Taking Medication: action of prescribed medication, drawing up, administering and storing injectable diabetes medications, proper site selection and rotation for injections, side effects of prescribed medications and when to take medications    Opportunities for ongoing education and support in diabetes-self management were discussed.    Pt verbalized understanding of concepts discussed and recommendations provided today.       Education Materials Provided:  [facility] Understanding Diabetes Booklet, BG Log Sheet, Carbohydrate Counting, Medication Information on Metformin and Tresiba Insulin information    PLAN:  See Patient Instructions for co-developed, patient-stated behavior change goals.  AVS printed and provided to patient today.    FOLLOW-UP:  Follow-up appointment scheduled on April 3.  Chart routed to referring provider.    Ongoing plan for education and support: Follow-up visit with diabetes educator in [place] Kw[place] RN/WIL Rausch  Diabetes Educator    Time Spent: 60 minutes  Encounter Type: Individual    Any diabetes medication dose changes were made via the CDE Protocol and Collaborative Practice Agreement with the patient's primary care provider. A copy of this encounter was shared with the provider.

## 2018-02-27 LAB — EJECTION FRACTION: 29

## 2018-03-01 ENCOUNTER — TRANSFERRED RECORDS (OUTPATIENT)
Dept: HEALTH INFORMATION MANAGEMENT | Facility: CLINIC | Age: 58
End: 2018-03-01

## 2018-03-02 ENCOUNTER — TRANSFERRED RECORDS (OUTPATIENT)
Dept: HEALTH INFORMATION MANAGEMENT | Facility: CLINIC | Age: 58
End: 2018-03-02

## 2018-03-02 LAB — EJECTION FRACTION: 35

## 2018-03-08 PROBLEM — I48.91 ATRIAL FIBRILLATION, UNSPECIFIED TYPE (H): Chronic | Status: ACTIVE | Noted: 2017-01-15

## 2018-03-08 PROBLEM — E66.01 MORBID OBESITY (H): Chronic | Status: ACTIVE | Noted: 2018-02-09

## 2018-03-08 PROBLEM — E03.9 HYPOTHYROIDISM: Chronic | Status: ACTIVE | Noted: 2017-05-07

## 2018-03-08 PROBLEM — S82.301E: Status: RESOLVED | Noted: 2017-01-15 | Resolved: 2018-03-08

## 2018-03-08 PROBLEM — E11.40 TYPE 2 DIABETES MELLITUS WITH DIABETIC NEUROPATHY, WITHOUT LONG-TERM CURRENT USE OF INSULIN (H): Chronic | Status: ACTIVE | Noted: 2017-01-15

## 2018-03-08 PROBLEM — I50.22 CHRONIC SYSTOLIC CONGESTIVE HEART FAILURE (H): Chronic | Status: ACTIVE | Noted: 2017-05-07

## 2018-03-08 PROBLEM — I10 BENIGN ESSENTIAL HYPERTENSION: Chronic | Status: ACTIVE | Noted: 2017-01-15

## 2018-03-09 ENCOUNTER — OFFICE VISIT (OUTPATIENT)
Dept: SLEEP MEDICINE | Facility: CLINIC | Age: 58
End: 2018-03-09
Payer: COMMERCIAL

## 2018-03-09 VITALS
BODY MASS INDEX: 39.17 KG/M2 | SYSTOLIC BLOOD PRESSURE: 112 MMHG | HEART RATE: 62 BPM | DIASTOLIC BLOOD PRESSURE: 77 MMHG | HEIGHT: 75 IN | WEIGHT: 315 LBS | OXYGEN SATURATION: 96 %

## 2018-03-09 DIAGNOSIS — G47.33 OSA (OBSTRUCTIVE SLEEP APNEA): Primary | ICD-10-CM

## 2018-03-09 DIAGNOSIS — I50.22 CHRONIC SYSTOLIC CONGESTIVE HEART FAILURE (H): Chronic | ICD-10-CM

## 2018-03-09 PROCEDURE — 99204 OFFICE O/P NEW MOD 45 MIN: CPT | Performed by: PHYSICIAN ASSISTANT

## 2018-03-09 RX ORDER — SPIRONOLACTONE 25 MG/1
TABLET ORAL
COMMUNITY
Start: 2018-03-05 | End: 2018-11-19

## 2018-03-09 RX ORDER — NORTRIPTYLINE HCL 25 MG
CAPSULE ORAL
Refills: 11 | COMMUNITY
Start: 2017-05-27 | End: 2020-01-20

## 2018-03-09 RX ORDER — TRAMADOL HYDROCHLORIDE 50 MG/1
50 TABLET ORAL
COMMUNITY
Start: 2016-02-15 | End: 2020-03-12

## 2018-03-09 RX ORDER — POTASSIUM CHLORIDE 750 MG/1
CAPSULE, EXTENDED RELEASE ORAL
Refills: 0 | COMMUNITY
Start: 2018-02-04 | End: 2018-12-07

## 2018-03-09 NOTE — NURSING NOTE
"Chief Complaint   Patient presents with     Sleep Problem     using cpap        Initial /77  Pulse 62  Ht 1.905 m (6' 3\")  Wt (!) 149.7 kg (330 lb)  SpO2 96%  BMI 41.25 kg/m2 Estimated body mass index is 41.25 kg/(m^2) as calculated from the following:    Height as of this encounter: 1.905 m (6' 3\").    Weight as of this encounter: 149.7 kg (330 lb).  Medication Reconciliation: complete   Neck circumference: 21 inches.      "

## 2018-03-09 NOTE — PROGRESS NOTES
Pt picked up misti and was instructed on how to use it will bring back Monday 03/12/18.  April Alexis CMA 3/9/2018 9:10 AM

## 2018-03-09 NOTE — MR AVS SNAPSHOT
After Visit Summary   3/9/2018    Rhett Elizabeth    MRN: [id number]           Patient Information     Date Of Birth          1960        Visit Information        Provider Department      3/9/2018 9:00 AM BK BED 5 [facility]        Today's Diagnoses     LANEY (obstructive sleep apnea)    -  1       Follow-ups after your visit        Your next 10 appointments already scheduled     Mar 12, 2018  8:00 AM CDT   Oximetry Drop Off with [facility] DME   [facility] ([facility]    [address]  [facility] [address]   [phone]            Mar 23, 2018  7:10 AM CDT   Office Visit with Gwen Saldana MD   [facility] ([facility]    [address] [id number] [phone]           Bring a current list of meds and any records pertaining to this visit. For Physicals, please bring immunization records and any forms needing to be filled out. Please arrive 10 minutes early to complete paperwork.            Apr 09, 2018  8:30 AM CDT   Diabetic Education with AN DIABETIC ED RESOURCE   [facility] ([facility])    [address] [id number] [phone]            Apr 13, 2018  7:30 AM CDT   Return Sleep Patient with CHARLES Gruber   [facility] ([facility])    [address] [place] [address]  [facility] [address]   [phone]              Future tests that were ordered for you today     Open Future Orders        Priority Expected Expires Ordered    Overnight oximetry study Routine  9/5/2018 3/9/2018            Who to contact     If you have questions or need follow up information about today's clinic visit or your schedule please contact [facility] directly at [phone].  Normal or non-critical lab and imaging results will be communicated to  you by MyChart, letter or phone within 4 business days after the clinic has received the results. If you do not hear from us within 7 days, please contact the clinic through [employer] or phone. If you have a critical or abnormal lab result, we will notify you by phone as soon as possible.  Submit refill requests through [employer] or call your pharmacy and they will forward the refill request to us. Please allow 3 business days for your refill to be completed.          Additional Information About Your Visit        [employer] Information     [employer] gives you secure access to your electronic health record. If you see a primary care provider, you can also send messages to your care team and make appointments. If you have questions, please call your primary care clinic.  If you do not have a primary care provider, please call [phone] and they will assist you.        Care EveryWhere ID     This is your Care EveryWhere ID. This could be used by other organizations to access your [place] medical records  [id number]         Blood Pressure from Last 3 Encounters:   03/09/18 112/77   02/09/18 120/80   10/11/17 103/67    Weight from Last 3 Encounters:   03/09/18 (!) 149.7 kg (330 lb)   02/09/18 (!) 153.8 kg (339 lb)   10/11/17 (!) 154.7 kg (341 lb)              Today, you had the following     No orders found for display         Today's Medication Changes          These changes are accurate as of 3/9/18  9:12 AM.  If you have any questions, ask your nurse or doctor.               Stop taking these medicines if you haven't already. Please contact your care team if you have questions.     order for DME   Stopped by:  Mariluz Emery PA           polyethylene glycol 0.4%- propylene glycol 0.3% 0.4-0.3 % Soln ophthalmic solution   Commonly known as:  SYSTANE ULTRA   Stopped by:  Mariluz Emery PA           TYLENOL PO   Stopped by:  Mariluz Emery PA                    Primary Care Provider Office Phone # Fax #    Gwen  Anh Saldana [phone]       [address] [facility] [address]        Equal Access to Services     HANNAH GARCÍA : Hadii aad ku hadallenrajendra Sovinnyali, wajodida sharonqadaha, norata kajaimeda brianne, halley nanciin hayaabrynn polkkiya kang la'samiabrynn [place]. So [facility] [phone].    ATENCIÓN: Si habla español, tiene a tsang disposición servicios gratuitos de asistencia lingüística. Llame al [phone].    We comply with applicable federal civil rights laws and [place] laws. We do not discriminate on the basis of race, color, national origin, age, disability, sex, sexual orientation, or gender identity.            Thank you!     Thank you for choosing [facility]  for your care. Our goal is always to provide you with excellent care. Hearing back from our patients is one way we can continue to improve our services. Please take a few minutes to complete the written survey that you may receive in the mail after your visit with us. Thank you!             Your Updated Medication List - Protect others around you: Learn how to safely use, store and throw away your medicines at www.disposemymeds.org.          This list is accurate as of 3/9/18  9:12 AM.  Always use your most recent med list.                   Brand Name Dispense Instructions for use Diagnosis    * ACCU-CHEK MANUEL PLUS VI           * blood glucose monitoring test strip    ACCU-CHEK GUIDE    100 strip    Use to test blood sugar 2 times daily or as directed.    Type 2 diabetes mellitus with diabetic neuropathy, without long-term current use of insulin (H)       aspirin 81 MG tablet      Take by mouth daily Reported on 5/5/2017        * blood glucose monitoring lancets      1 each by In Vitro route Use to test blood sugaras directed.        * blood glucose monitoring lancets     102 each    Use to test blood sugar 2 times daily or as directed.  Ok to substitute alternative if insurance prefers.    Type 2 diabetes mellitus with diabetic  neuropathy, without long-term current use of insulin (H)       DULoxetine 60 MG EC capsule    CYMBALTA     Take by mouth daily        ENSURE COMPLETE PO           insulin degludec 100 UNIT/ML pen    TRESIBA    3 mL    Inject 10 Units Subcutaneous At Bedtime    Type 2 diabetes mellitus with diabetic neuropathy, without long-term current use of insulin (H)       levothyroxine 100 MCG tablet    SYNTHROID/LEVOTHROID    90 tablet    Take 1 tablet (100 mcg) by mouth daily    Hypothyroidism, unspecified type       losartan 25 MG tablet    COZAAR     Take 25 mg by mouth daily        Lutein 20 MG Caps      Take 20 mg by mouth daily 8/11/2017- patient states he takes 1 20 mg tablet daily Patient states he takes 150 mg tablet twice a day.        metFORMIN 500 MG tablet    GLUCOPHAGE    180 tablet    Take 1 tablet (500 mg) by mouth 2 times daily (with meals)    Type 2 diabetes mellitus with diabetic neuropathy, without long-term current use of insulin (H)       MULTIVITAMINS PO      Reported on 5/22/2017        nortriptyline 25 MG capsule    PAMELOR     1 CAPSULE DAILY BY MOUTH        OMEGA-3 FISH OIL PO      Take 1 g by mouth Reported on 5/5/2017        potassium chloride 10 MEQ CR capsule    MICRO-K     TAKE 2 TABLETS (20 MEQ) BY MOUTH TWICE A DAY.        PREGABALIN PO      Take 300 mg by mouth 2 times daily Reported on 5/16/2017        rivaroxaban ANTICOAGULANT 20 MG Tabs tablet    XARELTO     Take 20 mg by mouth daily (with dinner) Reported on 5/5/2017        simvastatin 20 MG tablet    ZOCOR    90 tablet    Take 1 tablet (20 mg) by mouth At Bedtime    Hyperlipidemia, unspecified hyperlipidemia type       spironolactone 25 MG tablet    ALDACTONE          TOPROL XL PO      Take 50 mg by mouth daily        torsemide 20 MG tablet    DEMADEX     Take 20 mg by mouth daily        traMADol 50 MG tablet    ULTRAM     Take 50 mg by mouth        UNABLE TO FIND      cpap        Vitamin B-12 500 MCG Subl      Place 2 tablets under the  tongue daily        VITAMIN D-3 PO      Take 1,000 Units by mouth daily        * Notice:  This list has 4 medication(s) that are the same as other medications prescribed for you. Read the directions carefully, and ask your doctor or other care provider to review them with you.

## 2018-03-09 NOTE — MR AVS SNAPSHOT
After Visit Summary   3/9/2018    Rhett Elizabeth    MRN: [id number]           Patient Information     Date Of Birth          1960        Visit Information        Provider Department      3/9/2018 8:00 AM Mariluz Emery PA [facility]        Today's Diagnoses     LANEY (obstructive sleep apnea)    -  1    Chronic systolic congestive heart failure (H)          Care Instructions      Your BMI is Body mass index is 41.25 kg/(m^2).  Weight management is a personal decision.  If you are interested in exploring weight loss strategies, the following discussion covers the approaches that may be successful. Body mass index (BMI) is one way to tell whether you are at a healthy weight, overweight, or obese. It measures your weight in relation to your height.  A BMI of 18.5 to 24.9 is in the healthy range. A person with a BMI of 25 to 29.9 is considered overweight, and someone with a BMI of 30 or greater is considered obese. More than two-thirds of American adults are considered overweight or obese.  Being overweight or obese increases the risk for further weight gain. Excess weight may lead to heart disease and diabetes.  Creating and following plans for healthy eating and physical activity may help you improve your health.  Weight control is part of healthy lifestyle and includes exercise, emotional health, and healthy eating habits. Careful eating habits lifelong are the mainstay of weight control. Though there are significant health benefits from weight loss, long-term weight loss with diet alone may be very difficult to achieve- studies show long-term success with dietary management in less than 10% of people. Attaining a healthy weight may be especially difficult to achieve in those with severe obesity. In some cases, medications, devices and surgical management might be considered.  What can you do?  If you are overweight or obese and are interested in methods for weight loss, you  should discuss this with your provider.     Consider reducing daily calorie intake by 500 calories.     Keep a food journal.     Avoiding skipping meals, consider cutting portions instead.    Diet combined with exercise helps maintain muscle while optimizing fat loss. Strength training is particularly important for building and maintaining muscle mass. Exercise helps reduce stress, increase energy, and improves fitness. Increasing exercise without diet control, however, may not burn enough calories to loose weight.       Start walking three days a week 10-20 minutes at a time    Work towards walking thirty minutes five days a week     Eventually, increase the speed of your walking for 1-2 minutes at time    In addition, we recommend that you review healthy lifestyles and methods for weight loss available through the National Institutes of Health patient information sites:  http://win.niddk.nih.gov/publications/index.htm    And look into health and wellness programs that may be available through your health insurance provider, employer, local community center, or ziyad club.    Weight management plan: Patient was referred to their PCP to discuss a diet and exercise plan.              Follow-ups after your visit        Your next 10 appointments already scheduled     Mar 12, 2018  8:00 AM CDT   Oximetry Drop Off with FRANCISCO NIÑO   [facility] ([facility])    [address] [place] [id number] [phone]            Mar 23, 2018  7:10 AM CDT   Office Visit with Gwen Saldana MD   [facility]    [address] [facility] [address] [phone]           Bring a current list of meds and any records pertaining to this visit. For Physicals, please bring immunization records and any forms needing to be filled out. Please arrive 10 minutes early to complete paperwork.            Apr 09, 2018   8:30 AM CDT   Diabetic Education with AN DIABETIC ED RESOURCE   [facility] [place] ([facility])    [address] [facility] [address] [phone]            Apr 13, 2018  7:30 AM CDT   Return Sleep Patient with CHARLES Gruber   [facility] ([facility]    [address] [address] [phone]              Future tests that were ordered for you today     Open Future Orders        Priority Expected Expires Ordered    Overnight oximetry study Routine  9/5/2018 3/9/2018            Who to contact     If you have questions or need follow up information about today's clinic visit or your schedule please contact [facility] directly at [phone].  Normal or non-critical lab and imaging results will be communicated to you by WebCurfewhart, letter or phone within 4 business days after the clinic has received the results. If you do not hear from us within 7 days, please contact the clinic through WebCurfewhart or phone. If you have a critical or abnormal lab result, we will notify you by phone as soon as possible.  Submit refill requests through Tubaloo or call your pharmacy and they will forward the refill request to us. Please allow 3 business days for your refill to be completed.          Additional Information About Your Visit        Tubaloo Information     Tubaloo gives you secure access to your electronic health record. If you see a primary care provider, you can also send messages to your care team and make appointments. If you have questions, please call your primary care clinic.  If you do not have a primary care provider, please call [phone] and they will assist you.        Care EveryWhere ID     This is your Care EveryWhere ID. This could be used by other organizations to access your [facility] medical records  [id number]        Your Vitals Were     Pulse Height Pulse Oximetry  "BMI (Body Mass Index)          62 1.905 m (6' 3\") 96% 41.25 kg/m2         Blood Pressure from Last 3 Encounters:   03/09/18 112/77   02/09/18 120/80   10/11/17 103/67    Weight from Last 3 Encounters:   03/09/18 (!) 149.7 kg (330 lb)   02/09/18 (!) 153.8 kg (339 lb)   10/11/17 (!) 154.7 kg (341 lb)              We Performed the Following     Comprehensive DME          Today's Medication Changes          These changes are accurate as of 3/9/18  9:32 AM.  If you have any questions, ask your nurse or doctor.               Stop taking these medicines if you haven't already. Please contact your care team if you have questions.     order for DME   Stopped by:  Mariluz Emery PA           polyethylene glycol 0.4%- propylene glycol 0.3% 0.4-0.3 % Soln ophthalmic solution   Commonly known as:  SYSTANE ULTRA   Stopped by:  Mariluz Emery PA           TYLENOL PO   Stopped by:  Mariluz Emery PA                    Primary Care Provider Office Phone # Fax #    Gwen Saldana [phone] [address] [facility] [address]        Equal Access to Services     HANNAH GARCÍA : Aziza dominguezo Soomaali, waaxda luqadaha, qaybta kaalmada adeegyada, halley winchester. So [facility] [phone].    ATENCIÓN: Si habla español, tiene a tsang disposición servicios gratuitos de asistencia lingüística. Llame al [phone].    We comply with applicable federal civil rights laws and [place] laws. We do not discriminate on the basis of race, color, national origin, age, disability, sex, sexual orientation, or gender identity.            Thank you!     Thank you for choosing [facility]  for your care. Our goal is always to provide you with excellent care. Hearing back from our patients is one way we can continue to improve our services. Please take a few minutes to complete the written survey that you may receive in the mail after your visit with us. Thank you!      "        Your Updated Medication List - Protect others around you: Learn how to safely use, store and throw away your medicines at www.disposemymeds.org.          This list is accurate as of 3/9/18  9:32 AM.  Always use your most recent med list.                   Brand Name Dispense Instructions for use Diagnosis    * ACCU-CHEK MANUEL PLUS VI           * blood glucose monitoring test strip    ACCU-CHEK GUIDE    100 strip    Use to test blood sugar 2 times daily or as directed.    Type 2 diabetes mellitus with diabetic neuropathy, without long-term current use of insulin (H)       aspirin 81 MG tablet      Take by mouth daily Reported on 5/5/2017        * blood glucose monitoring lancets      1 each by In Vitro route Use to test blood sugaras directed.        * blood glucose monitoring lancets     102 each    Use to test blood sugar 2 times daily or as directed.  Ok to substitute alternative if insurance prefers.    Type 2 diabetes mellitus with diabetic neuropathy, without long-term current use of insulin (H)       DULoxetine 60 MG EC capsule    CYMBALTA     Take by mouth daily        ENSURE COMPLETE PO           insulin degludec 100 UNIT/ML pen    TRESIBA    3 mL    Inject 10 Units Subcutaneous At Bedtime    Type 2 diabetes mellitus with diabetic neuropathy, without long-term current use of insulin (H)       levothyroxine 100 MCG tablet    SYNTHROID/LEVOTHROID    90 tablet    Take 1 tablet (100 mcg) by mouth daily    Hypothyroidism, unspecified type       losartan 25 MG tablet    COZAAR     Take 25 mg by mouth daily        Lutein 20 MG Caps      Take 20 mg by mouth daily 8/11/2017- patient states he takes 1 20 mg tablet daily Patient states he takes 150 mg tablet twice a day.        metFORMIN 500 MG tablet    GLUCOPHAGE    180 tablet    Take 1 tablet (500 mg) by mouth 2 times daily (with meals)    Type 2 diabetes mellitus with diabetic neuropathy, without long-term current use of insulin (H)       MULTIVITAMINS PO       Reported on 5/22/2017        nortriptyline 25 MG capsule    PAMELOR     1 CAPSULE DAILY BY MOUTH        OMEGA-3 FISH OIL PO      Take 1 g by mouth Reported on 5/5/2017        potassium chloride 10 MEQ CR capsule    MICRO-K     TAKE 2 TABLETS (20 MEQ) BY MOUTH TWICE A DAY.        PREGABALIN PO      Take 300 mg by mouth 2 times daily Reported on 5/16/2017        rivaroxaban ANTICOAGULANT 20 MG Tabs tablet    XARELTO     Take 20 mg by mouth daily (with dinner) Reported on 5/5/2017        simvastatin 20 MG tablet    ZOCOR    90 tablet    Take 1 tablet (20 mg) by mouth At Bedtime    Hyperlipidemia, unspecified hyperlipidemia type       spironolactone 25 MG tablet    ALDACTONE          TOPROL XL PO      Take 50 mg by mouth daily        torsemide 20 MG tablet    DEMADEX     Take 20 mg by mouth daily        traMADol 50 MG tablet    ULTRAM     Take 50 mg by mouth        UNABLE TO FIND      cpap        Vitamin B-12 500 MCG Subl      Place 2 tablets under the tongue daily        VITAMIN D-3 PO      Take 1,000 Units by mouth daily        * Notice:  This list has 4 medication(s) that are the same as other medications prescribed for you. Read the directions carefully, and ask your doctor or other care provider to review them with you.

## 2018-03-09 NOTE — PROGRESS NOTES
"  Sleep Consultation:    Date on this visit: 3/9/2018    Rhett Elizabeth is sent by No ref. provider found for a sleep consultation.    Primary Physician: Gwen Saldana     CC: \"Get equipment supplies\"    HPI: Rhett Elizabeth was initially diagnosed with LANEY in ~1996. His last sleep study was done in 2016 at [facility]. Sleep study records obtained:    1996(289#)-AHI/RDI 65, CPAP 10 cm/H20  2/25.2016(369#)-AHI 36, RDI 37, lowest oxygen saturation was 88%. Tco2 40-45, no effective pressure found. He was prescribed auto-CPAP 10-20 cm/H20.    Compliance download is available.   Respironics  Auto-PAP 10 - 20 cmH2O 30 day usage data:    1% of days with > 4 hours of use. 29/30 days with no use. Average use 6hr 27 minutes per day.   Average leak 17.9 LPM.  Average % of night in large leak 2min 58sec.    CPAP 90% pressure 13.9cm.   AHI 15.4 events per hour. Average % night in periodic breathing 8.6%. Average CA index 3.5. Average OA index 8.4. Average hypopnea index 3.5    Overall, he rates the experience with PAP as 10 (0 poor, 10 great). The mask is comfortable.  The mask is not leaking .  He is snoring with the mask on. He is not having gasp arousals.  He is having significant oral/nasal dryness. The pressure is not comfortable. The pressure is uncomfortable because of \"not getting enough air\".    His PAP interface is Nasal Pillows.    Bedtime is typically 0930. Usually it takes about 5 min minutes to fall asleep with the mask on. Wake time is typically 0600.  Patient is using PAP therapy 7 hours per night. The patient is usually getting 7 hours of sleep per night.    He does not feel rested in the morning.    Total score - [place]: 18 (3/9/2018  8:00 AM). He has no difficulty with driving.     Patient does not use electronics in bed, watch TV in bed and read in bed.     Rhett does not do shift work.     Patient sleeps on his back and side. He denies no morning headaches and morning confusion. " Rhett denies any bruxism, sleep walking, sleep talking, dream enactment, sleep paralysis, cataplexy and hypnogogic/hypnopompic hallucinations.    Rhett denies difficulty breathing through his nose, claustrophobia and reflux at night.      Rhett has lost ~30 lbs since his last sleep study. Patient describes themself as a morning person.  He would prefer to go to sleep at 10:00 PM and wake up at 6:00 AM.     Rhett naps 7 times per week for 20-30 minutes, feels refreshed after naps. He takes some inadvertant naps.  He denies falling asleep while driving.   Patient was counseled on the importance of driving while alert, to pull over if drowsy, or nap before getting into the vehicle if sleepy.  He uses no caffeine.    Allergies:    Allergies   Allergen Reactions     Amlodipine Difficulty breathing     Swelling of the lips and tongue     Ace Inhibitors Cough       Medications:    Current Outpatient Prescriptions   Medication Sig Dispense Refill     traMADol (ULTRAM) 50 MG tablet Take 50 mg by mouth       blood glucose monitoring (ACCU-CHEK FASTCLIX) lancets Use to test blood sugar 2 times daily or as directed.  Ok to substitute alternative if insurance prefers. 102 each 11     blood glucose monitoring (ACCU-CHEK GUIDE) test strip Use to test blood sugar 2 times daily or as directed. 100 strip 11     insulin degludec (TRESIBA) 100 UNIT/ML pen Inject 10 Units Subcutaneous At Bedtime 3 mL 0     torsemide (DEMADEX) 20 MG tablet Take 20 mg by mouth daily  3     metFORMIN (GLUCOPHAGE) 500 MG tablet Take 1 tablet (500 mg) by mouth 2 times daily (with meals) 180 tablet 0     simvastatin (ZOCOR) 20 MG tablet Take 1 tablet (20 mg) by mouth At Bedtime 90 tablet 1     losartan (COZAAR) 25 MG tablet Take 25 mg by mouth daily       Cyanocobalamin (VITAMIN B-12) 500 MCG SUBL Place 2 tablets under the tongue daily       levothyroxine (SYNTHROID/LEVOTHROID) 100 MCG tablet Take 1 tablet (100 mcg) by mouth daily 90 tablet 3      Nutritional Supplements (ENSURE COMPLETE PO)        aspirin 81 MG tablet Take by mouth daily Reported on 5/5/2017       Glucose Blood (ACCU-CHEK MANUEL PLUS VI)        DULoxetine (CYMBALTA) 60 MG EC capsule Take by mouth daily       blood glucose monitoring (ACCU-CHEK MULTICLIX) lancets 1 each by In Vitro route Use to test blood sugaras directed.       Lutein 20 MG CAPS Take 20 mg by mouth daily 8/11/2017- patient states he takes 1 20 mg tablet daily  Patient states he takes 150 mg tablet twice a day.       Multiple Vitamin (MULTIVITAMINS PO) Reported on 5/22/2017       Omega-3 Fatty Acids (OMEGA-3 FISH OIL PO) Take 1 g by mouth Reported on 5/5/2017       PREGABALIN PO Take 300 mg by mouth 2 times daily Reported on 5/16/2017       UNABLE TO FIND cpap       Cholecalciferol (VITAMIN D-3 PO) Take 1,000 Units by mouth daily       Metoprolol Succinate (TOPROL XL PO) Take 50 mg by mouth daily        rivaroxaban ANTICOAGULANT (XARELTO) 20 MG TABS tablet Take 20 mg by mouth daily (with dinner) Reported on 5/5/2017       potassium chloride (MICRO-K) 10 MEQ CR capsule TAKE 2 TABLETS (20 MEQ) BY MOUTH TWICE A DAY.  0     nortriptyline (PAMELOR) 25 MG capsule 1 CAPSULE DAILY BY MOUTH  11     spironolactone (ALDACTONE) 25 MG tablet          Problem List:  Patient Active Problem List    Diagnosis Date Noted     Morbid obesity (H) 02/09/2018     Priority: Medium     Left foot pain 10/03/2017     Priority: Medium     Foot fracture, left, with routine healing, subsequent encounter 10/03/2017     Priority: Medium     Gait abnormality 10/03/2017     Priority: Medium     Drusen of macula, left 09/08/2017     Priority: Medium     Dry eyes 09/08/2017     Priority: Medium     Chronic systolic congestive heart failure (H) 05/07/2017     Priority: Medium     MRI completed on 1/17/2018 revealed an EF 21% with an enlarged LV as well as biatrial enlargement. Pulmonary congestion also noted.     Per 1/2017 outside ECHO (see  [facility]).  Summary of report:   1. Moderately increased left ventricular size.   2. Severe global hypokinesis.   3. LV poorly seen despite use of contrast. EF appears severely reduced, 25-30%.   4. Moderately dilated left atrium.   5. Moderately dilated right atrium.   6. No hemodynamically significant valvular disease.   7. No prior echo for comparison.       Hypothyroidism 05/07/2017     Priority: Medium     Neuropathic pain 01/15/2017     Priority: Medium     Benign essential hypertension 01/15/2017     Priority: Medium     Atrial fibrillation, unspecified type (H) 01/15/2017     Priority: Medium     LANEY (obstructive sleep apnea) 01/15/2017     Priority: Medium     1996(289#)-AHI/RDI 65, CPAP 10 cm/H20  2/25.2016(369#)-AHI 36, RDI 37, lowest oxygen saturation was 88%. Tco2 40-45, no effective pressure found.          Well controlled diabetes mellitus (H) 01/15/2017     Priority: Medium     Type 2 diabetes mellitus with diabetic neuropathy, without long-term current use of insulin (H) 01/15/2017     Priority: Medium        Past Medical/Surgical History:  Past Medical History:   Diagnosis Date     Diabetes (H)      Hypertension      Macular degeneration      Type I or II open fracture of distal end of right tibia with routine healing, unspecified fracture morphology, subsequent encounter 1/15/2017     Past Surgical History:   Procedure Laterality Date     STRABISMUS SURGERY         Social History:  Social History     Social History     Marital status:      Spouse name: N/A     Number of children: N/A     Years of education: N/A     Occupational History     Not on file.     Social History Main Topics     Smoking status: Never Smoker     Smokeless tobacco: Current User     Types: Snuff     Alcohol use No     Drug use: No     Sexual activity: Not on file     Other Topics Concern     Not on file     Social History Narrative       Family History:  Family History   Problem Relation Age of Onset     Glaucoma  "No family hx of      Macular Degeneration No family hx of        Review of Systems:  CONSTITUTIONAL: NEGATIVE for weight gain/loss, fever, chills, sweats or night sweats, drug allergies.  EYES: NEGATIVE for changes in vision, blind spots, double vision.  ENT: NEGATIVE for ear pain, sore throat, sinus pain, post-nasal drip, runny nose, bloody nose  CARDIAC: NEGATIVE for fast heartbeats or fluttering in chest, chest pain or pressure, breathlessness when lying flat, swollen legs or swollen feet.  NEUROLOGIC: NEGATIVE headaches, weakness or numbness in the arms or legs.  DERMATOLOGIC: NEGATIVE for rashes, new moles or change in mole(s)  PULMONARY: POSITIVE for SOB at rest, SOB with activity.    GASTROINTESTINAL: NEGATIVE for nausea or vomitting, loose or watery stools, fat or grease in stools, constipation, abdominal pain, bowel movements black in color or blood noted.  GENITOURINARY: NEGATIVE for pain during urination, blood in urine, urinating more frequently than usual.   MUSCULOSKELETAL: NEGATIVE for muscle pain, bone or joint pain, swollen joints.  ENDOCRINE: NEGATIVE for increased thirst or urination, diabetes.  LYMPHATIC: NEGATIVE for swollen lymph nodes, lumps or bumps in the breasts or nipple discharge.    Physical Examination:  Vitals: /77  Pulse 62  Ht 1.905 m (6' 3\")  Wt (!) 149.7 kg (330 lb)  SpO2 96%  BMI 41.25 kg/m2  BMI= Body mass index is 41.25 kg/(m^2).         [place] Total Score 3/9/2018   Total score - [place] 18       GENERAL APPEARANCE: alert and no distress  EYES: Eyes grossly normal to inspection, PERRL and conjunctivae and sclerae normal  HENT: ear canals and TM's normal, nose and mouth without ulcers or lesions and oropharynx crowded  NECK: no adenopathy and no asymmetry, masses, or scars  RESP: lungs clear to auscultation - no rales, rhonchi or wheezes  CV: regular rates and rhythm and normal S1 S2, no S3 or S4  MS: extremities normal- no gross deformities noted  NEURO: Normal " strength and tone, mentation intact and speech normal  PSYCH: mentation appears normal and affect normal/bright  Mallampati Class: III.    Last Basic Metabolic Panel:  Lab Results   Component Value Date     02/09/2018      Lab Results   Component Value Date    POTASSIUM 3.7 02/09/2018     Lab Results   Component Value Date    CHLORIDE 98 02/09/2018     Lab Results   Component Value Date    DEVENDRA 9.0 02/09/2018     Lab Results   Component Value Date    CO2 33 02/09/2018     Lab Results   Component Value Date    BUN 14 02/09/2018     Lab Results   Component Value Date    CR 1.06 02/09/2018     Lab Results   Component Value Date     02/09/2018     Impression/Plan:  Severe obstructive sleep apnea, suboptimally treated with residual AHI of 15 and possible periodic breathing with systolic heart failure(recent EF of ~21%).   He has excessive daytime sleepiness and he feels that pressure setting feels low.     Today, we discussed options including an all night PAP titration study to establish optimal treatment in the setting of heart failure. Patient is not prepared to undergo a repeat sleep study at this time. Will set his CPAP to 13 cm/H20 and check overnight oximetry.       He will follow up with me in 4 weeks for re-evaluation.     Mariluz Emery PA-C  CC: No ref. provider found

## 2018-03-09 NOTE — PATIENT INSTRUCTIONS

## 2018-03-12 ENCOUNTER — DOCUMENTATION ONLY (OUTPATIENT)
Dept: SLEEP MEDICINE | Facility: CLINIC | Age: 58
End: 2018-03-12
Payer: COMMERCIAL

## 2018-03-16 ENCOUNTER — APPOINTMENT (OUTPATIENT)
Dept: SLEEP MEDICINE | Facility: CLINIC | Age: 58
End: 2018-03-16
Payer: COMMERCIAL

## 2018-03-21 PROBLEM — Z95.810 S/P ICD (INTERNAL CARDIAC DEFIBRILLATOR) PROCEDURE: Status: ACTIVE | Noted: 2018-03-21

## 2018-03-23 ENCOUNTER — APPOINTMENT (OUTPATIENT)
Dept: SLEEP MEDICINE | Facility: CLINIC | Age: 58
End: 2018-03-23
Payer: COMMERCIAL

## 2018-03-27 NOTE — PROGRESS NOTES
Oximetry results were obtained for the date of 3/23/2018.  The patient was on a treatment of CPAP.  The study showed a valid recording time of 9 hours and 52 minutes with a 4% desaturation index of 7.4.  The basal oxygen saturation was 93.9% and the lowest SpO2 was ~86%.  The patient spent 2.3 minutes below 88% SpO2.     A/P  No significant hypoxemia on CPAP. Continue current treatment and follow up as planned.     Mariluz Emery PA-C

## 2018-03-29 DIAGNOSIS — E78.5 HYPERLIPIDEMIA, UNSPECIFIED HYPERLIPIDEMIA TYPE: ICD-10-CM

## 2018-03-29 RX ORDER — SIMVASTATIN 20 MG
TABLET ORAL
Qty: 30 TABLET | Refills: 0 | Status: SHIPPED | OUTPATIENT
Start: 2018-03-29 | End: 2018-04-25

## 2018-03-29 NOTE — LETTER
March 29, 2018    Rhett Elizabeth  [address] [facility] [address]        Dear Rhett,       We recently received a refill request for simvastatin (ZOCOR) 20 MG tablet.  We have refilled this for a one time 30 day supply only because you are due for a:    Diabetes & Cholesterol office visit and fasting lab appointment      Please schedule this lab appointment 4-5 days prior to the office visit.     Please call at your earliest convenience so that there will not be a delay with your future refills.          Thank you,   Your [facility] Team/[facility]  [phone]

## 2018-03-29 NOTE — TELEPHONE ENCOUNTER
#30 only as patient is overdue for appointment     TC, patient due for:  Lipids    Health Maintenance Due   Topic Date Due     ADVANCE DIRECTIVE PLANNING Q5 YRS  10/26/2016     FOOT EXAM Q1 YEAR  11/10/2017     A1C Q6 MO  02/11/2018     LIPID MONITORING Q1 YEAR  03/22/2018     Cheryl Varela RN

## 2018-04-09 ENCOUNTER — ALLIED HEALTH/NURSE VISIT (OUTPATIENT)
Dept: EDUCATION SERVICES | Facility: CLINIC | Age: 58
End: 2018-04-09
Payer: COMMERCIAL

## 2018-04-09 DIAGNOSIS — E11.40 TYPE 2 DIABETES MELLITUS WITH DIABETIC NEUROPATHY, WITHOUT LONG-TERM CURRENT USE OF INSULIN (H): Primary | Chronic | ICD-10-CM

## 2018-04-09 DIAGNOSIS — E11.9 DIABETES MELLITUS WITHOUT COMPLICATION (H): ICD-10-CM

## 2018-04-09 LAB — HBA1C MFR BLD: 8.1 % (ref 0–6.4)

## 2018-04-09 PROCEDURE — G0108 DIAB MANAGE TRN  PER INDIV: HCPCS

## 2018-04-09 PROCEDURE — 83036 HEMOGLOBIN GLYCOSYLATED A1C: CPT | Performed by: INTERNAL MEDICINE

## 2018-04-09 PROCEDURE — 99207 ZZC DROP WITH A PROCEDURE: CPT

## 2018-04-09 PROCEDURE — 36415 COLL VENOUS BLD VENIPUNCTURE: CPT | Performed by: INTERNAL MEDICINE

## 2018-04-09 NOTE — PROGRESS NOTES
Patient was seen today by the Diabetic Educator and a treatment plan is in place. Next appointment with me is in a month. Dr Gwen Saldana MD.

## 2018-04-09 NOTE — PATIENT INSTRUCTIONS
My Diabetes Care Goals:    Healthy Eating: I will eat at work, apple with peanut butter or left overs or cheese and crackers or apple and nuts    Being Active: I will walk at work during break for 15 minutes each night.    Monitoring: I will check my BG when I wake up before I eat, and when I get home from work before I go to bed.    Taking Medication: I will take Metformin  mg take 1 tablet with breakfast and 1 tablet with dinner  Tresiba U100 will take 14 units @ 8:00 am beginning Tuesday 4/10/2018    Follow up:  Follow-up diabetes education appointment scheduled on Monday May 14 @ 8:30  Make a follow up with Dr. york..      Bring blood glucose meter and logbook with you to all doctor and follow-up appointments.     [place] [facility] for the [facility]:  For Your Diabetes Education and Nutrition Appointments Call:  [phone]   For Diabetes Education or Nutrition Related Questions:   Phone: [phone]  E-mail: [email or web address]  Fax: [phone]   If you need a medication refill please contact your pharmacy. Please allow 3 business days for your refills to be completed.    Instructions for emailing the Diabetes Educators    If you need to communicate a non-urgent message to a Diabetes Educator via email, please send to [email or web address].    Please follow the following email guidelines:    Subject line: Secure: your clinic name (example: Secure: [facility])  In the email please include: First name, middle initial, last name and date of birth.    We will be in touch with you within one (1) business day.

## 2018-04-09 NOTE — MR AVS SNAPSHOT
After Visit Summary   4/9/2018    Rhett Elizabeth    MRN: [id number]           Patient Information     Date Of Birth          1960        Visit Information        Provider Department      4/9/2018 8:30 AM AN DIABETIC ED RESOURCE [facility] [place]        Today's Diagnoses     Type 2 diabetes mellitus with diabetic neuropathy, without long-term current use of insulin (H)    -  1      Care Instructions    My Diabetes Care Goals:    Healthy Eating: I will eat at work, apple with peanut butter or left overs or cheese and crackers or apple and nuts    Being Active: I will walk at work during break for 15 minutes each night.    Monitoring: I will check my BG when I wake up before I eat, and when I get home from work before I go to bed.    Taking Medication: I will take Metformin  mg take 1 tablet with breakfast and 1 tablet with dinner  Tresiba U100 will take 14 units @ 8:00 am beginning Tuesday 4/10/2018    Follow up:  Follow-up diabetes education appointment scheduled on Monday May 14 @ 8:30  Make a follow up with Dr. york..      Bring blood glucose meter and logbook with you to all doctor and follow-up appointments.     [place] [facility] for the [facility]:  For Your Diabetes Education and Nutrition Appointments Call:  [phone]   For Diabetes Education or Nutrition Related Questions:   Phone: [phone]  E-mail: [email or web address]  Fax: [phone]   If you need a medication refill please contact your pharmacy. Please allow 3 business days for your refills to be completed.    Instructions for emailing the Diabetes Educators    If you need to communicate a non-urgent message to a Diabetes Educator via email, please send to [email or web address].    Please follow the following email guidelines:    Subject line: Secure: your clinic name (example: Secure: [facility])  In the email please include: First name, middle  initial, last name and date of birth.    We will be in touch with you within one (1) business day.             Follow-ups after your visit        Your next 10 appointments already scheduled     Apr 13, 2018  7:30 AM CDT   Return Sleep Patient with CHARLES Gruber   [facility] ([facility])    [address] [address] [address]   [phone]            May 14, 2018  8:30 AM CDT   Diabetic Education with AN DIABETIC ED RESOURCE   [facility] [place] ([facility])    [address] [facility] [address] [phone]              Who to contact     If you have questions or need follow up information about today's clinic visit or your schedule please contact [place] [facility] directly at [phone].  Normal or non-critical lab and imaging results will be communicated to you by MyChart, letter or phone within 4 business days after the clinic has received the results. If you do not hear from us within 7 days, please contact the clinic through [employer] or phone. If you have a critical or abnormal lab result, we will notify you by phone as soon as possible.  Submit refill requests through [employer] or call your pharmacy and they will forward the refill request to us. Please allow 3 business days for your refill to be completed.          Additional Information About Your Visit        MyChart Information     [employer] gives you secure access to your electronic health record. If you see a primary care provider, you can also send messages to your care team and make appointments. If you have questions, please call your primary care clinic.  If you do not have a primary care provider, please call [phone] and they will assist you.        Care EveryWhere ID     This is your Care EveryWhere ID. This could be used by other organizations to access your [facility] records  [id number]         Blood  Pressure from Last 3 Encounters:   03/09/18 112/77   02/09/18 120/80   10/11/17 103/67    Weight from Last 3 Encounters:   03/09/18 (!) 149.7 kg (330 lb)   02/09/18 (!) 153.8 kg (339 lb)   10/11/17 (!) 154.7 kg (341 lb)              We Performed the Following     Hemoglobin A1c        Primary Care Provider Office Phone # Fax #    Gwen Saldana [phone] [address] [facility] [address]        Equal Access to Services     [facility]: Hadii aad ku hadasho Sodenise, waaxda luqadaha, qaybta kaalmada adekiyayakelly, hallye church . So [facility] [phone].    ATENCIÓN: Si habla español, tiene a tsang disposición servicios gratuitos de asistencia lingüística. Llame al [phone].    We comply with applicable federal civil rights laws and [place] laws. We do not discriminate on the basis of race, color, national origin, age, disability, sex, sexual orientation, or gender identity.            Thank you!     Thank you for choosing [place] [facility]  for your care. Our goal is always to provide you with excellent care. Hearing back from our patients is one way we can continue to improve our services. Please take a few minutes to complete the written survey that you may receive in the mail after your visit with us. Thank you!             Your Updated Medication List - Protect others around you: Learn how to safely use, store and throw away your medicines at www.disposemymeds.org.          This list is accurate as of 4/9/18  9:19 AM.  Always use your most recent med list.                   Brand Name Dispense Instructions for use Diagnosis    * ACCU-CHEK MANUEL PLUS VI           * blood glucose monitoring test strip    ACCU-CHEK GUIDE    100 strip    Use to test blood sugar 2 times daily or as directed.    Type 2 diabetes mellitus with diabetic neuropathy, without long-term current use of insulin (H)       aspirin 81 MG tablet      Take by  mouth daily Reported on 5/5/2017        * blood glucose monitoring lancets      1 each by In Vitro route Use to test blood sugaras directed.        * blood glucose monitoring lancets     102 each    Use to test blood sugar 2 times daily or as directed.  Ok to substitute alternative if insurance prefers.    Type 2 diabetes mellitus with diabetic neuropathy, without long-term current use of insulin (H)       DULoxetine 60 MG EC capsule    CYMBALTA     Take by mouth daily        ENSURE COMPLETE PO           insulin degludec 100 UNIT/ML pen    TRESIBA    3 mL    Inject 10 Units Subcutaneous At Bedtime    Type 2 diabetes mellitus with diabetic neuropathy, without long-term current use of insulin (H)       levothyroxine 100 MCG tablet    SYNTHROID/LEVOTHROID    90 tablet    Take 1 tablet (100 mcg) by mouth daily    Hypothyroidism, unspecified type       losartan 25 MG tablet    COZAAR     Take 25 mg by mouth daily        Lutein 20 MG Caps      Take 20 mg by mouth daily 8/11/2017- patient states he takes 1 20 mg tablet daily Patient states he takes 150 mg tablet twice a day.        metFORMIN 500 MG tablet    GLUCOPHAGE    180 tablet    Take 1 tablet (500 mg) by mouth 2 times daily (with meals)    Type 2 diabetes mellitus with diabetic neuropathy, without long-term current use of insulin (H)       MULTIVITAMINS PO      Reported on 5/22/2017        nortriptyline 25 MG capsule    PAMELOR     1 CAPSULE DAILY BY MOUTH        OMEGA-3 FISH OIL PO      Take 1 g by mouth Reported on 5/5/2017        potassium chloride 10 MEQ CR capsule    MICRO-K     TAKE 2 TABLETS (20 MEQ) BY MOUTH TWICE A DAY.        PREGABALIN PO      Take 300 mg by mouth 2 times daily Reported on 5/16/2017        rivaroxaban ANTICOAGULANT 20 MG Tabs tablet    XARELTO     Take 20 mg by mouth daily (with dinner) Reported on 5/5/2017        simvastatin 20 MG tablet    ZOCOR    30 tablet    TAKE 1 TABLET (20 MG) BY MOUTH AT BEDTIME    Hyperlipidemia, unspecified  hyperlipidemia type       spironolactone 25 MG tablet    ALDACTONE          TOPROL XL PO      Take 50 mg by mouth daily        torsemide 20 MG tablet    DEMADEX     Take 20 mg by mouth daily        traMADol 50 MG tablet    ULTRAM     Take 50 mg by mouth        UNABLE TO FIND      cpap        Vitamin B-12 500 MCG Subl      Place 2 tablets under the tongue daily        VITAMIN D-3 PO      Take 1,000 Units by mouth daily        * Notice:  This list has 4 medication(s) that are the same as other medications prescribed for you. Read the directions carefully, and ask your doctor or other care provider to review them with you.

## 2018-04-09 NOTE — PROGRESS NOTES
Diabetes Self Management Training: Follow-up Visit    Rhett Elizabeth presents today for education, evaluation of glucose control and modification of medication(s) related to Type 2 diabetes.    He is accompanied by self    Patient's diabetes management related comments/concerns: no concerns, he did not bring in his meter, so unable to assess his BG , am running an A1C today as it has been since Aug. 2017 since last drawn.      Patient would like this visit to be focused around the following diabetes-related behaviors and goals: Healthy Eating, Being Active, Monitoring and Taking Medication    ASSESSMENT:  Patient Problem List reviewed for relevant medical history and current medical status.    Current Diabetes Management per Patient:  Taking diabetes medications?   yes:     Diabetes Medication(s)     Biguanides Sig    metFORMIN (GLUCOPHAGE) 500 MG tablet Take 1 tablet (500 mg) by mouth 2 times daily (with meals)    Insulin Sig    insulin degludec (TRESIBA) 100 UNIT/ML pen Inject 10 Units Subcutaneous At Bedtime      , Oral Medications: Metformin - Dose: 500 mg , Time: breakfast and supper, Injectable Medications: Tresiba 10-0-0-0, he is no longer using Novolog for sliding scale per Dr. Saldana notes.      *Abbreviated insulin dose documentation key: Insulin Trade Name (akzigvyhi-lvdkv-nczncg-bedtime) - i.e. Humalog 5-5-5-0 (Humalog 5 units at breakfast, 5 units at lunch, and 5 units at dinner).    Patient glucose self monitoring as follows: two times daily.   BG meter: Accu-Chek Marilyn meter  BG results: not available states he has been having 120-130's when he gets home from work    BG values are: unable to assess  Patient's most recent   Lab Results   Component Value Date    A1C 8.1 04/09/2018    is not meeting goal of <7.0 he did recently have a pacemaker placed and in the hospital .      Nutrition:  Patient has an inconsistent intake of carbohydrates, states he only eats 2 meals per day.    Breakfast -  "protein shake and 2 granola bars. There is a banana in his protein shake.    Lunch - skips this.   Dinner - protein shake and 2 granola bars with bananas.    Snacks - nuts    Beverages: water all day while awake    Cultural/Voodoo diet restrictions: No     Biggest Challenge to Healthy Eating: knowing what to eat and does not understand the reason for the 3 meals per day, we came up with a plan    Physical Activity:    Type: will be walking once the snow is gone.      Diabetes Complications:  Chronic Complication Prevention: Eyes: exam within in the last year? Yes  Nerve/Circulation: foot exam within the last year Yes  Dental Health: brushing/flossing regularly Yes, dental exam within last year Yes    Vitals:  There were no vitals taken for this visit.  Estimated body mass index is 41.25 kg/(m^2) as calculated from the following:    Height as of 3/9/18: 1.905 m (6' 3\").    Weight as of 3/9/18: 149.7 kg (330 lb).   Last 3 BP:   BP Readings from Last 3 Encounters:   03/09/18 112/77   02/09/18 120/80   10/11/17 103/67       History   Smoking Status     Never Smoker   Smokeless Tobacco     Current User     Types: Snuff       Labs:  Lab Results   Component Value Date    A1C 7.3 08/11/2017     Lab Results   Component Value Date     02/09/2018     Lab Results   Component Value Date    LDL 52 03/22/2017     HDL Cholesterol   Date Value Ref Range Status   03/22/2017 44 >39 mg/dL Final   ]  GFR Estimate   Date Value Ref Range Status   02/09/2018 72 >60 mL/min/1.7m2 Final     Comment:     Non  GFR Calc     GFR Estimate If Black   Date Value Ref Range Status   02/09/2018 87 >60 mL/min/1.7m2 Final     Comment:      GFR Calc     Lab Results   Component Value Date    CR 1.06 02/09/2018     No results found for: MICROALBUMIN    Health Beliefs and Attitudes:   Patient Activation Measure Survey Score:  No flowsheet data found.    Stage of Change: MAINTENANCE (Working to maintain change, with " risk of relapse)    Progress toward meeting diabetes-related behavioral goals:    GOALS % Met Goal   Healthy Eating  50   Physical Activity 25   Monitoring 50   Medication Taking 100   Problem Solving     Healthy Coping     Risk Reduction           Diabetes knowledge and skills assessment:     Patient is knowledgeable in diabetes management concepts related to: Healthy Eating, Being Active, Monitoring and Taking Medication    Patient needs further education on the following diabetes management concepts: Healthy Eating, Being Active, Monitoring and Taking Medication    Barriers to Learning Assessment: No Barriers identified    Based on learning assessment above, most appropriate setting for further diabetes education would be: Individual setting.    INTERVENTION:    Education provided today on:  AADE Self-Care Behaviors:  Healthy Eating: carbohydrate counting, consistency in amount, composition, and timing of food intake, weight reduction, heart healthy diet, eating out, portion control, plate planning method and label reading  Being Active: relationship to blood glucose and describe appropriate activity program  Monitoring: purpose, proper technique, log and interpret results, individual blood glucose targets and frequency of monitoring  Taking Medication: action of prescribed medication, drawing up, administering and storing injectable diabetes medications, proper site selection and rotation for injections, side effects of prescribed medications and when to take medications  Healthy Coping: recognize feelings about diagnosis, benefits of making appropriate lifestyle changes and utilize support systems    Opportunities for ongoing education and support in diabetes-self management were discussed.    Pt verbalized understanding of concepts discussed and recommendations provided today.       Education Materials Provided:  Carbohydrate Counting    PLAN:  See Patient Instructions for co-developed, patient-stated behavior  change goals.  AVS printed and provided to patient today.    FOLLOW-UP:  Follow-up appointment scheduled on May 14.  Follow-up with PCP recommended.  Chart routed to referring provider.    Ongoing plan for education and support: Follow-up visit with diabetes educator in [place]    Natasha Kwong RN/WIL Rausch Diabetes Educator    Time Spent: 60 minutes  Encounter Type: Individual    Any diabetes medication dose changes were made via the C[facility] Protocol and Collaborative Practice Agreement with the patient's primary care provider. A copy of this encounter was shared with the provider.

## 2018-04-09 NOTE — LETTER
4/9/2018         RE: Rhett Elizabeth  [address] [facility] [address]        Dear Les,  Today Rhett did not bring in his meter and this makes it tough, so I drew an A1C and figured it would be up.  He had been in the hospital and had a pacemaker placed.  We did have a long discussion on diet and how to eat while at work.  I was going to increase his Metformin, but noticed he is on Torsemide , so increased his Tresiba.  Do you think he would tolerate Victoza?  Would also like to send him to and RD for weight loss diet and meal planning.  As you can see from my note, he is not eating well.  Even his wife told him such.    Thank you,  He is going to make a follow up with you.    Thank you for referring your patient, Rhett Elizabeth, to the [place] [facility] [place]. Please see a copy of my visit note below.    Diabetes Self Management Training: Follow-up Visit    Rhett Elizabeth presents today for education, evaluation of glucose control and modification of medication(s) related to Type 2 diabetes.    He is accompanied by self    Patient's diabetes management related comments/concerns: no concerns, he did not bring in his meter, so unable to assess his BG , am running an A1C today as it has been since Aug. 2017 since last drawn.      Patient would like this visit to be focused around the following diabetes-related behaviors and goals: Healthy Eating, Being Active, Monitoring and Taking Medication    ASSESSMENT:  Patient Problem List reviewed for relevant medical history and current medical status.    Current Diabetes Management per Patient:  Taking diabetes medications?   yes:     Diabetes Medication(s)     Biguanides Sig    metFORMIN (GLUCOPHAGE) 500 MG tablet Take 1 tablet (500 mg) by mouth 2 times daily (with meals)    Insulin Sig    insulin degludec (TRESIBA) 100 UNIT/ML pen Inject 10 Units Subcutaneous At Bedtime      , Oral Medications: Metformin - Dose: 500 mg , Time:  "breakfast and supper, Injectable Medications: Tresiba 10-0-0-0, he is no longer using Novolog for sliding scale per Dr. Saldana notes.      *Abbreviated insulin dose documentation key: Insulin Trade Name (elxpqldlv-xwwho-kphrgz-bedtime) - i.e. Humalog 5-5-5-0 (Humalog 5 units at breakfast, 5 units at lunch, and 5 units at dinner).    Patient glucose self monitoring as follows: two times daily.   BG meter: Accu-Chek Marilyn meter  BG results: not available states he has been having 120-130's when he gets home from work    BG values are: unable to assess  Patient's most recent   Lab Results   Component Value Date    A1C 8.1 04/09/2018    is not meeting goal of <7.0 he did recently have a pacemaker placed and in the hospital .      Nutrition:  Patient has an inconsistent intake of carbohydrates, states he only eats 2 meals per day.    Breakfast - protein shake and 2 granola bars. There is a banana in his protein shake.    Lunch - skips this.   Dinner - protein shake and 2 granola bars with bananas.    Snacks - nuts    Beverages: water all day while awake    Cultural/Scientologist diet restrictions: No     Biggest Challenge to Healthy Eating: knowing what to eat and does not understand the reason for the 3 meals per day, we came up with a plan    Physical Activity:    Type: will be walking once the snow is gone.      Diabetes Complications:  Chronic Complication Prevention: Eyes: exam within in the last year? Yes  Nerve/Circulation: foot exam within the last year Yes  Dental Health: brushing/flossing regularly Yes, dental exam within last year Yes    Vitals:  There were no vitals taken for this visit.  Estimated body mass index is 41.25 kg/(m^2) as calculated from the following:    Height as of 3/9/18: 1.905 m (6' 3\").    Weight as of 3/9/18: 149.7 kg (330 lb).   Last 3 BP:   BP Readings from Last 3 Encounters:   03/09/18 112/77   02/09/18 120/80   10/11/17 103/67       History   Smoking Status     Never Smoker   Smokeless " Tobacco     Current User     Types: Snuff       Labs:  Lab Results   Component Value Date    A1C 7.3 08/11/2017     Lab Results   Component Value Date     02/09/2018     Lab Results   Component Value Date    LDL 52 03/22/2017     HDL Cholesterol   Date Value Ref Range Status   03/22/2017 44 >39 mg/dL Final   ]  GFR Estimate   Date Value Ref Range Status   02/09/2018 72 >60 mL/min/1.7m2 Final     Comment:     Non  GFR Calc     GFR Estimate If Black   Date Value Ref Range Status   02/09/2018 87 >60 mL/min/1.7m2 Final     Comment:      GFR Calc     Lab Results   Component Value Date    CR 1.06 02/09/2018     No results found for: MICROALBUMIN    Health Beliefs and Attitudes:   Patient Activation Measure Survey Score:  No flowsheet data found.    Stage of Change: MAINTENANCE (Working to maintain change, with risk of relapse)    Progress toward meeting diabetes-related behavioral goals:    GOALS % Met Goal   Healthy Eating  50   Physical Activity 25   Monitoring 50   Medication Taking 100   Problem Solving     Healthy Coping     Risk Reduction           Diabetes knowledge and skills assessment:     Patient is knowledgeable in diabetes management concepts related to: Healthy Eating, Being Active, Monitoring and Taking Medication    Patient needs further education on the following diabetes management concepts: Healthy Eating, Being Active, Monitoring and Taking Medication    Barriers to Learning Assessment: No Barriers identified    Based on learning assessment above, most appropriate setting for further diabetes education would be: Individual setting.    INTERVENTION:    Education provided today on:  AADE Self-Care Behaviors:  Healthy Eating: carbohydrate counting, consistency in amount, composition, and timing of food intake, weight reduction, heart healthy diet, eating out, portion control, plate planning method and label reading  Being Active: relationship to blood glucose and  describe appropriate activity program  Monitoring: purpose, proper technique, log and interpret results, individual blood glucose targets and frequency of monitoring  Taking Medication: action of prescribed medication, drawing up, administering and storing injectable diabetes medications, proper site selection and rotation for injections, side effects of prescribed medications and when to take medications  Healthy Coping: recognize feelings about diagnosis, benefits of making appropriate lifestyle changes and utilize support systems    Opportunities for ongoing education and support in diabetes-self management were discussed.    Pt verbalized understanding of concepts discussed and recommendations provided today.       Education Materials Provided:  Carbohydrate Counting    PLAN:  See Patient Instructions for co-developed, patient-stated behavior change goals.  AVS printed and provided to patient today.    FOLLOW-UP:  Follow-up appointment scheduled on May 14.  Follow-up with PCP recommended.  Chart routed to referring provider.    Ongoing plan for education and support: Follow-up visit with diabetes educator in [place]    Natasha Kwong RN/WIL Rausch Diabetes Educator    Time Spent: 60 minutes  Encounter Type: Individual    Any diabetes medication dose changes were made via the [facility] Protocol and Collaborative Practice Agreement with the patient's primary care provider. A copy of this encounter was shared with the provider.

## 2018-04-10 ENCOUNTER — TRANSFERRED RECORDS (OUTPATIENT)
Dept: HEALTH INFORMATION MANAGEMENT | Facility: CLINIC | Age: 58
End: 2018-04-10

## 2018-04-10 LAB
CREAT SERPL-MCNC: 1.35 MG/DL (ref 0.7–1.3)
GFR SERPL CREATININE-BSD FRML MDRD: 55 ML/MIN/1.73M2
GLUCOSE SERPL-MCNC: 128 MG/DL (ref 74–106)
POTASSIUM SERPL-SCNC: 3.3 MMOL/L (ref 3.5–5.1)

## 2018-04-13 ENCOUNTER — OFFICE VISIT (OUTPATIENT)
Dept: SLEEP MEDICINE | Facility: CLINIC | Age: 58
End: 2018-04-13
Payer: COMMERCIAL

## 2018-04-13 VITALS
DIASTOLIC BLOOD PRESSURE: 74 MMHG | HEIGHT: 75 IN | HEART RATE: 65 BPM | BODY MASS INDEX: 39.17 KG/M2 | OXYGEN SATURATION: 98 % | SYSTOLIC BLOOD PRESSURE: 115 MMHG | WEIGHT: 315 LBS

## 2018-04-13 DIAGNOSIS — G47.33 OSA (OBSTRUCTIVE SLEEP APNEA): Primary | ICD-10-CM

## 2018-04-13 PROCEDURE — 99214 OFFICE O/P EST MOD 30 MIN: CPT | Performed by: PHYSICIAN ASSISTANT

## 2018-04-13 NOTE — MR AVS SNAPSHOT
After Visit Summary   4/13/2018    Rhett Elizabeth    MRN: [id number]           Patient Information     Date Of Birth          1960        Visit Information        Provider Department      4/13/2018 7:30 AM Mariluz Emery PA [facility]        Today's Diagnoses     LANEY (obstructive sleep apnea)    -  1      Care Instructions      Your BMI is Body mass index is 41.25 kg/(m^2).  Weight management is a personal decision.  If you are interested in exploring weight loss strategies, the following discussion covers the approaches that may be successful. Body mass index (BMI) is one way to tell whether you are at a healthy weight, overweight, or obese. It measures your weight in relation to your height.  A BMI of 18.5 to 24.9 is in the healthy range. A person with a BMI of 25 to 29.9 is considered overweight, and someone with a BMI of 30 or greater is considered obese. More than two-thirds of American adults are considered overweight or obese.  Being overweight or obese increases the risk for further weight gain. Excess weight may lead to heart disease and diabetes.  Creating and following plans for healthy eating and physical activity may help you improve your health.  Weight control is part of healthy lifestyle and includes exercise, emotional health, and healthy eating habits. Careful eating habits lifelong are the mainstay of weight control. Though there are significant health benefits from weight loss, long-term weight loss with diet alone may be very difficult to achieve- studies show long-term success with dietary management in less than 10% of people. Attaining a healthy weight may be especially difficult to achieve in those with severe obesity. In some cases, medications, devices and surgical management might be considered.  What can you do?  If you are overweight or obese and are interested in methods for weight loss, you should discuss this with your provider.     Consider  reducing daily calorie intake by 500 calories.     Keep a food journal.     Avoiding skipping meals, consider cutting portions instead.    Diet combined with exercise helps maintain muscle while optimizing fat loss. Strength training is particularly important for building and maintaining muscle mass. Exercise helps reduce stress, increase energy, and improves fitness. Increasing exercise without diet control, however, may not burn enough calories to loose weight.       Start walking three days a week 10-20 minutes at a time    Work towards walking thirty minutes five days a week     Eventually, increase the speed of your walking for 1-2 minutes at time    In addition, we recommend that you review healthy lifestyles and methods for weight loss available through the National Institutes of Health patient information sites:  http://win.niddk.nih.gov/publications/index.htm    And look into health and wellness programs that may be available through your health insurance provider, employer, local community center, or ziyad club.    Weight management plan: Patient was referred to their PCP to discuss a diet and exercise plan.          Your Body mass index is 41.25 kg/(m^2).  Weight management is a personal decision.  If you are interested in exploring weight loss strategies, the following discussion covers the approaches that may be successful. Body mass index (BMI) is one way to tell whether you are at a healthy weight, overweight, or obese. It measures your weight in relation to your height.  A BMI of 18.5 to 24.9 is in the healthy range. A person with a BMI of 25 to 29.9 is considered overweight, and someone with a BMI of 30 or greater is considered obese. More than two-thirds of American adults are considered overweight or obese.  Being overweight or obese increases the risk for further weight gain. Excess weight may lead to heart disease and diabetes.  Creating and following plans for healthy eating and physical  activity may help you improve your health.  Weight control is part of healthy lifestyle and includes exercise, emotional health, and healthy eating habits. Careful eating habits lifelong are the mainstay of weight control. Though there are significant health benefits from weight loss, long-term weight loss with diet alone may be very difficult to achieve- studies show long-term success with dietary management in less than 10% of people. Attaining a healthy weight may be especially difficult to achieve in those with severe obesity. In some cases, medications, devices and surgical management might be considered.  What can you do?  If you are overweight or obese and are interested in methods for weight loss, you should discuss this with your provider.     Consider reducing daily calorie intake by 500 calories.     Keep a food journal.     Avoiding skipping meals, consider cutting portions instead.    Diet combined with exercise helps maintain muscle while optimizing fat loss. Strength training is particularly important for building and maintaining muscle mass. Exercise helps reduce stress, increase energy, and improves fitness. Increasing exercise without diet control, however, may not burn enough calories to loose weight.       Start walking three days a week 10-20 minutes at a time    Work towards walking thirty minutes five days a week     Eventually, increase the speed of your walking for 1-2 minutes at time    In addition, we recommend that you review healthy lifestyles and methods for weight loss available through the National Institutes of Health patient information sites:  http://win.niddk.nih.gov/publications/index.htm    And look into health and wellness programs that may be available through your health insurance provider, employer, local community center, or ziyad club.    Weight management plan: Patient was referred to their PCP to discuss a diet and exercise plan.          Follow-ups after your visit         Your next 10 appointments already scheduled     Apr 24, 2018  7:15 AM CDT   [facility] with [facility] LAB   [facility]    [address] [id number] [phone]           Please do not eat 10-12 hours before your appointment if you are coming in fasting for labs on lipids, cholesterol, or glucose (sugar). This does not apply to pregnant women. Water, hot tea and black coffee (with nothing added) are okay. Do not drink other fluids, diet soda or chew gum.            May 02, 2018  9:00 AM CDT   Office Visit with Gwen Saldana MD   [facility]    [address] [id number] [phone]           Bring a current list of meds and any records pertaining to this visit. For Physicals, please bring immunization records and any forms needing to be filled out. Please arrive 10 minutes early to complete paperwork.            May 14, 2018  8:30 AM CDT   Diabetic Education with AN DIABETIC ED RESOURCE   [facility] ([facility])    [address] [id number] [phone]              Who to contact     If you have questions or need follow up information about today's clinic visit or your schedule please contact [facility] directly at [phone].  Normal or non-critical lab and imaging results will be communicated to you by MyChart, letter or phone within 4 business days after the clinic has received the results. If you do not hear from us within 7 days, please contact the clinic through [email or web address] or phone. If you have a critical or abnormal lab result, we will notify you by phone as soon as possible.  Submit refill requests through "[employer] or call your pharmacy and they will forward the refill request to us. Please allow 3 business days for your refill to be completed.          Additional Information About Your Visit        "[employer]  "Information     Edu gives you secure access to your electronic health record. If you see a primary care provider, you can also send messages to your care team and make appointments. If you have questions, please call your primary care clinic.  If you do not have a primary care provider, please call [phone] and they will assist you.        Care EveryWhere ID     This is your Care EveryWhere ID. This could be used by other organizations to access your [place] medical records  [id number]        Your Vitals Were     Pulse Height Pulse Oximetry BMI (Body Mass Index)          65 1.905 m (6' 3\") 98% 41.25 kg/m2         Blood Pressure from Last 3 Encounters:   04/13/18 115/74   03/09/18 112/77   02/09/18 120/80    Weight from Last 3 Encounters:   04/13/18 149.7 kg (330 lb)   03/09/18 (!) 149.7 kg (330 lb)   02/09/18 (!) 153.8 kg (339 lb)              Today, you had the following     No orders found for display         Today's Medication Changes          These changes are accurate as of 4/13/18  8:07 AM.  If you have any questions, ask your nurse or doctor.               Stop taking these medicines if you haven't already. Please contact your care team if you have questions.     Vitamin B-12 500 MCG Subl   Stopped by:  Mariluz Emery PA                    Primary Care Provider Office Phone # Fax #    Gwen Saldana [phone] [address] [facility] [place] [address]        Equal Access to Services     [facility]: Hadii connie dominguezo Sodenise, waaxda luqadaha, qaybta kaalmada brianne, halley winchester. So [facility] [phone].    ATENCIÓN: Si habla español, tiene a tsang disposición servicios gratuitos de asistencia lingüística. Llame al [phone].    We comply with applicable federal civil rights laws and [place] laws. We do not discriminate on the basis of race, color, national origin, age, disability, sex, sexual orientation, or gender " identity.            Thank you!     Thank you for choosing [facility]  for your care. Our goal is always to provide you with excellent care. Hearing back from our patients is one way we can continue to improve our services. Please take a few minutes to complete the written survey that you may receive in the mail after your visit with us. Thank you!             Your Updated Medication List - Protect others around you: Learn how to safely use, store and throw away your medicines at www.disposemymeds.org.          This list is accurate as of 4/13/18  8:07 AM.  Always use your most recent med list.                   Brand Name Dispense Instructions for use Diagnosis    * ACCU-CHEK MANUEL PLUS VI           * blood glucose monitoring test strip    ACCU-CHEK GUIDE    100 strip    Use to test blood sugar 2 times daily or as directed.    Type 2 diabetes mellitus with diabetic neuropathy, without long-term current use of insulin (H)       aspirin 81 MG tablet      Take by mouth daily Reported on 5/5/2017        * blood glucose monitoring lancets      1 each by In Vitro route Use to test blood sugaras directed.        * blood glucose monitoring lancets     102 each    Use to test blood sugar 2 times daily or as directed.  Ok to substitute alternative if insurance prefers.    Type 2 diabetes mellitus with diabetic neuropathy, without long-term current use of insulin (H)       DULoxetine 60 MG EC capsule    CYMBALTA     Take by mouth daily        ENSURE COMPLETE PO           insulin degludec 100 UNIT/ML pen    TRESIBA    15 mL    Inject 14 Units Subcutaneous every morning    Type 2 diabetes mellitus with diabetic neuropathy, without long-term current use of insulin (H)       levothyroxine 100 MCG tablet    SYNTHROID/LEVOTHROID    90 tablet    Take 1 tablet (100 mcg) by mouth daily    Hypothyroidism, unspecified type       losartan 25 MG tablet    COZAAR     Take 25 mg by mouth daily        Lutein 20 MG Caps       Take 20 mg by mouth daily 8/11/2017- patient states he takes 1 20 mg tablet daily Patient states he takes 150 mg tablet twice a day.        metFORMIN 500 MG tablet    GLUCOPHAGE    180 tablet    Take 1 tablet (500 mg) by mouth 2 times daily (with meals)    Type 2 diabetes mellitus with diabetic neuropathy, without long-term current use of insulin (H)       MULTIVITAMINS PO      Reported on 5/22/2017        nortriptyline 25 MG capsule    PAMELOR     1 CAPSULE DAILY BY MOUTH        OMEGA-3 FISH OIL PO      Take 1 g by mouth Reported on 5/5/2017        potassium chloride 10 MEQ CR capsule    MICRO-K     TAKE 2 TABLETS (20 MEQ) BY MOUTH TWICE A DAY.        PREGABALIN PO      Take 300 mg by mouth 2 times daily Reported on 5/16/2017        rivaroxaban ANTICOAGULANT 20 MG Tabs tablet    XARELTO     Take 20 mg by mouth daily (with dinner) Reported on 5/5/2017        simvastatin 20 MG tablet    ZOCOR    30 tablet    TAKE 1 TABLET (20 MG) BY MOUTH AT BEDTIME    Hyperlipidemia, unspecified hyperlipidemia type       spironolactone 25 MG tablet    ALDACTONE          TOPROL XL PO      Take 50 mg by mouth daily        torsemide 20 MG tablet    DEMADEX     Take 20 mg by mouth daily        traMADol 50 MG tablet    ULTRAM     Take 50 mg by mouth        UNABLE TO FIND      cpap        VITAMIN D-3 PO      Take 1,000 Units by mouth daily        * Notice:  This list has 4 medication(s) that are the same as other medications prescribed for you. Read the directions carefully, and ask your doctor or other care provider to review them with you.

## 2018-04-13 NOTE — NURSING NOTE
"Chief Complaint   Patient presents with     CPAP Follow Up     cpap f/u       Initial /74  Pulse 65  Ht 1.905 m (6' 3\")  Wt 149.7 kg (330 lb)  SpO2 98%  BMI 41.25 kg/m2 Estimated body mass index is 41.25 kg/(m^2) as calculated from the following:    Height as of this encounter: 1.905 m (6' 3\").    Weight as of this encounter: 149.7 kg (330 lb).  Medication Reconciliation: complete    "

## 2018-04-13 NOTE — PATIENT INSTRUCTIONS

## 2018-04-13 NOTE — PROGRESS NOTES
Obstructive Sleep Apnea- PAP Follow-Up Visit:    Chief Complaint   Patient presents with     CPAP Follow Up     cpap f/u       Rhett Elizabeth comes in today for follow-up of their severe sleep apnea, managed with CPAP.     Rhett Elizabeth was initially diagnosed with LANEY in ~1996. His last sleep study was done in 2016 at [place]. Sleep study records obtained:    1996(289#)-AHI/RDI 65, CPAP 10 cm/H20  2/25/2016(369#)-AHI 36, RDI 37, lowest oxygen saturation was 88%. Tco2 40-45, no effective pressure found. He was prescribed auto-CPAP 10-20 cm/H20.     FLORIAN on 3/2/2018 showed EF 35%    Overall, he rates the experience with PAP as 9 (0 poor, 10 great). The mask is comfortable.    The mask is not leaking .  He is not snoring with the mask on. He is not having gasp arousals.  He is having significant oral/nasal dryness. The pressure is comfortable.     His PAP interface is Nasal Pillows.    Bedtime is typically 1000. Usually it takes about 30  min minutes to fall asleep with the mask on. Wake time is typically 0600.  Patient is using PAP therapy 7 hours per night. The patient is usually getting 7 hours of sleep per night.    He does not feel rested in the morning.    Total score - [place]: 18 (3/9/2018  8:00 AM)    Last clinic visit CPAP set to 13 cm/H20 and overnight oximetry done.   Oximetry results were obtained for the date of 3/23/2018.  The patient was on a treatment of CPAP.  The study showed a valid recording time of 9 hours and 52 minutes with a 4% desaturation index of 7.4.  The basal oxygen saturation was 93.9% and the lowest SpO2 was ~86%.  The patient spent 2.3 minutes below 88% SpO2.     He did not bring his CPAP for download today.     Past medical/surgical history, family history, social history, medications and allergies were reviewed.      Problem List:  Patient Active Problem List    Diagnosis Date Noted     S/P ICD (internal cardiac defibrillator) procedure 03/21/2018     Priority: Medium      "Biventricular ICD implantation on 3.1.18 at [facility].       Morbid obesity (H) 02/09/2018     Priority: Medium     Left foot pain 10/03/2017     Priority: Medium     Foot fracture, left, with routine healing, subsequent encounter 10/03/2017     Priority: Medium     Gait abnormality 10/03/2017     Priority: Medium     Drusen of macula, left 09/08/2017     Priority: Medium     Dry eyes 09/08/2017     Priority: Medium     Chronic systolic congestive heart failure (H) 05/07/2017     Priority: Medium     ICD and Bi-V. 3/1/2018    MRI completed on 1/17/2018 revealed an EF 21% with an enlarged LV as well as biatrial enlargement. Pulmonary congestion also noted.     Per 1/2017 outside ECHO (see CareEverywhere).  Summary of report:   1. Moderately increased left ventricular size.   2. Severe global hypokinesis.   3. LV poorly seen despite use of contrast. EF appears severely reduced, 25-30%.   4. Moderately dilated left atrium.   5. Moderately dilated right atrium.   6. No hemodynamically significant valvular disease.   7. No prior echo for comparison.       Hypothyroidism 05/07/2017     Priority: Medium     Neuropathic pain 01/15/2017     Priority: Medium     Benign essential hypertension 01/15/2017     Priority: Medium     Atrial fibrillation, unspecified type (H) 01/15/2017     Priority: Medium     LANEY (obstructive sleep apnea) 01/15/2017     Priority: Medium     1996(289#)-AHI/RDI 65, CPAP 10 cm/H20  2/25.2016(369#)-AHI 36, RDI 37, lowest oxygen saturation was 88%. Tco2 40-45, no effective pressure found.          Well controlled diabetes mellitus (H) 01/15/2017     Priority: Medium     Type 2 diabetes mellitus with diabetic neuropathy, without long-term current use of insulin (H) 01/15/2017     Priority: Medium        /74  Pulse 65  Ht 1.905 m (6' 3\")  Wt 149.7 kg (330 lb)  SpO2 98%  BMI 41.25 kg/m2        Impression/Plan:  Severe obstructive sleep apnea-  He will bring his CPAP for download. If " he continues to have persistent events could consider a repeat polysomnogram versus changing setting on CPAP.       Fifteen minutes spent with patient, all of which were spent face-to-face counseling, consulting, coordinating plan of care regarding LANEY.      Mariluz Emery PA-C

## 2018-04-18 ENCOUNTER — DOCUMENTATION ONLY (OUTPATIENT)
Dept: LAB | Facility: CLINIC | Age: 58
End: 2018-04-18

## 2018-04-18 DIAGNOSIS — I50.22 CHRONIC SYSTOLIC CONGESTIVE HEART FAILURE (H): Chronic | ICD-10-CM

## 2018-04-18 DIAGNOSIS — E03.9 HYPOTHYROIDISM, UNSPECIFIED TYPE: Chronic | ICD-10-CM

## 2018-04-18 DIAGNOSIS — E11.40 TYPE 2 DIABETES MELLITUS WITH DIABETIC NEUROPATHY, WITHOUT LONG-TERM CURRENT USE OF INSULIN (H): Primary | Chronic | ICD-10-CM

## 2018-04-18 DIAGNOSIS — I48.91 ATRIAL FIBRILLATION, UNSPECIFIED TYPE (H): Chronic | ICD-10-CM

## 2018-04-18 NOTE — PROGRESS NOTES
Lab still needs orders signed for patient's lab appointment tomorrow, 04.24.18.  Thank you.    [facility]            .Please place or confirm pending lab orders for upcoming lab appointment on 04/24/18.  Thanks,  Dorene

## 2018-04-24 DIAGNOSIS — I48.91 ATRIAL FIBRILLATION, UNSPECIFIED TYPE (H): Chronic | ICD-10-CM

## 2018-04-24 DIAGNOSIS — E11.40 TYPE 2 DIABETES MELLITUS WITH DIABETIC NEUROPATHY, WITHOUT LONG-TERM CURRENT USE OF INSULIN (H): Chronic | ICD-10-CM

## 2018-04-24 DIAGNOSIS — E03.9 HYPOTHYROIDISM, UNSPECIFIED TYPE: Chronic | ICD-10-CM

## 2018-04-24 LAB
CHOLEST SERPL-MCNC: 105 MG/DL
ERYTHROCYTE [DISTWIDTH] IN BLOOD BY AUTOMATED COUNT: 13.2 % (ref 10–15)
HCT VFR BLD AUTO: 40.5 % (ref 40–53)
HDLC SERPL-MCNC: 37 MG/DL
HGB BLD-MCNC: 13.8 G/DL (ref 13.3–17.7)
LDLC SERPL CALC-MCNC: 53 MG/DL
MCH RBC QN AUTO: 30 PG (ref 26.5–33)
MCHC RBC AUTO-ENTMCNC: 34.1 G/DL (ref 31.5–36.5)
MCV RBC AUTO: 88 FL (ref 78–100)
NONHDLC SERPL-MCNC: 68 MG/DL
PLATELET # BLD AUTO: 250 10E9/L (ref 150–450)
RBC # BLD AUTO: 4.6 10E12/L (ref 4.4–5.9)
TRIGL SERPL-MCNC: 76 MG/DL
TSH SERPL DL<=0.005 MIU/L-ACNC: 2.82 MU/L (ref 0.4–4)
WBC # BLD AUTO: 10.1 10E9/L (ref 4–11)

## 2018-04-24 PROCEDURE — 36415 COLL VENOUS BLD VENIPUNCTURE: CPT | Performed by: INTERNAL MEDICINE

## 2018-04-24 PROCEDURE — 84443 ASSAY THYROID STIM HORMONE: CPT | Performed by: INTERNAL MEDICINE

## 2018-04-24 PROCEDURE — 85027 COMPLETE CBC AUTOMATED: CPT | Performed by: INTERNAL MEDICINE

## 2018-04-24 PROCEDURE — 80061 LIPID PANEL: CPT | Performed by: INTERNAL MEDICINE

## 2018-04-25 DIAGNOSIS — E78.5 HYPERLIPIDEMIA, UNSPECIFIED HYPERLIPIDEMIA TYPE: ICD-10-CM

## 2018-04-26 RX ORDER — SIMVASTATIN 20 MG
20 TABLET ORAL AT BEDTIME
Qty: 30 TABLET | Refills: 0 | Status: SHIPPED | OUTPATIENT
Start: 2018-04-26 | End: 2018-05-24

## 2018-04-26 NOTE — PROGRESS NOTES
Plan to discuss at upcoming visit. (labs overall within normal limits-lipids OK on simvatatin).         Dr Gwen Saldana MD.

## 2018-05-14 DIAGNOSIS — E11.40 TYPE 2 DIABETES MELLITUS WITH DIABETIC NEUROPATHY, WITHOUT LONG-TERM CURRENT USE OF INSULIN (H): ICD-10-CM

## 2018-05-15 RX ORDER — INSULIN DEGLUDEC 100 U/ML
INJECTION, SOLUTION SUBCUTANEOUS
Qty: 15 ML | Refills: 0 | Status: SHIPPED | OUTPATIENT
Start: 2018-05-15 | End: 2018-06-07

## 2018-05-18 ENCOUNTER — TELEPHONE (OUTPATIENT)
Dept: INTERNAL MEDICINE | Facility: CLINIC | Age: 58
End: 2018-05-18

## 2018-05-18 NOTE — TELEPHONE ENCOUNTER
Panel Management Review      Patient has the following on his problem list:     Diabetes    ASA: Passed    Last A1C  Lab Results   Component Value Date    A1C 8.1 04/09/2018    A1C 7.3 08/11/2017    A1C 6.6 03/22/2017     A1C tested: FAILED    Last LDL:    Lab Results   Component Value Date    CHOL 105 04/24/2018     Lab Results   Component Value Date    HDL 37 04/24/2018     Lab Results   Component Value Date    LDL 53 04/24/2018     Lab Results   Component Value Date    TRIG 76 04/24/2018     No results found for: CHOLHDLRATIO  Lab Results   Component Value Date    NHDL 68 04/24/2018       Is the patient on a Statin? YES             Is the patient on Aspirin? YES    Medications     HMG CoA Reductase Inhibitors    simvastatin (ZOCOR) 20 MG tablet    Salicylates    aspirin 81 MG tablet          Last three blood pressure readings:  BP Readings from Last 3 Encounters:   04/13/18 115/74   03/09/18 112/77   02/09/18 120/80       Date of last diabetes office visit: 2/9/2018   Tobacco History:     History   Smoking Status     Never Smoker   Smokeless Tobacco     Current User     Types: Snuff         Hypertension   Last three blood pressure readings:  BP Readings from Last 3 Encounters:   04/13/18 115/74   03/09/18 112/77   02/09/18 120/80     Blood pressure: Passed    HTN Guidelines:  Age 18-59 BP range:  Less than 140/90  Age 60-85 with Diabetes:  Less than 140/90  Age 60-85 without Diabetes:  less than 150/90      Composite cancer screening  Chart review shows that this patient is due/due soon for the following None  Summary:    Patient is due/failing the following:   A1C    Action needed:   Patient needs office visit for diabetic check.    Type of outreach:    Sent All[employer] message.    Questions for provider review:    None                                                                                                                                    Shonda Pruett CMA

## 2018-05-24 DIAGNOSIS — E78.5 HYPERLIPIDEMIA, UNSPECIFIED HYPERLIPIDEMIA TYPE: ICD-10-CM

## 2018-05-24 RX ORDER — SIMVASTATIN 20 MG
TABLET ORAL
Qty: 30 TABLET | Refills: 11 | Status: SHIPPED | OUTPATIENT
Start: 2018-05-24 | End: 2019-06-06

## 2018-05-30 NOTE — PROGRESS NOTES
SUBJECTIVE:   Rhett Elizabeth is a 57 year old male who presents to clinic today for the following health issues:      Diabetes Follow-up    Patient is checking blood sugars: twice daily.    Blood sugar testing frequency justification: schedule  Results are as follows:         am - when get home from overnight shift and when he gets done at night from work.    Diabetic concerns: None     Symptoms of hypoglycemia (low blood sugar): none     Paresthesias (numbness or burning in feet) or sores: No     Date of last diabetic eye exam: with last year.    BP Readings from Last 2 Encounters:   05/31/18 113/76   04/13/18 115/74     Hemoglobin A1C (%)   Date Value   04/09/2018 8.1 (H)   08/11/2017 7.3 (H)     LDL Cholesterol Calculated (mg/dL)   Date Value   04/24/2018 53   03/22/2017 52       Amount of exercise or physical activity: not nearly enough    Problems taking medications regularly: No    Medication side effects: none    Diet: regular (no restrictions)    He got a new glucometer -he didn't bring it with him, but the sugars are 100-135, rarely do they go up to 140.     Patient has been very tired.   Wife passed away unexpectedly on May 2nd 2018 from post surgical, massive PE.  Patient has been getting back to taking care of himself now, post the acute mourning period.  He has been eating comfort food these past few weeks, but would like to start keeping a more diabetes-friendly diet now.   He has good support at home.  He is feeling tired all the time. As noted above, social stressor of wife's unexpected death a few weeks ago.  CPAP machine recently checked, a few weeks ago, and is good.  History of neuropathy of BLE. This is d/t DM. He follows with Neurology for this.         The ASCVD Risk score (Lottie DYLAN Jr, et al., 2013) failed to calculate for the following reasons:    The valid total cholesterol range is 130 to 320 mg/dL        Reviewed and updated as needed this visit by clinical staff  Tobacco  Allergies   Meds  Problems       Reviewed and updated as needed this visit by Provider  Meds  Problems           Patient Active Problem List   Diagnosis     Neuropathic pain     Benign essential hypertension     Atrial fibrillation, unspecified type (H)     LANEY (obstructive sleep apnea)     Well controlled diabetes mellitus (H)     Type 2 diabetes mellitus with diabetic neuropathy, without long-term current use of insulin (H)     Chronic systolic congestive heart failure (H)     Hypothyroidism     Drusen of macula, left     Dry eyes     Left foot pain     Foot fracture, left, with routine healing, subsequent encounter     Gait abnormality     Morbid obesity (H)     S/P ICD (internal cardiac defibrillator) procedure     Recent bereavement       Past Medical History:   Diagnosis Date     Diabetes (H)      Hypertension      Macular degeneration      Type I or II open fracture of distal end of right tibia with routine healing, unspecified fracture morphology, subsequent encounter 1/15/2017       Past Surgical History:   Procedure Laterality Date     STRABISMUS SURGERY         Family History   Problem Relation Age of Onset     Glaucoma No family hx of      Macular Degeneration No family hx of        Social History   Substance Use Topics     Smoking status: Never Smoker     Smokeless tobacco: Current User     Types: Snuff     Alcohol use No       Current Outpatient Prescriptions   Medication     aspirin 81 MG tablet     blood glucose monitoring (ACCU-CHEK FASTCLIX) lancets     blood glucose monitoring (ACCU-CHEK GUIDE) test strip     blood glucose monitoring (ACCU-CHEK MULTICLIX) lancets     Cholecalciferol (VITAMIN D-3 PO)     DULoxetine (CYMBALTA) 60 MG EC capsule     Glucose Blood (ACCU-CHEK MANUEL PLUS VI)     insulin degludec (TRESIBA) 100 UNIT/ML pen     levothyroxine (SYNTHROID/LEVOTHROID) 100 MCG tablet     losartan (COZAAR) 25 MG tablet     Lutein 20 MG CAPS     metFORMIN (GLUCOPHAGE) 500 MG tablet     Metoprolol  "Succinate (TOPROL XL PO)     Multiple Vitamin (MULTIVITAMINS PO)     nortriptyline (PAMELOR) 25 MG capsule     Nutritional Supplements (ENSURE COMPLETE PO)     Omega-3 Fatty Acids (OMEGA-3 FISH OIL PO)     potassium chloride (MICRO-K) 10 MEQ CR capsule     PREGABALIN PO     rivaroxaban ANTICOAGULANT (XARELTO) 20 MG TABS tablet     simvastatin (ZOCOR) 20 MG tablet     spironolactone (ALDACTONE) 25 MG tablet     torsemide (DEMADEX) 20 MG tablet     traMADol (ULTRAM) 50 MG tablet     TRESIBA FLEXTOUCH 100 UNIT/ML pen     UNABLE TO FIND     No current facility-administered medications for this visit.          ROS:  Constitutional, HEENT, cardiovascular, pulmonary, GI, , musculoskeletal, neuro, skin, endocrine and psych systems are negative, except as otherwise noted.     OBJECTIVE:                                                    /76  Pulse 96  Ht 6' 3\" (1.905 m)  Wt (!) 331 lb (150.1 kg)  BMI 41.37 kg/m2     GENERAL APPEARANCE: healthy, alert and in no distress  EYES: Eyes grossly normal to inspection, and conjunctivae and sclerae normal  HENT: head normocephalic and atraumatic and mouth without ulcers or lesions, oropharynx clear and oral mucous membranes moist  NECK: no noticeable adenopathy, no asymmetry, masses, or scars   RESP: lungs clear to auscultation - no rales, rhonchi or wheezes  CV: regular rate and rhythm, normal S1 S2, no S3 or S4, no murmur, click or rub, no peripheral edema and peripheral pulses strong  ABDOMEN: soft, nontender, no hepatosplenomegaly, no masses and bowel sounds normal  MS: no musculoskeletal defects are noted and gait is age appropriate without ataxia  SKIN: no suspicious lesions or rashes  FOOT: microfilament could not be felt anywhere on the plantar surfaces of the feet, as per baseline. DP and TA pulses within normal limits. No noted wounds.   NEURO: mentation intact and speech normal  PSYCH: mentation appears normal and affect normal/bright.    Results for orders " placed or performed in visit on 05/31/18   Comprehensive metabolic panel   Result Value Ref Range    Sodium 135 133 - 144 mmol/L    Potassium 4.0 3.4 - 5.3 mmol/L    Chloride 96 94 - 109 mmol/L    Carbon Dioxide 30 20 - 32 mmol/L    Anion Gap 9 3 - 14 mmol/L    Glucose 136 (H) 70 - 99 mg/dL    Urea Nitrogen 16 7 - 30 mg/dL    Creatinine 1.12 0.66 - 1.25 mg/dL    GFR Estimate 68 >60 mL/min/1.7m2    GFR Estimate If Black 82 >60 mL/min/1.7m2    Calcium 9.2 8.5 - 10.1 mg/dL    Bilirubin Total 0.7 0.2 - 1.3 mg/dL    Albumin 3.6 3.4 - 5.0 g/dL    Protein Total 8.0 6.8 - 8.8 g/dL    Alkaline Phosphatase 141 40 - 150 U/L    ALT 25 0 - 70 U/L    AST 34 0 - 45 U/L   CBC with platelets   Result Value Ref Range    WBC 10.3 4.0 - 11.0 10e9/L    RBC Count 4.49 4.4 - 5.9 10e12/L    Hemoglobin 13.7 13.3 - 17.7 g/dL    Hematocrit 40.0 40.0 - 53.0 %    MCV 89 78 - 100 fl    MCH 30.5 26.5 - 33.0 pg    MCHC 34.3 31.5 - 36.5 g/dL    RDW 13.2 10.0 - 15.0 %    Platelet Count 226 150 - 450 10e9/L   Folate   Result Value Ref Range    Folate 19.8 >5.4 ng/mL   Vitamin B12   Result Value Ref Range    Vitamin B12 1151 (H) 193 - 986 pg/mL   Vitamin D Deficiency   Result Value Ref Range    Vitamin D Deficiency screening 40 20 - 75 ug/L   TSH with free T4 reflex   Result Value Ref Range    TSH 0.92 0.40 - 4.00 mU/L   Ferritin   Result Value Ref Range    Ferritin 102 26 - 388 ng/mL       No results found for this or any previous visit (from the past 744 hour(s)).      ASSESSMENT/PLAN:                                                        ICD-10-CM    1. Type 2 diabetes mellitus with diabetic neuropathy, without long-term current use of insulin (H) E11.40 Hemoglobin A1c     Basic metabolic panel   2. Well controlled diabetes mellitus (H) E11.9    3. Screening for HIV (human immunodeficiency virus) Z11.4    4. Screening for diabetic peripheral neuropathy Z13.89 FOOT EXAM  NO CHARGE [33775.114]   5. Fatigue, unspecified type R53.83 Comprehensive  metabolic panel     CBC with platelets     Folate     Vitamin B12     Vitamin D Deficiency     TSH with free T4 reflex     Ferritin   6. Recent bereavement Z63.4      5: fatigue; labs within normal limits. Recent CPAP check within normal limits; the fatigue is most c/w bereavement. PLAN: see results.  Other issues: ASSESSMENT: stable  PLAN: continue current regimen-regarding DM-see plan below.     Patient Instructions   [facility] and we will provide further recommendations regarding your fatigue.     Please complete a non-fasting lab test in 5-6 weeks, and then we will advise you further regarding your Diabetes (including the follow-up appointment, etc).        Gwen Saldana MD    [facility]  [address] [facility] [place] [address] [phone]

## 2018-05-31 ENCOUNTER — OFFICE VISIT (OUTPATIENT)
Dept: INTERNAL MEDICINE | Facility: CLINIC | Age: 58
End: 2018-05-31
Payer: COMMERCIAL

## 2018-05-31 VITALS
SYSTOLIC BLOOD PRESSURE: 113 MMHG | DIASTOLIC BLOOD PRESSURE: 76 MMHG | HEART RATE: 96 BPM | BODY MASS INDEX: 39.17 KG/M2 | WEIGHT: 315 LBS | HEIGHT: 75 IN

## 2018-05-31 DIAGNOSIS — E11.40 TYPE 2 DIABETES MELLITUS WITH DIABETIC NEUROPATHY, WITHOUT LONG-TERM CURRENT USE OF INSULIN (H): Primary | Chronic | ICD-10-CM

## 2018-05-31 DIAGNOSIS — Z63.4 RECENT BEREAVEMENT: ICD-10-CM

## 2018-05-31 DIAGNOSIS — Z13.89 SCREENING FOR DIABETIC PERIPHERAL NEUROPATHY: ICD-10-CM

## 2018-05-31 DIAGNOSIS — E11.9 WELL CONTROLLED DIABETES MELLITUS (H): ICD-10-CM

## 2018-05-31 DIAGNOSIS — Z11.4 SCREENING FOR HIV (HUMAN IMMUNODEFICIENCY VIRUS): ICD-10-CM

## 2018-05-31 DIAGNOSIS — R53.83 FATIGUE, UNSPECIFIED TYPE: ICD-10-CM

## 2018-05-31 LAB
ALBUMIN SERPL-MCNC: 3.6 G/DL (ref 3.4–5)
ALP SERPL-CCNC: 141 U/L (ref 40–150)
ALT SERPL W P-5'-P-CCNC: 25 U/L (ref 0–70)
ANION GAP SERPL CALCULATED.3IONS-SCNC: 9 MMOL/L (ref 3–14)
AST SERPL W P-5'-P-CCNC: 34 U/L (ref 0–45)
BILIRUB SERPL-MCNC: 0.7 MG/DL (ref 0.2–1.3)
BUN SERPL-MCNC: 16 MG/DL (ref 7–30)
CALCIUM SERPL-MCNC: 9.2 MG/DL (ref 8.5–10.1)
CHLORIDE SERPL-SCNC: 96 MMOL/L (ref 94–109)
CO2 SERPL-SCNC: 30 MMOL/L (ref 20–32)
CREAT SERPL-MCNC: 1.12 MG/DL (ref 0.66–1.25)
DEPRECATED CALCIDIOL+CALCIFEROL SERPL-MC: 40 UG/L (ref 20–75)
ERYTHROCYTE [DISTWIDTH] IN BLOOD BY AUTOMATED COUNT: 13.2 % (ref 10–15)
FERRITIN SERPL-MCNC: 102 NG/ML (ref 26–388)
FOLATE SERPL-MCNC: 19.8 NG/ML
GFR SERPL CREATININE-BSD FRML MDRD: 68 ML/MIN/1.7M2
GLUCOSE SERPL-MCNC: 136 MG/DL (ref 70–99)
HCT VFR BLD AUTO: 40 % (ref 40–53)
HGB BLD-MCNC: 13.7 G/DL (ref 13.3–17.7)
MCH RBC QN AUTO: 30.5 PG (ref 26.5–33)
MCHC RBC AUTO-ENTMCNC: 34.3 G/DL (ref 31.5–36.5)
MCV RBC AUTO: 89 FL (ref 78–100)
PLATELET # BLD AUTO: 226 10E9/L (ref 150–450)
POTASSIUM SERPL-SCNC: 4 MMOL/L (ref 3.4–5.3)
PROT SERPL-MCNC: 8 G/DL (ref 6.8–8.8)
RBC # BLD AUTO: 4.49 10E12/L (ref 4.4–5.9)
SODIUM SERPL-SCNC: 135 MMOL/L (ref 133–144)
TSH SERPL DL<=0.005 MIU/L-ACNC: 0.92 MU/L (ref 0.4–4)
VIT B12 SERPL-MCNC: 1151 PG/ML (ref 193–986)
WBC # BLD AUTO: 10.3 10E9/L (ref 4–11)

## 2018-05-31 PROCEDURE — 80053 COMPREHEN METABOLIC PANEL: CPT | Performed by: INTERNAL MEDICINE

## 2018-05-31 PROCEDURE — 36415 COLL VENOUS BLD VENIPUNCTURE: CPT | Performed by: INTERNAL MEDICINE

## 2018-05-31 PROCEDURE — 82728 ASSAY OF FERRITIN: CPT | Performed by: INTERNAL MEDICINE

## 2018-05-31 PROCEDURE — 82746 ASSAY OF FOLIC ACID SERUM: CPT | Performed by: INTERNAL MEDICINE

## 2018-05-31 PROCEDURE — 99207 C FOOT EXAM  NO CHARGE: CPT | Mod: 25 | Performed by: INTERNAL MEDICINE

## 2018-05-31 PROCEDURE — 84443 ASSAY THYROID STIM HORMONE: CPT | Performed by: INTERNAL MEDICINE

## 2018-05-31 PROCEDURE — 85027 COMPLETE CBC AUTOMATED: CPT | Performed by: INTERNAL MEDICINE

## 2018-05-31 PROCEDURE — 82607 VITAMIN B-12: CPT | Performed by: INTERNAL MEDICINE

## 2018-05-31 PROCEDURE — 99214 OFFICE O/P EST MOD 30 MIN: CPT | Performed by: INTERNAL MEDICINE

## 2018-05-31 PROCEDURE — 82306 VITAMIN D 25 HYDROXY: CPT | Performed by: INTERNAL MEDICINE

## 2018-05-31 SDOH — SOCIAL STABILITY - SOCIAL INSECURITY: DISSAPEARANCE AND DEATH OF FAMILY MEMBER: Z63.4

## 2018-05-31 NOTE — PATIENT INSTRUCTIONS
We will let you know your test results by Edu and we will provide further recommendations regarding your fatigue.     Please complete a non-fasting lab test in 5-6 weeks, and then we will advise you further regarding your Diabetes (including the follow-up appointment, etc).

## 2018-05-31 NOTE — MR AVS SNAPSHOT
After Visit Summary   5/31/2018    Rhett Elizabeth    MRN: [id number]           Patient Information     Date Of Birth          1960        Visit Information        Provider Department      5/31/2018 8:30 AM Gwen Saldana MD [facility]        Today's Diagnoses     Type 2 diabetes mellitus with diabetic neuropathy, without long-term current use of insulin (H)    -  1    Screening for HIV (human immunodeficiency virus)        Screening for diabetic peripheral neuropathy        Fatigue, unspecified type          Care Instructions    We will let you know your test results by [facility] and we will provide further recommendations regarding your fatigue.     Please complete a non-fasting lab test in 5-6 weeks, and then we will advise you further regarding your Diabetes (including the follow-up appointment, etc).            Follow-ups after your visit        Your next 10 appointments already scheduled     Jun 07, 2018  8:30 AM CDT   Diabetic Education with AN DIABETIC ED RESOURCE   [facility] ([facility])    [address] [address] [phone]              Future tests that were ordered for you today     Open Future Orders        Priority Expected Expires Ordered    Hemoglobin A1c Routine 7/14/2018 5/31/2019 5/31/2018    Basic metabolic panel Routine 7/14/2018 5/31/2019 5/31/2018            Who to contact     If you have questions or need follow up information about today's clinic visit or your schedule please contact [facility] directly at [phone].  Normal or non-critical lab and imaging results will be communicated to you by MyChart, letter or phone within 4 business days after the clinic has received the results. If you do not hear from us within 7 days, please contact the clinic through MyChart or phone. If you have a critical or abnormal lab result, we will notify you by phone as soon as  "possible.  Submit refill requests through [employer] or call your pharmacy and they will forward the refill request to us. Please allow 3 business days for your refill to be completed.          Additional Information About Your Visit        [employer] Information     [employer] gives you secure access to your electronic health record. If you see a primary care provider, you can also send messages to your care team and make appointments. If you have questions, please call your primary care clinic.  If you do not have a primary care provider, please call [phone] and they will assist you.        Care EveryWhere ID     This is your Care EveryWhere ID. This could be used by other organizations to access your [facility] medical records  [id number]        Your Vitals Were     Pulse Height BMI (Body Mass Index)             96 6' 3\" (1.905 m) 41.37 kg/m2          Blood Pressure from Last 3 Encounters:   05/31/18 113/76   04/13/18 115/74   03/09/18 112/77    Weight from Last 3 Encounters:   05/31/18 (!) 331 lb (150.1 kg)   04/13/18 330 lb (149.7 kg)   03/09/18 (!) 330 lb (149.7 kg)              We Performed the Following     CBC with platelets     Comprehensive metabolic panel     Ferritin     Folate     FOOT EXAM  NO CHARGE [06134.114]     TSH with free T4 reflex     Vitamin B12     Vitamin D Deficiency        Primary Care Provider Office Phone # Fax #    Gwen Saldana [phone] [address] [facility] [address]        Equal Access to Services     HANNAH GARCÍA : Hadii aad ku hadasho Soomaali, waaxda luqadaha, qaybta kaalmada hanselyakelly, [facility] . So [facility] [phone].    ATENCIÓN: Si habla español, tiene a tsang disposición servicios gratuitos de asistencia lingüística. Llame al [phone].    We comply with applicable federal civil rights laws and [place] laws. We do not discriminate on the basis of race, color, national origin, age, disability, sex, " sexual orientation, or gender identity.            Thank you!     Thank you for choosing [facility]. Our goal is always to provide you with excellent care. Hearing back from our patients is one way we can continue to improve our services. Please take a few minutes to complete the written survey that you may receive in the mail after your visit with us. Thank you!             Your Updated Medication List - Protect others around you: Learn how to safely use, store and throw away your medicines at www.disposemymeds.org.          This list is accurate as of 5/31/18  9:08 AM.  Always use your most recent med list.                   Brand Name Dispense Instructions for use Diagnosis    * ACCU-CHEK MANUEL PLUS VI           * blood glucose monitoring test strip    ACCU-CHEK GUIDE    100 strip    Use to test blood sugar 2 times daily or as directed.    Type 2 diabetes mellitus with diabetic neuropathy, without long-term current use of insulin (H)       aspirin 81 MG tablet      Take by mouth daily Reported on 5/5/2017        * blood glucose monitoring lancets      1 each by In Vitro route Use to test blood sugaras directed.        * blood glucose monitoring lancets     102 each    Use to test blood sugar 2 times daily or as directed.  Ok to substitute alternative if insurance prefers.    Type 2 diabetes mellitus with diabetic neuropathy, without long-term current use of insulin (H)       DULoxetine 60 MG EC capsule    CYMBALTA     Take by mouth daily        ENSURE COMPLETE PO           * insulin degludec 100 UNIT/ML pen    TRESIBA    15 mL    Inject 14 Units Subcutaneous every morning    Type 2 diabetes mellitus with diabetic neuropathy, without long-term current use of insulin (H)       * TRESIBA FLEXTOUCH 100 UNIT/ML pen   Generic drug:  insulin degludec     15 mL    INJECT 10 UNITS SUBCUTANEOUS AT BEDTIME    Type 2 diabetes mellitus with diabetic neuropathy, without long-term current use of  insulin (H)       levothyroxine 100 MCG tablet    SYNTHROID/LEVOTHROID    90 tablet    Take 1 tablet (100 mcg) by mouth daily    Hypothyroidism, unspecified type       losartan 25 MG tablet    COZAAR     Take 25 mg by mouth daily        Lutein 20 MG Caps      Take 20 mg by mouth daily 8/11/2017- patient states he takes 1 20 mg tablet daily Patient states he takes 150 mg tablet twice a day.        metFORMIN 500 MG tablet    GLUCOPHAGE    180 tablet    TAKE 1 TABLET (500 MG) BY MOUTH 2 TIMES DAILY (WITH MEALS)    Type 2 diabetes mellitus with diabetic neuropathy, without long-term current use of insulin (H)       MULTIVITAMINS PO      Reported on 5/22/2017        nortriptyline 25 MG capsule    PAMELOR     1 CAPSULE DAILY BY MOUTH        OMEGA-3 FISH OIL PO      Take 1 g by mouth Reported on 5/5/2017        potassium chloride 10 MEQ CR capsule    MICRO-K     TAKE 2 TABLETS (20 MEQ) BY MOUTH TWICE A DAY.        PREGABALIN PO      Take 300 mg by mouth 2 times daily Reported on 5/16/2017        rivaroxaban ANTICOAGULANT 20 MG Tabs tablet    XARELTO     Take 20 mg by mouth daily (with dinner) Reported on 5/5/2017        simvastatin 20 MG tablet    ZOCOR    30 tablet    TAKE 1 TABLET (20 MG) BY MOUTH AT BEDTIME    Hyperlipidemia, unspecified hyperlipidemia type       spironolactone 25 MG tablet    ALDACTONE          TOPROL XL PO      Take 50 mg by mouth daily        torsemide 20 MG tablet    DEMADEX     Take 20 mg by mouth daily        traMADol 50 MG tablet    ULTRAM     Take 50 mg by mouth        UNABLE TO FIND      cpap        VITAMIN D-3 PO      Take 1,000 Units by mouth daily        * Notice:  This list has 6 medication(s) that are the same as other medications prescribed for you. Read the directions carefully, and ask your doctor or other care provider to review them with you.

## 2018-06-02 NOTE — PROGRESS NOTES
Dear Rhett,     Your test results are attached.    Your blood tests are overall within normal limits and do not provide a reason for your fatigue.  I believe that your fatigue is most likely due to the stress of a normal bereavement process.   Please let me know if your fatigue still persists in 2 more months or if it becomes difficult to complete your tasks at any point, as a result of it.     Please notify me via [employer] or contact the clinic at [phone] if you have any questions.    Gwen Saldana MD

## 2018-06-03 PROBLEM — Z63.4 RECENT BEREAVEMENT: Status: ACTIVE | Noted: 2018-06-03

## 2018-06-07 ENCOUNTER — ALLIED HEALTH/NURSE VISIT (OUTPATIENT)
Dept: EDUCATION SERVICES | Facility: CLINIC | Age: 58
End: 2018-06-07
Payer: COMMERCIAL

## 2018-06-07 DIAGNOSIS — E11.40 TYPE 2 DIABETES MELLITUS WITH DIABETIC NEUROPATHY, WITHOUT LONG-TERM CURRENT USE OF INSULIN (H): Primary | Chronic | ICD-10-CM

## 2018-06-07 DIAGNOSIS — E11.9 DIABETES MELLITUS WITHOUT COMPLICATION (H): ICD-10-CM

## 2018-06-07 DIAGNOSIS — R53.83 FATIGUE, UNSPECIFIED TYPE: Primary | ICD-10-CM

## 2018-06-07 PROCEDURE — G0108 DIAB MANAGE TRN  PER INDIV: HCPCS

## 2018-06-07 PROCEDURE — 99207 ZZC DROP WITH A PROCEDURE: CPT

## 2018-06-07 RX ORDER — LANCETS 33 GAUGE
1 EACH MISCELLANEOUS 3 TIMES DAILY
Qty: 100 EACH | Refills: 11 | Status: SHIPPED | OUTPATIENT
Start: 2018-06-07 | End: 2018-12-06

## 2018-06-07 RX ORDER — LIRAGLUTIDE 6 MG/ML
1.2 INJECTION SUBCUTANEOUS DAILY
Qty: 18 ML | Refills: 3 | Status: SHIPPED | OUTPATIENT
Start: 2018-06-07 | End: 2018-11-19

## 2018-06-07 RX ORDER — METFORMIN HCL 500 MG
1000 TABLET, EXTENDED RELEASE 24 HR ORAL 2 TIMES DAILY WITH MEALS
Qty: 360 TABLET | Refills: 1 | Status: SHIPPED | OUTPATIENT
Start: 2018-06-07 | End: 2018-09-27

## 2018-06-07 NOTE — PROGRESS NOTES
Diabetes Self Management Training: Follow-up Visit    Rhett Elizabeth presents today for education, evaluation of glucose control and modification of medication(s) Type 2 diabetes.    He is accompanied by self    Patient's diabetes management related comments/concerns: numbers are still a bit high, wife recently passed due to blood clot after surgery.      Patient would like this visit to be focused around the following diabetes-related behaviors and goals: get more info with meal plan.     ASSESSMENT:  Patient Problem List reviewed for relevant medical history and current medical status.    Current Diabetes Management per Patient:  Taking diabetes medications?   yes:     Diabetes Medication(s)     Biguanides Sig    metFORMIN (GLUCOPHAGE) 500 MG tablet TAKE 1 TABLET (500 MG) BY MOUTH 2 TIMES DAILY (WITH MEALS)    Insulin Sig    insulin degludec (TRESIBA) 100 UNIT/ML pen Inject 14 Units Subcutaneous every morning    TRESIBA FLEXTOUCH 100 UNIT/ML pen INJECT 10 UNITS SUBCUTANEOUS AT BEDTIME      , Oral Medications: Metformin - Dose: 500 mg , Time: breakfast and supper, will increase this to 2000 mg daily and switch to using ER form. Injectable Medications: Tresiba 14-0-0-0    Do you have any difficulty affording your medications or glucose monitoring supplies?  No      *Abbreviated insulin dose documentation key: Insulin trade name ([id number]) - i.e. Humalog 5-5-5-0 (Humalog 5 units at breakfast, 5 units at lunch, and 5 units at dinner).    Patient glucose self monitoring as follows: two times daily.   BG meter: One Touch Verio IQ meter  BG results: see blood glucose log attached to this encounter       BG values are: Not in goal  Patient's most recent   Lab Results   Component Value Date    A1C 8.1 04/09/2018    is not meeting goal of <7.0    Nutrition:  Patient currently has an inconsistent intake of carbohydrates    Breakfast - spinach salads, tomatoes and blue cheese. Will grill pork chops  "or steak, or chicken .   Lunch - peanut butter crackers at night shift, and celery and carrots.  Apples or bananas.   Dinner - ensure shake with ice and banana and granola bar and apple.   Snacks - apples.    He was eating foods that everyone brought for him, and his daughter is cooking for him as well.      Beverages: water all day.  Some apple juice and V-8.      Cultural/Hindu diet restrictions: No     Biggest Challenge to Healthy Eating: none    Physical Activity:    Type: will be starting to walk some , this has been difficult.      Diabetes Complications:  Not discussed today.    Recent health service and resource utilization related to diabetes (hyperglycemia, hypoglycemia, etc.):  None    Vitals:  There were no vitals taken for this visit.  Estimated body mass index is 41.37 kg/(m^2) as calculated from the following:    Height as of 5/31/18: 1.905 m (6' 3\").    Weight as of 5/31/18: 150.1 kg (331 lb).   Last 3 BP:   BP Readings from Last 3 Encounters:   05/31/18 113/76   04/13/18 115/74   03/09/18 112/77       History   Smoking Status     Never Smoker   Smokeless Tobacco     Current User     Types: Snuff       Labs:  Lab Results   Component Value Date    A1C 8.1 04/09/2018     Lab Results   Component Value Date     05/31/2018     Lab Results   Component Value Date    LDL 53 04/24/2018     HDL Cholesterol   Date Value Ref Range Status   04/24/2018 37 (L) >39 mg/dL Final   ]  GFR Estimate   Date Value Ref Range Status   05/31/2018 68 >60 mL/min/1.7m2 Final     Comment:     Non  GFR Calc     GFR Estimate If Black   Date Value Ref Range Status   05/31/2018 82 >60 mL/min/1.7m2 Final     Comment:      GFR Calc     Lab Results   Component Value Date    CR 1.12 05/31/2018     No results found for: MICROALBUMIN    Health Beliefs and Attitudes:   Patient Activation Measure Survey Score:  No flowsheet data found.    Stage of Change: ACTION (Actively working towards " change)    Progress toward meeting diabetes-related behavioral goals:    GOALS % Met Goal   Healthy Eating  76, lost his wife and has been eating the foods neighbors have brought over   Physical Activity  25   Monitoring  100   Medication Taking  100   Problem Solving     Healthy Coping     Risk Reduction           Diabetes knowledge and skills assessment:     Patient is knowledgeable in diabetes management concepts related to: Healthy Eating, Being Active, Monitoring and Taking Medication    Patient needs further education on the following diabetes management concepts: Healthy Eating, Being Active, Monitoring, Taking Medication and Healthy Coping    Barriers to Learning Assessment: No Barriers identified    Based on learning assessment above, most appropriate setting for further diabetes education would be: Individual setting.    INTERVENTION:    Education provided today on:  AADE Self-Care Behaviors:  Healthy Eating: carbohydrate counting, consistency in amount, composition, and timing of food intake, weight reduction, heart healthy diet, eating out, portion control, plate planning method and label reading  Being Active: relationship to blood glucose and describe appropriate activity program  Monitoring: purpose, proper technique, log and interpret results, individual blood glucose targets and frequency of monitoring  Taking Medication: action of prescribed medication, drawing up, administering and storing injectable diabetes medications, proper site selection and rotation for injections, side effects of prescribed medications and when to take medications  Healthy Coping: utilize support systems, methods for coping with stress and when to seek professional counseling    Opportunities for ongoing education and support in diabetes-self management were discussed.    Pt verbalized understanding of concepts discussed and recommendations provided today.       Education Materials Provided:  No new materials provided  today    PLAN:  See Patient Instructions for co-developed, patient-stated behavior change goals.  AVS printed and provided to patient today.    FOLLOW-UP:  Follow-up appointment scheduled on July 19.  Education topics to cover at the next diabetes education visit(s): exercises  Chart routed to referring provider.    Ongoing plan for education and support: Support group(s) and Follow-up visit with diabetes educator in [place]    Natasha Kwong RN/WIL Rausch Diabetes Educator    Time Spent: 60 minutes  Encounter Type: Individual    Any diabetes medication dose changes were made via the CDE Protocol and Collaborative Practice Agreement with the patient's primary care provider. A copy of this encounter was shared with the provider.

## 2018-06-07 NOTE — MR AVS SNAPSHOT
After Visit Summary   6/7/2018    Rhett lEizabeth    MRN: [id number]           Patient Information     Date Of Birth          1960        Visit Information        Provider Department      6/7/2018 8:30 AM AN DIABETIC ED RESOURCE [facility]        Today's Diagnoses     Type 2 diabetes mellitus with diabetic neuropathy, without long-term current use of insulin (H)    -  1      Care Instructions    My Diabetes Care Goals:    Being Active: I will look into walking again every day.    Taking Medication: I will Metformin 500 mg take 2 pills with breakfast and 2 pills with dinner    Metformin  mg take 2 pills with breakfast and 2 pills with   dinner.    Tresiba U100 take 14 units daily  Victoza begin 0.6 mg X 7 days, then on day 8 increase to 1.2 mg daily    Follow up:  Follow-up diabetes education appointment scheduled on Thursday July 19 @ 8:30.      Bring blood glucose meter and logbook with you to all doctor and follow-up appointments.     [facility] for the [facility]:  For Your Diabetes Education and Nutrition Appointments Call:  [phone]   For Diabetes Education or Nutrition Related Questions:   Phone: [phone]  E-mail: [email or web address]  Fax: [phone]   If you need a medication refill please contact your pharmacy. Please allow 3 business days for your refills to be completed.    Instructions for emailing the Diabetes Educators    If you need to communicate a non-urgent message to a Diabetes Educator via email, please send to [email or web address].    Please follow the following email guidelines:    Subject line: Secure: your clinic name (example: Secure: [place])  In the email please include: First name, middle initial, last name and date of birth.    We will be in touch with you within one (1) business day.             Follow-ups after your visit        Your next 10 appointments already  scheduled     Jul 11, 2018  7:15 AM CDT   [facility]    [address] [facility] [address] [phone]           Please do not eat 10-12 hours before your appointment if you are coming in fasting for labs on lipids, cholesterol, or glucose (sugar). This does not apply to pregnant women. Water, hot tea and black coffee (with nothing added) are okay. Do not drink other fluids, diet soda or chew gum.            Jul 19, 2018  8:30 AM CDT   Diabetic Education with AN DIABETIC ED RESOURCE   [facility] ([facility])    [address] [address] [phone]              Who to contact     If you have questions or need follow up information about today's clinic visit or your schedule please contact [facility] [place] directly at [phone].  Normal or non-critical lab and imaging results will be communicated to you by [employer], letter or phone within 4 business days after the clinic has received the results. If you do not hear from us within 7 days, please contact the clinic through [employer] or phone. If you have a critical or abnormal lab result, we will notify you by phone as soon as possible.  Submit refill requests through [employer] or call your pharmacy and they will forward the refill request to us. Please allow 3 business days for your refill to be completed.          Additional Information About Your Visit        [employer] Information     [employer] gives you secure access to your electronic health record. If you see a primary care provider, you can also send messages to your care team and make appointments. If you have questions, please call your primary care clinic.  If you do not have a primary care provider, please call [phone] and they will assist you.        Care EveryWhere ID     This is your Care EveryWhere ID. This could be used by other organizations to access your  [place] medical records  [id number]         Blood Pressure from Last 3 Encounters:   05/31/18 113/76   04/13/18 115/74   03/09/18 112/77    Weight from Last 3 Encounters:   05/31/18 (!) 150.1 kg (331 lb)   04/13/18 149.7 kg (330 lb)   03/09/18 (!) 149.7 kg (330 lb)              Today, you had the following     No orders found for display         Today's Medication Changes          These changes are accurate as of 6/7/18  9:27 AM.  If you have any questions, ask your nurse or doctor.               Start taking these medicines.        Dose/Directions    insulin pen needle 32G X 4 MM   Commonly known as:  BD ARNOLD U/F   Used for:  Type 2 diabetes mellitus with diabetic neuropathy, without long-term current use of insulin (H)        Use 2 daily as directed.   Quantity:  200 each   Refills:  11       liraglutide 18 MG/3ML soln   Commonly known as:  VICTOZA   Used for:  Type 2 diabetes mellitus with diabetic neuropathy, without long-term current use of insulin (H)        Dose:  1.2 mg   Inject 1.2 mg Subcutaneous daily   Quantity:  18 mL   Refills:  3       metFORMIN 500 MG 24 hr tablet   Commonly known as:  GLUCOPHAGE-XR   Used for:  Type 2 diabetes mellitus with diabetic neuropathy, without long-term current use of insulin (H)   Replaces:  metFORMIN 500 MG tablet        Dose:  1000 mg   Take 2 tablets (1,000 mg) by mouth 2 times daily (with meals)   Quantity:  360 tablet   Refills:  1         These medicines have changed or have updated prescriptions.        Dose/Directions    blood glucose monitoring test strip   Commonly known as:  ONETOUCH VERIO IQ   This may have changed:    - how to take this  - additional instructions  - Another medication with the same name was removed. Continue taking this medication, and follow the directions you see here.   Used for:  Type 2 diabetes mellitus with diabetic neuropathy, without long-term current use of insulin (H)        Use to test blood sugar 3 times daily or as directed.   Ok to substitute alternative if insurance prefers.   Quantity:  270 strip   Refills:  11       ONETOUCH DELICA LANCETS 33G Misc   This may have changed:    - how much to take  - how to take this  - when to take this  - additional instructions  - Another medication with the same name was removed. Continue taking this medication, and follow the directions you see here.   Used for:  Type 2 diabetes mellitus with diabetic neuropathy, without long-term current use of insulin (H)        Dose:  1 Box   1 Box 3 times daily   Quantity:  100 each   Refills:  11         Stop taking these medicines if you haven't already. Please contact your care team if you have questions.     metFORMIN 500 MG tablet   Commonly known as:  GLUCOPHAGE   Replaced by:  metFORMIN 500 MG 24 hr tablet                Where to get your medicines      These medications were sent to [facility]/pharmacy #[id number] - [place] [id number] [facility],  AT CORNER OF [address], [facility] [address]     Phone:  [phone]     blood glucose monitoring test strip    insulin pen needle 32G X 4 MM    liraglutide 18 MG/3ML soln    metFORMIN 500 MG 24 hr tablet    ONETOUCH DELICA LANCETS 33G [facility]                Primary Care Provider Office Phone # Fax #    Gwen Saldana [phone] [address] [facility] [address]        Equal Access to Services     HANNAH GARCÍA AH: Hadii connie grayson hadasho Soomaali, waaxda luqadaha, qaybta kaalmada adeegyada, halley ortiz haypaul [place]. So [facility] [phone].    ATENCIÓN: Si habla español, tiene a tsang disposición servicios gratuitos de asistencia lingüística. Llame al [phone].    We comply with applicable federal civil rights laws and [place] laws. We do not discriminate on the basis of race, color, national origin, age, disability, sex, sexual orientation, or gender identity.            Thank you!     Thank you for choosing [facility]  [facility]  for your care. Our goal is always to provide you with excellent care. Hearing back from our patients is one way we can continue to improve our services. Please take a few minutes to complete the written survey that you may receive in the mail after your visit with us. Thank you!             Your Updated Medication List - Protect others around you: Learn how to safely use, store and throw away your medicines at www.disposemymeds.org.          This list is accurate as of 6/7/18  9:27 AM.  Always use your most recent med list.                   Brand Name Dispense Instructions for use Diagnosis    aspirin 81 MG tablet      Take by mouth daily Reported on 5/5/2017        blood glucose monitoring test strip    ONETOUCH VERIO IQ    270 strip    Use to test blood sugar 3 times daily or as directed.  Ok to substitute alternative if insurance prefers.    Type 2 diabetes mellitus with diabetic neuropathy, without long-term current use of insulin (H)       DULoxetine 60 MG EC capsule    CYMBALTA     Take by mouth daily        ENSURE COMPLETE PO           insulin degludec 100 UNIT/ML pen    TRESIBA    15 mL    Inject 14 Units Subcutaneous every morning    Type 2 diabetes mellitus with diabetic neuropathy, without long-term current use of insulin (H)       insulin pen needle 32G X 4 MM    BD ARNOLD U/F    200 each    Use 2 daily as directed.    Type 2 diabetes mellitus with diabetic neuropathy, without long-term current use of insulin (H)       levothyroxine 100 MCG tablet    SYNTHROID/LEVOTHROID    90 tablet    Take 1 tablet (100 mcg) by mouth daily    Hypothyroidism, unspecified type       liraglutide 18 MG/3ML soln    VICTOZA    18 mL    Inject 1.2 mg Subcutaneous daily    Type 2 diabetes mellitus with diabetic neuropathy, without long-term current use of insulin (H)       losartan 25 MG tablet    COZAAR     Take 25 mg by mouth daily        Lutein 20 MG Caps      Take 20 mg by mouth daily 8/11/2017- patient  states he takes 1 20 mg tablet daily Patient states he takes 150 mg tablet twice a day.        metFORMIN 500 MG 24 hr tablet    GLUCOPHAGE-XR    360 tablet    Take 2 tablets (1,000 mg) by mouth 2 times daily (with meals)    Type 2 diabetes mellitus with diabetic neuropathy, without long-term current use of insulin (H)       MULTIVITAMINS PO      Reported on 5/22/2017        nortriptyline 25 MG capsule    PAMELOR     1 CAPSULE DAILY BY MOUTH        OMEGA-3 FISH OIL PO      Take 1 g by mouth Reported on 5/5/2017        ONETOUCH DELICA LANCETS 33G Misc     100 each    1 Box 3 times daily    Type 2 diabetes mellitus with diabetic neuropathy, without long-term current use of insulin (H)       potassium chloride 10 MEQ CR capsule    MICRO-K     TAKE 2 TABLETS (20 MEQ) BY MOUTH TWICE A DAY.        PREGABALIN PO      Take 300 mg by mouth 2 times daily Reported on 5/16/2017        rivaroxaban ANTICOAGULANT 20 MG Tabs tablet    XARELTO     Take 20 mg by mouth daily (with dinner) Reported on 5/5/2017        simvastatin 20 MG tablet    ZOCOR    30 tablet    TAKE 1 TABLET (20 MG) BY MOUTH AT BEDTIME    Hyperlipidemia, unspecified hyperlipidemia type       spironolactone 25 MG tablet    ALDACTONE          TOPROL XL PO      Take 50 mg by mouth daily        torsemide 20 MG tablet    DEMADEX     Take 20 mg by mouth daily        traMADol 50 MG tablet    ULTRAM     Take 50 mg by mouth        UNABLE TO FIND      cpap        VITAMIN D-3 PO      Take 1,000 Units by mouth daily

## 2018-06-07 NOTE — PATIENT INSTRUCTIONS
My Diabetes Care Goals:    Being Active: I will look into walking again every day.    Taking Medication: I will Metformin 500 mg take 2 pills with breakfast and 2 pills with dinner    Metformin  mg take 2 pills with breakfast and 2 pills with   dinner.  Tresiba U100 take 14 units daily  Victoza begin 0.6 mg X 7 days, then on day 8 increase to 1.2 mg daily    Follow up:  Follow-up diabetes education appointment scheduled on Thursday July 19 @ 8:30.      Bring blood glucose meter and logbook with you to all doctor and follow-up appointments.     [facility] for the [facility]:  For Your Diabetes Education and Nutrition Appointments Call:  [phone]   For Diabetes Education or Nutrition Related Questions:   Phone: [phone]  E-mail: [email or web address]  Fax: [phone]   If you need a medication refill please contact your pharmacy. Please allow 3 business days for your refills to be completed.    Instructions for emailing the Diabetes Educators    If you need to communicate a non-urgent message to a Diabetes Educator via email, please send to [email or web address].    Please follow the following email guidelines:    Subject line: Secure: your clinic name (example: Secure: [place])  In the email please include: First name, middle initial, last name and date of birth.    We will be in touch with you within one (1) business day.

## 2018-06-18 ENCOUNTER — DOCUMENTATION ONLY (OUTPATIENT)
Dept: LAB | Facility: CLINIC | Age: 58
End: 2018-06-18

## 2018-06-18 DIAGNOSIS — E11.40 TYPE 2 DIABETES MELLITUS WITH DIABETIC NEUROPATHY, WITHOUT LONG-TERM CURRENT USE OF INSULIN (H): Primary | Chronic | ICD-10-CM

## 2018-06-18 NOTE — PROGRESS NOTES
Please place or confirm pending lab orders for upcoming lab appointment on 7/11/18  Thank you,  Li

## 2018-06-28 DIAGNOSIS — E03.9 HYPOTHYROIDISM, UNSPECIFIED TYPE: ICD-10-CM

## 2018-06-28 RX ORDER — LEVOTHYROXINE SODIUM 100 UG/1
TABLET ORAL
Qty: 90 TABLET | Refills: 0 | Status: SHIPPED | OUTPATIENT
Start: 2018-06-28 | End: 2018-09-27

## 2018-07-03 PROBLEM — S92.902D: Status: RESOLVED | Noted: 2017-10-03 | Resolved: 2018-07-03

## 2018-07-03 PROBLEM — M79.672 LEFT FOOT PAIN: Status: RESOLVED | Noted: 2017-10-03 | Resolved: 2018-07-03

## 2018-07-03 PROBLEM — R26.9 GAIT ABNORMALITY: Status: RESOLVED | Noted: 2017-10-03 | Resolved: 2018-07-03

## 2018-07-03 NOTE — PROGRESS NOTES
Subjective:  HPI                    Objective:  System    Physical Exam    General     ROS    Assessment/Plan:    DISCHARGE REPORT    Progress reporting period is as of 10/24/17    SUBJECTIVE  Subjective: Pt reports his foot is doing pretty good today but did notice increased soreness the end of last week after going back to work and doing his new strengthening exercises; he reports he is walking better overall   Current Pain level: 5/10   Initial Pain level: 5/10   Changes in function: No changes noted in function since last SOAP note   Adverse reactions: None;   ,     Patient has failed to return to therapy so current objective findings are unknown.  The subjective and objective information are from the last SOAP note on this patient.    OBJECTIVE  Objective: AROM left ankle DF 20, PF 45, Inv 38, Ever 12      ASSESSMENT/PLAN  Updated problem list and treatment plan: Diagnosis 1:  Left foot fracture   Pain -  home program  Decreased ROM/flexibility - home program  Decreased function - home program  STG/LTGs have been met or progress has been made towards goals:  Yes (See Goal flow sheet completed today.)  Assessment of Progress: The patient has not returned to therapy. Current status is unknown.  Self Management Plans:  Patient has been instructed in a home treatment program.  Patient is independent in a home treatment program.  Patient  has been instructed in self management of symptoms.  Patient is independent in self management of symptoms.  I have re-evaluated this patient and find that the nature, scope, duration and intensity of the therapy is appropriate for the medical condition of the patient.  Rhett continues to require the following intervention to meet STG and LTG's: PT intervention is no longer required to meet STG/LTG.  The patient failed to complete scheduled/ordered appointments so current information is unknown.  We will discharge this patient from PT.    Recommendations:  This patient is ready  to be discharged from therapy and continue their home treatment program.    Please refer to the daily flowsheet for treatment today, total treatment time and time spent performing 1:1 timed codes.

## 2018-07-11 DIAGNOSIS — E11.40 TYPE 2 DIABETES MELLITUS WITH DIABETIC NEUROPATHY, WITHOUT LONG-TERM CURRENT USE OF INSULIN (H): Chronic | ICD-10-CM

## 2018-07-11 DIAGNOSIS — R53.83 FATIGUE, UNSPECIFIED TYPE: ICD-10-CM

## 2018-07-11 LAB
ANION GAP SERPL CALCULATED.3IONS-SCNC: 6 MMOL/L (ref 3–14)
BUN SERPL-MCNC: 18 MG/DL (ref 7–30)
CALCIUM SERPL-MCNC: 9.1 MG/DL (ref 8.5–10.1)
CHLORIDE SERPL-SCNC: 105 MMOL/L (ref 94–109)
CO2 SERPL-SCNC: 27 MMOL/L (ref 20–32)
CREAT SERPL-MCNC: 1.04 MG/DL (ref 0.66–1.25)
GFR SERPL CREATININE-BSD FRML MDRD: 74 ML/MIN/1.7M2
GLUCOSE SERPL-MCNC: 84 MG/DL (ref 70–99)
HBA1C MFR BLD: 6.4 % (ref 0–5.6)
POTASSIUM SERPL-SCNC: 4.2 MMOL/L (ref 3.4–5.3)
SODIUM SERPL-SCNC: 138 MMOL/L (ref 133–144)

## 2018-07-11 PROCEDURE — 84403 ASSAY OF TOTAL TESTOSTERONE: CPT | Performed by: INTERNAL MEDICINE

## 2018-07-11 PROCEDURE — 83036 HEMOGLOBIN GLYCOSYLATED A1C: CPT | Performed by: INTERNAL MEDICINE

## 2018-07-11 PROCEDURE — 36415 COLL VENOUS BLD VENIPUNCTURE: CPT | Performed by: INTERNAL MEDICINE

## 2018-07-11 PROCEDURE — 80048 BASIC METABOLIC PNL TOTAL CA: CPT | Performed by: INTERNAL MEDICINE

## 2018-07-13 ENCOUNTER — TELEPHONE (OUTPATIENT)
Dept: INTERNAL MEDICINE | Facility: CLINIC | Age: 58
End: 2018-07-13

## 2018-07-13 LAB — TESTOST SERPL-MCNC: 210 NG/DL (ref 240–950)

## 2018-07-13 NOTE — LETTER
July 17, 2018    Rhett Elizabeth  [address] [facility] [address]            Dear Rhett,      Your basic metabolic panel and hemoglobin A1C are within normal limits for you.     Your total testosterone level is slightly low. If you are interested in possible testosterone replacement therapy, please start an E-visit.    Thank you,  Gwen Saldana MD/steph     Results for orders placed or performed in visit on 07/11/18   Hemoglobin A1c   Result Value Ref Range    Hemoglobin A1C 6.4 (H) 0 - 5.6 %   Basic metabolic panel   Result Value Ref Range    Sodium 138 133 - 144 mmol/L    Potassium 4.2 3.4 - 5.3 mmol/L    Chloride 105 94 - 109 mmol/L    Carbon Dioxide 27 20 - 32 mmol/L    Anion Gap 6 3 - 14 mmol/L    Glucose 84 70 - 99 mg/dL    Urea Nitrogen 18 7 - 30 mg/dL    Creatinine 1.04 0.66 - 1.25 mg/dL    GFR Estimate 74 >60 mL/min/1.7m2    GFR Estimate If Black 89 >60 mL/min/1.7m2    Calcium 9.1 8.5 - 10.1 mg/dL   Testosterone, total   Result Value Ref Range    Testosterone Total 210 (L) 240 - 950 ng/dL

## 2018-07-14 NOTE — TELEPHONE ENCOUNTER
"Please call and advise the patient as follows, and also send a [employer] message with the same text and attach recent test results [statements which are in bold and in square brackets, like this sentence, is for clinic staff and should not be included in the text of the letter to the patient]:    Dear Rhett,     Your basic metabolic panel and hemoglobin A1C are within normal limits for you.    Your total testosterone level is slightly low. If you are interested in possible testosterone replacement therapy, please start an E-visit.    [if the patient does indicate that he wants to start an evisit, the following questions may be sent to him as part of his evisit: \"in deciding whether a person needs testosterone replacement, we consider repeat lab tests and symptoms. Please let me know if you have experienced the following symptoms and if so, which ones: decreased body pubic hair, decreased morning erections, decreased testicle size, growth in breast tissue].    Thank you,  Gwen Saldana MD      "

## 2018-07-16 NOTE — TELEPHONE ENCOUNTER
[employer] message sent to patient.   See message for details.    Cheryl Varela, CHELLEN RN

## 2018-07-16 NOTE — TELEPHONE ENCOUNTER
Left message on voicemail for patient to call back.     Direct number given: [phone].  Cheryl Varela, CHELLEN, RN

## 2018-07-17 NOTE — TELEPHONE ENCOUNTER
Patient called back and left voicemail giving permission for RN to leave detailed voicemail for him.  Patient works nights and sleeps during the day.    Called patient and left detailed voicemail with providers message below.  Gave him the MyChart Help Desk number also in case he needs to reset his password.  Gave this RN's direct number to call back if questions.     TC, please mail letter with providers message below.  Cheryl CORBETTN, RN

## 2018-07-19 ENCOUNTER — ALLIED HEALTH/NURSE VISIT (OUTPATIENT)
Dept: EDUCATION SERVICES | Facility: CLINIC | Age: 58
End: 2018-07-19
Payer: COMMERCIAL

## 2018-07-19 DIAGNOSIS — E11.9 DIABETES MELLITUS WITHOUT COMPLICATION (H): Primary | ICD-10-CM

## 2018-07-19 PROCEDURE — 99207 ZZC DROP WITH A PROCEDURE: CPT

## 2018-07-19 PROCEDURE — G0108 DIAB MANAGE TRN  PER INDIV: HCPCS

## 2018-07-19 NOTE — PROGRESS NOTES
Diabetes Self Management Training: Follow-up Visit    Rhett Elizabeth presents today for education, evaluation of glucose control and modification of medication(s) Type 2 diabetes.    He is accompanied by self    Patient's diabetes management related comments/concerns: BG are in control    Patient would like this visit to be focused around the following diabetes-related behaviors and goals: work more on the diet.  On Victoza now and tolerating it well.      ASSESSMENT:  Patient Problem List reviewed for relevant medical history and current medical status.    Current Diabetes Management per Patient:  Taking diabetes medications?   yes:     Diabetes Medication(s)     Biguanides Sig    metFORMIN (GLUCOPHAGE-XR) 500 MG 24 hr tablet Take 2 tablets (1,000 mg) by mouth 2 times daily (with meals)    Insulin Sig    insulin degludec (TRESIBA) 100 UNIT/ML pen Inject 14 Units Subcutaneous every morning    Incretin Mimetic Agents (GLP-1 Receptor Agonists) Sig    liraglutide (VICTOZA) 18 MG/3ML soln Inject 1.2 mg Subcutaneous daily      , Oral Medications: Metformin - Dose:  mg takes 1000 mg , Time: breakfast and supper, Injectable Medications: Victoza 1.2mg once daily    Do you have any difficulty affording your medications or glucose monitoring supplies?  No      *Abbreviated insulin dose documentation key: Insulin trade name (qrviorvop-bmnpn-ixpyvw-bedtime) - i.e. Humalog 5-5-5-0 (Humalog 5 units at breakfast, 5 units at lunch, and 5 units at dinner).    Patient glucose self monitoring as follows: two times daily.   BG meter: One Touch Verio IQ meter  BG results: fasting glucose- 100,88,110,89,163,120,95,99,142,     BG values are: In goal  Patient's most recent   Lab Results   Component Value Date    A1C 6.4 07/11/2018    is meeting goal of <7.0    Nutrition:  Patient currently eats 3 meals per day    Breakfast - works nights, will come home and eat cereal, apple, granola bar.  Lunch - gets up at 1900 and eats, salad,  "eggs, and some hash,    Dinner - at work, salad, crackers,  Apples,   Snacks - crackers, celery, and carrots  When has days off will still follow his schedule     Beverages: water    Cultural/Taoist diet restrictions: No     Biggest Challenge to Healthy Eating: knowing what to eat    Physical Activity:    Type: walking at work.     Diabetes Complications:  Not discussed today.    Recent health service and resource utilization related to diabetes (hyperglycemia, hypoglycemia, etc.):  None    Vitals:  There were no vitals taken for this visit.  Estimated body mass index is 41.37 kg/(m^2) as calculated from the following:    Height as of 5/31/18: 1.905 m (6' 3\").    Weight as of 5/31/18: 150.1 kg (331 lb).   Last 3 BP:   BP Readings from Last 3 Encounters:   05/31/18 113/76   04/13/18 115/74   03/09/18 112/77       History   Smoking Status     Never Smoker   Smokeless Tobacco     Current User     Types: Snuff       Labs:  Lab Results   Component Value Date    A1C 6.4 07/11/2018     Lab Results   Component Value Date    GLC 84 07/11/2018     Lab Results   Component Value Date    LDL 53 04/24/2018     HDL Cholesterol   Date Value Ref Range Status   04/24/2018 37 (L) >39 mg/dL Final   ]  GFR Estimate   Date Value Ref Range Status   07/11/2018 74 >60 mL/min/1.7m2 Final     Comment:     Non  GFR Calc     GFR Estimate If Black   Date Value Ref Range Status   07/11/2018 89 >60 mL/min/1.7m2 Final     Comment:      GFR Calc     Lab Results   Component Value Date    CR 1.04 07/11/2018     No results found for: MICROALBUMIN    Health Beliefs and Attitudes:   Patient Activation Measure Survey Score:  No flowsheet data found.    Stage of Change: ACTION (Actively working towards change)    Progress toward meeting diabetes-related behavioral goals:    GOALS % Met Goal   Healthy Eating  75   Physical Activity  75   Monitoring  100   Medication Taking  100   Problem Solving     Healthy Coping     Risk " Reduction           Diabetes knowledge and skills assessment:     Patient is knowledgeable in diabetes management concepts related to: Healthy Eating, Being Active, Monitoring and Taking Medication    Patient needs further education on the following diabetes management concepts: Healthy Eating, Being Active, Monitoring and Taking Medication    Barriers to Learning Assessment: No Barriers identified    Based on learning assessment above, most appropriate setting for further diabetes education would be: Individual setting.    INTERVENTION:    Education provided today on:  AADE Self-Care Behaviors:  Healthy Eating: carbohydrate counting, consistency in amount, composition, and timing of food intake, weight reduction, heart healthy diet, eating out, portion control, plate planning method and label reading  Being Active: relationship to blood glucose and describe appropriate activity program    Opportunities for ongoing education and support in diabetes-self management were discussed.    Pt verbalized understanding of concepts discussed and recommendations provided today.       Education Materials Provided:  Carbohydrate Counting    PLAN:  See Patient Instructions for co-developed, patient-stated behavior change goals.  AVS printed and provided to patient today.    FOLLOW-UP:  Follow-up appointment scheduled on Oct. 8.  Chart routed to referring provider.    Ongoing plan for education and support: Follow-up visit with diabetes educator in [place]    Natasha Kwong RN/WIL [facility] Diabetes Educator    Time Spent: 30 minutes  Encounter Type: Individual    Any diabetes medication dose changes were made via the [facility] Protocol and Collaborative Practice Agreement with the patient's primary care provider. A copy of this encounter was shared with the provider.

## 2018-07-19 NOTE — MR AVS SNAPSHOT
After Visit Summary   7/19/2018    Rhett Elizabeth    MRN: [id number]           Patient Information     Date Of Birth          1960        Visit Information        Provider Department      7/19/2018 8:30 AM AN DIABETIC ED RESOURCE [facility] [place]        Care Instructions    My Diabetes Care Goals:    Healthy Eating: I will eat more carbs, 3-4 carb choices each meal.  3 meals per day.  Do not avoid carbs.  Have snacks, pizza, buritoes , tacos , subway, peanut butter sandwiches, spaghetti,  fruits. Get yogurt, trail mix. Cottage cheese.      Being Active: I will try to walk when it is the coolest time of the day.      Monitoring: I will check every time I wake up.    Taking Medication: I will Metformin  mg take 2 tabs with breakfast and 2 tabs with breakfast  Victoza 1.2 mg daily    Follow up:  Follow-up diabetes education appointment scheduled on Monday Oct. 8 @ 8:30.      Bring blood glucose meter and logbook with you to all doctor and follow-up appointments.     [facility] the [facility]:  For Your Diabetes Education and Nutrition Appointments Call:  [phone]   For Diabetes Education or Nutrition Related Questions:   Phone: [phone]  E-mail: [email or web address]  Fax: [phone]   If you need a medication refill please contact your pharmacy. Please allow 3 business days for your refills to be completed.    Instructions for emailing the Diabetes Educators    If you need to communicate a non-urgent message to a Diabetes Educator via email, please send to [email or web address].    Please follow the following email guidelines:    Subject line: Secure: your clinic name (example: Secure: [facility])  In the email please include: First name, middle initial, last name and date of birth.    We will be in touch with you within one (1) business day.             Follow-ups after your visit        Your next 10  appointments already scheduled     Oct 08, 2018  8:30 AM CDT   Diabetic Education with AN DIABETIC ED RESOURCE   [facility] ([facility])    [address] [facility] [address] [phone]              Who to contact     If you have questions or need follow up information about today's clinic visit or your schedule please contact [facility] directly at [phone].  Normal or non-critical lab and imaging results will be communicated to you by Campanistohart, letter or phone within 4 business days after the clinic has received the results. If you do not hear from us within 7 days, please contact the clinic through VeriFone or phone. If you have a critical or abnormal lab result, we will notify you by phone as soon as possible.  Submit refill requests through VeriFone or call your pharmacy and they will forward the refill request to us. Please allow 3 business days for your refill to be completed.          Additional Information About Your Visit        VeriFone Information     VeriFone gives you secure access to your electronic health record. If you see a primary care provider, you can also send messages to your care team and make appointments. If you have questions, please call your primary care clinic.  If you do not have a primary care provider, please call [phone] and they will assist you.        Care EveryWhere ID     This is your Care EveryWhere ID. This could be used by other organizations to access your [place] medical records  [id number]         Blood Pressure from Last 3 Encounters:   05/31/18 113/76   04/13/18 115/74   03/09/18 112/77    Weight from Last 3 Encounters:   05/31/18 (!) 150.1 kg (331 lb)   04/13/18 149.7 kg (330 lb)   03/09/18 (!) 149.7 kg (330 lb)              Today, you had the following     No orders found for display         Today's Medication Changes          These changes are accurate as of 7/19/18  9:28 AM.  If you  have any questions, ask your nurse or doctor.               Stop taking these medicines if you haven't already. Please contact your care team if you have questions.     insulin degludec 100 UNIT/ML pen   Commonly known as:  TRESIBA                    Primary Care Provider Office Phone # Fax #    Gwen Saldana [phone] [id number] [facility] [id number]        Equal Access to Services     HANNAH GARCÍA : Hadii aad ku hadasho Soomaali, waaxda luqadaha, qaybta kaalmada adeegyada, waxay idiin hayaan adeeg kharash laluis manuel . So [facility] [phone].    ATENCIÓN: Si lyndsey lewis, tiene a tsang disposición servicios gratuitos de asistencia lingüística. Llame al [phone].    We comply with applicable federal civil rights laws and [place] laws. We do not discriminate on the basis of race, color, national origin, age, disability, sex, sexual orientation, or gender identity.            Thank you!     Thank you for choosing [facility]  for your care. Our goal is always to provide you with excellent care. Hearing back from our patients is one way we can continue to improve our services. Please take a few minutes to complete the written survey that you may receive in the mail after your visit with us. Thank you!             Your Updated Medication List - Protect others around you: Learn how to safely use, store and throw away your medicines at www.disposemymeds.org.          This list is accurate as of 7/19/18  9:28 AM.  Always use your most recent med list.                   Brand Name Dispense Instructions for use Diagnosis    aspirin 81 MG tablet      Take by mouth daily Reported on 5/5/2017        blood glucose monitoring test strip    ONETOUCH VERIO IQ    270 strip    Use to test blood sugar 3 times daily or as directed.  Ok to substitute alternative if insurance prefers.    Type 2 diabetes mellitus with diabetic neuropathy, without long-term current use of  insulin (H)       DULoxetine 60 MG EC capsule    CYMBALTA     Take by mouth daily        ENSURE COMPLETE PO           insulin pen needle 32G X 4 MM    BD ARNOLD U/F    200 each    Use 2 daily as directed.    Type 2 diabetes mellitus with diabetic neuropathy, without long-term current use of insulin (H)       levothyroxine 100 MCG tablet    SYNTHROID/LEVOTHROID    90 tablet    TAKE 1 TABLET (100 MCG) BY MOUTH DAILY    Hypothyroidism, unspecified type       liraglutide 18 MG/3ML soln    VICTOZA    18 mL    Inject 1.2 mg Subcutaneous daily    Type 2 diabetes mellitus with diabetic neuropathy, without long-term current use of insulin (H)       losartan 25 MG tablet    COZAAR     Take 25 mg by mouth daily        Lutein 20 MG Caps      Take 20 mg by mouth daily 8/11/2017- patient states he takes 1 20 mg tablet daily Patient states he takes 150 mg tablet twice a day.        metFORMIN 500 MG 24 hr tablet    GLUCOPHAGE-XR    360 tablet    Take 2 tablets (1,000 mg) by mouth 2 times daily (with meals)    Type 2 diabetes mellitus with diabetic neuropathy, without long-term current use of insulin (H)       MULTIVITAMINS PO      Reported on 5/22/2017        nortriptyline 25 MG capsule    PAMELOR     1 CAPSULE DAILY BY MOUTH        OMEGA-3 FISH OIL PO      Take 1 g by mouth Reported on 5/5/2017        ONETOUCH DELICA LANCETS 33G Misc     100 each    1 Box 3 times daily    Type 2 diabetes mellitus with diabetic neuropathy, without long-term current use of insulin (H)       potassium chloride 10 MEQ CR capsule    MICRO-K     TAKE 2 TABLETS (20 MEQ) BY MOUTH TWICE A DAY.        PREGABALIN PO      Take 300 mg by mouth 2 times daily Reported on 5/16/2017        rivaroxaban ANTICOAGULANT 20 MG Tabs tablet    XARELTO     Take 20 mg by mouth daily (with dinner) Reported on 5/5/2017        simvastatin 20 MG tablet    ZOCOR    30 tablet    TAKE 1 TABLET (20 MG) BY MOUTH AT BEDTIME    Hyperlipidemia, unspecified hyperlipidemia type        spironolactone 25 MG tablet    ALDACTONE          TOPROL XL PO      Take 50 mg by mouth daily        torsemide 20 MG tablet    DEMADEX     Take 20 mg by mouth daily        traMADol 50 MG tablet    ULTRAM     Take 50 mg by mouth        UNABLE TO FIND      cpap        VITAMIN D-3 PO      Take 1,000 Units by mouth daily

## 2018-07-19 NOTE — PATIENT INSTRUCTIONS
My Diabetes Care Goals:    Healthy Eating: I will eat more carbs, 3-4 carb choices each meal.  3 meals per day.  Do not avoid carbs.  Have snacks, pizza, buritoes , tacos , subway, peanut butter sandwiches, spaghetti,  fruits. Get yogurt, trail mix. Cottage cheese.      Being Active: I will try to walk when it is the coolest time of the day.      Monitoring: I will check every time I wake up.    Taking Medication: I will Metformin  mg take 2 tabs with breakfast and 2 tabs with breakfast  Victoza 1.2 mg daily    Follow up:  Follow-up diabetes education appointment scheduled on Monday Oct. 8 @ 8:30.      Bring blood glucose meter and logbook with you to all doctor and follow-up appointments.     [facility] for the [facility]:  For Your Diabetes Education and Nutrition Appointments Call:  [phone]   For Diabetes Education or Nutrition Related Questions:   Phone: [phone]  E-mail: [email or web address]  Fax: [phone]   If you need a medication refill please contact your pharmacy. Please allow 3 business days for your refills to be completed.    Instructions for emailing the Diabetes Educators    If you need to communicate a non-urgent message to a Diabetes Educator via email, please send to [email or web address].    Please follow the following email guidelines:    Subject line: Secure: your clinic name (example: Secure: [facility])  In the email please include: First name, middle initial, last name and date of birth.    We will be in touch with you within one (1) business day.

## 2018-07-27 ENCOUNTER — TELEPHONE (OUTPATIENT)
Dept: FAMILY MEDICINE | Facility: CLINIC | Age: 58
End: 2018-07-27

## 2018-07-27 NOTE — TELEPHONE ENCOUNTER
Reason for call:  Form   Our goal is to have forms completed within 72 hours, however some forms may require a visit or additional information.     Who is the form from? Patient  Where did the form come from? Patient or family brought in     What clinic location was the form placed at?[place]  Where was the form placed? 's Box  What number is listed as a contact on the form?yes      Phone call message - patient request for a letter, form or note:     Date needed: as soon as possible  Please fax to [phone]  Has the patient signed a consent form for release of information? YES    Additional comments: Pt would like a copy of the fax sent mailed to him after the form is faxed overType of letter, form or note:  and vehicle services    Phone number to reach patient:  Cell number on file:    Telephone Information:   Mobile [phone]       Best Time anytime  Can we leave a detailed message on this number?  YES

## 2018-07-30 NOTE — TELEPHONE ENCOUNTER
Faxed form to [place] [employer] and mailed original to patient's home.Belen Laboy MA/JOVITA    There was also a form for Shelly Allred. I faxed to them and mailed original to patient.Belen Laboy MA/JOVITA

## 2018-09-27 ENCOUNTER — TELEPHONE (OUTPATIENT)
Dept: FAMILY MEDICINE | Facility: CLINIC | Age: 58
End: 2018-09-27

## 2018-09-27 DIAGNOSIS — E03.9 HYPOTHYROIDISM, UNSPECIFIED TYPE: ICD-10-CM

## 2018-09-27 DIAGNOSIS — E11.40 TYPE 2 DIABETES MELLITUS WITH DIABETIC NEUROPATHY, WITHOUT LONG-TERM CURRENT USE OF INSULIN (H): Chronic | ICD-10-CM

## 2018-09-27 RX ORDER — LEVOTHYROXINE SODIUM 100 UG/1
TABLET ORAL
Qty: 90 TABLET | Refills: 1 | Status: SHIPPED | OUTPATIENT
Start: 2018-09-27 | End: 2019-10-11

## 2018-09-27 RX ORDER — METFORMIN HCL 500 MG
1000 TABLET, EXTENDED RELEASE 24 HR ORAL 2 TIMES DAILY WITH MEALS
Qty: 360 TABLET | Refills: 0 | Status: SHIPPED | OUTPATIENT
Start: 2018-09-27 | End: 2018-11-19

## 2018-09-27 RX ORDER — METFORMIN HCL 500 MG
TABLET, EXTENDED RELEASE 24 HR ORAL
Qty: 360 TABLET | Refills: 0 | Status: CANCELLED | OUTPATIENT
Start: 2018-09-27

## 2018-09-27 NOTE — TELEPHONE ENCOUNTER
Patient is calling to request refill RX: metFORMIN (GLUCOPHAGE-XR) 500 MG 24 hr tablet. Thank you.

## 2018-09-28 ENCOUNTER — TELEPHONE (OUTPATIENT)
Dept: INTERNAL MEDICINE | Facility: CLINIC | Age: 58
End: 2018-09-28

## 2018-09-28 NOTE — TELEPHONE ENCOUNTER
Called patient and informed patient metformin was refilled yesterday morning at 9:12 am.   Patient said he called his pharmacy this morning and was told it has not been refilled.     This RN called [facility] pharmacy to confirm they have refill order and they do.    CHELLE LaneN RN

## 2018-09-28 NOTE — TELEPHONE ENCOUNTER
Patient is requesting a refill for Metformin stated he has been out of this for a couple of days now    Please call to discuss    Thank you

## 2018-10-08 ENCOUNTER — TELEPHONE (OUTPATIENT)
Dept: EDUCATION SERVICES | Facility: CLINIC | Age: 58
End: 2018-10-08

## 2018-10-08 DIAGNOSIS — Z53.9 NO SHOW: Primary | ICD-10-CM

## 2018-10-16 ENCOUNTER — TRANSFERRED RECORDS (OUTPATIENT)
Dept: HEALTH INFORMATION MANAGEMENT | Facility: CLINIC | Age: 58
End: 2018-10-16

## 2018-11-12 NOTE — PROGRESS NOTES
SUBJECTIVE:   Rhett Elizabeth is a 58 year old male who presents to clinic today for the following health issues:        Patient presents with:  Establish Care  Health Maintenance: hiv, adv directives, phq2, microalbumin, flu, eye exam  Recheck Medication: take over perscriptions  Flu Shot       Type 2 diabetes mellitus with diabetic neuropathy, without long-term current use of insulin (H)  Screening for HIV (human immunodeficiency virus)  Need for prophylactic vaccination and inoculation against influenza  Essential hypertension with goal blood pressure less than 140/90  Persistent atrial fibrillation (H)  S/P ICD (internal cardiac defibrillator) procedure  Chronic systolic congestive heart failure (H)  Morbid obesity (H)  Acquired hypothyroidism  LANEY (obstructive sleep apnea)  Screening for human immunodeficiency virus     New patient to the Internal Medicine department here with [facility] . Prior patient with Dr. Gwen Saldana, Internist with [facility] .    Many different issues today , difficult to do complete justice to problem list . Recommended recheck in 3-6 months     Diabetes mellitus diagnosis was originally 10-15 years ago, probably was undiagnosed for years and years and developed diabetes neuropathy as a presenting symptoms. Uses metformin ( Glucophage) and Liraglutide [ Victoza ]. This Liraglutide [ Victoza ] has resulted in massive success.    Lab Results   Component Value Date    A1C 6.4 07/11/2018    A1C 8.1 04/09/2018    A1C 7.3 08/11/2017    A1C 6.6 03/22/2017     Wt Readings from Last 5 Encounters:   11/19/18 309 lb (140.2 kg)   05/31/18 (!) 331 lb (150.1 kg)   04/13/18 330 lb (149.7 kg)   03/09/18 (!) 330 lb (149.7 kg)   02/09/18 (!) 339 lb (153.8 kg)       Problem list and histories reviewed & adjusted, as indicated.  Additional history: as documented    Patient Active Problem List   Diagnosis     Neuropathic pain     Essential hypertension with  goal blood pressure less than 140/90     Persistent atrial fibrillation (H)     LANEY (obstructive sleep apnea)     Type 2 diabetes mellitus with diabetic neuropathy, without long-term current use of insulin (H)     Chronic systolic congestive heart failure (H)     Drusen of macula, left     Dry eyes     Morbid obesity (H)     S/P ICD (internal cardiac defibrillator) procedure     Recent bereavement     Acquired hypothyroidism     Past Surgical History:   Procedure Laterality Date     STRABISMUS SURGERY         Social History   Substance Use Topics     Smoking status: Never Smoker     Smokeless tobacco: Current User     Types: Snuff     Alcohol use No     Family History   Problem Relation Age of Onset     Prostate Cancer Father      Glaucoma No family hx of      Macular Degeneration No family hx of          Current Outpatient Prescriptions   Medication Sig Dispense Refill     aspirin 81 MG tablet Take by mouth daily Reported on 5/5/2017       blood glucose monitoring (ONETOUCH VERIO IQ) test strip Use to test blood sugar 3 times daily or as directed.  Ok to substitute alternative if insurance prefers. 270 strip 11     Cholecalciferol (VITAMIN D-3 PO) Take 1,000 Units by mouth daily       DULoxetine (CYMBALTA) 60 MG EC capsule Take 1 capsule (60 mg) by mouth daily 90 capsule 1     insulin pen needle (BD ARNOLD U/F) 32G X 4 MM Use 2 daily as directed. 200 each 11     levothyroxine (SYNTHROID/LEVOTHROID) 100 MCG tablet TAKE 1 TABLET (100 MCG) BY MOUTH DAILY 90 tablet 1     liraglutide (VICTOZA) 18 MG/3ML soln Inject 1.2 mg Subcutaneous daily 18 mL 3     losartan (COZAAR) 25 MG tablet Take 1 tablet (25 mg) by mouth daily 90 tablet 1     Lutein 20 MG CAPS Take 20 mg by mouth daily 8/11/2017- patient states he takes 1 20 mg tablet daily  Patient states he takes 150 mg tablet twice a day.       metFORMIN (GLUCOPHAGE-XR) 500 MG 24 hr tablet Take 2 tablets (1,000 mg) by mouth 2 times daily (with meals) 360 tablet 0      "metoprolol succinate (TOPROL XL) 50 MG 24 hr tablet Take 1 tablet (50 mg) by mouth daily 90 tablet 1     Multiple Vitamin (MULTIVITAMINS PO) Reported on 5/22/2017       Nutritional Supplements (ENSURE COMPLETE PO)        Omega-3 Fatty Acids (OMEGA-3 FISH OIL PO) Take 1 g by mouth Reported on 5/5/2017       ONETOUCH DELICA LANCETS 33G MISC 1 Box 3 times daily 100 each 11     potassium chloride (MICRO-K) 10 MEQ CR capsule TAKE 2 TABLETS (20 MEQ) BY MOUTH TWICE A DAY.  0     pregabalin 300 MG CAPS capsule Take 1 capsule (300 mg) by mouth 2 times daily Reported on 5/16/2017 60 capsule 5     rivaroxaban ANTICOAGULANT (XARELTO) 20 MG TABS tablet Take 20 mg by mouth daily (with dinner) Reported on 5/5/2017       simvastatin (ZOCOR) 20 MG tablet TAKE 1 TABLET (20 MG) BY MOUTH AT BEDTIME 30 tablet 11     spironolactone (ALDACTONE) 25 MG tablet Take 1 tablet (25 mg) by mouth daily 90 tablet 1     traMADol (ULTRAM) 50 MG tablet Take 50 mg by mouth       UNABLE TO FIND cpap       nortriptyline (PAMELOR) 25 MG capsule 1 CAPSULE DAILY BY MOUTH  11     [DISCONTINUED] DULoxetine (CYMBALTA) 60 MG EC capsule Take by mouth daily       [DISCONTINUED] liraglutide (VICTOZA) 18 MG/3ML soln Inject 1.2 mg Subcutaneous daily 18 mL 3     [DISCONTINUED] losartan (COZAAR) 25 MG tablet Take 25 mg by mouth daily       [DISCONTINUED] metFORMIN (GLUCOPHAGE-XR) 500 MG 24 hr tablet Take 2 tablets (1,000 mg) by mouth 2 times daily (with meals) 360 tablet 0     [DISCONTINUED] Metoprolol Succinate (TOPROL XL PO) Take 50 mg by mouth daily        [DISCONTINUED] PREGABALIN PO Take 300 mg by mouth 2 times daily Reported on 5/16/2017       [DISCONTINUED] spironolactone (ALDACTONE) 25 MG tablet        Allergies   Allergen Reactions     Amlodipine Difficulty breathing     Other reaction(s): Other  \"legs swell\"  Swelling of the lips and tongue     Ace Inhibitors Cough     Other reaction(s): Cough     Recent Labs   Lab Test  07/11/18   0703  05/31/18   0913  " 04/24/18   0712   04/09/18   0851  02/09/18   0826 02/03/18 08/11/17   0927  05/22/17   1337   03/22/17   1624   A1C  6.4*   --    --    --   8.1*   --    --   7.3*   --    --   6.6*   LDL   --    --   53   --    --    --   57   --    --    --   52   HDL   --    --   37*   --    --    --   34*   --    --    --   44   TRIG   --    --   76   --    --    --   117   --    --    --   92   ALT   --   25   --    --    --   34   --    --   20   --    --    CR  1.04  1.12   --    < >   --   1.06   --   0.90   --    < >  1.03   GFRESTIMATED  74  68   --    < >   --   72   --   87   --    < >  75   GFRESTBLACK  89  82   --    < >   --   87   --   >90   GFR Calc     --    < >  >90   GFR Calc     POTASSIUM  4.2  4.0   --    < >   --   3.7   --   4.1   --    < >  4.4   TSH   --   0.92  2.82   --    --    --    --    --    --    < >  1.64    < > = values in this interval not displayed.      BP Readings from Last 3 Encounters:   11/19/18 120/80   05/31/18 113/76   04/13/18 115/74    Wt Readings from Last 3 Encounters:   11/19/18 309 lb (140.2 kg)   05/31/18 (!) 331 lb (150.1 kg)   04/13/18 330 lb (149.7 kg)                  Labs reviewed in EPIC    Reviewed and updated as needed this visit by clinical staff       Reviewed and updated as needed this visit by Provider         ROS:  Constitutional, neuro, ENT, endocrine, pulmonary, cardiac, gastrointestinal, genitourinary, musculoskeletal, integument and psychiatric systems are negative, except as otherwise noted.    OBJECTIVE:                                                    /80  Pulse 91  Temp 98  F (36.7  C) (Oral)  Resp 16  Wt 309 lb (140.2 kg)  SpO2 96%  BMI 38.62 kg/m2  Body mass index is 38.62 kg/(m^2).  GENERAL APPEARANCE: healthy, alert and no distress  EYES: Eyes grossly normal to inspection, PERRL and conjunctivae and sclerae normal  RESP: lungs clear to auscultation - no rales, rhonchi or wheezes  CV: normal S1 S2, no S3 or  S4, no murmur, click or rub and irregularly irregular rhythm  DIABETIC FOOT EXAM: normal DP and PT pulses, no trophic changes or ulcerative lesions and mildly abnormal sensory exam    Diagnostic test results:  Diagnostic Test Results:  Orders Placed This Encounter   Procedures     FLU VACCINE, (RIV4) RECOMBINANT HA  , IM (FluBlok, egg free) [34263]- >18 YRS ([facility] recommended  50-64 YRS)     Vaccine Administration, Initial [36586]     Albumin Random Urine Quantitative with Creat Ratio     Basic metabolic panel     HIV Antigen Antibody Combo          ASSESSMENT/PLAN:                                                    1. Type 2 diabetes mellitus with diabetic neuropathy, without long-term current use of insulin (H)  Absolutely controlled within acceptable limits . All refills provided   - Albumin Random Urine Quantitative with Creat Ratio  - DULoxetine (CYMBALTA) 60 MG EC capsule; Take 1 capsule (60 mg) by mouth daily  Dispense: 90 capsule; Refill: 1  - losartan (COZAAR) 25 MG tablet; Take 1 tablet (25 mg) by mouth daily  Dispense: 90 tablet; Refill: 1  - liraglutide (VICTOZA) 18 MG/3ML soln; Inject 1.2 mg Subcutaneous daily  Dispense: 18 mL; Refill: 3  - metFORMIN (GLUCOPHAGE-XR) 500 MG 24 hr tablet; Take 2 tablets (1,000 mg) by mouth 2 times daily (with meals)  Dispense: 360 tablet; Refill: 0  - pregabalin 300 MG CAPS capsule; Take 1 capsule (300 mg) by mouth 2 times daily Reported on 5/16/2017  Dispense: 60 capsule; Refill: 5  - Basic metabolic panel    2. Screening for HIV (human immunodeficiency virus)      3. Need for prophylactic vaccination and inoculation against influenza    - FLU VACCINE, (RIV4) RECOMBINANT HA  , IM (FluBlok, egg free) [52613]- >18 YRS ([facility] recommended  50-64 YRS)  - Vaccine Administration, Initial [50336]    4. Essential hypertension with goal blood pressure less than 140/90  Controlled within acceptable limits. I found that he was on Demadex (Torsemide) for awhile since stopped. His only  diuretic therapy is with spironolactone and so he's still on the potassium replacement therapy which is likely unnecessary. He tells me he has a cardiomyopathy with an % ejection fraction of approximately 20 and so obviously there's a lot to this history yet to be reviewed. I see his heart care is with [facility] , for complete details please see care everywhere   - Albumin Random Urine Quantitative with Creat Ratio  - losartan (COZAAR) 25 MG tablet; Take 1 tablet (25 mg) by mouth daily  Dispense: 90 tablet; Refill: 1  - metoprolol succinate (TOPROL XL) 50 MG 24 hr tablet; Take 1 tablet (50 mg) by mouth daily  Dispense: 90 tablet; Refill: 1  - spironolactone (ALDACTONE) 25 MG tablet; Take 1 tablet (25 mg) by mouth daily  Dispense: 90 tablet; Refill: 1    5. Persistent atrial fibrillation (H)  Ongoing treatment with anticoagulation     6. S/P ICD (internal cardiac defibrillator) procedure  Noted as a point of historical importance     7. Chronic systolic congestive heart failure (H)  As detailed above   - spironolactone (ALDACTONE) 25 MG tablet; Take 1 tablet (25 mg) by mouth daily  Dispense: 90 tablet; Refill: 1    8. Morbid obesity (H)  Body mass index is 38.62 kg/(m^2).     9. Acquired hypothyroidism  TSH   Date Value Ref Range Status   05/31/2018 0.92 0.40 - 4.00 mU/L Final   not due today for a tsh     10. LANEY (obstructive sleep apnea)  Noted as a point of historical importance.     11. Screening for human immunodeficiency virus  Routine screening   - HIV Antigen Antibody Combo      Follow up with Provider - 3-6 months      Ac Frederick MD  [facility]      Injectable Influenza Immunization Documentation    1.  Is the person to be vaccinated sick today?   No    2. Does the person to be vaccinated have an allergy to a component   of the vaccine?   No  Egg Allergy Algorithm Link    3. Has the person to be vaccinated ever had a serious reaction   to influenza vaccine in the past?   No    4. Has  the person to be vaccinated ever had Guillain-Barré syndrome?   No    Form completed by Isidra CAPUTO CMA ([facility])

## 2018-11-19 ENCOUNTER — OFFICE VISIT (OUTPATIENT)
Dept: FAMILY MEDICINE | Facility: CLINIC | Age: 58
End: 2018-11-19
Payer: COMMERCIAL

## 2018-11-19 VITALS
TEMPERATURE: 98 F | RESPIRATION RATE: 16 BRPM | OXYGEN SATURATION: 96 % | DIASTOLIC BLOOD PRESSURE: 80 MMHG | HEART RATE: 91 BPM | SYSTOLIC BLOOD PRESSURE: 120 MMHG | BODY MASS INDEX: 38.62 KG/M2 | WEIGHT: 309 LBS

## 2018-11-19 DIAGNOSIS — E11.40 TYPE 2 DIABETES MELLITUS WITH DIABETIC NEUROPATHY, WITHOUT LONG-TERM CURRENT USE OF INSULIN (H): Primary | Chronic | ICD-10-CM

## 2018-11-19 DIAGNOSIS — I50.22 CHRONIC SYSTOLIC CONGESTIVE HEART FAILURE (H): Chronic | ICD-10-CM

## 2018-11-19 DIAGNOSIS — I10 ESSENTIAL HYPERTENSION WITH GOAL BLOOD PRESSURE LESS THAN 140/90: ICD-10-CM

## 2018-11-19 DIAGNOSIS — Z23 NEED FOR PROPHYLACTIC VACCINATION AND INOCULATION AGAINST INFLUENZA: ICD-10-CM

## 2018-11-19 DIAGNOSIS — I48.19 PERSISTENT ATRIAL FIBRILLATION (H): ICD-10-CM

## 2018-11-19 DIAGNOSIS — G47.33 OSA (OBSTRUCTIVE SLEEP APNEA): ICD-10-CM

## 2018-11-19 DIAGNOSIS — Z11.4 SCREENING FOR HUMAN IMMUNODEFICIENCY VIRUS: ICD-10-CM

## 2018-11-19 DIAGNOSIS — E03.9 ACQUIRED HYPOTHYROIDISM: Chronic | ICD-10-CM

## 2018-11-19 DIAGNOSIS — Z11.4 SCREENING FOR HIV (HUMAN IMMUNODEFICIENCY VIRUS): ICD-10-CM

## 2018-11-19 DIAGNOSIS — E66.01 MORBID OBESITY (H): Chronic | ICD-10-CM

## 2018-11-19 DIAGNOSIS — Z95.810 S/P ICD (INTERNAL CARDIAC DEFIBRILLATOR) PROCEDURE: ICD-10-CM

## 2018-11-19 LAB
ANION GAP SERPL CALCULATED.3IONS-SCNC: 6 MMOL/L (ref 3–14)
BUN SERPL-MCNC: 17 MG/DL (ref 7–30)
CALCIUM SERPL-MCNC: 9.3 MG/DL (ref 8.5–10.1)
CHLORIDE SERPL-SCNC: 104 MMOL/L (ref 94–109)
CO2 SERPL-SCNC: 28 MMOL/L (ref 20–32)
CREAT SERPL-MCNC: 0.89 MG/DL (ref 0.66–1.25)
CREAT UR-MCNC: 138 MG/DL
GFR SERPL CREATININE-BSD FRML MDRD: 89 ML/MIN/1.7M2
GLUCOSE SERPL-MCNC: 133 MG/DL (ref 70–99)
MICROALBUMIN UR-MCNC: 16 MG/L
MICROALBUMIN/CREAT UR: 11.74 MG/G CR (ref 0–17)
POTASSIUM SERPL-SCNC: 4.3 MMOL/L (ref 3.4–5.3)
SODIUM SERPL-SCNC: 138 MMOL/L (ref 133–144)

## 2018-11-19 PROCEDURE — 82043 UR ALBUMIN QUANTITATIVE: CPT | Performed by: INTERNAL MEDICINE

## 2018-11-19 PROCEDURE — 87389 HIV-1 AG W/HIV-1&-2 AB AG IA: CPT | Performed by: INTERNAL MEDICINE

## 2018-11-19 PROCEDURE — 90471 IMMUNIZATION ADMIN: CPT | Performed by: INTERNAL MEDICINE

## 2018-11-19 PROCEDURE — 90682 RIV4 VACC RECOMBINANT DNA IM: CPT | Performed by: INTERNAL MEDICINE

## 2018-11-19 PROCEDURE — 80048 BASIC METABOLIC PNL TOTAL CA: CPT | Performed by: INTERNAL MEDICINE

## 2018-11-19 PROCEDURE — 99214 OFFICE O/P EST MOD 30 MIN: CPT | Mod: 25 | Performed by: INTERNAL MEDICINE

## 2018-11-19 PROCEDURE — 36415 COLL VENOUS BLD VENIPUNCTURE: CPT | Performed by: INTERNAL MEDICINE

## 2018-11-19 RX ORDER — LIRAGLUTIDE 6 MG/ML
1.2 INJECTION SUBCUTANEOUS DAILY
Qty: 18 ML | Refills: 3 | Status: SHIPPED | OUTPATIENT
Start: 2018-11-19 | End: 2019-12-10

## 2018-11-19 RX ORDER — SPIRONOLACTONE 25 MG/1
25 TABLET ORAL DAILY
Qty: 90 TABLET | Refills: 1 | Status: SHIPPED | OUTPATIENT
Start: 2018-11-19 | End: 2019-10-11

## 2018-11-19 RX ORDER — METFORMIN HCL 500 MG
1000 TABLET, EXTENDED RELEASE 24 HR ORAL 2 TIMES DAILY WITH MEALS
Qty: 360 TABLET | Refills: 0 | Status: SHIPPED | OUTPATIENT
Start: 2018-11-19 | End: 2018-12-27

## 2018-11-19 RX ORDER — LOSARTAN POTASSIUM 25 MG/1
25 TABLET ORAL DAILY
Qty: 90 TABLET | Refills: 1 | Status: SHIPPED | OUTPATIENT
Start: 2018-11-19 | End: 2019-10-11

## 2018-11-19 RX ORDER — METOPROLOL SUCCINATE 50 MG/1
50 TABLET, EXTENDED RELEASE ORAL DAILY
Qty: 90 TABLET | Refills: 1 | Status: SHIPPED | OUTPATIENT
Start: 2018-11-19 | End: 2019-10-11

## 2018-11-19 RX ORDER — DULOXETIN HYDROCHLORIDE 60 MG/1
60 CAPSULE, DELAYED RELEASE ORAL DAILY
Qty: 90 CAPSULE | Refills: 1 | Status: SHIPPED | OUTPATIENT
Start: 2018-11-19 | End: 2019-10-11

## 2018-11-19 RX ORDER — PREGABALIN 300 MG/1
300 CAPSULE ORAL 2 TIMES DAILY
Qty: 60 CAPSULE | Refills: 5 | Status: SHIPPED | OUTPATIENT
Start: 2018-11-19 | End: 2019-05-23

## 2018-11-19 ASSESSMENT — PATIENT HEALTH QUESTIONNAIRE - PHQ9: SUM OF ALL RESPONSES TO PHQ QUESTIONS 1-9: 13

## 2018-11-19 NOTE — PATIENT INSTRUCTIONS
[facility]    If you have any questions regarding to your visit please contact your care team:     Team Pink:   Clinic Hours Telephone Number   Internal Medicine:  Dr. Alie Chacon NP 7am-7pm  Monday - Thursday   7am-5pm  Fridays  [phone]  (Appointment scheduling available 24/7)   Urgent Care - [place] [facility] - 11am-9pm Monday-Friday Saturday-Sunday- 9am-5pm   [place] - 5pm-9pm Monday-Friday Saturday-Sunday- 9am-5pm  [phone] - [place]  [phone] - [place]       What options do I have for a visit other than an office visit? We offer electronic visits (e-visits) and telephone visits, when medically appropriate.  Please check with your medical insurance to see if these types of visits are covered, as you will be responsible for any charges that are not paid by your insurance.      You can use HealthSouk (secure electronic communication) to access to your chart, send your primary care provider a message, or make an appointment. Ask a team member how to get started.     For a price quote for your services, please call our Consumer Price Line at [phone] or our Imaging Cost estimation line at [phone] (for imaging tests).  Isidra CAPUTO CMA ([facility])

## 2018-11-19 NOTE — MR AVS SNAPSHOT
After Visit Summary   11/19/2018    Rhett Elizabeth    MRN: [id number]           Patient Information     Date Of Birth          1960        Visit Information        Provider Department      11/19/2018 9:10 AM Ac Frederick MD [facility]        Today's Diagnoses     Type 2 diabetes mellitus with diabetic neuropathy, without long-term current use of insulin (H)    -  1    Screening for HIV (human immunodeficiency virus)        Need for prophylactic vaccination and inoculation against influenza        Essential hypertension with goal blood pressure less than 140/90        Persistent atrial fibrillation (H)        S/P ICD (internal cardiac defibrillator) procedure        Chronic systolic congestive heart failure (H)        Morbid obesity (H)        Acquired hypothyroidism        LANEY (obstructive sleep apnea)        Screening for human immunodeficiency virus          Care Instructions    [facility]    If you have any questions regarding to your visit please contact your care team:     Team Pink:   Clinic Hours Telephone Number   Internal Medicine:  Dr. Alie Chacon NP 7am-7pm  Monday - Thursday   7am-5pm  Fridays  [phone]  (Appointment scheduling available 24/7)   Urgent Care - [place] - 11am-9pm Monday-Friday Saturday-Sunday- 9am-5pm   [place] - 5pm-9pm Monday-Friday Saturday-Sunday- 9am-5pm  [phone] - [place]  [phone] - [place]       What options do I have for a visit other than an office visit? We offer electronic visits (e-visits) and telephone visits, when medically appropriate.  Please check with your medical insurance to see if these types of visits are covered, as you will be responsible for any charges that are not paid by your insurance.      You can use JustSpotted (secure electronic communication) to access to your chart, send your primary care provider a message, or  make an appointment. Ask a team member how to get started.     For a price quote for your services, please call our Consumer Price Line at [phone] or our Imaging Cost estimation line at [phone] (for imaging tests).  Isidra CAPUTO CMA ([facility])            Follow-ups after your visit        Your next 10 appointments already scheduled     Dec 13, 2018  8:00 AM CST   New Visit with Abdirashid Diaz OD   [facility]    [address] [place] [address] [phone]              Who to contact     If you have questions or need follow up information about today's clinic visit or your schedule please contact [facility] directly at [phone].  Normal or non-critical lab and imaging results will be communicated to you by IronGatehart, letter or phone within 4 business days after the clinic has received the results. If you do not hear from us within 7 days, please contact the clinic through IronGatehart or phone. If you have a critical or abnormal lab result, we will notify you by phone as soon as possible.  Submit refill requests through [facility] or call your pharmacy and they will forward the refill request to us. Please allow 3 business days for your refill to be completed.          Additional Information About Your Visit        [employer]Gate[employer]Market Information     [facility] gives you secure access to your electronic health record. If you see a primary care provider, you can also send messages to your care team and make appointments. If you have questions, please call your primary care clinic.  If you do not have a primary care provider, please call [phone] and they will assist you.        Care EveryWhere ID     This is your Care EveryWhere ID. This could be used by other organizations to access your [facility] medical records  [id number]        Your Vitals Were     Pulse Temperature Respirations Pulse Oximetry BMI (Body Mass Index)       91 98  F (36.7  C) (Oral) 16  96% 38.62 kg/m2        Blood Pressure from Last 3 Encounters:   11/19/18 120/80   05/31/18 113/76   04/13/18 115/74    Weight from Last 3 Encounters:   11/19/18 309 lb (140.2 kg)   05/31/18 (!) 331 lb (150.1 kg)   04/13/18 330 lb (149.7 kg)              We Performed the Following     Albumin Random Urine Quantitative with Creat Ratio     Basic metabolic panel     FLU VACCINE, (RIV4) RECOMBINANT HA  , IM (FluBlok, egg free) [97028]- >18 YRS (FMG recommended  50-64 YRS)     HIV Antigen Antibody Combo     Vaccine Administration, Initial [66636]          Today's Medication Changes          These changes are accurate as of 11/19/18  9:59 AM.  If you have any questions, ask your nurse or doctor.               These medicines have changed or have updated prescriptions.        Dose/Directions    DULoxetine 60 MG EC capsule   Commonly known as:  CYMBALTA   This may have changed:  how much to take   Used for:  Type 2 diabetes mellitus with diabetic neuropathy, without long-term current use of insulin (H)   Changed by:  Ac Frederick MD        Dose:  60 mg   Take 1 capsule (60 mg) by mouth daily   Quantity:  90 capsule   Refills:  1       metoprolol succinate 50 MG 24 hr tablet   Commonly known as:  TOPROL XL   This may have changed:  medication strength   Used for:  Essential hypertension with goal blood pressure less than 140/90   Changed by:  Ac Frederick MD        Dose:  50 mg   Take 1 tablet (50 mg) by mouth daily   Quantity:  90 tablet   Refills:  1       pregabalin 300 MG Caps capsule   This may have changed:  medication strength   Used for:  Type 2 diabetes mellitus with diabetic neuropathy, without long-term current use of insulin (H)   Changed by:  Ac Frederick MD        Dose:  300 mg   Take 1 capsule (300 mg) by mouth 2 times daily Reported on 5/16/2017   Quantity:  60 capsule   Refills:  5       spironolactone 25 MG tablet   Commonly known as:  ALDACTONE   This may have changed:    - how much to take  - how to  take this  - when to take this   Used for:  Essential hypertension with goal blood pressure less than 140/90, Chronic systolic congestive heart failure (H)   Changed by:  Ac Frederick MD        Dose:  25 mg   Take 1 tablet (25 mg) by mouth daily   Quantity:  90 tablet   Refills:  1            Where to get your medicines      These medications were [place] to [facility] [place] [address]  [address], [place] [address]     Phone:  [phone]     DULoxetine 60 MG EC capsule    liraglutide 18 MG/3ML soln    losartan 25 MG tablet    metFORMIN 500 MG 24 hr tablet    metoprolol succinate 50 MG 24 hr tablet    spironolactone 25 MG tablet         Some of these will need a paper prescription and others can be bought over the counter.  Ask your nurse if you have questions.     Bring a paper prescription for each of these medications     pregabalin 300 MG Caps capsule                Primary Care Provider Office Phone # Fax #    Gwen Saldana [phone] [address] [facility] [address]        Equal Access to Services     [facility]: Hadii connie grayson hadasho Sodenise, waaxda luqadaha, qaybta kaalmada brianne, [facility] . So [facility] [phone].    ATENCIÓN: Si habla español, tiene a tsang disposición servicios gratNorthern Navajo Medical Centeros de asistencia lingüística. [facility] [phone].    We comply with applicable federal civil rights laws and [place] laws. We do not discriminate on the basis of race, color, national origin, age, disability, sex, sexual orientation, or gender identity.            Thank you!     Thank you for choosing [facility]  for your care. Our goal is always to provide you with excellent care. Hearing back from our patients is one way we can continue to improve our services. Please take a few minutes to complete the written survey that you may receive in the mail after your visit  with us. Thank you!             Your Updated Medication List - Protect others around you: Learn how to safely use, store and throw away your medicines at www.disposemymeds.org.          This list is accurate as of 11/19/18  9:59 AM.  Always use your most recent med list.                   Brand Name Dispense Instructions for use Diagnosis    aspirin 81 MG tablet      Take by mouth daily Reported on 5/5/2017        blood glucose monitoring test strip    ONETOUCH VERIO IQ    270 strip    Use to test blood sugar 3 times daily or as directed.  Ok to substitute alternative if insurance prefers.    Type 2 diabetes mellitus with diabetic neuropathy, without long-term current use of insulin (H)       DULoxetine 60 MG EC capsule    CYMBALTA    90 capsule    Take 1 capsule (60 mg) by mouth daily    Type 2 diabetes mellitus with diabetic neuropathy, without long-term current use of insulin (H)       ENSURE COMPLETE PO           insulin pen needle 32G X 4 MM miscellaneous    BD ARNOLD U/F    200 each    Use 2 daily as directed.    Type 2 diabetes mellitus with diabetic neuropathy, without long-term current use of insulin (H)       levothyroxine 100 MCG tablet    SYNTHROID/LEVOTHROID    90 tablet    TAKE 1 TABLET (100 MCG) BY MOUTH DAILY    Hypothyroidism, unspecified type       liraglutide 18 MG/3ML soln    VICTOZA    18 mL    Inject 1.2 mg Subcutaneous daily    Type 2 diabetes mellitus with diabetic neuropathy, without long-term current use of insulin (H)       losartan 25 MG tablet    COZAAR    90 tablet    Take 1 tablet (25 mg) by mouth daily    Type 2 diabetes mellitus with diabetic neuropathy, without long-term current use of insulin (H), Essential hypertension with goal blood pressure less than 140/90       Lutein 20 MG Caps      Take 20 mg by mouth daily 8/11/2017- patient states he takes 1 20 mg tablet daily Patient states he takes 150 mg tablet twice a day.        metFORMIN 500 MG 24 hr tablet    GLUCOPHAGE-XR    360  tablet    Take 2 tablets (1,000 mg) by mouth 2 times daily (with meals)    Type 2 diabetes mellitus with diabetic neuropathy, without long-term current use of insulin (H)       metoprolol succinate 50 MG 24 hr tablet    TOPROL XL    90 tablet    Take 1 tablet (50 mg) by mouth daily    Essential hypertension with goal blood pressure less than 140/90       MULTIVITAMINS PO      Reported on 5/22/2017        nortriptyline 25 MG capsule    PAMELOR     1 CAPSULE DAILY BY MOUTH        OMEGA-3 FISH OIL PO      Take 1 g by mouth Reported on 5/5/2017        ONETOUCH DELICA LANCETS 33G Misc     100 each    1 Box 3 times daily    Type 2 diabetes mellitus with diabetic neuropathy, without long-term current use of insulin (H)       potassium chloride 10 MEQ CR capsule    MICRO-K     TAKE 2 TABLETS (20 MEQ) BY MOUTH TWICE A DAY.        pregabalin 300 MG Caps capsule     60 capsule    Take 1 capsule (300 mg) by mouth 2 times daily Reported on 5/16/2017    Type 2 diabetes mellitus with diabetic neuropathy, without long-term current use of insulin (H)       rivaroxaban ANTICOAGULANT 20 MG Tabs tablet    XARELTO     Take 20 mg by mouth daily (with dinner) Reported on 5/5/2017        simvastatin 20 MG tablet    ZOCOR    30 tablet    TAKE 1 TABLET (20 MG) BY MOUTH AT BEDTIME    Hyperlipidemia, unspecified hyperlipidemia type       spironolactone 25 MG tablet    ALDACTONE    90 tablet    Take 1 tablet (25 mg) by mouth daily    Essential hypertension with goal blood pressure less than 140/90, Chronic systolic congestive heart failure (H)       traMADol 50 MG tablet    ULTRAM     Take 50 mg by mouth        UNABLE TO FIND      cpap        VITAMIN D-3 PO      Take 1,000 Units by mouth daily

## 2018-11-20 ENCOUNTER — TRANSFERRED RECORDS (OUTPATIENT)
Dept: HEALTH INFORMATION MANAGEMENT | Facility: CLINIC | Age: 58
End: 2018-11-20

## 2018-11-20 LAB — HIV 1+2 AB+HIV1 P24 AG SERPL QL IA: NONREACTIVE

## 2018-12-06 ENCOUNTER — TELEPHONE (OUTPATIENT)
Dept: FAMILY MEDICINE | Facility: CLINIC | Age: 58
End: 2018-12-06

## 2018-12-06 DIAGNOSIS — Z95.810 S/P ICD (INTERNAL CARDIAC DEFIBRILLATOR) PROCEDURE: ICD-10-CM

## 2018-12-06 DIAGNOSIS — E11.40 TYPE 2 DIABETES MELLITUS WITH DIABETIC NEUROPATHY, WITHOUT LONG-TERM CURRENT USE OF INSULIN (H): Chronic | ICD-10-CM

## 2018-12-06 DIAGNOSIS — I50.22 CHRONIC SYSTOLIC CONGESTIVE HEART FAILURE (H): Primary | Chronic | ICD-10-CM

## 2018-12-06 RX ORDER — CHOLECALCIFEROL (VITAMIN D3) 25 MCG
1000 CAPSULE ORAL DAILY
Qty: 90 CAPSULE | Refills: 11 | Status: CANCELLED | OUTPATIENT
Start: 2018-12-06

## 2018-12-06 RX ORDER — POTASSIUM CHLORIDE 750 MG/1
CAPSULE, EXTENDED RELEASE ORAL
Qty: 360 CAPSULE | Refills: 11 | Status: CANCELLED | OUTPATIENT
Start: 2018-12-06

## 2018-12-06 RX ORDER — LANCETS 33 GAUGE
1 EACH MISCELLANEOUS 3 TIMES DAILY
Qty: 100 EACH | Refills: 1 | Status: SHIPPED | OUTPATIENT
Start: 2018-12-06 | End: 2018-12-07

## 2018-12-06 NOTE — TELEPHONE ENCOUNTER
Not all prescriptions were sent in at last OV  Per message below, [facility] does not have all the prescriptions requested so they cannot transfer what they do not have  Please review the meds pended by  pharmacy  Some are listed as historical/reported and some do not have diagnostic codes  Provider to please review    New pharmacy:  [facility] Ronquillo RN

## 2018-12-06 NOTE — TELEPHONE ENCOUNTER
Patient is changing pharmacies to [facility] and [facility] stated that they do not have any of the requested prescriptions.     Please sent to [facility].    Thank you,  Thais Pack, PharmD  [facility]  [phone]

## 2018-12-06 NOTE — TELEPHONE ENCOUNTER
I don't understand exactly. When we saw this patient for the first time it was less then a month ago. I thought we refilled all medications.    Is it difficult to transfer prescriptions from one pharmacy to another ? I didn't think so    If necessary lets resend all prescriptions just as we sent them at our appointment.    Reroute if additional input requested from me     Ac Frederick MD

## 2018-12-06 NOTE — TELEPHONE ENCOUNTER
This patient was seen by Dr. Frederick on 11/19/18 to establish care.  The listed medications have not been filled in our system; historical on med list.  Sending to Dr. Frederick to advise.  Iman Salamanca RN

## 2018-12-07 RX ORDER — LANCETS 33 GAUGE
1 EACH MISCELLANEOUS 3 TIMES DAILY
Qty: 300 EACH | Refills: 1 | Status: SHIPPED | OUTPATIENT
Start: 2018-12-07 | End: 2021-01-19

## 2018-12-07 NOTE — TELEPHONE ENCOUNTER
Patient notified.  He stated that he can purchase ASA and Vit D OTC  He agreed to stop potassium and have labs checked at next visit    Med list updated    Deepali Ronquillo RN

## 2018-12-07 NOTE — TELEPHONE ENCOUNTER
I have reviewed and refilled all the medications I can.    I don't think this patient has any convincing evidence of needing a potassium supplement. Spironolactone is a potassium sparing diuretic and I prefer patient not take this medication . We can recheck potassium at his next office visit     Vitamin D supplements are of uncertain need and I don't see a low vitamin D level. Besides this is an over the counter nonprescription medication . His Vitamin D level was within normal limits  6 months ago. He can certainly continue with 3000 international units a day if he wishes    Does he really want us to send in a prescription for aspirin ?    Ac Frederick MD

## 2018-12-13 ENCOUNTER — OFFICE VISIT (OUTPATIENT)
Dept: OPTOMETRY | Facility: CLINIC | Age: 58
End: 2018-12-13
Payer: COMMERCIAL

## 2018-12-13 DIAGNOSIS — H52.4 PRESBYOPIA: ICD-10-CM

## 2018-12-13 DIAGNOSIS — Z01.01 ENCOUNTER FOR EXAMINATION OF EYES AND VISION WITH ABNORMAL FINDINGS: Primary | ICD-10-CM

## 2018-12-13 DIAGNOSIS — H35.362 DRUSEN OF MACULA, LEFT: ICD-10-CM

## 2018-12-13 DIAGNOSIS — H04.123 DRY EYES: ICD-10-CM

## 2018-12-13 DIAGNOSIS — H52.13 MYOPIA OF BOTH EYES: ICD-10-CM

## 2018-12-13 DIAGNOSIS — H52.223 REGULAR ASTIGMATISM OF BOTH EYES: ICD-10-CM

## 2018-12-13 DIAGNOSIS — E11.9 TYPE 2 DIABETES MELLITUS WITHOUT RETINOPATHY (H): ICD-10-CM

## 2018-12-13 PROCEDURE — 92014 COMPRE OPH EXAM EST PT 1/>: CPT | Performed by: OPTOMETRIST

## 2018-12-13 PROCEDURE — 92015 DETERMINE REFRACTIVE STATE: CPT | Performed by: OPTOMETRIST

## 2018-12-13 ASSESSMENT — TONOMETRY
IOP_METHOD: APPLANATION
OD_IOP_MMHG: 17
OS_IOP_MMHG: 16

## 2018-12-13 ASSESSMENT — VISUAL ACUITY
CORRECTION_TYPE: GLASSES
OS_CC+: -1
OS_CC: 20/40
METHOD: SNELLEN - LINEAR
OD_SC: 20/120
OS_CC: 20/20
OD_SC: 20/250
OD_CC: 20/60
OS_SC: 20/200
OS_SC: 20/60
OD_CC: 20/50

## 2018-12-13 ASSESSMENT — REFRACTION_MANIFEST
OS_ADD: +2.50
OD_AXIS: 140
OD_SPHERE: -3.00
OS_CYLINDER: +2.25
OD_CYLINDER: +2.75
OS_SPHERE: -3.75
OS_AXIS: 073
OD_ADD: +2.50

## 2018-12-13 ASSESSMENT — REFRACTION_WEARINGRX
OD_ADD: +2.25
OS_ADD: +2.25
OS_CYLINDER: +1.75
OS_SPHERE: -3.50
OS_AXIS: 087
OD_AXIS: 135
OD_CYLINDER: +2.50
OD_SPHERE: -3.50
SPECS_TYPE: PAL

## 2018-12-13 ASSESSMENT — CONF VISUAL FIELD
OD_NORMAL: 1
OS_NORMAL: 1

## 2018-12-13 ASSESSMENT — EXTERNAL EXAM - RIGHT EYE: OD_EXAM: NORMAL

## 2018-12-13 ASSESSMENT — EXTERNAL EXAM - LEFT EYE: OS_EXAM: NORMAL

## 2018-12-13 ASSESSMENT — SLIT LAMP EXAM - LIDS
COMMENTS: NORMAL
COMMENTS: NORMAL

## 2018-12-13 ASSESSMENT — CUP TO DISC RATIO
OS_RATIO: 0.55
OD_RATIO: 0.55

## 2018-12-13 NOTE — PROGRESS NOTES
Chief Complaint   Patient presents with     Diabetic Eye Exam     Accompanied by self  Lab Results   Component Value Date    A1C 6.4 07/11/2018    A1C 8.1 04/09/2018    A1C 7.3 08/11/2017    A1C 6.6 03/22/2017       Last Eye Exam: 1 year ago  Dilated Previously: Yes    What are you currently using to see?  Glasses- 1 year old    Distance Vision Acuity: Satisfied with vision    Near Vision Acuity: Not satisfied     Eye Comfort: dry and itchy  Do you use eye drops? : Yes: when needed-refresh  Occupation or Hobbies:     [employer], [id number]     Medical, surgical and family histories reviewed and updated 12/13/2018.       OBJECTIVE: See Ophthalmology exam    ASSESSMENT:    ICD-10-CM    1. Encounter for examination of eyes and vision with abnormal findings Z01.01    2. Type 2 diabetes mellitus without retinopathy (H) E11.9    3. Dry eyes H04.123    4. 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 Myopia of both eyes H52.13    6. Regular astigmatism of both eyes H52.223    7. Presbyopia H52.4       PLAN:    Rhett Elizabeth aware  eye exam results will be sent to cA Frederick.  Patient Instructions    DRY EYE TREATMENT    I recommend using artificial tears for your dry eye. There are over the counter drops that work well and may be used up to 4x daily. ( systane balance, refresh optive, soothe xp)   If you need more than 4 drops daily, use a preservative free product which come in individual vials which may be used for 24 hours and discarded.     Artificial tears work best as a preventative and not as well after your eyes are starting to bother you.  It may take 4- 6 weeks of using the drops before you notice improvement.  If after that time you are still having problems schedule an appointment for an evaluation and discussion of different treatments.  Dry eyes are a chronic condition and you may have more symptoms at certain times of the year.      Additional recommended treatment:  Warm compresses once to  twice daily for 5-10 minutes    Directions for warm soaks  There are few methods for hot compresses. Moisten a washcloth with hot water, or microwave for 10 seconds, being careful to not get the cloth too hot.   Then put the washcloth onto your eyelids for 5 minutes. It will cool quickly so a rice pack or eyemask that can be heated and laid on top of the washcloth will help retain the heat.     Patient educated on importance of good blood sugar control.  Letter sent to primary care provider with diabetic eye exam report.     Continue taking Lutein supplements. Patient reports taking lutein supplements once daily for several years.     Rhett was advised of today's exam findings.  Fill glasses prescription  Allow 2 weeks to adapt to change in glasses  Wear glasses full time  Copy of glasses Rx provided today.  Return in 1 year for diabetic eye exam, or sooner if needed.    The affects of the dilating drops last for 4- 6 hours.  You will be more sensitive to light and vision will be blurry up close.  Mydriatic sunglasses were given if needed.      Abdirashid Diaz O.D.  [address]. [facility] [place]  [address]    [phone]

## 2018-12-13 NOTE — LETTER
12/13/2018         RE: Rhett Penningtonon  [address] [place] [address]        Dear Colleague,    Thank you for referring your patient, Rhett Elizabeth, to the [facility]. Please see a copy of my visit note below.    Chief Complaint   Patient presents with     Diabetic Eye Exam     Accompanied by self  Lab Results   Component Value Date    A1C 6.4 07/11/2018    A1C 8.1 04/09/2018    A1C 7.3 08/11/2017    A1C 6.6 03/22/2017       Last Eye Exam: 1 year ago  Dilated Previously: Yes    What are you currently using to see?  Glasses- 1 year old    Distance Vision Acuity: Satisfied with vision    Near Vision Acuity: Not satisfied     Eye Comfort: dry and itchy  Do you use eye drops? 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       OBJECTIVE: See Ophthalmology exam    ASSESSMENT:    ICD-10-CM    1. Encounter for examination of eyes and vision with abnormal findings Z01.01    2. Type 2 diabetes mellitus without retinopathy (H) E11.9    3. Dry eyes H04.123    4. 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 Myopia of both eyes H52.13    6. Regular astigmatism of both eyes H52.223    7. Presbyopia H52.4       PLAN:    Rhett LUC Glenn aware  eye exam results will be sent to Ac Frederick.  Patient Instructions    DRY EYE TREATMENT    I recommend using artificial tears for your dry eye. There are over the counter drops that work well and may be used up to 4x daily. ( systane balance, refresh optive, soothe xp)   If you need more than 4 drops daily, use a preservative free product which come in individual vials which may be used for 24 hours and discarded.     Artificial tears work best as a preventative and not as well after your eyes are starting to bother you.  It may take 4- 6 weeks of using the drops before you notice improvement.  If after that time you are still having  problems schedule an appointment for an evaluation and discussion of different treatments.  Dry eyes are a chronic condition and you may have more symptoms at certain times of the year.      Additional recommended treatment:  Warm compresses once to twice daily for 5-10 minutes    Directions for warm soaks  There are few methods for hot compresses. Moisten a washcloth with hot water, or microwave for 10 seconds, being careful to not get the cloth too hot.   Then put the washcloth onto your eyelids for 5 minutes. It will cool quickly so a rice pack or eyemask that can be heated and laid on top of the washcloth will help retain the heat.     Patient educated on importance of good blood sugar control.  Letter sent to primary care provider with diabetic eye exam report.     Continue taking Lutein supplements. Patient reports taking lutein supplements once daily for several years.     Rhett was advised of today's exam findings.  Fill glasses prescription  Allow 2 weeks to adapt to change in glasses  Wear glasses full time  Copy of glasses Rx provided today.  Return in 1 year for diabetic eye exam, or sooner if needed.    The affects of the dilating drops last for 4- 6 hours.  You will be more sensitive to light and vision will be blurry up close.  Mydriatic sunglasses were given if needed.      Abdirashid Diaz O.D.  [address]. [place]  [address] [phone]               Again, thank you for allowing me to participate in the care of your patient.        Sincerely,        Abdirashid Diaz OD

## 2018-12-13 NOTE — PATIENT INSTRUCTIONS
DRY EYE TREATMENT    I recommend using artificial tears for your dry eye. There are over the counter drops that work well and may be used up to 4x daily. ( systane balance, refresh optive, soothe xp)   If you need more than 4 drops daily, use a preservative free product which come in individual vials which may be used for 24 hours and discarded.     Artificial tears work best as a preventative and not as well after your eyes are starting to bother you.  It may take 4- 6 weeks of using the drops before you notice improvement.  If after that time you are still having problems schedule an appointment for an evaluation and discussion of different treatments.  Dry eyes are a chronic condition and you may have more symptoms at certain times of the year.      Additional recommended treatment:  Warm compresses once to twice daily for 5-10 minutes    Directions for warm soaks  There are few methods for hot compresses. Moisten a washcloth with hot water, or microwave for 10 seconds, being careful to not get the cloth too hot.   Then put the washcloth onto your eyelids for 5 minutes. It will cool quickly so a rice pack or eyemask that can be heated and laid on top of the washcloth will help retain the heat.     Patient educated on importance of good blood sugar control.  Letter sent to primary care provider with diabetic eye exam report.     Continue taking Lutein supplements. Patient reports taking lutein supplements once daily for several years.     Rhett was advised of today's exam findings.  Fill glasses prescription  Allow 2 weeks to adapt to change in glasses  Wear glasses full time  Copy of glasses Rx provided today.  Return in 1 year for diabetic eye exam, or sooner if needed.    The affects of the dilating drops last for 4- 6 hours.  You will be more sensitive to light and vision will be blurry up close.  Mydriatic sunglasses were given if needed.      Abdirashid Diaz O.D.  [facility]  [address]  [facility] [place]  [address]    [phone]

## 2018-12-18 ENCOUNTER — MEDICAL CORRESPONDENCE (OUTPATIENT)
Dept: HEALTH INFORMATION MANAGEMENT | Facility: CLINIC | Age: 58
End: 2018-12-18

## 2018-12-26 DIAGNOSIS — E11.40 TYPE 2 DIABETES MELLITUS WITH DIABETIC NEUROPATHY, WITHOUT LONG-TERM CURRENT USE OF INSULIN (H): Chronic | ICD-10-CM

## 2018-12-27 RX ORDER — METFORMIN HCL 500 MG
1000 TABLET, EXTENDED RELEASE 24 HR ORAL 2 TIMES DAILY WITH MEALS
Qty: 360 TABLET | Refills: 0 | Status: SHIPPED | OUTPATIENT
Start: 2018-12-27 | End: 2019-06-24

## 2019-03-26 ENCOUNTER — TELEPHONE (OUTPATIENT)
Dept: FAMILY MEDICINE | Facility: CLINIC | Age: 59
End: 2019-03-26

## 2019-03-26 NOTE — TELEPHONE ENCOUNTER
Panel Management Review      Patient has the following on his problem list:     Diabetes    ASA: Passed    Last A1C  Lab Results   Component Value Date    A1C 6.4 07/11/2018    A1C 8.1 04/09/2018    A1C 7.3 08/11/2017    A1C 6.6 03/22/2017     A1C tested: FAILED    Last LDL:    Lab Results   Component Value Date    CHOL 105 04/24/2018     Lab Results   Component Value Date    HDL 37 04/24/2018     Lab Results   Component Value Date    LDL 53 04/24/2018     Lab Results   Component Value Date    TRIG 76 04/24/2018     No results found for: CHOLHDLRATIO  Lab Results   Component Value Date    NHDL 68 04/24/2018       Is the patient on a Statin? YES             Is the patient on Aspirin? YES    Medications     HMG CoA Reductase Inhibitors     simvastatin (ZOCOR) 20 MG tablet       Salicylates     aspirin 81 MG tablet             Last three blood pressure readings:  BP Readings from Last 3 Encounters:   11/19/18 120/80   05/31/18 113/76   04/13/18 115/74       Date of last diabetes office visit: 11/19/2019     Tobacco History:     History   Smoking Status     Never Smoker   Smokeless Tobacco     Current User     Types: Snuff           Composite cancer screening  Chart review shows that this patient is due/due soon for the following NoneNone  Summary:    Patient is due/failing the following:   Zoster, adv directives, and A1C    Action needed:   Patient needs office visit for diabetes.    Type of outreach:    Sent ViaBill message.    Questions for provider review:    None                                                                                                                                    Isidra CAPUTO CMA ([facility])       Chart routed to none .

## 2019-05-16 PROBLEM — E78.5 HYPERLIPIDEMIA LDL GOAL <100: Status: ACTIVE | Noted: 2019-05-16

## 2019-05-17 ENCOUNTER — OFFICE VISIT (OUTPATIENT)
Dept: FAMILY MEDICINE | Facility: CLINIC | Age: 59
End: 2019-05-17
Payer: COMMERCIAL

## 2019-05-17 VITALS
HEART RATE: 88 BPM | DIASTOLIC BLOOD PRESSURE: 60 MMHG | TEMPERATURE: 98.6 F | OXYGEN SATURATION: 95 % | RESPIRATION RATE: 20 BRPM | WEIGHT: 288.8 LBS | BODY MASS INDEX: 35.91 KG/M2 | SYSTOLIC BLOOD PRESSURE: 110 MMHG | HEIGHT: 75 IN

## 2019-05-17 DIAGNOSIS — I48.19 PERSISTENT ATRIAL FIBRILLATION (H): ICD-10-CM

## 2019-05-17 DIAGNOSIS — Z71.89 ADVANCED DIRECTIVES, COUNSELING/DISCUSSION: ICD-10-CM

## 2019-05-17 DIAGNOSIS — I50.22 CHRONIC SYSTOLIC CONGESTIVE HEART FAILURE (H): Chronic | ICD-10-CM

## 2019-05-17 DIAGNOSIS — E11.40 TYPE 2 DIABETES MELLITUS WITH DIABETIC NEUROPATHY, WITHOUT LONG-TERM CURRENT USE OF INSULIN (H): Primary | Chronic | ICD-10-CM

## 2019-05-17 DIAGNOSIS — Z87.448 PERSONAL HISTORY OF URETHRAL STRICTURE: ICD-10-CM

## 2019-05-17 DIAGNOSIS — E78.5 HYPERLIPIDEMIA LDL GOAL <100: ICD-10-CM

## 2019-05-17 DIAGNOSIS — I10 ESSENTIAL HYPERTENSION WITH GOAL BLOOD PRESSURE LESS THAN 140/90: ICD-10-CM

## 2019-05-17 DIAGNOSIS — N39.0 URINARY TRACT INFECTION WITHOUT HEMATURIA, SITE UNSPECIFIED: ICD-10-CM

## 2019-05-17 DIAGNOSIS — G47.33 OSA (OBSTRUCTIVE SLEEP APNEA): ICD-10-CM

## 2019-05-17 DIAGNOSIS — R82.90 NONSPECIFIC FINDING ON EXAMINATION OF URINE: ICD-10-CM

## 2019-05-17 DIAGNOSIS — E03.9 ACQUIRED HYPOTHYROIDISM: Chronic | ICD-10-CM

## 2019-05-17 DIAGNOSIS — Z23 NEED FOR SHINGLES VACCINE: ICD-10-CM

## 2019-05-17 DIAGNOSIS — R55 SYNCOPE, UNSPECIFIED SYNCOPE TYPE: ICD-10-CM

## 2019-05-17 LAB
ALBUMIN UR-MCNC: 100 MG/DL
ALT SERPL W P-5'-P-CCNC: 20 U/L (ref 0–70)
ANION GAP SERPL CALCULATED.3IONS-SCNC: 11 MMOL/L (ref 3–14)
APPEARANCE UR: CLEAR
BACTERIA #/AREA URNS HPF: ABNORMAL /HPF
BASOPHILS # BLD AUTO: 0 10E9/L (ref 0–0.2)
BASOPHILS NFR BLD AUTO: 0.2 %
BILIRUB UR QL STRIP: ABNORMAL
BUN SERPL-MCNC: 19 MG/DL (ref 7–30)
CALCIUM SERPL-MCNC: 9.2 MG/DL (ref 8.5–10.1)
CHLORIDE SERPL-SCNC: 100 MMOL/L (ref 94–109)
CHOLEST SERPL-MCNC: 91 MG/DL
CO2 SERPL-SCNC: 25 MMOL/L (ref 20–32)
COLOR UR AUTO: YELLOW
CREAT SERPL-MCNC: 0.94 MG/DL (ref 0.66–1.25)
CREAT UR-MCNC: 311 MG/DL
DIFFERENTIAL METHOD BLD: ABNORMAL
EOSINOPHIL # BLD AUTO: 0.5 10E9/L (ref 0–0.7)
EOSINOPHIL NFR BLD AUTO: 3.8 %
ERYTHROCYTE [DISTWIDTH] IN BLOOD BY AUTOMATED COUNT: 13.5 % (ref 10–15)
GFR SERPL CREATININE-BSD FRML MDRD: 90 ML/MIN/{1.73_M2}
GLUCOSE SERPL-MCNC: 148 MG/DL (ref 70–99)
GLUCOSE UR STRIP-MCNC: NEGATIVE MG/DL
HBA1C MFR BLD: 5.7 % (ref 0–5.6)
HCT VFR BLD AUTO: 43.1 % (ref 40–53)
HDLC SERPL-MCNC: 31 MG/DL
HGB BLD-MCNC: 14.9 G/DL (ref 13.3–17.7)
HGB UR QL STRIP: ABNORMAL
KETONES UR STRIP-MCNC: 15 MG/DL
LDLC SERPL CALC-MCNC: 46 MG/DL
LEUKOCYTE ESTERASE UR QL STRIP: ABNORMAL
LYMPHOCYTES # BLD AUTO: 1.9 10E9/L (ref 0.8–5.3)
LYMPHOCYTES NFR BLD AUTO: 14.7 %
MCH RBC QN AUTO: 30.7 PG (ref 26.5–33)
MCHC RBC AUTO-ENTMCNC: 34.6 G/DL (ref 31.5–36.5)
MCV RBC AUTO: 89 FL (ref 78–100)
MICROALBUMIN UR-MCNC: 512 MG/L
MICROALBUMIN/CREAT UR: 164.63 MG/G CR (ref 0–17)
MONOCYTES # BLD AUTO: 1.3 10E9/L (ref 0–1.3)
MONOCYTES NFR BLD AUTO: 10.2 %
NEUTROPHILS # BLD AUTO: 9.2 10E9/L (ref 1.6–8.3)
NEUTROPHILS NFR BLD AUTO: 71.1 %
NITRATE UR QL: NEGATIVE
NON-SQ EPI CELLS #/AREA URNS LPF: ABNORMAL /LPF
NONHDLC SERPL-MCNC: 60 MG/DL
PH UR STRIP: 6 PH (ref 5–7)
PLATELET # BLD AUTO: 212 10E9/L (ref 150–450)
POTASSIUM SERPL-SCNC: 4.5 MMOL/L (ref 3.4–5.3)
RBC # BLD AUTO: 4.86 10E12/L (ref 4.4–5.9)
RBC #/AREA URNS AUTO: ABNORMAL /HPF
SODIUM SERPL-SCNC: 136 MMOL/L (ref 133–144)
SOURCE: ABNORMAL
SP GR UR STRIP: >1.03 (ref 1–1.03)
T4 FREE SERPL-MCNC: 1.78 NG/DL (ref 0.76–1.46)
TRIGL SERPL-MCNC: 72 MG/DL
TSH SERPL DL<=0.005 MIU/L-ACNC: 0.3 MU/L (ref 0.4–4)
URNS CMNT MICRO: ABNORMAL
UROBILINOGEN UR STRIP-ACNC: 0.2 EU/DL (ref 0.2–1)
WBC # BLD AUTO: 12.9 10E9/L (ref 4–11)
WBC #/AREA URNS AUTO: >100 /HPF

## 2019-05-17 PROCEDURE — 82043 UR ALBUMIN QUANTITATIVE: CPT | Performed by: INTERNAL MEDICINE

## 2019-05-17 PROCEDURE — 85025 COMPLETE CBC W/AUTO DIFF WBC: CPT | Performed by: INTERNAL MEDICINE

## 2019-05-17 PROCEDURE — 80061 LIPID PANEL: CPT | Performed by: INTERNAL MEDICINE

## 2019-05-17 PROCEDURE — 87186 SC STD MICRODIL/AGAR DIL: CPT | Performed by: INTERNAL MEDICINE

## 2019-05-17 PROCEDURE — 87086 URINE CULTURE/COLONY COUNT: CPT | Performed by: INTERNAL MEDICINE

## 2019-05-17 PROCEDURE — 84443 ASSAY THYROID STIM HORMONE: CPT | Performed by: INTERNAL MEDICINE

## 2019-05-17 PROCEDURE — 81001 URINALYSIS AUTO W/SCOPE: CPT | Performed by: INTERNAL MEDICINE

## 2019-05-17 PROCEDURE — 99214 OFFICE O/P EST MOD 30 MIN: CPT | Performed by: INTERNAL MEDICINE

## 2019-05-17 PROCEDURE — 36415 COLL VENOUS BLD VENIPUNCTURE: CPT | Performed by: INTERNAL MEDICINE

## 2019-05-17 PROCEDURE — 87088 URINE BACTERIA CULTURE: CPT | Performed by: INTERNAL MEDICINE

## 2019-05-17 PROCEDURE — 84460 ALANINE AMINO (ALT) (SGPT): CPT | Performed by: INTERNAL MEDICINE

## 2019-05-17 PROCEDURE — 80048 BASIC METABOLIC PNL TOTAL CA: CPT | Performed by: INTERNAL MEDICINE

## 2019-05-17 PROCEDURE — 83036 HEMOGLOBIN GLYCOSYLATED A1C: CPT | Performed by: INTERNAL MEDICINE

## 2019-05-17 PROCEDURE — 84439 ASSAY OF FREE THYROXINE: CPT | Performed by: INTERNAL MEDICINE

## 2019-05-17 ASSESSMENT — PAIN SCALES - GENERAL: PAINLEVEL: EXTREME PAIN (8)

## 2019-05-17 ASSESSMENT — MIFFLIN-ST. JEOR: SCORE: 2220.62

## 2019-05-17 NOTE — PROGRESS NOTES
SUBJECTIVE:   Rhett Elizabeth is a 58 year old male who presents to clinic today for the following   health issues:      {  Chief Complaint   Patient presents with     health screening     Urinary Problem     hard to go         Type 2 diabetes mellitus with diabetic neuropathy, without long-term current use of insulin (H)  Chronic systolic congestive heart failure (H)  Acquired hypothyroidism  Essential hypertension with goal blood pressure less than 140/90  Persistent atrial fibrillation (H)  LANEY (obstructive sleep apnea)  Need for shingles vaccine  Advanced directives, counseling/discussion  Hyperlipidemia LDL goal <100  Personal history of urethral stricture  Syncope, unspecified syncope type  Nonspecific finding on examination of urine       Last appointment with me was 11-19-18 and that was get acquainted visit with a new patient to the Internal Medicine department. This patient indeed has a highly complex challenging history and today reveals additional aspects to history not discussed at last appointment.    Preventative healthcare maintenance goals including preventative health care visit, Heart failure action plan [printed], ShingRx vaccination against herpes zoster, advanced directive counseling and discussion - we printed POLST (Provider Orders for Life Sustaining Treatment) today for patient to sign off on, PHQ-2 depression survey questions, hemoglobin a1c  [ diabetes test ], fasting lipid panel , basic metabolic panel, TSH ( thyroid test ), ALT [ liver test ] , complete blood count with differential     His wife passed away from breast cancer within the recent past. She had the BRCA1 gene or the BRCA2 gene .   He's now taking Liraglutide [ Victoza ] for about a year and his diabetes mellitus is well controlled.    Doesn't feel good many days. Recently had a syncopal episode at home. He doesn't understand why, and twisted his knee this was last night. He blames his medications for this. He  "self-adjusted his diuretic therapy and cut back his water pill to a 1/2 pill. Now he's stopped it completely. Difficulty with urination, he has a damaged urethra. He's had a big workup in the past and the conclusion was he needs treatment for a ureteral stricture, a urethroplasty surgery was recommended apparently. But his leg fracture put this surgery on hold. He's since postponed this surgery but he's developing urinary tract infection symptoms and urination difficulties that are increasingly severe . Sometimes takes 20 minutes to void and he only feels like he is \" taking the top of his \" full bladder\" . Patient had an attempt at a cystoscopy , this has been done before. Nurse reportedly pulled out a catheter without deflating the cuevas catheter bulb and he says he felt he was going to die from this. Has a lot of trepidation around urologists and unfortunately we lack all of this documentation       Additional history: as documented    Reviewed  and updated as needed this visit by clinical staff  Tobacco  Allergies  Meds  Med Hx  Surg Hx  Fam Hx  Soc Hx        Reviewed and updated as needed this visit by Provider         Patient Active Problem List   Diagnosis     Neuropathic pain     Essential hypertension with goal blood pressure less than 140/90     Persistent atrial fibrillation (H)     LANEY (obstructive sleep apnea)     Type 2 diabetes mellitus with diabetic neuropathy, without long-term current use of insulin (H)     Chronic systolic congestive heart failure (H)     Drusen of macula, left     Dry eyes     Morbid obesity (H)     S/P ICD (internal cardiac defibrillator) procedure     Recent bereavement     Acquired hypothyroidism     Hyperlipidemia LDL goal <100     Past Surgical History:   Procedure Laterality Date     STRABISMUS SURGERY         Social History     Tobacco Use     Smoking status: Never Smoker     Smokeless tobacco: Current User     Types: Snuff   Substance Use Topics     Alcohol use: No "     Family History   Problem Relation Age of Onset     Prostate Cancer Father      Glaucoma No family hx of      Macular Degeneration No family hx of          Current Outpatient Medications   Medication Sig Dispense Refill     aspirin 81 MG tablet Take by mouth daily Reported on 5/5/2017       blood glucose monitoring (ONETOUCH VERIO IQ) test strip Use to test blood sugar 3 times daily or as directed.  Ok to substitute alternative if insurance prefers. 300 strip 1     DULoxetine (CYMBALTA) 60 MG EC capsule Take 1 capsule (60 mg) by mouth daily 90 capsule 1     levothyroxine (SYNTHROID/LEVOTHROID) 100 MCG tablet TAKE 1 TABLET (100 MCG) BY MOUTH DAILY 90 tablet 1     liraglutide (VICTOZA) 18 MG/3ML soln Inject 1.2 mg Subcutaneous daily 18 mL 3     losartan (COZAAR) 25 MG tablet Take 1 tablet (25 mg) by mouth daily 90 tablet 1     Lutein 20 MG CAPS Take 20 mg by mouth daily 8/11/2017- patient states he takes 1 20 mg tablet daily  Patient states he takes 150 mg tablet twice a day.       metFORMIN (GLUCOPHAGE-XR) 500 MG 24 hr tablet Take 2 tablets (1,000 mg) by mouth 2 times daily (with meals) 360 tablet 0     metoprolol succinate (TOPROL XL) 50 MG 24 hr tablet Take 1 tablet (50 mg) by mouth daily 90 tablet 1     Multiple Vitamin (MULTIVITAMINS PO) Reported on 5/22/2017       Omega-3 Fatty Acids (OMEGA-3 FISH OIL PO) Take 1 g by mouth Reported on 5/5/2017       rivaroxaban ANTICOAGULANT (XARELTO) 20 MG TABS tablet Take 20 mg by mouth daily (with dinner) Reported on 5/5/2017       simvastatin (ZOCOR) 20 MG tablet TAKE 1 TABLET (20 MG) BY MOUTH AT BEDTIME 30 tablet 11     spironolactone (ALDACTONE) 25 MG tablet Take 1 tablet (25 mg) by mouth daily 90 tablet 1     traMADol (ULTRAM) 50 MG tablet Take 50 mg by mouth       UNABLE TO FIND cpap       Cholecalciferol (VITAMIN D-3 PO) Take 1,000 Units by mouth daily       insulin pen needle (BD ARNOLD U/F) 32G X 4 MM miscellaneous Use 2 daily as directed. (Patient not taking:  "Reported on 5/17/2019) 200 each 1     nortriptyline (PAMELOR) 25 MG capsule 1 CAPSULE DAILY BY MOUTH  11     Nutritional Supplements (ENSURE COMPLETE PO)        ONETOUCH DELICA LANCETS 33G MISC 1 Box 3 times daily (Patient not taking: Reported on 5/17/2019) 300 each 1     pregabalin 300 MG CAPS capsule Take 1 capsule (300 mg) by mouth 2 times daily Reported on 5/16/2017 (Patient not taking: Reported on 5/17/2019) 60 capsule 5     Allergies   Allergen Reactions     Amlodipine Difficulty breathing     Other reaction(s): Other  \"legs swell\"  Swelling of the lips and tongue     Ace Inhibitors Cough     Other reaction(s): Cough     Recent Labs   Lab Test 05/17/19  0913 11/19/18  1004 07/11/18  0703 05/31/18  0913 04/24/18  0712  04/09/18  0851 02/09/18  0826 02/03/18 05/22/17  1337  03/22/17  1624   A1C 5.7*  --  6.4*  --   --   --  8.1*  --   --    < >  --   --  6.6*   LDL  --   --   --   --  53  --   --   --  57  --   --   --  52   HDL  --   --   --   --  37*  --   --   --  34*  --   --   --  44   TRIG  --   --   --   --  76  --   --   --  117  --   --   --  92   ALT  --   --   --  25  --   --   --  34  --   --  20  --   --    CR  --  0.89 1.04 1.12  --    < >  --  1.06  --    < >  --    < > 1.03   GFRESTIMATED  --  89 74 68  --    < >  --  72  --    < >  --    < > 75   GFRESTBLACK  --  >90 89 82  --    < >  --  87  --    < >  --    < > >90   GFR Calc     POTASSIUM  --  4.3 4.2 4.0  --    < >  --  3.7  --    < >  --    < > 4.4   TSH  --   --   --  0.92 2.82  --   --   --   --   --   --    < > 1.64    < > = values in this interval not displayed.      BP Readings from Last 3 Encounters:   05/17/19 110/60   11/19/18 120/80   05/31/18 113/76    Wt Readings from Last 3 Encounters:   05/17/19 131 kg (288 lb 12.8 oz)   11/19/18 140.2 kg (309 lb)   05/31/18 (!) 150.1 kg (331 lb)                  Labs reviewed in EPIC    ROS:  Constitutional, HEENT, cardiovascular, pulmonary, gi and gu systems are negative, " "except as otherwise noted.    OBJECTIVE:                                                    /60   Pulse 88   Temp 98.6  F (37  C) (Oral)   Resp 20   Ht 1.905 m (6' 3\")   Wt 131 kg (288 lb 12.8 oz)   SpO2 95%   BMI 36.10 kg/m    Body mass index is 36.1 kg/m .  GENERAL APPEARANCE: healthy, alert and no distress  EYES: Eyes grossly normal to inspection, PERRL and conjunctivae and sclerae normal  RESP: lungs clear to auscultation - no rales, rhonchi or wheezes  CV: regular rates and rhythm, normal S1 S2, no S3 or S4 and no murmur, click or rub  NEURO: Normal strength and tone, mentation intact and speech normal  PSYCH: mentation appears normal and affect normal/bright    Diagnostic test results:  Diagnostic Test Results:  Orders Placed This Encounter   Procedures     CBC with platelets differential     Lipid panel reflex to direct LDL Non-fasting     Hemoglobin A1c     Basic metabolic panel     ALT     TSH with free T4 reflex     UA with Microscopic reflex to Culture     Albumin Random Urine Quantitative with Creat Ratio          ASSESSMENT/PLAN:                                                    1. Type 2 diabetes mellitus with diabetic neuropathy, without long-term current use of insulin (H)  Has been well controlled , will reassess today with recheck laboratory studies   - Hemoglobin A1c  - Basic metabolic panel  - Albumin Random Urine Quantitative with Creat Ratio    2. Chronic systolic congestive heart failure (H)  Stable phase of chronic illness   - CBC with platelets differential    3. Acquired hypothyroidism  Due for recheck   - TSH with free T4 reflex    4. Essential hypertension with goal blood pressure less than 140/90  Controlled within acceptable limits , no refills requested this office visit   - Basic metabolic panel    5. Persistent atrial fibrillation (H)  Continue current plan of care    - Basic metabolic panel    6. LANEY (obstructive sleep apnea)      7. Need for shingles vaccine  Pharmacy " router form given     8. Advanced directives, counseling/discussion  POLST (Provider Orders for Life Sustaining Treatment) printed    9. Hyperlipidemia LDL goal <100  Due for recheck   - Lipid panel reflex to direct LDL Non-fasting  - ALT    10. Personal history of urethral stricture  Had patient sign a release of information and I am most acutely concerned to check his urine analysis and basic metabolic panel to help me decide on need for acuity level of care  - UA with Microscopic reflex to Culture    11. Syncope, unspecified syncope type  Beyond the scope of what we can accomplish today . We reviewed his history and symptoms and I agreed to follow up regarding laboratory results  . I recommended he discussed his symptoms with his cardiologist . And / or schedule follow up with me in 1-2 weeks for additional follow up / workup     12. Nonspecific finding on examination of urine    - Urine Culture Aerobic Bacterial      Follow up with Provider - depending on the test results / depending on how things go 1-3 months      Ac Frederick MD  [facility]

## 2019-05-17 NOTE — LETTER
My Heart Failure Action Plan   Name: Rhett Elizabeth    YOB: 1960   Date: 5/16/2019    My doctor: Ac Frederick     [address]  [place] [id number] [phone]  My Diagnosis: Systolic Heart Failure   My Ejection Fraction: Over 50%    My Exercise Goal: 30 minutes daily  .     My Weight Goal:    Wt Readings from Last 2 Encounters:   11/19/18 140.2 kg (309 lb)   05/31/18 (!) 150.1 kg (331 lb)     Weigh yourself daily using the same scale. If you gain more than 2 pounds in 24 hours or 5 pounds in a week call the clinic    My Diet Goal: No added salt    Emergency Room Visits:    Our goal is to improve your quality of life and help you avoid a visit to the emergency room or hospital.  If we work together, we can achieve this goal. But, if you feel you need to call 911 or go to the emergency room, please do so.  If you go to the emergency room, please bring your list of medicines and your daily weight chart with you.       GREEN ZONE     Doing well today    Weight gained is no more than 2 pounds a day or 5 pounds a week.    No swelling in feet, ankles, legs or stomach.    No more swelling than usual.    No more trouble breathing than usual.    No change in my sleep.    No other problems. Actions:    I am doing fine.  I will take my medicine, follow my diet, see my doctor, exercise, and watch for symptoms.           YELLOW ZONE         Having a bad day or flare up    Weight gain of more than 2 pounds in one day or 5 pounds in one week.    New swelling in ankle, leg, knee or thigh.    Bloating in belly, pants feel tighter.    Swelling in hands or face.    Coughing or trouble breathing while walking or talking.    Harder to breathe last night.    Have trouble sleeping, wake up short of breath.    Much more tired than usual.    Not eating.    Pain in my chest or bad leg cramps.    Feel weak or dizzy. Actions:    I need to take action and call my doctor or nurse  today.                 RED ZONE         Need medical care now    Weight gain of 5 pounds overnight.    Chest pain or pressure that does not go away.    Feel less alert.    Wheezing or have trouble breathing when at rest.    Cannot sleep lying down.    Cannot take my water pill.    Pass out or faint. Actions:    I need to call my doctor or nurse now!    Call 911 if I have chest pain or cannot breathe.

## 2019-05-19 LAB
BACTERIA SPEC CULT: ABNORMAL
SPECIMEN SOURCE: ABNORMAL

## 2019-05-19 RX ORDER — CIPROFLOXACIN 500 MG/1
500 TABLET, FILM COATED ORAL 2 TIMES DAILY
Qty: 14 TABLET | Refills: 0 | Status: SHIPPED | OUTPATIENT
Start: 2019-05-19 | End: 2019-10-11

## 2019-05-23 ENCOUNTER — TELEPHONE (OUTPATIENT)
Dept: FAMILY MEDICINE | Facility: CLINIC | Age: 59
End: 2019-05-23

## 2019-05-23 DIAGNOSIS — E11.40 TYPE 2 DIABETES MELLITUS WITH DIABETIC NEUROPATHY, WITHOUT LONG-TERM CURRENT USE OF INSULIN (H): Chronic | ICD-10-CM

## 2019-05-23 PROBLEM — Z87.448 PERSONAL HISTORY OF URETHRAL STRICTURE: Status: ACTIVE | Noted: 2019-05-23

## 2019-05-23 RX ORDER — PREGABALIN 300 MG/1
300 CAPSULE ORAL 2 TIMES DAILY
Qty: 60 CAPSULE | Refills: 5 | Status: SHIPPED | OUTPATIENT
Start: 2019-05-23 | End: 2019-11-25

## 2019-05-23 NOTE — TELEPHONE ENCOUNTER
[place] reviewed:  reviewed- no concerns    [place]:  [email or web address]    Jackelin Link RN

## 2019-05-23 NOTE — TELEPHONE ENCOUNTER
Controlled Substance Refill Request for pregabalin 300 MG CAPS capsule  Problem List Complete:  No     PROVIDER TO CONSIDER COMPLETION OF PROBLEM LIST AND OVERVIEW/CONTROLLED SUBSTANCE AGREEMENT    Last Written Prescription Date:  11-  Last Fill Quantity: 60,   # refills: 5    THE MOST RECENT OFFICE VISIT MUST BE WITHIN THE PAST 3 MONTHS. AT LEAST ONE FACE TO FACE VISIT MUST OCCUR EVERY 6 MONTHS. ADDITIONAL VISITS CAN BE VIRTUAL.  (THIS STATEMENT SHOULD BE DELETED.)    Last Office Visit with [facility] primary care provider: 5/17/2019    Future Office visit:     Controlled substance agreement:   Encounter-Level CSA:    There are no encounter-level csa.     Patient-Level CSA:    There are no patient-level csa.         Last Urine Drug Screen: No results found for: CDAUT, No results found for: COMDAT, No results found for: THC13, PCP13, COC13, MAMP13, OPI13, AMP13, BZO13, TCA13, MTD13, BAR13, OXY13, PPX13, BUP13      [email or web address]       checked in past 3 months?  No, route to RN

## 2019-06-06 DIAGNOSIS — E78.5 HYPERLIPIDEMIA, UNSPECIFIED HYPERLIPIDEMIA TYPE: ICD-10-CM

## 2019-06-06 RX ORDER — SIMVASTATIN 20 MG
TABLET ORAL
Qty: 30 TABLET | Refills: 11 | Status: SHIPPED | OUTPATIENT
Start: 2019-06-06 | End: 2020-06-09

## 2019-06-20 DIAGNOSIS — E11.40 TYPE 2 DIABETES MELLITUS WITH DIABETIC NEUROPATHY, WITHOUT LONG-TERM CURRENT USE OF INSULIN (H): Chronic | ICD-10-CM

## 2019-06-24 RX ORDER — METFORMIN HCL 500 MG
1000 TABLET, EXTENDED RELEASE 24 HR ORAL 2 TIMES DAILY WITH MEALS
Qty: 360 TABLET | Refills: 0 | Status: SHIPPED | OUTPATIENT
Start: 2019-06-24 | End: 2019-09-30

## 2019-06-24 NOTE — TELEPHONE ENCOUNTER
Prescription approved per [facility] Refill Protocol.  Mónica Maravilla RN

## 2019-08-09 ENCOUNTER — TELEPHONE (OUTPATIENT)
Dept: FAMILY MEDICINE | Facility: CLINIC | Age: 59
End: 2019-08-09

## 2019-08-09 NOTE — TELEPHONE ENCOUNTER
Reason for Call:  Form, our goal is to have forms completed with 72 hours, however, some forms may require a visit or additional information.    Type of letter, form or note:  legal    Who is the form from?: Patient    Where did the form come from: Patient or family brought in       What clinic location was the form placed at?: [facility]    Where the form was placed: 'S Box/Folder    What number is listed as a contact on the form?: [phone]- SANTI       Additional comments: Please call the patient at the number listed above when completed form has been faxed to [phone].    Call taken on 8/9/2019 at 8:21 AM by Liborio Hess

## 2019-09-26 DIAGNOSIS — E11.40 TYPE 2 DIABETES MELLITUS WITH DIABETIC NEUROPATHY, WITHOUT LONG-TERM CURRENT USE OF INSULIN (H): Chronic | ICD-10-CM

## 2019-09-30 RX ORDER — METFORMIN HCL 500 MG
1000 TABLET, EXTENDED RELEASE 24 HR ORAL 2 TIMES DAILY WITH MEALS
Qty: 120 TABLET | Refills: 0 | Status: SHIPPED | OUTPATIENT
Start: 2019-09-30 | End: 2019-10-14

## 2019-09-30 NOTE — TELEPHONE ENCOUNTER
2nd attempt, VM left for patient to return call to clinic regarding below message. OSMANI Randall

## 2019-09-30 NOTE — TELEPHONE ENCOUNTER
Medication is being filled for 1 time refill only due to:  Patient needs to be seen because needs appt.

## 2019-10-11 ENCOUNTER — OFFICE VISIT (OUTPATIENT)
Dept: FAMILY MEDICINE | Facility: CLINIC | Age: 59
End: 2019-10-11
Payer: COMMERCIAL

## 2019-10-11 VITALS
SYSTOLIC BLOOD PRESSURE: 128 MMHG | RESPIRATION RATE: 17 BRPM | TEMPERATURE: 97.5 F | HEART RATE: 72 BPM | HEIGHT: 75 IN | BODY MASS INDEX: 33.3 KG/M2 | OXYGEN SATURATION: 100 % | DIASTOLIC BLOOD PRESSURE: 64 MMHG | WEIGHT: 267.8 LBS

## 2019-10-11 DIAGNOSIS — I50.22 CHRONIC SYSTOLIC CONGESTIVE HEART FAILURE (H): Chronic | ICD-10-CM

## 2019-10-11 DIAGNOSIS — E11.40 TYPE 2 DIABETES MELLITUS WITH DIABETIC NEUROPATHY, WITHOUT LONG-TERM CURRENT USE OF INSULIN (H): Chronic | ICD-10-CM

## 2019-10-11 DIAGNOSIS — I10 ESSENTIAL HYPERTENSION WITH GOAL BLOOD PRESSURE LESS THAN 140/90: ICD-10-CM

## 2019-10-11 DIAGNOSIS — Z23 NEED FOR PROPHYLACTIC VACCINATION AND INOCULATION AGAINST INFLUENZA: ICD-10-CM

## 2019-10-11 DIAGNOSIS — N52.9 ERECTILE DYSFUNCTION, UNSPECIFIED ERECTILE DYSFUNCTION TYPE: Primary | ICD-10-CM

## 2019-10-11 DIAGNOSIS — E03.9 HYPOTHYROIDISM, UNSPECIFIED TYPE: ICD-10-CM

## 2019-10-11 LAB — HBA1C MFR BLD: 5.3 % (ref 0–5.6)

## 2019-10-11 PROCEDURE — 90471 IMMUNIZATION ADMIN: CPT | Performed by: INTERNAL MEDICINE

## 2019-10-11 PROCEDURE — 90682 RIV4 VACC RECOMBINANT DNA IM: CPT | Performed by: INTERNAL MEDICINE

## 2019-10-11 PROCEDURE — 36415 COLL VENOUS BLD VENIPUNCTURE: CPT | Performed by: INTERNAL MEDICINE

## 2019-10-11 PROCEDURE — 84443 ASSAY THYROID STIM HORMONE: CPT | Performed by: INTERNAL MEDICINE

## 2019-10-11 PROCEDURE — 99214 OFFICE O/P EST MOD 30 MIN: CPT | Mod: 25 | Performed by: INTERNAL MEDICINE

## 2019-10-11 PROCEDURE — 83036 HEMOGLOBIN GLYCOSYLATED A1C: CPT | Performed by: INTERNAL MEDICINE

## 2019-10-11 PROCEDURE — 84439 ASSAY OF FREE THYROXINE: CPT | Performed by: INTERNAL MEDICINE

## 2019-10-11 RX ORDER — LOSARTAN POTASSIUM 25 MG/1
25 TABLET ORAL DAILY
Qty: 90 TABLET | Refills: 1 | Status: SHIPPED | OUTPATIENT
Start: 2019-10-11 | End: 2020-04-11

## 2019-10-11 RX ORDER — SILDENAFIL CITRATE 20 MG/1
20 TABLET ORAL SEE ADMIN INSTRUCTIONS
Qty: 30 TABLET | Refills: 1 | Status: SHIPPED | OUTPATIENT
Start: 2019-10-11 | End: 2022-09-26

## 2019-10-11 RX ORDER — METOPROLOL SUCCINATE 50 MG/1
50 TABLET, EXTENDED RELEASE ORAL DAILY
Qty: 90 TABLET | Refills: 1 | Status: SHIPPED | OUTPATIENT
Start: 2019-10-11 | End: 2020-06-11

## 2019-10-11 RX ORDER — DULOXETIN HYDROCHLORIDE 60 MG/1
60 CAPSULE, DELAYED RELEASE ORAL DAILY
Qty: 90 CAPSULE | Refills: 1 | Status: SHIPPED | OUTPATIENT
Start: 2019-10-11 | End: 2020-06-05

## 2019-10-11 RX ORDER — LEVOTHYROXINE SODIUM 100 UG/1
TABLET ORAL
Qty: 90 TABLET | Refills: 1 | Status: SHIPPED | OUTPATIENT
Start: 2019-10-11 | End: 2019-10-14

## 2019-10-11 RX ORDER — SPIRONOLACTONE 25 MG/1
25 TABLET ORAL DAILY
Qty: 90 TABLET | Refills: 1 | Status: SHIPPED | OUTPATIENT
Start: 2019-10-11 | End: 2020-04-11

## 2019-10-11 ASSESSMENT — MIFFLIN-ST. JEOR: SCORE: 2125.36

## 2019-10-11 ASSESSMENT — PATIENT HEALTH QUESTIONNAIRE - PHQ9: SUM OF ALL RESPONSES TO PHQ QUESTIONS 1-9: 5

## 2019-10-11 ASSESSMENT — PAIN SCALES - GENERAL: PAINLEVEL: NO PAIN (0)

## 2019-10-11 NOTE — PATIENT INSTRUCTIONS
1) I think that we should recheck hemoglobin A1C test and if things are still well controlled, then we can consider stopping metformin and just continuing with Victoza for diabetes treatment.     2) For the ED, I think that we should try the sildenafil to see if this helps. If it's not adequate for you then I could set you up with a urologist to discuss other options.     3) Follow-up with cardiology as planned.

## 2019-10-11 NOTE — PROGRESS NOTES
Subjective     Rhett Elizabeth is a 58 year old male who presents to clinic today for the following health issues:    HPI   Diabetes Mellitus Type 2   Diabetes has been under better control with Victoza and metformin both on board. Notes improvement in diabetes neuropathy with Lyrica. Takes blood glucose readings at home often ranging between 100-135 on average. Has lost close to 80lbs in the last couple years. Has stable but noteworthy and problematic diabetes neuropathy symptoms.      Lab Results   Component Value Date    A1C 5.7 05/17/2019    A1C 6.4 07/11/2018    A1C 8.1 04/09/2018    A1C 7.3 08/11/2017    A1C 6.6 03/22/2017     Wt Readings from Last 5 Encounters:   10/11/19 121.5 kg (267 lb 12.8 oz)   05/17/19 131 kg (288 lb 12.8 oz)   11/19/18 140.2 kg (309 lb)   05/31/18 (!) 150.1 kg (331 lb)   04/13/18 149.7 kg (330 lb)     Hypertension, Atrial fibrillation, Heart Failure  Had recent appointment with cardiology. Has an AICD-pacemaker at this time. Using Xarelto and daily Aspirin 81 mg. Has seen improvement in EF with most recent echocardiogram. This is a very serious issue and for complete details please see echocardiogram and cardiology consultation office visit notes.    BP Readings from Last 6 Encounters:   10/11/19 128/64   05/17/19 110/60   11/19/18 120/80   05/31/18 113/76   04/13/18 115/74   03/09/18 112/77     ED   Reports that he has noticed this more recently. His wife passed some time ago and is unsure when this might have developed for him. Seems to have just barely a slight erection at all. Says that he feels a desire for sexual activity. Diabetes has been present for many years now for him. His symptoms seem less consistent with age-related erectile dysfunction and more consistent with diabetes mediated erectile dysfunction.     Patient Active Problem List   Diagnosis     Neuropathic pain     Essential hypertension with goal blood pressure less than 140/90     Persistent atrial fibrillation  "    LANEY (obstructive sleep apnea)     Type 2 diabetes mellitus with diabetic neuropathy, without long-term current use of insulin (H)     Chronic systolic congestive heart failure (H)     Drusen of macula, left     Dry eyes     Morbid obesity (H)     S/P ICD (internal cardiac defibrillator) procedure     Recent bereavement     Acquired hypothyroidism     Hyperlipidemia LDL goal <100     Personal history of urethral stricture     Past Surgical History:   Procedure Laterality Date     STRABISMUS SURGERY         Social History     Tobacco Use     Smoking status: Never Smoker     Smokeless tobacco: Current User     Types: Snuff   Substance Use Topics     Alcohol use: No     Family History   Problem Relation Age of Onset     Prostate Cancer Father      Glaucoma No family hx of      Macular Degeneration No family hx of          BP Readings from Last 3 Encounters:   10/11/19 128/64   05/17/19 110/60   11/19/18 120/80    Wt Readings from Last 3 Encounters:   10/11/19 121.5 kg (267 lb 12.8 oz)   05/17/19 131 kg (288 lb 12.8 oz)   11/19/18 140.2 kg (309 lb)         Reviewed and updated as needed this visit by Provider         Review of Systems   ROS COMP: Constitutional, HEENT, cardiovascular, pulmonary, GI, , musculoskeletal, neuro, skin, endocrine and psych systems are negative, except as otherwise noted.    This document serves as a record of the services and decisions personally performed and made by Ac Frederick MD. It was created on his behalf by Fidel Lyons, a trained medical scribe. The creation of this document is based on the provider's statements to the medical scribe.  Fidel Lyons 12:19 PM October 11, 2019        Objective    /64 (BP Location: Right arm, Cuff Size: Adult Regular)   Pulse 72   Temp 97.5  F (36.4  C) (Oral)   Resp 17   Ht 1.905 m (6' 3\")   Wt 121.5 kg (267 lb 12.8 oz)   SpO2 100%   BMI 33.47 kg/m    Body mass index is 33.47 kg/m .  Physical Exam   GENERAL: healthy, alert and no " distress  EYES: Eyes grossly normal to inspection, PERRL and conjunctivae and sclerae normal  SKIN: no suspicious lesions or rashes to visible skin   NEURO: Normal strength and tone, mentation intact and speech normal  PSYCH: mentation appears normal, affect normal/bright        Assessment & Plan     (N52.9) Erectile dysfunction, unspecified erectile dysfunction type  (primary encounter diagnosis)  Comment: has noted recent issues with ED. Seems that it could be secondary to diabetes. We reviewed the mechanism of action and other details of the phosphodiesterase type 5 (PDE5) inhibitors and we agreed to trial with Sildenafil [Viagra,Revatio] however based on the total absence of erectile function I would suspect this medication won't be effective. If so he's looking at options to try including The suppository-form of alprostadil also known as MUSE (Medicated Urethral System for Erection), Alprostadil injection-form [Caverject, Edex, and Prostin VR] and finally penile implants for which he'd want to see urological consultation   Plan: sildenafil (REVATIO) 20 MG tablet            (E11.40) Type 2 diabetes mellitus with diabetic neuropathy, without long-term current use of insulin (H)  Comment: has been well controlled as of lately, hemoglobin A1C test not done with most recent blood work.  Plan: DULoxetine (CYMBALTA) 60 MG capsule, losartan         (COZAAR) 25 MG tablet, Hemoglobin A1c        If still controlled this well we should consider stopping metformin and just continuing to treat diabetes with Victoza.    (E03.9) Hypothyroidism, unspecified type  Comment:   TSH   Date Value Ref Range Status   05/17/2019 0.30 (L) 0.40 - 4.00 mU/L Final     Plan: levothyroxine (SYNTHROID/LEVOTHROID) 100 MCG         tablet, TSH with free T4 reflex            (I10) Essential hypertension with goal blood pressure less than 140/90  Comment: controlled within acceptable limits   Plan: losartan (COZAAR) 25 MG tablet, metoprolol          "succinate ER (TOPROL XL) 50 MG 24 hr tablet,         spironolactone (ALDACTONE) 25 MG tablet            (I50.22) Chronic systolic congestive heart failure (H)  Comment: following with cardiology  Plan: spironolactone (ALDACTONE) 25 MG tablet        Follow-up with cardiology as planned.     (Z23) Need for prophylactic vaccination and inoculation against influenza  Comment: given   Plan: INFLUENZA QUAD, RECOMBINANT, P-FREE (RIV4)         (FLUBLOCK) [23556]            BMI:   Estimated body mass index is 33.47 kg/m  as calculated from the following:    Height as of this encounter: 1.905 m (6' 3\").    Weight as of this encounter: 121.5 kg (267 lb 12.8 oz).   Weight management plan: Discussed healthy diet and exercise guidelines already lost significant weight close to 80 lbs.    No follow-ups on file.    The information in this document, created by the medical scribe for me, accurately reflects the services I personally performed and the decisions made by me. I have reviewed and approved this document for accuracy prior to leaving the patient care area.  October 11, 2019 12:40 PM     Ac Frederick MD  [facility]        "

## 2019-10-12 LAB
T4 FREE SERPL-MCNC: 1.53 NG/DL (ref 0.76–1.46)
TSH SERPL DL<=0.005 MIU/L-ACNC: 0.01 MU/L (ref 0.4–4)

## 2019-10-14 ENCOUNTER — TELEPHONE (OUTPATIENT)
Dept: INTERNAL MEDICINE | Facility: CLINIC | Age: 59
End: 2019-10-14

## 2019-10-14 DIAGNOSIS — E03.9 HYPOTHYROIDISM, UNSPECIFIED TYPE: ICD-10-CM

## 2019-10-14 DIAGNOSIS — E11.40 TYPE 2 DIABETES MELLITUS WITH DIABETIC NEUROPATHY, WITHOUT LONG-TERM CURRENT USE OF INSULIN (H): Primary | Chronic | ICD-10-CM

## 2019-10-14 RX ORDER — LEVOTHYROXINE SODIUM 88 UG/1
88 TABLET ORAL DAILY
Qty: 90 TABLET | Refills: 0 | Status: SHIPPED | OUTPATIENT
Start: 2019-10-14 | End: 2020-01-08

## 2019-10-14 NOTE — TELEPHONE ENCOUNTER
Patient notified of providers message as written. Metformin removed from med list, order placed for dose change of Levothyroxine- sent to pharmacy.  Lab orders placed- advised patient he can come in for lab only appointment for this.  Patient verbalized understanding, no further questions or concerns.    Jackelin Nguyen RN

## 2019-10-31 ENCOUNTER — OFFICE VISIT (OUTPATIENT)
Dept: FAMILY MEDICINE | Facility: CLINIC | Age: 59
End: 2019-10-31
Payer: COMMERCIAL

## 2019-10-31 VITALS
TEMPERATURE: 98.4 F | RESPIRATION RATE: 22 BRPM | WEIGHT: 269.2 LBS | HEART RATE: 87 BPM | OXYGEN SATURATION: 99 % | HEIGHT: 75 IN | BODY MASS INDEX: 33.47 KG/M2 | DIASTOLIC BLOOD PRESSURE: 82 MMHG | SYSTOLIC BLOOD PRESSURE: 114 MMHG

## 2019-10-31 DIAGNOSIS — H69.92 DYSFUNCTION OF LEFT EUSTACHIAN TUBE: ICD-10-CM

## 2019-10-31 DIAGNOSIS — E66.01 MORBID OBESITY (H): ICD-10-CM

## 2019-10-31 DIAGNOSIS — H61.892 IRRITATION OF EXTERNAL EAR CANAL, LEFT: Primary | ICD-10-CM

## 2019-10-31 PROCEDURE — 99213 OFFICE O/P EST LOW 20 MIN: CPT | Performed by: FAMILY MEDICINE

## 2019-10-31 RX ORDER — NEOMYCIN SULFATE, POLYMYXIN B SULFATE, HYDROCORTISONE 3.5; 10000; 1 MG/ML; [USP'U]/ML; MG/ML
3 SOLUTION/ DROPS AURICULAR (OTIC) 4 TIMES DAILY
Qty: 5 ML | Refills: 0 | Status: SHIPPED | OUTPATIENT
Start: 2019-10-31 | End: 2020-01-03

## 2019-10-31 RX ORDER — FLUTICASONE PROPIONATE 50 MCG
1 SPRAY, SUSPENSION (ML) NASAL DAILY
Qty: 16 G | Refills: 1 | Status: SHIPPED | OUTPATIENT
Start: 2019-10-31 | End: 2020-01-20

## 2019-10-31 ASSESSMENT — MIFFLIN-ST. JEOR: SCORE: 2126.71

## 2019-10-31 NOTE — PROGRESS NOTES
"Subjective     Rhett Elizabeth is a 59 year old male who presents to clinic today for the following health issues:    HPI   Chief Complaint   Patient presents with     Otalgia     X2 weeks ; bled yesterday and felt pressure released     Ear plugging - left ear  Yesterday used qtip and felt like he popped something/felt pain as well, had bleeding from his ear for the rest of the day.  No recent URI symptoms  Ears pop with valsalva    BP Readings from Last 3 Encounters:   10/31/19 114/82   10/11/19 128/64   05/17/19 110/60    Wt Readings from Last 3 Encounters:   10/31/19 122.1 kg (269 lb 3.2 oz)   10/11/19 121.5 kg (267 lb 12.8 oz)   05/17/19 131 kg (288 lb 12.8 oz)                      Reviewed and updated as needed this visit by Provider  Tobacco  Allergies  Meds  Problems  Med Hx  Surg Hx  Fam Hx         Review of Systems   ROS COMP: Constitutional, HEENT, cardiovascular, pulmonary, gi and gu systems are negative, except as otherwise noted.      Objective    /82   Pulse 87   Temp 98.4  F (36.9  C) (Oral)   Resp 22   Ht 1.905 m (6' 3\")   Wt 122.1 kg (269 lb 3.2 oz)   SpO2 99%   BMI 33.65 kg/m    Body mass index is 33.65 kg/m .  Physical Exam   GENERAL: healthy, alert and no distress  EYES: Eyes grossly normal to inspection, PERRL and conjunctivae and sclerae normal  HENT: left ear canal ulceration and TM's normal, nose and mouth without ulcers or lesions  NECK: no adenopathy, no asymmetry, masses, or scars and thyroid normal to palpation  SKIN: no suspicious lesions or rashes  PSYCH: mentation appears normal, affect normal/bright          Assessment & Plan       ICD-10-CM    1. Irritation of external ear canal, left H61.892 neomycin-polymyxin-hydrocortisone (CORTISPORIN) 3.5-35863-9 otic solution     OTOLARYNGOLOGY REFERRAL   2. Dysfunction of left eustachian tube H69.82 fluticasone (FLONASE) 50 MCG/ACT nasal spray   3. Morbid obesity (H) E66.01               Return in about 2 weeks (around " 11/14/2019) for With Specialist.    Pedro Chow MD  [facility]

## 2019-11-25 DIAGNOSIS — E11.40 TYPE 2 DIABETES MELLITUS WITH DIABETIC NEUROPATHY, WITHOUT LONG-TERM CURRENT USE OF INSULIN (H): Chronic | ICD-10-CM

## 2019-11-25 RX ORDER — PREGABALIN 300 MG/1
300 CAPSULE ORAL 2 TIMES DAILY
Qty: 60 CAPSULE | Refills: 5 | Status: SHIPPED | OUTPATIENT
Start: 2019-11-25 | End: 2020-05-26

## 2019-11-25 NOTE — TELEPHONE ENCOUNTER
reviewed. No concerns.   Last dispensed 11/12/19 #120 for a 30 day supply.    Tatiana Noriega RN  [facility]

## 2019-11-25 NOTE — TELEPHONE ENCOUNTER
Controlled Substance Refill Request for pregabalin (LYRICA) 300 MG capsule  Problem List Complete:  No     PROVIDER TO CONSIDER COMPLETION OF PROBLEM LIST AND OVERVIEW/CONTROLLED SUBSTANCE AGREEMENT    Last Written Prescription Date:  5/223/2019  Last Fill Quantity: 60,   # refills: 5    THE MOST RECENT OFFICE VISIT MUST BE WITHIN THE PAST 3 MONTHS. AT LEAST ONE FACE TO FACE VISIT MUST OCCUR EVERY 6 MONTHS. ADDITIONAL VISITS CAN BE VIRTUAL.  (THIS STATEMENT SHOULD BE DELETED.)    Last Office Visit with [facility] primary care provider: 10/31/2019    Future Office visit:     Controlled substance agreement:   Encounter-Level CSA:    There are no encounter-level csa.     Patient-Level CSA:    There are no patient-level csa.         Last Urine Drug Screen: No results found for: CDAUT, No results found for: COMDAT, No results found for: THC13, PCP13, COC13, MAMP13, OPI13, AMP13, BZO13, TCA13, MTD13, BAR13, OXY13, PPX13, BUP13       [email or web address]       checked in past 3 months?  No, route to RN

## 2019-12-09 DIAGNOSIS — E11.40 TYPE 2 DIABETES MELLITUS WITH DIABETIC NEUROPATHY, WITHOUT LONG-TERM CURRENT USE OF INSULIN (H): Chronic | ICD-10-CM

## 2019-12-10 RX ORDER — LIRAGLUTIDE 6 MG/ML
1.2 INJECTION SUBCUTANEOUS DAILY
Qty: 18 ML | Refills: 0 | Status: SHIPPED | OUTPATIENT
Start: 2019-12-10 | End: 2020-04-01

## 2019-12-11 ENCOUNTER — TRANSFERRED RECORDS (OUTPATIENT)
Dept: HEALTH INFORMATION MANAGEMENT | Facility: CLINIC | Age: 59
End: 2019-12-11

## 2019-12-20 ENCOUNTER — TRANSFERRED RECORDS (OUTPATIENT)
Dept: HEALTH INFORMATION MANAGEMENT | Facility: CLINIC | Age: 59
End: 2019-12-20

## 2019-12-24 ENCOUNTER — MEDICAL CORRESPONDENCE (OUTPATIENT)
Dept: HEALTH INFORMATION MANAGEMENT | Facility: CLINIC | Age: 59
End: 2019-12-24

## 2020-01-03 ENCOUNTER — OFFICE VISIT (OUTPATIENT)
Dept: OPTOMETRY | Facility: CLINIC | Age: 60
End: 2020-01-03
Payer: COMMERCIAL

## 2020-01-03 DIAGNOSIS — H52.4 PRESBYOPIA: ICD-10-CM

## 2020-01-03 DIAGNOSIS — Z01.01 ENCOUNTER FOR EXAMINATION OF EYES AND VISION WITH ABNORMAL FINDINGS: Primary | ICD-10-CM

## 2020-01-03 DIAGNOSIS — H04.123 DRY EYES: ICD-10-CM

## 2020-01-03 DIAGNOSIS — H52.223 REGULAR ASTIGMATISM OF BOTH EYES: ICD-10-CM

## 2020-01-03 DIAGNOSIS — E11.9 TYPE 2 DIABETES MELLITUS WITHOUT RETINOPATHY (H): ICD-10-CM

## 2020-01-03 DIAGNOSIS — H52.13 MYOPIA OF BOTH EYES: ICD-10-CM

## 2020-01-03 DIAGNOSIS — H35.362 DRUSEN OF MACULA, LEFT: ICD-10-CM

## 2020-01-03 PROCEDURE — 92015 DETERMINE REFRACTIVE STATE: CPT | Performed by: OPTOMETRIST

## 2020-01-03 PROCEDURE — 92014 COMPRE OPH EXAM EST PT 1/>: CPT | Performed by: OPTOMETRIST

## 2020-01-03 ASSESSMENT — REFRACTION_WEARINGRX
OD_SPHERE: -3.50
OD_CYLINDER: +2.50
OS_CYLINDER: +1.75
OS_ADD: +2.25
OS_AXIS: 087
SPECS_TYPE: PAL
OD_AXIS: 135
OS_SPHERE: -3.50
OD_ADD: +2.25

## 2020-01-03 ASSESSMENT — CONF VISUAL FIELD
OD_NORMAL: 1
OS_NORMAL: 1
METHOD: COUNTING FINGERS

## 2020-01-03 ASSESSMENT — REFRACTION_MANIFEST
OS_CYLINDER: +1.75
OD_CYLINDER: +2.75
OS_AXIS: 069
OD_SPHERE: -3.25
OS_ADD: +2.50
OD_ADD: +2.50
OD_AXIS: 135
OS_SPHERE: -3.25

## 2020-01-03 ASSESSMENT — VISUAL ACUITY
OS_CC: 20/20
CORRECTION_TYPE: GLASSES
OD_CC: 20/30
OD_SC: 20/125
OS_CC: 20/20
OD_CC: 20/20
OS_SC: 20/150
METHOD: SNELLEN - LINEAR

## 2020-01-03 ASSESSMENT — SLIT LAMP EXAM - LIDS
COMMENTS: NORMAL
COMMENTS: NORMAL

## 2020-01-03 ASSESSMENT — EXTERNAL EXAM - RIGHT EYE: OD_EXAM: NORMAL

## 2020-01-03 ASSESSMENT — CUP TO DISC RATIO
OD_RATIO: 0.55
OS_RATIO: 0.55

## 2020-01-03 ASSESSMENT — EXTERNAL EXAM - LEFT EYE: OS_EXAM: NORMAL

## 2020-01-03 ASSESSMENT — TONOMETRY
IOP_METHOD: APPLANATION
OS_IOP_MMHG: 14
OD_IOP_MMHG: 15

## 2020-01-03 NOTE — PATIENT INSTRUCTIONS
Patient educated on importance of good blood sugar control.  Letter sent to primary care provider with diabetic eye exam report.     Continue use of artificial tears as needed for dry eye symptoms.     Rhett was advised of today's exam findings.  Optional to fill new glasses prescription, mild change  Copy of glasses Rx provided today.    Return in 1 year for eye exam, or sooner if needed.    The effects of the dilating drops last for 4- 6 hours.  You will be more sensitive to light and vision will be blurry up close.  Mydriatic sunglasses were given if needed.      Abdirashid Diaz O.D.  [address] [place]  [address]    [phone]

## 2020-01-03 NOTE — PROGRESS NOTES
Chief Complaint   Patient presents with     Diabetic Eye Exam        Lab Results   Component Value Date    A1C 5.3 10/11/2019    A1C 5.7 05/17/2019    A1C 6.4 07/11/2018    A1C 8.1 04/09/2018    A1C 7.3 08/11/2017       Last Eye Exam: 2018  Dilated Previously: Yes    What are you currently using to see?  glasses    Distance Vision Acuity: Satisfied with vision    Near Vision Acuity: Satisfied with vision while reading and using computer with glasses    Eye Comfort: dry, scratchy  Do you use eye drops? : Yes: moist tears  Occupation or Hobbies: Works for the [employer] [place]    [employer]      Medical, surgical and family histories reviewed and updated 1/3/2020.       OBJECTIVE: See Ophthalmology exam    ASSESSMENT:    ICD-10-CM    1. Encounter for examination of eyes and vision with abnormal findings Z01.01 EYE EXAM (SIMPLE-NONBILLABLE)   2. Type 2 diabetes mellitus without retinopathy (H) E11.9 EYE EXAM (SIMPLE-NONBILLABLE)   3. Dry eyes H04.123 EYE EXAM (SIMPLE-NONBILLABLE)   4. Drusen of macula, left H35.362 EYE EXAM (SIMPLE-NONBILLABLE)   5. Myopia of both eyes H52.13 EYE EXAM (SIMPLE-NONBILLABLE)     REFRACTION   6. Regular astigmatism of both eyes H52.223 EYE EXAM (SIMPLE-NONBILLABLE)     REFRACTION   7. Presbyopia H52.4 EYE EXAM (SIMPLE-NONBILLABLE)     REFRACTION      PLAN:    Rhett Elizabeth aware  eye exam results will be sent to Ac Frederick.  Patient Instructions   Patient educated on importance of good blood sugar control.  Letter sent to primary care provider with diabetic eye exam report.     Continue use of artificial tears as needed for dry eye symptoms.     Rhett was advised of today's exam findings.  Optional to fill new glasses prescription, mild change  Copy of glasses Rx provided today.    Return in 1 year for eye exam, or sooner if needed.    The effects of the dilating drops last for 4- 6 hours.  You will be more sensitive to light and vision will be blurry up close.  Mydriatic  sunglasses were given if needed.      Abdirashid Diaz O.D.  [facility]  [place]  [address]    [phone]

## 2020-01-03 NOTE — LETTER
1/3/2020         RE: Rhett Elizabeth  [address] [place] [address]        Dear Colleague,    Thank you for referring your patient, Rhett Elizabeth, to the [facility]. Please see a copy of my visit note below.    Chief Complaint   Patient presents with     Diabetic Eye Exam        Lab Results   Component Value Date    A1C 5.3 10/11/2019    A1C 5.7 05/17/2019    A1C 6.4 07/11/2018    A1C 8.1 04/09/2018    A1C 7.3 08/11/2017       Last Eye Exam: 2018  Dilated Previously: Yes    What are you currently using to see?  glasses    Distance Vision Acuity: Satisfied with vision    Near Vision Acuity: Satisfied with vision while reading and using computer with glasses    Eye Comfort: dry, scratchy  Do you use eye drops? : Yes: moist tears  Occupation or Hobbies: Works for the [employer]    [employer]     Medical, surgical and family histories reviewed and updated 1/3/2020.       OBJECTIVE: See Ophthalmology exam    ASSESSMENT:    ICD-10-CM    1. Encounter for examination of eyes and vision with abnormal findings Z01.01 EYE EXAM (SIMPLE-NONBILLABLE)   2. Type 2 diabetes mellitus without retinopathy (H) E11.9 EYE EXAM (SIMPLE-NONBILLABLE)   3. Dry eyes H04.123 EYE EXAM (SIMPLE-NONBILLABLE)   4. Drusen of macula, left H35.362 EYE EXAM (SIMPLE-NONBILLABLE)   5. Myopia of both eyes H52.13 EYE EXAM (SIMPLE-NONBILLABLE)     REFRACTION   6. Regular astigmatism of both eyes H52.223 EYE EXAM (SIMPLE-NONBILLABLE)     REFRACTION   7. Presbyopia H52.4 EYE EXAM (SIMPLE-NONBILLABLE)     REFRACTION      PLAN:    Rhett Elizabeth aware  eye exam results will be sent to Ac Frederick.  Patient Instructions   Patient educated on importance of good blood sugar control.  Letter sent to primary care provider with diabetic eye exam report.     Continue use of artificial tears as needed for dry eye symptoms.     Rhett was advised of today's exam findings.  Optional to fill new glasses prescription, mild  change  Copy of glasses Rx provided today.    Return in 1 year for eye exam, or sooner if needed.    The effects of the dilating drops last for 4- 6 hours.  You will be more sensitive to light and vision will be blurry up close.  Mydriatic sunglasses were given if needed.      Abdirashid Diza O.D.  [address] [facility] [place]  [address]    [phone]           Again, thank you for allowing me to participate in the care of your patient.        Sincerely,        Abdirashid Diaz, OD

## 2020-01-05 DIAGNOSIS — E03.9 HYPOTHYROIDISM, UNSPECIFIED TYPE: ICD-10-CM

## 2020-01-06 NOTE — TELEPHONE ENCOUNTER
Left patient VM to return call to pink team to schedule for lab work  Joselito Staley CMA on 1/6/2020 at 3:52 PM

## 2020-01-07 NOTE — TELEPHONE ENCOUNTER
2nd attempt.     Left patient VM to return call to pink team to schedule for lab work  Joselito Staley CMA on 1/7/2020 at 8:12 AM

## 2020-01-08 RX ORDER — LEVOTHYROXINE SODIUM 88 UG/1
TABLET ORAL
Qty: 30 TABLET | Refills: 0 | Status: SHIPPED | OUTPATIENT
Start: 2020-01-08 | End: 2020-02-10

## 2020-01-17 ENCOUNTER — TRANSFERRED RECORDS (OUTPATIENT)
Dept: HEALTH INFORMATION MANAGEMENT | Facility: CLINIC | Age: 60
End: 2020-01-17

## 2020-01-20 ENCOUNTER — OFFICE VISIT (OUTPATIENT)
Dept: INTERNAL MEDICINE | Facility: CLINIC | Age: 60
End: 2020-01-20
Payer: COMMERCIAL

## 2020-01-20 VITALS
WEIGHT: 290 LBS | HEIGHT: 75 IN | TEMPERATURE: 97.2 F | DIASTOLIC BLOOD PRESSURE: 84 MMHG | OXYGEN SATURATION: 96 % | HEART RATE: 74 BPM | RESPIRATION RATE: 20 BRPM | SYSTOLIC BLOOD PRESSURE: 124 MMHG | BODY MASS INDEX: 36.06 KG/M2

## 2020-01-20 DIAGNOSIS — E11.40 TYPE 2 DIABETES MELLITUS WITH DIABETIC NEUROPATHY, WITHOUT LONG-TERM CURRENT USE OF INSULIN (H): ICD-10-CM

## 2020-01-20 DIAGNOSIS — E66.01 MORBID OBESITY (H): ICD-10-CM

## 2020-01-20 DIAGNOSIS — Z95.810 S/P ICD (INTERNAL CARDIAC DEFIBRILLATOR) PROCEDURE: ICD-10-CM

## 2020-01-20 DIAGNOSIS — G47.33 OSA (OBSTRUCTIVE SLEEP APNEA): ICD-10-CM

## 2020-01-20 DIAGNOSIS — E03.9 ACQUIRED HYPOTHYROIDISM: ICD-10-CM

## 2020-01-20 DIAGNOSIS — G62.9 PERIPHERAL POLYNEUROPATHY: ICD-10-CM

## 2020-01-20 DIAGNOSIS — E78.5 HYPERLIPIDEMIA LDL GOAL <100: ICD-10-CM

## 2020-01-20 DIAGNOSIS — Z13.89 SCREENING FOR DIABETIC PERIPHERAL NEUROPATHY: ICD-10-CM

## 2020-01-20 DIAGNOSIS — Z23 NEED FOR SHINGLES VACCINE: ICD-10-CM

## 2020-01-20 DIAGNOSIS — I50.22 CHRONIC SYSTOLIC CONGESTIVE HEART FAILURE (H): ICD-10-CM

## 2020-01-20 DIAGNOSIS — I48.19 PERSISTENT ATRIAL FIBRILLATION (H): ICD-10-CM

## 2020-01-20 DIAGNOSIS — M79.2 NEUROPATHIC PAIN: Primary | ICD-10-CM

## 2020-01-20 DIAGNOSIS — I10 ESSENTIAL HYPERTENSION WITH GOAL BLOOD PRESSURE LESS THAN 140/90: ICD-10-CM

## 2020-01-20 LAB
ANION GAP SERPL CALCULATED.3IONS-SCNC: 4 MMOL/L (ref 3–14)
BUN SERPL-MCNC: 25 MG/DL (ref 7–30)
CALCIUM SERPL-MCNC: 8.9 MG/DL (ref 8.5–10.1)
CHLORIDE SERPL-SCNC: 102 MMOL/L (ref 94–109)
CO2 SERPL-SCNC: 29 MMOL/L (ref 20–32)
CREAT SERPL-MCNC: 0.9 MG/DL (ref 0.66–1.25)
GFR SERPL CREATININE-BSD FRML MDRD: >90 ML/MIN/{1.73_M2}
GLUCOSE SERPL-MCNC: 248 MG/DL (ref 70–99)
HBA1C MFR BLD: 6.7 % (ref 0–5.6)
POTASSIUM SERPL-SCNC: 4.2 MMOL/L (ref 3.4–5.3)
SODIUM SERPL-SCNC: 135 MMOL/L (ref 133–144)
TSH SERPL DL<=0.005 MIU/L-ACNC: 3.26 MU/L (ref 0.4–4)

## 2020-01-20 PROCEDURE — 80048 BASIC METABOLIC PNL TOTAL CA: CPT | Performed by: INTERNAL MEDICINE

## 2020-01-20 PROCEDURE — 84443 ASSAY THYROID STIM HORMONE: CPT | Performed by: INTERNAL MEDICINE

## 2020-01-20 PROCEDURE — 99207 C FOOT EXAM  NO CHARGE: CPT | Mod: 25 | Performed by: INTERNAL MEDICINE

## 2020-01-20 PROCEDURE — 99214 OFFICE O/P EST MOD 30 MIN: CPT | Performed by: INTERNAL MEDICINE

## 2020-01-20 PROCEDURE — 83036 HEMOGLOBIN GLYCOSYLATED A1C: CPT | Performed by: INTERNAL MEDICINE

## 2020-01-20 PROCEDURE — 36415 COLL VENOUS BLD VENIPUNCTURE: CPT | Performed by: INTERNAL MEDICINE

## 2020-01-20 ASSESSMENT — MIFFLIN-ST. JEOR: SCORE: 2221.06

## 2020-01-20 NOTE — LETTER
[address] [place] [address]    January 21, 2020    Rhett Elizabeth  [address] [place] [id number]      Dear Rhett,    All of these tests are within acceptable limits. Things look OK ! The blood glucose reading is fairly high but your hemoglobin a1c  [ diabetes test ] is within acceptable limits.     Enclosed is a copy of your results.     Results for orders placed or performed in visit on 01/20/20   TSH with free T4 reflex     Status: None   Result Value Ref Range    TSH 3.26 0.40 - 4.00 mU/L   Basic metabolic panel     Status: Abnormal   Result Value Ref Range    Sodium 135 133 - 144 mmol/L    Potassium 4.2 3.4 - 5.3 mmol/L    Chloride 102 94 - 109 mmol/L    Carbon Dioxide 29 20 - 32 mmol/L    Anion Gap 4 3 - 14 mmol/L    Glucose 248 (H) 70 - 99 mg/dL    Urea Nitrogen 25 7 - 30 mg/dL    Creatinine 0.90 0.66 - 1.25 mg/dL    GFR Estimate >90 >60 mL/min/[1.73_m2]    GFR Estimate If Black >90 >60 mL/min/[1.73_m2]    Calcium 8.9 8.5 - 10.1 mg/dL   Hemoglobin A1c     Status: Abnormal   Result Value Ref Range    Hemoglobin A1C 6.7 (H) 0 - 5.6 %       If you have any questions or concerns, please call myself or my nurse at [phone].      Sincerely,        Ac Frederick MD/bruce

## 2020-01-20 NOTE — PROGRESS NOTES
Subjective     Rhett Elizabeth is a 59 year old male who presents to clinic today for the following health issues:    HPI      Neuropathic pain  Peripheral polyneuropathy  Chronic systolic congestive heart failure (H)  Type 2 diabetes mellitus with diabetic neuropathy, without long-term current use of insulin (H)  Persistent atrial fibrillation  Morbid obesity (H)  Acquired hypothyroidism  S/P ICD (internal cardiac defibrillator) procedure  Essential hypertension with goal blood pressure less than 140/90  LANEY (obstructive sleep apnea)  Hyperlipidemia LDL goal <100  Screening for diabetic peripheral neuropathy  Need for shingles vaccine    At the outset of this office visit patient is asking me to consolidate his care and he wants me to care for his peripheral neuropathy with which he is taking tramadol (Ultram), LYRICA (pregabalin) and also Cymbalta (duloxetine), used to see a neurologist associated with [facility].    Follows with cardiology every 6-12 months for atrial fibrillation diagnosis. Taking Xarelto and metoprolol for this. Also has a diagnosis of chronic systolic heart failure for which he has an AICD pacemaker implanted. Reports that since this device was placed he has been without issues. His last echocardiogram in march of 2018 showed an ejection fraction of 35%. He has a well compensated cardiomyopathy without congestive heart failure symptoms.    He recently got a new CPAP machine and this has been working well for him.     Currently taking Victoza for his diabetes. Has early macular degenerative condition which since starting treatment for this appears to have been stable for him. Patient has been following with neurology Dr. Robert Esquivel with [facility] for his peripheral neuropathy. Has this to about the level of the ankles bilaterally. Has had neuropathy for about 20+ years, he estimates. It has been attributed to his diabetes mellitus .    Lab Results    Component Value Date    A1C 5.3 10/11/2019    A1C 5.7 05/17/2019    A1C 6.4 07/11/2018    A1C 8.1 04/09/2018    A1C 7.3 08/11/2017     Hypertension  BP Readings from Last 6 Encounters:   01/20/20 124/84   10/31/19 114/82   10/11/19 128/64   05/17/19 110/60   11/19/18 120/80   05/31/18 113/76         Patient Active Problem List   Diagnosis     Neuropathic pain     Essential hypertension with goal blood pressure less than 140/90     Persistent atrial fibrillation     LANEY (obstructive sleep apnea)     Type 2 diabetes mellitus with diabetic neuropathy, without long-term current use of insulin (H)     Chronic systolic congestive heart failure (H)     Drusen of macula, left     Dry eyes     Morbid obesity (H)     S/P ICD (internal cardiac defibrillator) procedure     Recent bereavement     Acquired hypothyroidism     Hyperlipidemia LDL goal <100     Personal history of urethral stricture     Peripheral polyneuropathy     Past Surgical History:   Procedure Laterality Date     STRABISMUS SURGERY         Social History     Tobacco Use     Smoking status: Never Smoker     Smokeless tobacco: Current User     Types: Snuff   Substance Use Topics     Alcohol use: No     Family History   Problem Relation Age of Onset     Macular Degeneration Mother      Prostate Cancer Father      Glaucoma No family hx of          BP Readings from Last 3 Encounters:   01/20/20 124/84   10/31/19 114/82   10/11/19 128/64    Wt Readings from Last 3 Encounters:   01/20/20 131.5 kg (290 lb)   10/31/19 122.1 kg (269 lb 3.2 oz)   10/11/19 121.5 kg (267 lb 12.8 oz)        Reviewed and updated as needed this visit by Provider       Review of Systems   ROS COMP: Constitutional, HEENT, cardiovascular, pulmonary, GI, , musculoskeletal, neuro, skin, endocrine and psych systems are negative, except as otherwise noted.    This document serves as a record of the services and decisions personally performed and made by Ac Frederick MD. It was created on his  "behalf by Fidel Lyons, a trained medical scribe. The creation of this document is based on the provider's statements to the medical scribe.  Fidel Lyons 8:46 AM January 20, 2020      Objective    /84   Pulse 74   Temp 97.2  F (36.2  C) (Oral)   Resp 20   Ht 1.905 m (6' 3\")   Wt 131.5 kg (290 lb)   SpO2 96%   BMI 36.25 kg/m    Body mass index is 36.25 kg/m .  Physical Exam   GENERAL: healthy, alert and no distress  EYES: Eyes grossly normal to inspection, PERRL and conjunctivae and sclerae normal  RESP: lungs clear to auscultation - no rales, rhonchi or wheezes  CV: regular rate and rhythm, normal S1 S2, no S3 or S4, no murmur, click or rub, no peripheral edema and peripheral pulses strong  SKIN: no suspicious lesions or rashes to visible skin   NEURO: Normal strength and tone, mentation intact and speech normal  PSYCH: mentation appears normal, affect normal/bright      Assessment & Plan     (M79.2) Neuropathic pain  (primary encounter diagnosis)  Comment: was following with neurology. Stable and asked if I would take on his prescriptions given by the neurologist.  Plan: FOOT EXAM,        Patient thought his feet were fine and does regular self foot exams, declined exam during today's encounter.    (G62.9) Peripheral polyneuropathy  Comment: as above - we agreed to take over prescribing . He did not need refills today. He needs to sign a Chronic Pain Contract   Plan: FOOT EXAM        As above     (I50.22) Chronic systolic congestive heart failure (H)  Comment: following with cardiology every 6-12 months at this time. Currently in a stable phase with an implanted AICD pacemaker.  Plan: continue current plan of care per cardiology.    (E11.40) Type 2 diabetes mellitus with diabetic neuropathy, without long-term current use of insulin (H)  Comment: appears well controlled with victoza   Plan: FOOT EXAM, Basic metabolic panel, Hemoglobin         A1c    (I48.19) Persistent atrial fibrillation  Comment: " stable with current treatment, xarelto and metoprolol. Has regular follow-up with cardiology.  Plan: Basic metabolic panel        Continue current treatment plan and follow-up     (E66.01) Morbid obesity (H)  Comment: patient has lost significant weight since starting treatment of diabetes with victoza, weight has increased some over the last few months.  Plan: patient plans to continue efforts with diet/ exercise    (E03.9) Acquired hypothyroidism  Comment:  new dose of levothyroxine was ordered after this most recent lab in October and he is due for a repeat TSH  TSH   Date Value Ref Range Status   10/11/2019 0.01 (L) 0.40 - 4.00 mU/L Final     Plan: TSH with free T4 reflex            (Z95.810) S/P ICD (internal cardiac defibrillator) procedure  Comment: as above  Plan: as above     (I10) Essential hypertension with goal blood pressure less than 140/90  Comment: controlled within acceptable limits   Plan: Basic metabolic panel        Continue current treatment     (G47.33) LANEY (obstructive sleep apnea)  Comment: using CPAP machine, recently got a new one which seems to be working very well for him.  Plan: continue current treatment plan    (E78.5) Hyperlipidemia LDL goal <100  Comment: patient currently on 20 mg simvastatin  Plan: continue current treatment     (Z13.89) Screening for diabetic peripheral neuropathy  Comment: as above  Plan: FOOT EXAM        As above     (Z23) Need for shingles vaccine  Comment: pharmacy router form given   Plan:      Return in about 6 months (around 7/20/2020) for Lab Work, Routine Visit, Diabetes check.    The information in this document, created by the medical scribe for me, accurately reflects the services I personally performed and the decisions made by me. I have reviewed and approved this document for accuracy prior to leaving the patient care area.  January 20, 2020 9:06 AM     Ac Frederick MD  [facility]

## 2020-01-20 NOTE — LETTER
[facility]  01/20/20    Patient: Rhett Elizabeth  YOB: 1960  Medical Record Number: [id number]                                                                  Opioid / Opioid Plus Controlled Substance Agreement    I understand that my care provider has prescribed an opioid (narcotic) controlled substance to help manage my condition(s). I am taking this medicine to help me function or work. I know this is strong medicine, and that it can cause serious side effects. Opioid medicine can be sedating, addicting and may cause a dependency on the drug. They can affect my ability to drive or think, and cause depression. They need to be taken exactly as prescribed. Combining opioids with certain medicines or chemicals (such as cocaine, sedatives and tranquilizers, sleeping pills, meth) can be dangerous or even fatal. Also, if I stop opioids suddenly, I may have severe withdrawal symptoms. Last, I understand that opioids do not work for all types of pain nor for all patients. If not helpful, I may be asked to stop them.    Opioid    The risks, benefits, and side effects of these medicine(s) were explained to me. I agree that:    1. I will take part in other treatments as advised by my care team. This may be psychiatry or counseling, physical therapy, behavioral therapy, group treatment or a referral to a pain clinic. I will reduce or stop my medicine when my care team tells me to do so.  2. I will take my medicines as prescribed. I will not change the dose or schedule unless my care team tells me to. There will be no refills if I  run out early.   I may be contactedwithout warning and asked to complete a urine drug test or pill count at any time.   3. I will keep all my appointments, and understand this is part of the monitoring of opioids. My care team may require an office visit for EVERY opioid/controlled substance refill. If I miss appointments or don t follow instructions, my care team  may stop my medicine.  4. I will not ask other providers to prescribe controlled substances, and I will not accept controlled substances from other people. If I need another prescribed controlled substance for a new reason, I will tell my care team within 1 business day.  5. I will use one pharmacy to fill all of my controlled substance prescriptions, and it is up to me to make sure that I do not run out of my medicines on weekends or holidays. If my care team is willing to refill my opioid prescription without a visit, I must request refills only during office hours, refills may take up to 3 days to process, and it may take up to 5 to 7 days for my medicine to be mailed and ready at my pharmacy. Prescriptions will not be mailed anywhere except my pharmacy.        [id number]  Rev 12/18         Registration to scan to EHR                             Page 1 of 2               Controlled Substance Agreement Opioid        [facility]  01/20/20  Patient: Rhett Elizabeth  YOB: 1960  Medical Record Number: [id number]                                                                  6. I am responsible for my prescriptions. If the medicine/prescription is lost or stolen, it will not be replaced. I also agree not to share controlled substance medicines with anyone.  7. I agree to not use ANY illegal or recreational drugs. This includes marijuana, cocaine, bath salts or other drugs. I agree not to use alcohol unless my care team says I may.          I agree to give urine samples whenever asked. If I don t give a urine sample, the care team may stop my medicine.    8. If I enroll in the [place] Medical Marijuana program, I will tell my care team. I will also sign an agreement to share my medical records with my care team.   9. I will bring in my list of medicines (or my medicine bottles) each time I come to the clinic.   10. I will tell my care team right away if I become pregnant or have a new  medical problem treated outside of my regular clinic.  11. I understand that this medicine can affect my thinking and judgment. It may be unsafe for me to drive, use machinery and do dangerous tasks. I will not do any of these things until I know how the medicine affects me. If my dose changes, I will wait to see how it affects me. I will contact my care team if I have concerns about medicine side effects.    I understand that if I do not follow any of the conditions above, my prescriptions or treatment may be stopped.      I agree that my provider, clinic care team, and pharmacy may work with any city, state or federal law enforcement agency that investigates the misuse, sale, or other diversion of my controlled medicine. I will allow my provider to discuss my care with or share a copy of this agreement with any other treating provider, pharmacy or emergency room where I receive care. I agree to give up (waive) any right of privacy or confidentiality with respect to these consents.     I have read this agreement and have asked questions about anything I did not understand.      ________________________________________________________________________  Patient signature - Date/Time -  Rhett Elizabeth                                      ________________________________________________________________________  Witness signature                                                            ________________________________________________________________________  Provider signature - Ac Frederick MD      [id number]  Rev 12/18         Registration to scan to EHR                         Page 2 of 2                   Controlled Substance Agreement Opioid           Page 1 of 2  Opioid Pain Medicines (also known as Narcotics)  What You Need to Know    What are opioids?   Opioids are pain medicines that must be prescribed by a doctor.  They are also known as narcotics.    Examples are:     morphine (MS Contin,  Mima)    oxycodone (Oxycontin)    oxycodone and acetaminophen (Percocet)    hydrocodone and acetaminophen (Vicodin, Norco)     fentanyl patch (Duragesic)     hydromorphone (Dilaudid)     methadone     What do opioids do well?   Opioids are best for short-term pain after a surgery or injury. They also work well for cancer pain. Unlike other pain medicines, they do not cause liver or kidney failure or ulcers. They may help some people with long-lasting (chronic) pain.     What do opioids NOT do well?   Opioids never get rid of pain entirely, and they do not work well for most patients with chronic pain. Opioids do not reduce swelling, one of the causes of pain. They also don t work well for nerve pain.                           For informational purposes only.  Not to replace the advice of your care provider.  Copyright [facility]. All right reserved. [employer] 916919-Lkg 02/18.      Page 2 of 2    Risks and side effects   Talk to your doctor before you start or decide to keep taking one of these medicines. Side effects include:    Lowering your breathing rate enough to cause death    Overdose, including death, especially if taking higher than prescribed doses    Long-term opioid use    Worse depression symptoms; less pleasure in things you usually enjoy    Feeling tired or sluggish    Slower thoughts or cloudy thinking    Being more sensitive to pain over time; pain is harder to control    Trouble sleeping or restless sleep    Changes in hormone levels (for example, less testosterone)    Changes in sex drive or ability to have sex    Constipation    Unsafe driving    Itching and sweating    Feeling dizzy    Nausea, vomiting and dry mouth    What else should I know about opioids?  When someone takes opioids for too long or too often, they become dependent. This means that if you stop or reduce the medicine too quickly, you will have withdrawal symptoms.    Dependence is not the same as addiction.  Addiction is when people keep using a substance that harms their body, their mind or their relations with others. If you have a history of drug or alcohol abuse, taking opioids can cause a relapse.    Over time, opioids don t work as well. Most people will need higher and higher doses. The higher the dose, the more serious the side effects. We don t know the long-term effects of opioids.      Prescribed opioids aren't the best way to manage chronic pain    Other ways to manage pain include:      Ibuprofen or acetaminophen.  You should always try this first.      Treat health problems that may be causing pain.      acupuncture or massage, deep breathing, meditation, visual imagery, aromatherapy.      Use heat or ice at the pain site      Physical therapy and exercise      Stop smoking      See a counselor or therapist                                                  People who have used opioids for a long time may have a lower quality of life, worse depression, higher levels of pain and more visits to doctors.    Never share your opioids with others. Be sure to store opioids in a secure place, locked if possible.Young children can easily swallow them and overdose.     You can overdose on opioids.  Signs of overdose include decrease or loss of consciousness, slowed breathing, trouble waking and blue lips.  If someone is worried about overdose, they should call 911.    If you are at risk for overdose, you may get naloxone (Narcan, a medicine that reverses the effects of opioids.  If you overdose, a friend or family member can give you Narcan while waiting for the ambulance.  They need to know the signs of overdose and how to give Narcan.    While you're taking opioids:    Don't use alcohol or street drugs. Taking them together can cause death.    Don't take any of these medicines unless your doctor says its okay.  Taking these with opioids can cause death.    Benzodiazepines (such as lorazepam         or  diazepam)    Muscle relaxers (such as cyclobenzaprine)    sleeping pills    other opioids    Safe disposal of opioids  Find your area drug take-back program, your pharmacy mail-back program, buy a special disposal bag (such as Deterra) from your pharmacy or flush them down the toilet.  Use the guidelines at:  www.fda.gov/drugs/resourcesforyou

## 2020-01-20 NOTE — PATIENT INSTRUCTIONS
Reminder to do regular self foot exams to monitor for ulcers or lesions with the diabetes neuropathy.     Today we discussed and advised the new shingles vaccine (ShingRx) which you can get at any pharmacy. You will then follow-up 2-6 months later for a second booster. Check with your pharmacist for insurance coverage on this vaccine.

## 2020-02-09 DIAGNOSIS — E03.9 HYPOTHYROIDISM, UNSPECIFIED TYPE: ICD-10-CM

## 2020-02-10 RX ORDER — LEVOTHYROXINE SODIUM 88 UG/1
88 TABLET ORAL DAILY
Qty: 90 TABLET | Refills: 3 | Status: SHIPPED | OUTPATIENT
Start: 2020-02-10 | End: 2020-06-11

## 2020-03-01 ENCOUNTER — HEALTH MAINTENANCE LETTER (OUTPATIENT)
Age: 60
End: 2020-03-01

## 2020-03-12 DIAGNOSIS — G62.9 PERIPHERAL POLYNEUROPATHY: ICD-10-CM

## 2020-03-12 DIAGNOSIS — M79.2 NEUROPATHIC PAIN: Primary | ICD-10-CM

## 2020-03-12 NOTE — TELEPHONE ENCOUNTER
Voice mail left for patient to call RN hotline at [phone].    What dosage of tramadol and how often are you taking?    Routing refill request to provider for review/approval because:  Medication is reported/historical  Per last visit with PCP on 1/20/20:   Neuropathic pain  (primary encounter diagnosis)  Comment: was following with neurology. Stable and asked if I would take on his prescriptions given by the neurologist.    Jackelin Nguyen RN

## 2020-03-13 RX ORDER — TRAMADOL HYDROCHLORIDE 50 MG/1
100 TABLET ORAL 2 TIMES DAILY PRN
Qty: 120 TABLET | Refills: 2 | Status: SHIPPED | OUTPATIENT
Start: 2020-03-13 | End: 2020-06-09

## 2020-03-13 NOTE — TELEPHONE ENCOUNTER
Patient returned called to RN Hotline and left .   Please call back at [phone].    Called and spoke with patient. Reports he is currently taking Tramadol 50 mg, 2 tablets in the AM and 2 tablets in PM. Currently out of medication.  Requesting refill to be sent to [facility] pharmacy.    Last OV 1/20/2020 with Dr. Frederick. Per OV notes:      (M79.2) Neuropathic pain  (primary encounter diagnosis)  Comment: was following with neurology. Stable and asked if I would take on his prescriptions given by the neurologist.  Plan: FOOT EXAM,        Patient thought his feet were fine and does regular self foot exams, declined exam during today's encounter.     (G62.9) Peripheral polyneuropathy  Comment: as above - we agreed to take over prescribing . He did not need refills today. He needs to sign a Chronic Pain Contract   Plan: FOOT EXAM        As above     Adele Hamilton RN

## 2020-03-13 NOTE — TELEPHONE ENCOUNTER
Patient called RN Hotline again requesting medication.   Message is waiting to be addressed by provider.     Adele Hamilton RN

## 2020-03-16 NOTE — TELEPHONE ENCOUNTER
Pt called RN hotline regarding status of refill. Informed pt that refill was sent on 3/13 at 1717. Pt will contact pharmacy.    Tatiana Noriega RN  [facility]

## 2020-04-01 DIAGNOSIS — E11.40 TYPE 2 DIABETES MELLITUS WITH DIABETIC NEUROPATHY, WITHOUT LONG-TERM CURRENT USE OF INSULIN (H): Chronic | ICD-10-CM

## 2020-04-01 RX ORDER — LIRAGLUTIDE 6 MG/ML
1.2 INJECTION SUBCUTANEOUS DAILY
Qty: 18 ML | Refills: 1 | Status: SHIPPED | OUTPATIENT
Start: 2020-04-01 | End: 2020-06-11

## 2020-04-01 NOTE — TELEPHONE ENCOUNTER
Prescription approved per [facility] Refill Protocol.  Sepideh Benavides, PharmD  Medication Therapy Management Pharmacist  [phone]

## 2020-04-10 DIAGNOSIS — E11.40 TYPE 2 DIABETES MELLITUS WITH DIABETIC NEUROPATHY, WITHOUT LONG-TERM CURRENT USE OF INSULIN (H): Chronic | ICD-10-CM

## 2020-04-10 DIAGNOSIS — I10 ESSENTIAL HYPERTENSION WITH GOAL BLOOD PRESSURE LESS THAN 140/90: ICD-10-CM

## 2020-04-10 DIAGNOSIS — I50.22 CHRONIC SYSTOLIC CONGESTIVE HEART FAILURE (H): Chronic | ICD-10-CM

## 2020-04-11 RX ORDER — LOSARTAN POTASSIUM 25 MG/1
TABLET ORAL
Qty: 90 TABLET | Refills: 0 | Status: SHIPPED | OUTPATIENT
Start: 2020-04-11 | End: 2020-06-11

## 2020-04-11 RX ORDER — SPIRONOLACTONE 25 MG/1
TABLET ORAL
Qty: 90 TABLET | Refills: 0 | Status: SHIPPED | OUTPATIENT
Start: 2020-04-11 | End: 2020-06-11

## 2020-05-22 DIAGNOSIS — E11.40 TYPE 2 DIABETES MELLITUS WITH DIABETIC NEUROPATHY, WITHOUT LONG-TERM CURRENT USE OF INSULIN (H): Chronic | ICD-10-CM

## 2020-05-26 RX ORDER — PREGABALIN 300 MG/1
300 CAPSULE ORAL 2 TIMES DAILY
Qty: 60 CAPSULE | Refills: 5 | Status: SHIPPED | OUTPATIENT
Start: 2020-05-26 | End: 2020-06-11

## 2020-05-26 NOTE — TELEPHONE ENCOUNTER
Routing refill request to provider for review/approval because:  Drug not on the [facility] refill protocol    checked. Last dispensed on 4/24/2020.    Controlled Substance Refill Request for pregabalin (LYRICA) 300 MG capsule  Problem List Complete:  Yes    Last Written Prescription Date:  11/25/2019  Last Fill Quantity: 60,   # refills: 5    THE MOST RECENT OFFICE VISIT MUST BE WITHIN THE PAST 3 MONTHS. AT LEAST ONE FACE TO FACE VISIT MUST OCCUR EVERY 6 MONTHS. ADDITIONAL VISITS CAN BE VIRTUAL.  (THIS STATEMENT SHOULD BE DELETED.)    Last Office Visit with [facility] primary care provider: 1/20/2020    Future Office visit: N/A    Controlled substance agreement:   Encounter-Level CSA:    There are no encounter-level csa.     Patient-Level CSA:    Controlled Substance Agreement - Opioid - Scan on 2/3/2020  4:24 PM         Last Urine Drug Screen: No results found for: CDAUT, No results found for: COMDAT, No results found for: THC13, PCP13, COC13, MAMP13, OPI13, AMP13, BZO13, TCA13, MTD13, BAR13, OXY13, PPX13, BUP13     Processing:  Rx to be electronically transmitted to pharmacy by provider     [email or web address]   checked in past 3 months?  checked. Last dispensed on 4/24/2020.    Adele Hamilton RN

## 2020-06-04 DIAGNOSIS — G62.9 PERIPHERAL POLYNEUROPATHY: ICD-10-CM

## 2020-06-04 DIAGNOSIS — E78.5 HYPERLIPIDEMIA, UNSPECIFIED HYPERLIPIDEMIA TYPE: ICD-10-CM

## 2020-06-04 DIAGNOSIS — M79.2 NEUROPATHIC PAIN: ICD-10-CM

## 2020-06-05 DIAGNOSIS — E11.40 TYPE 2 DIABETES MELLITUS WITH DIABETIC NEUROPATHY, WITHOUT LONG-TERM CURRENT USE OF INSULIN (H): Chronic | ICD-10-CM

## 2020-06-05 RX ORDER — DULOXETIN HYDROCHLORIDE 60 MG/1
60 CAPSULE, DELAYED RELEASE ORAL DAILY
Qty: 90 CAPSULE | Refills: 0 | Status: SHIPPED | OUTPATIENT
Start: 2020-06-05 | End: 2020-06-11

## 2020-06-05 NOTE — TELEPHONE ENCOUNTER
Controlled Substance Refill Request for traMADol (ULTRAM) 50 MG tablet   Problem List Complete:  No     PROVIDER TO CONSIDER COMPLETION OF PROBLEM LIST AND OVERVIEW/CONTROLLED SUBSTANCE AGREEMENT    Last Written Prescription Date:  03/13/2020  Last Fill Quantity: 120,   # refills: 2    THE MOST RECENT OFFICE VISIT MUST BE WITHIN THE PAST 3 MONTHS. AT LEAST ONE FACE TO FACE VISIT MUST OCCUR EVERY 6 MONTHS. ADDITIONAL VISITS CAN BE VIRTUAL.  (THIS STATEMENT SHOULD BE DELETED.)    Last Office Visit with [facility] primary care provider: 01/20/2020    Future Office visit:     Controlled substance agreement:   Encounter-Level CSA:    There are no encounter-level csa.     Patient-Level CSA:    Controlled Substance Agreement - Opioid - Scan on 2/3/2020  4:24 PM         Last Urine Drug Screen: No results found for: CDAUT, No results found for: COMDAT, No results found for: THC13, PCP13, COC13, MAMP13, OPI13, AMP13, BZO13, TCA13, MTD13, BAR13, OXY13, PPX13, BUP13     Processing:  Rx to be electronically transmitted to pharmacy by provider      [email or web address]       checked in past 3 months?  No, route to ANGEL Staley CMA

## 2020-06-05 NOTE — TELEPHONE ENCOUNTER
LM for patient to call and schedule telephone visit per Dr. Frederick in order to get his medications refilled. [place],

## 2020-06-05 NOTE — TELEPHONE ENCOUNTER
"Routing refill request to provider for review/approval because:  Drug [place] [facility] [place]   Labs not current:  LDL   checked - last dispensed on 5/12/2020    Requested Prescriptions   Pending Prescriptions Disp Refills     traMADol (ULTRAM) 50 MG tablet [Pharmacy Med Name: TRAMADOL HCL 50MG TABS] 120 tablet 2     Sig: TAKE TWO TABLETS BY MOUTH TWO TIMES DAILY AS NEEDED FOR SEVERE PAIN       There is no refill protocol information for this order        simvastatin (ZOCOR) 20 MG tablet 30 tablet 11     Sig: TAKE 1 TABLET (20 MG) BY MOUTH AT BEDTIME       Statins Protocol Failed - 6/5/2020  9:05 AM        Failed - LDL on file in past 12 months     Recent Labs   Lab Test 05/17/19  0913   LDL 46             Passed - No abnormal creatine kinase in past 12 months     No lab results found.             Passed - Recent (12 mo) or future (30 days) visit within the authorizing provider's specialty     Patient has had an office visit with the authorizing provider or a provider within the authorizing providers department within the previous 12 mos or has a future within next 30 days. See \"Patient Info\" tab in inbasket, or \"Choose Columns\" in Meds & Orders section of the refill encounter.              Passed - Medication is active on med list        Passed - Patient is age 18 or older           Adele Hamilton RN  "

## 2020-06-09 RX ORDER — TRAMADOL HYDROCHLORIDE 50 MG/1
TABLET ORAL
Qty: 120 TABLET | Refills: 0 | Status: SHIPPED | OUTPATIENT
Start: 2020-06-09 | End: 2020-06-11

## 2020-06-09 RX ORDER — SIMVASTATIN 20 MG
TABLET ORAL
Qty: 30 TABLET | Refills: 0 | Status: SHIPPED | OUTPATIENT
Start: 2020-06-09 | End: 2020-06-11

## 2020-06-09 NOTE — TELEPHONE ENCOUNTER
2nd attempt. LM for patient to call and schedule telephone visit per Dr. Frederick in order to get his medications refilled.   Joselito Staley CMA on 6/9/2020 at 8:33 AM

## 2020-06-11 ENCOUNTER — VIRTUAL VISIT (OUTPATIENT)
Dept: INTERNAL MEDICINE | Facility: CLINIC | Age: 60
End: 2020-06-11
Payer: COMMERCIAL

## 2020-06-11 DIAGNOSIS — E03.9 HYPOTHYROIDISM, UNSPECIFIED TYPE: ICD-10-CM

## 2020-06-11 DIAGNOSIS — M79.2 NEUROPATHIC PAIN: ICD-10-CM

## 2020-06-11 DIAGNOSIS — E78.5 HYPERLIPIDEMIA, UNSPECIFIED HYPERLIPIDEMIA TYPE: ICD-10-CM

## 2020-06-11 DIAGNOSIS — N52.9 ERECTILE DYSFUNCTION, UNSPECIFIED ERECTILE DYSFUNCTION TYPE: ICD-10-CM

## 2020-06-11 DIAGNOSIS — E11.40 TYPE 2 DIABETES MELLITUS WITH DIABETIC NEUROPATHY, WITHOUT LONG-TERM CURRENT USE OF INSULIN (H): Chronic | ICD-10-CM

## 2020-06-11 DIAGNOSIS — Z23 NEED FOR SHINGLES VACCINE: ICD-10-CM

## 2020-06-11 DIAGNOSIS — I10 ESSENTIAL HYPERTENSION WITH GOAL BLOOD PRESSURE LESS THAN 140/90: ICD-10-CM

## 2020-06-11 DIAGNOSIS — G62.9 PERIPHERAL POLYNEUROPATHY: ICD-10-CM

## 2020-06-11 DIAGNOSIS — I48.20 CHRONIC ATRIAL FIBRILLATION (H): Primary | ICD-10-CM

## 2020-06-11 DIAGNOSIS — I50.22 CHRONIC SYSTOLIC CONGESTIVE HEART FAILURE (H): Chronic | ICD-10-CM

## 2020-06-11 PROCEDURE — 99214 OFFICE O/P EST MOD 30 MIN: CPT | Mod: TEL | Performed by: INTERNAL MEDICINE

## 2020-06-11 RX ORDER — SPIRONOLACTONE 25 MG/1
25 TABLET ORAL DAILY
Qty: 90 TABLET | Refills: 1 | Status: SHIPPED | OUTPATIENT
Start: 2020-06-11 | End: 2020-12-14

## 2020-06-11 RX ORDER — LIRAGLUTIDE 6 MG/ML
1.2 INJECTION SUBCUTANEOUS DAILY
Qty: 18 ML | Refills: 1 | Status: SHIPPED | OUTPATIENT
Start: 2020-06-11 | End: 2020-12-14

## 2020-06-11 RX ORDER — SILDENAFIL CITRATE 20 MG/1
20 TABLET ORAL SEE ADMIN INSTRUCTIONS
Qty: 30 TABLET | Refills: 1 | Status: CANCELLED | OUTPATIENT
Start: 2020-06-11

## 2020-06-11 RX ORDER — DULOXETIN HYDROCHLORIDE 60 MG/1
60 CAPSULE, DELAYED RELEASE ORAL DAILY
Qty: 90 CAPSULE | Refills: 1 | Status: SHIPPED | OUTPATIENT
Start: 2020-06-11 | End: 2021-01-04

## 2020-06-11 RX ORDER — LOSARTAN POTASSIUM 25 MG/1
25 TABLET ORAL DAILY
Qty: 90 TABLET | Refills: 1 | Status: SHIPPED | OUTPATIENT
Start: 2020-06-11 | End: 2020-12-14

## 2020-06-11 RX ORDER — LEVOTHYROXINE SODIUM 88 UG/1
88 TABLET ORAL DAILY
Qty: 90 TABLET | Refills: 1 | Status: SHIPPED | OUTPATIENT
Start: 2020-06-11 | End: 2020-12-11

## 2020-06-11 RX ORDER — METOPROLOL SUCCINATE 50 MG/1
50 TABLET, EXTENDED RELEASE ORAL DAILY
Qty: 90 TABLET | Refills: 1 | Status: SHIPPED | OUTPATIENT
Start: 2020-06-11 | End: 2020-12-14

## 2020-06-11 RX ORDER — PREGABALIN 300 MG/1
300 CAPSULE ORAL 2 TIMES DAILY
Qty: 60 CAPSULE | Refills: 5 | Status: SHIPPED | OUTPATIENT
Start: 2020-06-11 | End: 2020-11-20

## 2020-06-11 RX ORDER — TRAMADOL HYDROCHLORIDE 50 MG/1
TABLET ORAL
Qty: 120 TABLET | Refills: 5 | Status: SHIPPED | OUTPATIENT
Start: 2020-06-11 | End: 2020-12-14

## 2020-06-11 RX ORDER — SIMVASTATIN 20 MG
TABLET ORAL
Qty: 90 TABLET | Refills: 1 | Status: SHIPPED | OUTPATIENT
Start: 2020-06-11 | End: 2020-12-14

## 2020-06-11 NOTE — PROGRESS NOTES
"Rhett Elizabeth is a 59 year old male who is being evaluated via a billable telephone visit.      The patient has been notified of following:     \"This telephone visit will be conducted via a call between you and your physician/provider. We have found that certain health care needs can be provided without the need for a physical exam.  This service lets us provide the care you need with a short phone conversation.  If a prescription is necessary we can send it directly to your pharmacy.  If lab work is needed we can place an order for that and you can then stop by our lab to have the test done at a later time.    Telephone visits are billed at different rates depending on your insurance coverage. During this emergency period, for some insurers they may be billed the same as an in-person visit.  Please reach out to your insurance provider with any questions.    If during the course of the call the physician/provider feels a telephone visit is not appropriate, you will not be charged for this service.\"    Patient has given verbal consent for Telephone visit?  Yes    What phone number would you like to be contacted at? [phone]    How would you like to obtain your AVS? Edu Ennis     Rhett Elizabeth is a 59 year old male who presents via phone visit today for the following health issues:       Type 2 diabetes mellitus with diabetic neuropathy, without long-term current use of insulin (H)  Hypothyroidism, unspecified type  Essential hypertension with goal blood pressure less than 140/90  Erectile dysfunction, unspecified erectile dysfunction type  Hyperlipidemia, unspecified hyperlipidemia type  Chronic systolic congestive heart failure (H)  Neuropathic pain  Peripheral polyneuropathy  Chronic atrial fibrillation  Need for shingles vaccine     Highly complex challenging patient with multiple medical problems. I only had just met this a patient once and now we are trying to cover his care with " telephone MD visit due to Coronavirus (COVID-19) situation .    HPI    Patient Active Problem List   Diagnosis     Neuropathic pain     Essential hypertension with goal blood pressure less than 140/90     Persistent atrial fibrillation     LANEY (obstructive sleep apnea)     Type 2 diabetes mellitus with diabetic neuropathy, without long-term current use of insulin (H)     Chronic systolic congestive heart failure (H)     Drusen of macula, left     Dry eyes     Morbid obesity (H)     S/P ICD (internal cardiac defibrillator) procedure     Recent bereavement     Acquired hypothyroidism     Hyperlipidemia LDL goal <100     Personal history of urethral stricture     Peripheral polyneuropathy     Past Surgical History:   Procedure Laterality Date     STRABISMUS SURGERY         Social History     Tobacco Use     Smoking status: Never Smoker     Smokeless tobacco: Current User     Types: Snuff   Substance Use Topics     Alcohol use: No     Family History   Problem Relation Age of Onset     Macular Degeneration Mother      Prostate Cancer Father      Glaucoma No family hx of          Current Outpatient Medications   Medication Sig Dispense Refill     aspirin 81 MG tablet Take by mouth daily Reported on 5/5/2017       blood glucose monitoring (ONETOUCH VERIO IQ) test strip Use to test blood sugar 3 times daily or as directed.  Ok to substitute alternative if insurance prefers. 300 strip 1     Cholecalciferol (VITAMIN D-3 PO) Take 1,000 Units by mouth daily       DULoxetine (CYMBALTA) 60 MG capsule Take 1 capsule (60 mg) by mouth daily 90 capsule 1     insulin pen needle (BD ARNOLD U/F) 32G X 4 MM miscellaneous Use 2 daily as directed. 200 each 1     levothyroxine (SYNTHROID/LEVOTHROID) 88 MCG tablet Take 1 tablet (88 mcg) by mouth daily 90 tablet 1     liraglutide (VICTOZA) 18 MG/3ML solution Inject 1.2 mg Subcutaneous daily 18 mL 1     losartan (COZAAR) 25 MG tablet Take 1 tablet (25 mg) by mouth daily 90 tablet 1     Lutein 20  "MG CAPS Take 20 mg by mouth daily 8/11/2017- patient states he takes 1 20 mg tablet daily  Patient states he takes 150 mg tablet twice a day.       metoprolol succinate ER (TOPROL XL) 50 MG 24 hr tablet Take 1 tablet (50 mg) by mouth daily 90 tablet 1     Multiple Vitamin (MULTIVITAMINS PO) Reported on 5/22/2017       Nutritional Supplements (ENSURE COMPLETE PO)        Omega-3 Fatty Acids (OMEGA-3 FISH OIL PO) Take 1 g by mouth Reported on 5/5/2017       ONETOUCH DELICA LANCETS 33G MISC 1 Box 3 times daily 300 each 1     pregabalin (LYRICA) 300 MG capsule Take 1 capsule (300 mg) by mouth 2 times daily Reported on 5/16/2017 60 capsule 5     rivaroxaban ANTICOAGULANT (XARELTO) 20 MG TABS tablet Take 1 tablet (20 mg) by mouth daily (with dinner) Reported on 5/5/2017 90 tablet 1     sildenafil (REVATIO) 20 MG tablet Take 1 tablet (20 mg) by mouth See Admin Instructions \" take between 2-4 tablets at a time for planned sexual activity\" , maximum 5 pills per day - patient will be paying out of pocket for this med 30 tablet 1     simvastatin (ZOCOR) 20 MG tablet TAKE 1 TABLET (20 MG) BY MOUTH AT BEDTIME 90 tablet 1     spironolactone (ALDACTONE) 25 MG tablet Take 1 tablet (25 mg) by mouth daily 90 tablet 1     traMADol (ULTRAM) 50 MG tablet TAKE TWO TABLETS BY MOUTH TWO TIMES DAILY AS NEEDED FOR SEVERE PAIN 120 tablet 5     UNABLE TO FIND cpap       Allergies   Allergen Reactions     Amlodipine Difficulty breathing and Other (See Comments)     Other reaction(s): Other  \"legs swell\"  Swelling of the lips and tongue  \"legs swell\"  swelling       Ace Inhibitors Cough     Other reaction(s): Cough     Recent Labs   Lab Test 01/20/20  0911 10/11/19  1247 05/17/19  0913  05/31/18  0913 04/24/18  0712  02/09/18  0826 02/03/18   A1C 6.7* 5.3 5.7*   < >  --   --    < >  --   --    LDL  --   --  46  --   --  53  --   --  57   HDL  --   --  31*  --   --  37*  --   --  34*   TRIG  --   --  72  --   --  76  --   --  117   ALT  --   --  " 20  --  25  --   --  34  --    CR 0.90  --  0.94   < > 1.12  --    < > 1.06  --    GFRESTIMATED >90  --  90   < > 68  --    < > 72  --    GFRESTBLACK >90  --  >90   < > 82  --    < > 87  --    POTASSIUM 4.2  --  4.5   < > 4.0  --    < > 3.7  --    TSH 3.26 0.01* 0.30*  --  0.92 2.82  --   --   --     < > = values in this interval not displayed.      BP Readings from Last 3 Encounters:   01/20/20 124/84   10/31/19 114/82   10/11/19 128/64    Wt Readings from Last 3 Encounters:   01/20/20 131.5 kg (290 lb)   10/31/19 122.1 kg (269 lb 3.2 oz)   10/11/19 121.5 kg (267 lb 12.8 oz)                    Reviewed and updated as needed this visit by Provider         Review of Systems   Constitutional, HEENT, cardiovascular, pulmonary, gi and gu systems are negative, except as otherwise noted.       Objective   Reported vitals:  There were no vitals taken for this visit.   healthy, alert and no distress  PSYCH: Alert and oriented times 3; coherent speech, normal   rate and volume, able to articulate logical thoughts, able   to abstract reason, no tangential thoughts, no hallucinations   or delusions  His affect is normal  RESP: No cough, no audible wheezing, able to talk in full sentences  Remainder of exam unable to be completed due to telephone visits    Diagnostic Test Results:  Labs reviewed in Epic        Assessment/Plan:  1. Type 2 diabetes mellitus with diabetic neuropathy, without long-term current use of insulin (H)  Patient is definitely in major need of laboratory studies. Laboratory future order placed   - DULoxetine (CYMBALTA) 60 MG capsule; Take 1 capsule (60 mg) by mouth daily  Dispense: 90 capsule; Refill: 1  - liraglutide (VICTOZA) 18 MG/3ML solution; Inject 1.2 mg Subcutaneous daily  Dispense: 18 mL; Refill: 1  - losartan (COZAAR) 25 MG tablet; Take 1 tablet (25 mg) by mouth daily  Dispense: 90 tablet; Refill: 1  - pregabalin (LYRICA) 300 MG capsule; Take 1 capsule (300 mg) by mouth 2 times daily Reported on  5/16/2017  Dispense: 60 capsule; Refill: 5  - **ALT FUTURE anytime; Future  - Albumin Random Urine Quantitative with Creat Ratio; Future  - **A1C FUTURE anytime; Future    2. Hypothyroidism, unspecified type  As detailed above, further follow u depending on the test results   - levothyroxine (SYNTHROID/LEVOTHROID) 88 MCG tablet; Take 1 tablet (88 mcg) by mouth daily  Dispense: 90 tablet; Refill: 1  - **TSH with free T4 reflex FUTURE 1yr; Future    3. Essential hypertension with goal blood pressure less than 140/90  Appears to be controlled within acceptable limits   - losartan (COZAAR) 25 MG tablet; Take 1 tablet (25 mg) by mouth daily  Dispense: 90 tablet; Refill: 1  - metoprolol succinate ER (TOPROL XL) 50 MG 24 hr tablet; Take 1 tablet (50 mg) by mouth daily  Dispense: 90 tablet; Refill: 1  - spironolactone (ALDACTONE) 25 MG tablet; Take 1 tablet (25 mg) by mouth daily  Dispense: 90 tablet; Refill: 1  - **ALT FUTURE anytime; Future    4. Erectile dysfunction, unspecified erectile dysfunction type  Recheck. Patient did not require Revatio (Sildenafil Citrate) refill today   - **CBC with platelets FUTURE anytime; Future    5. Hyperlipidemia, unspecified hyperlipidemia type  Due for recheck   - simvastatin (ZOCOR) 20 MG tablet; TAKE 1 TABLET (20 MG) BY MOUTH AT BEDTIME  Dispense: 90 tablet; Refill: 1  - Lipid panel reflex to direct LDL Fasting; Future    6. Chronic systolic congestive heart failure (H)  Continue current plan of care , see cardiology consultation office visit note as detailed above   - spironolactone (ALDACTONE) 25 MG tablet; Take 1 tablet (25 mg) by mouth daily  Dispense: 90 tablet; Refill: 1    7. Neuropathic pain  Continue current plan of care    - traMADol (ULTRAM) 50 MG tablet; TAKE TWO TABLETS BY MOUTH TWO TIMES DAILY AS NEEDED FOR SEVERE PAIN  Dispense: 120 tablet; Refill: 5  - **CBC with platelets FUTURE anytime; Future    8. Peripheral polyneuropathy  Continue current plan of care    -  traMADol (ULTRAM) 50 MG tablet; TAKE TWO TABLETS BY MOUTH TWO TIMES DAILY AS NEEDED FOR SEVERE PAIN  Dispense: 120 tablet; Refill: 5  - **CBC with platelets FUTURE anytime; Future    9. Chronic atrial fibrillation    - rivaroxaban ANTICOAGULANT (XARELTO) 20 MG TABS tablet; Take 1 tablet (20 mg) by mouth daily (with dinner) Reported on 5/5/2017  Dispense: 90 tablet; Refill: 1  - **ALT FUTURE anytime; Future    10. Need for shingles vaccine  Patient reminded      No follow-ups on file.    Telephone MD visit     Call began at 12:23 PM   Call ended at .12:41 PM     This is the most recent previous heart note with his cardiologist from 1 week ago.    Background: (AUSTIN Escobar NP) 57 y/o male with? a history of a nonischemic cardiomyopathy, paroxysmal atrial fibrillation, type 2 diabetes mellitus, hypertension, LANEY,? and hyperlipidemia who presents to clinic for follow-up. ? He has history of a nonischemic cardiomyopathy dating back to 2009. He had an ejection fraction of 40-45% by an echo done at [facility] at that time. An angiogram was done that demonstrated normal coronary anatomy as well. In May 2016 he was hospitalized again at [facility] for acute decompensated heart failure. He was hospitalized again at [facility] in December 2016 with acute decompensated heart failure in the setting of atrial fibrillation with a rapid ventricular response and subsequently underwent FLORIAN and cardioversion and at that time his ejection fraction was 15-20%. He was hospitalized at [facility] in December 27, 2016 after a fall due to lightheadedness and weakness, which resulted in a right ankle fracture. Cardiology was consulted for preoperative cardiac evaluation and he was seen by Dr. Liao. His echocardiogram on January 4, 2017 demonstrated an ejection fraction of 25-30%.   Rhett underwent a bi-V ICD in 3/1/18. When he was admitted for his ICD implantation was found to be in atrial flutter and subsequently underwent FLORIAN and cardioversion  on March 2, 2018. He was then started on dofetilide therapy and his dose had to be decreased due to his QTC being mildly prolonged. He has continued on his Xarelto and dofetilide therapy for management of his PAF. Follow-up echo was performed on October 22, 2018 and his ejection fraction remained severely reduced at 35?-40 percent.  Today in clinic, John tells me that he is doing fairly well overall. He has continued to work on his diet and is down another 40 pounds since his visit 6 months ago. Overall since the initial diagnosis of his cardiomyopathy he has lost almost 100 pounds. He has not been exercising regularly but wants to get back into a walking program. He had a previous fracture of his right ankle which has limited him to some degree. He denies any palpitations, chest pain, shortness of breath, or peripheral edema. He does get orthostatic lightheadedness intermittently and has had a couple of presyncopal episodes. He actually stopped taking his spironolactone and feels that his orthostatic symptoms have lessened. He does describe some episodes of extreme sweats/diaphoresis. This is been going on for quite some time and is quite variable in terms of frequency and duration. Have encouraged him to check his blood sugars when he has these episodes as this could certainly be related to his diabetes. He did have a 2-hour episode of atrial fibrillation detected on his device check in June and on his more recent check done earlier this month he had no recurrent episodes. He again has been asymptomatic with this as well.     Regarding treatment of diabetes mellitus , he has stopped Metformin ( Glucophage) completely . only is taking Liraglutide [ Victoza ] . We are awaiting recent hemoglobin a1c  [ diabetes test ]     Lab Results   Component Value Date    A1C 6.7 01/20/2020    A1C 5.3 10/11/2019    A1C 5.7 05/17/2019    A1C 6.4 07/11/2018    A1C 8.1 04/09/2018     Wt Readings from Last 5 Encounters:   01/20/20  131.5 kg (290 lb)   10/31/19 122.1 kg (269 lb 3.2 oz)   10/11/19 121.5 kg (267 lb 12.8 oz)   05/17/19 131 kg (288 lb 12.8 oz)   11/19/18 140.2 kg (309 lb)     He started the (glucagon-like peptide-1) GLP-1 agonist roughly 18-24 months !    Phone call duration:  18 minutes    Ac Frederick MD

## 2020-06-12 RX ORDER — TRAMADOL HYDROCHLORIDE 50 MG/1
TABLET ORAL
Qty: 120 TABLET | Refills: 2 | OUTPATIENT
Start: 2020-06-12

## 2020-06-12 NOTE — TELEPHONE ENCOUNTER
Duplicate     Disp  Refills  Start  End  KENJI    traMADol (ULTRAM) 50 MG tablet  120 tablet  5  6/11/2020   No    Sig: TAKE TWO TABLETS BY MOUTH TWO TIMES DAILY AS NEEDED FOR SEVERE PAIN    Sent to pharmacy as: traMADol HCl 50 MG Oral Tablet (ULTRAM)    Class: E-Prescribe    Order: [id number]    E-Prescribing Status: Receipt confirmed by pharmacy (6/11/2020 12:42 PM CDT)

## 2020-06-12 NOTE — TELEPHONE ENCOUNTER
traMADol (ULTRAM) 50 MG tablet  120 tablet  5  6/11/2020   No    Sig: TAKE TWO TABLETS BY MOUTH TWO TIMES DAILY AS NEEDED FOR SEVERE PAIN    Sent to pharmacy as: traMADol HCl 50 MG Oral Tablet (ULTRAM)    Class: E-Prescribe    Order: [id number]    E-Prescribing Status: Receipt confirmed by pharmacy (6/11/2020 12:42 PM CDT)      Duplicate request.   Closing encounter.     Adele Hamilton RN

## 2020-09-09 DIAGNOSIS — E11.40 TYPE 2 DIABETES MELLITUS WITH DIABETIC NEUROPATHY, WITHOUT LONG-TERM CURRENT USE OF INSULIN (H): Chronic | ICD-10-CM

## 2020-09-09 NOTE — TELEPHONE ENCOUNTER
Duplicate, 1st request.    DULoxetine (CYMBALTA) 60 MG capsule  90 capsule  1  6/11/2020   No    Sig - Route: Take 1 capsule (60 mg) by mouth daily - Oral    Sent to pharmacy as: DULoxetine HCl 60 MG Oral Capsule Delayed Release Particles (CYMBALTA)    Class: E-Prescribe    Order: [id number]    E-Prescribing Status: Receipt confirmed by pharmacy (6/11/2020 12:42 PM CDT)      Tatiana BURDEN CMA ([facility])

## 2020-09-11 RX ORDER — DULOXETIN HYDROCHLORIDE 60 MG/1
CAPSULE, DELAYED RELEASE ORAL
Qty: 90 CAPSULE | Refills: 0 | OUTPATIENT
Start: 2020-09-11

## 2020-11-19 DIAGNOSIS — E11.40 TYPE 2 DIABETES MELLITUS WITH DIABETIC NEUROPATHY, WITHOUT LONG-TERM CURRENT USE OF INSULIN (H): Chronic | ICD-10-CM

## 2020-11-19 NOTE — TELEPHONE ENCOUNTER
Routing refill request to provider for review/approval because:  Drug not on the [facility] refill protocol     Cheryl CORBETTN, RN

## 2020-11-20 RX ORDER — PREGABALIN 300 MG/1
CAPSULE ORAL
Qty: 60 CAPSULE | Refills: 5 | Status: SHIPPED | OUTPATIENT
Start: 2020-11-20 | End: 2021-05-18

## 2020-12-04 DIAGNOSIS — I10 ESSENTIAL HYPERTENSION WITH GOAL BLOOD PRESSURE LESS THAN 140/90: ICD-10-CM

## 2020-12-04 DIAGNOSIS — E78.5 HYPERLIPIDEMIA, UNSPECIFIED HYPERLIPIDEMIA TYPE: ICD-10-CM

## 2020-12-04 DIAGNOSIS — E11.40 TYPE 2 DIABETES MELLITUS WITH DIABETIC NEUROPATHY, WITHOUT LONG-TERM CURRENT USE OF INSULIN (H): Chronic | ICD-10-CM

## 2020-12-04 DIAGNOSIS — M79.2 NEUROPATHIC PAIN: ICD-10-CM

## 2020-12-04 DIAGNOSIS — G62.9 PERIPHERAL POLYNEUROPATHY: ICD-10-CM

## 2020-12-04 DIAGNOSIS — E03.9 HYPOTHYROIDISM, UNSPECIFIED TYPE: ICD-10-CM

## 2020-12-04 DIAGNOSIS — I50.22 CHRONIC SYSTOLIC CONGESTIVE HEART FAILURE (H): Chronic | ICD-10-CM

## 2020-12-04 DIAGNOSIS — I48.20 CHRONIC ATRIAL FIBRILLATION (H): ICD-10-CM

## 2020-12-04 NOTE — LETTER
[facility]  [address]  [place] [address]  [phone]          December 15, 2020    Rhett Elizabeth                                                                                                                     [address] [place] [id number]            Dear Rhett,      Your provider has sent a  jordan refills for your medication. You are due for an appointment for further refills.  Please contact the clinic to schedule an appointment for further refills.           Sincerely,         Ac Frederick MD/ak

## 2020-12-07 NOTE — TELEPHONE ENCOUNTER
Controlled Substance Refill Request for traMADol (ULTRAM) 50 MG tablet  Problem List Complete:  No     PROVIDER TO CONSIDER COMPLETION OF PROBLEM LIST AND OVERVIEW/CONTROLLED SUBSTANCE AGREEMENT    Last Written Prescription Date:  6/11/2020  Last Fill Quantity: 120,   # refills: 5    THE MOST RECENT OFFICE VISIT MUST BE WITHIN THE PAST 3 MONTHS. AT LEAST ONE FACE TO FACE VISIT MUST OCCUR EVERY 6 MONTHS. ADDITIONAL VISITS CAN BE VIRTUAL.  (THIS STATEMENT SHOULD BE DELETED.)    [place] [facility] [place]: 6/11/2020    Future Office visit:     Controlled substance agreement:   Encounter-Level CSA:    There are no encounter-level csa.     Patient-Level CSA:    Controlled Substance Agreement - Opioid - Scan on 2/3/2020  4:24 PM         Last Urine Drug Screen: No results found for: CDAUT, No results found for: COMDAT, No results found for: THC13, PCP13, COC13, MAMP13, OPI13, AMP13, BZO13, TCA13, MTD13, BAR13, OXY13, PPX13, BUP13     Processing:  Rx to be electronically transmitted to pharmacy by provider      [email or web address]       checked in past 3 months?  No, route to RN

## 2020-12-10 NOTE — TELEPHONE ENCOUNTER
Called patient and left VM to call clinic. Please help schedule DM/med check appointment if patient returns call.  Tatiana BURDEN CMA ([facility])

## 2020-12-11 RX ORDER — LEVOTHYROXINE SODIUM 88 UG/1
TABLET ORAL
Qty: 90 TABLET | Refills: 1 | Status: SHIPPED | OUTPATIENT
Start: 2020-12-11 | End: 2021-01-04

## 2020-12-11 NOTE — TELEPHONE ENCOUNTER
Reason for Call:  Other prescription    Detailed comments: patient called and scheduled an appointment for the soonest he could get in - 1/4/21    Patient states he will be out of his Tramadol this weekend and is hoping he can get this refilled today at the [facility] pharmacy along with his other meds:    -levothyroxine  -liraglutide  -losartan  -metoprolol  -rivaroxaban  -simvastatin  -spironolactone     Phone Number Patient can be reached at: Home number on file [phone] (home)    Best Time: anytime    Can we leave a detailed message on this number? YES    Call taken on 12/11/2020 at 1:59 PM by Patrice Puentes

## 2020-12-11 NOTE — TELEPHONE ENCOUNTER
Synthroid: pt meets all criteria for refill protocol.  Will approve for 6 months, last OV was in June.  Alex Boss, PharmD  [facility]   [place] Pharmacist on behalf of [facility]

## 2020-12-14 RX ORDER — LOSARTAN POTASSIUM 25 MG/1
25 TABLET ORAL DAILY
Qty: 90 TABLET | Refills: 0 | Status: SHIPPED | OUTPATIENT
Start: 2020-12-14 | End: 2021-01-15

## 2020-12-14 RX ORDER — SIMVASTATIN 20 MG
TABLET ORAL
Qty: 30 TABLET | Refills: 1 | Status: SHIPPED | OUTPATIENT
Start: 2020-12-14 | End: 2023-06-15

## 2020-12-14 RX ORDER — SPIRONOLACTONE 25 MG/1
25 TABLET ORAL DAILY
Qty: 90 TABLET | Refills: 0 | Status: SHIPPED | OUTPATIENT
Start: 2020-12-14 | End: 2021-04-21

## 2020-12-14 RX ORDER — METOPROLOL SUCCINATE 50 MG/1
50 TABLET, EXTENDED RELEASE ORAL DAILY
Qty: 90 TABLET | Refills: 0 | Status: SHIPPED | OUTPATIENT
Start: 2020-12-14 | End: 2022-09-26

## 2020-12-14 RX ORDER — LIRAGLUTIDE 6 MG/ML
1.2 INJECTION SUBCUTANEOUS DAILY
Qty: 18 ML | Refills: 1 | Status: SHIPPED | OUTPATIENT
Start: 2020-12-14 | End: 2021-01-19

## 2020-12-14 RX ORDER — TRAMADOL HYDROCHLORIDE 50 MG/1
TABLET ORAL
Qty: 120 TABLET | Refills: 0 | Status: SHIPPED | OUTPATIENT
Start: 2020-12-14 | End: 2021-01-15

## 2020-12-14 NOTE — TELEPHONE ENCOUNTER
Patient called back and would like to know when the the prescription will be sent to the pharmacy. Please call him with an up date

## 2020-12-14 NOTE — TELEPHONE ENCOUNTER
Routing refill request to provider for review/approval because:  Drug not on the [facility] refill protocol   Labs not current:  A1c, Lipid panel, Cr  Pt has upcoming appt 01/04.

## 2020-12-14 NOTE — TELEPHONE ENCOUNTER
[place] reviewed:  reviewed- no concerns  Last filled-11/9/2020  [place]  [email or web address]

## 2020-12-14 NOTE — TELEPHONE ENCOUNTER
Patient transferred to RN line, is wondering the status of his medications, as he's wanting them today.  RN advised that the request has been sent to Dr. Frederick as high priority, so he should be looking at the request this afternoon.    Radha Rodriguez RN  [facility]

## 2021-01-04 ENCOUNTER — OFFICE VISIT (OUTPATIENT)
Dept: INTERNAL MEDICINE | Facility: CLINIC | Age: 61
End: 2021-01-04
Payer: COMMERCIAL

## 2021-01-04 VITALS
DIASTOLIC BLOOD PRESSURE: 89 MMHG | HEART RATE: 94 BPM | SYSTOLIC BLOOD PRESSURE: 135 MMHG | OXYGEN SATURATION: 98 % | BODY MASS INDEX: 41.25 KG/M2 | TEMPERATURE: 97.7 F | WEIGHT: 315 LBS | RESPIRATION RATE: 16 BRPM

## 2021-01-04 DIAGNOSIS — E78.5 HYPERLIPIDEMIA LDL GOAL <100: ICD-10-CM

## 2021-01-04 DIAGNOSIS — E66.01 MORBID OBESITY (H): Chronic | ICD-10-CM

## 2021-01-04 DIAGNOSIS — E03.9 HYPOTHYROIDISM, UNSPECIFIED TYPE: ICD-10-CM

## 2021-01-04 DIAGNOSIS — G62.9 PERIPHERAL POLYNEUROPATHY: ICD-10-CM

## 2021-01-04 DIAGNOSIS — I50.22 CHRONIC SYSTOLIC CONGESTIVE HEART FAILURE (H): Chronic | ICD-10-CM

## 2021-01-04 DIAGNOSIS — I48.20 CHRONIC ATRIAL FIBRILLATION (H): ICD-10-CM

## 2021-01-04 DIAGNOSIS — G47.33 OSA (OBSTRUCTIVE SLEEP APNEA): ICD-10-CM

## 2021-01-04 DIAGNOSIS — E11.40 TYPE 2 DIABETES MELLITUS WITH DIABETIC NEUROPATHY, WITHOUT LONG-TERM CURRENT USE OF INSULIN (H): Primary | Chronic | ICD-10-CM

## 2021-01-04 DIAGNOSIS — I10 ESSENTIAL HYPERTENSION WITH GOAL BLOOD PRESSURE LESS THAN 140/90: ICD-10-CM

## 2021-01-04 LAB
ANION GAP SERPL CALCULATED.3IONS-SCNC: 6 MMOL/L (ref 3–14)
BASOPHILS # BLD AUTO: 0 10E9/L (ref 0–0.2)
BASOPHILS NFR BLD AUTO: 0.3 %
BUN SERPL-MCNC: 16 MG/DL (ref 7–30)
CALCIUM SERPL-MCNC: 8.8 MG/DL (ref 8.5–10.1)
CHLORIDE SERPL-SCNC: 102 MMOL/L (ref 94–109)
CHOLEST SERPL-MCNC: 111 MG/DL
CO2 SERPL-SCNC: 26 MMOL/L (ref 20–32)
CREAT SERPL-MCNC: 1 MG/DL (ref 0.66–1.25)
DIFFERENTIAL METHOD BLD: NORMAL
EOSINOPHIL # BLD AUTO: 0.5 10E9/L (ref 0–0.7)
EOSINOPHIL NFR BLD AUTO: 5.9 %
ERYTHROCYTE [DISTWIDTH] IN BLOOD BY AUTOMATED COUNT: 13.2 % (ref 10–15)
GFR SERPL CREATININE-BSD FRML MDRD: 82 ML/MIN/{1.73_M2}
GLUCOSE SERPL-MCNC: 369 MG/DL (ref 70–99)
HBA1C MFR BLD: 8.5 % (ref 0–5.6)
HCT VFR BLD AUTO: 43.4 % (ref 40–53)
HDLC SERPL-MCNC: 27 MG/DL
HGB BLD-MCNC: 14.7 G/DL (ref 13.3–17.7)
LDLC SERPL CALC-MCNC: 37 MG/DL
LYMPHOCYTES # BLD AUTO: 2.1 10E9/L (ref 0.8–5.3)
LYMPHOCYTES NFR BLD AUTO: 24.1 %
MCH RBC QN AUTO: 30.8 PG (ref 26.5–33)
MCHC RBC AUTO-ENTMCNC: 33.9 G/DL (ref 31.5–36.5)
MCV RBC AUTO: 91 FL (ref 78–100)
MONOCYTES # BLD AUTO: 0.6 10E9/L (ref 0–1.3)
MONOCYTES NFR BLD AUTO: 7.2 %
NEUTROPHILS # BLD AUTO: 5.5 10E9/L (ref 1.6–8.3)
NEUTROPHILS NFR BLD AUTO: 62.5 %
NONHDLC SERPL-MCNC: 84 MG/DL
PLATELET # BLD AUTO: 191 10E9/L (ref 150–450)
POTASSIUM SERPL-SCNC: 4.4 MMOL/L (ref 3.4–5.3)
RBC # BLD AUTO: 4.77 10E12/L (ref 4.4–5.9)
SODIUM SERPL-SCNC: 134 MMOL/L (ref 133–144)
TRIGL SERPL-MCNC: 235 MG/DL
TSH SERPL DL<=0.005 MIU/L-ACNC: 0.87 MU/L (ref 0.4–4)
WBC # BLD AUTO: 8.8 10E9/L (ref 4–11)

## 2021-01-04 PROCEDURE — 83036 HEMOGLOBIN GLYCOSYLATED A1C: CPT | Performed by: INTERNAL MEDICINE

## 2021-01-04 PROCEDURE — 99214 OFFICE O/P EST MOD 30 MIN: CPT | Performed by: INTERNAL MEDICINE

## 2021-01-04 PROCEDURE — 84443 ASSAY THYROID STIM HORMONE: CPT | Performed by: INTERNAL MEDICINE

## 2021-01-04 PROCEDURE — 80061 LIPID PANEL: CPT | Performed by: INTERNAL MEDICINE

## 2021-01-04 PROCEDURE — 85025 COMPLETE CBC W/AUTO DIFF WBC: CPT | Performed by: INTERNAL MEDICINE

## 2021-01-04 PROCEDURE — 36415 COLL VENOUS BLD VENIPUNCTURE: CPT | Performed by: INTERNAL MEDICINE

## 2021-01-04 PROCEDURE — 80048 BASIC METABOLIC PNL TOTAL CA: CPT | Performed by: INTERNAL MEDICINE

## 2021-01-04 RX ORDER — LEVOTHYROXINE SODIUM 88 UG/1
88 TABLET ORAL DAILY
Qty: 90 TABLET | Refills: 1 | Status: SHIPPED | OUTPATIENT
Start: 2021-01-04 | End: 2021-09-20

## 2021-01-04 RX ORDER — DULOXETIN HYDROCHLORIDE 60 MG/1
60 CAPSULE, DELAYED RELEASE ORAL DAILY
Qty: 90 CAPSULE | Refills: 1 | Status: SHIPPED | OUTPATIENT
Start: 2021-01-04 | End: 2021-08-24

## 2021-01-04 RX ORDER — BLOOD SUGAR DIAGNOSTIC
STRIP MISCELLANEOUS
Qty: 300 STRIP | Refills: 1 | Status: SHIPPED | OUTPATIENT
Start: 2021-01-04

## 2021-01-04 NOTE — PROGRESS NOTES
Assessment & Plan     Type 2 diabetes mellitus with diabetic neuropathy, without long-term current use of insulin (H)  Need to see today's hemoglobin a1c  [ diabetes test ]. Patient thinks this is going to be ok and if so we don't need to make any medication adjustments . He's continuing treatment with Cymbalta (duloxetine)  , tramadol [Ultram] and LYRICA (pregabalin) to deal with the severity of his chronic diabetes neuropathy   - Hemoglobin A1c  - Basic metabolic panel  - blood glucose (ONETOUCH VERIO IQ) test strip; Use to test blood sugar 3 times daily or as directed.  Ok to substitute alternative if insurance prefers.  - DULoxetine (CYMBALTA) 60 MG capsule; Take 1 capsule (60 mg) by mouth daily    Hypothyroidism, unspecified type  Due for laboratory studies today   - levothyroxine (SYNTHROID/LEVOTHROID) 88 MCG tablet; Take 1 tablet (88 mcg) by mouth daily  - TSH with free T4 reflex    Chronic atrial fibrillation (H)  Continue current plan of care    - rivaroxaban ANTICOAGULANT (XARELTO) 20 MG TABS tablet; Take 1 tablet (20 mg) by mouth daily (with dinner) Reported on 5/5/2017 please make appointment within 30 days  - CBC with platelets differential    Chronic systolic congestive heart failure (H)  Problem is stable and ongoing monitoring        Morbid obesity (H)  Weight loss measures reviewed     Essential hypertension with goal blood pressure less than 140/90  Controlled within acceptable limits , continue current plan of care    - Basic metabolic panel  - CBC with platelets differential    LANEY (obstructive sleep apnea)  Uses his cpap machine and mask religiously   - CBC with platelets differential    Hyperlipidemia LDL goal <100  Due for recheck   - Lipid panel reflex to direct LDL Fasting    Peripheral polyneuropathy  Continue current plan of care          28 minutes spent on the date of the encounter doing chart review, history and exam, documentation and further activities as noted above        "      BMI:   Estimated body mass index is 41.25 kg/m  as calculated from the following:    Height as of 1/20/20: 1.905 m (6' 3\").    Weight as of this encounter: 149.7 kg (330 lb).   Weight management plan: Discussed healthy diet and exercise guidelines      Return in about 3 months (around 4/4/2021), or to 6 months , depending on lab results.    Ac Frederick MD  [facility]     Rhett Elizabeth is a 60 year old male who presents to clinic today for the following health issues     History of Present Illness       Diabetes:   He presents for follow up of diabetes.  He is checking home blood glucose a few times a week. He checks blood glucose before and after meals.  Blood glucose is never over 200 and never under 70. When his blood glucose is low, the patient is asymptomatic for confusion, blurred vision, lethargy and reports not feeling dizzy, shaky, or weak.  He has no concerns regarding his diabetes at this time.  He is not experiencing numbness or burning in feet, excessive thirst, blurry vision, weight changes or redness, sores or blisters on feet. The patient has not had a diabetic eye exam in the last 12 months.         He eats 0-1 servings of fruits and vegetables daily.He consumes 0 (once a  week ) sweetened beverage(s) daily.He exercises with enough effort to increase his heart rate 9 or less minutes per day.  He exercises with enough effort to increase his heart rate 3 or less days per week.   He is taking medications regularly.     Feels he needs to \"get his crap together\". Patient has had diabetes mellitus type 2 probably undiagnosed for many many years , the formal diagnosis has been 10-15 years now. It's been almost 3 solid years of good control with the help of Liraglutide [ Victoza ] .      Lab Results   Component Value Date    A1C 6.7 01/20/2020    A1C 5.3 10/11/2019    A1C 5.7 05/17/2019    A1C 6.4 07/11/2018    A1C 8.1 04/09/2018       Wt Readings from Last 5 " Encounters:   01/04/21 149.7 kg (330 lb)   01/20/20 131.5 kg (290 lb)   10/31/19 122.1 kg (269 lb 3.2 oz)   10/11/19 121.5 kg (267 lb 12.8 oz)   05/17/19 131 kg (288 lb 12.8 oz)     He has been taking the Liraglutide [ Victoza ] every single day so what's happened is that Liraglutide [ Victoza ] has possibly lost it's effectiveness.? Patient questions this , feels he eats out of boredom. Unfortunately despite Liraglutide [ Victoza ] he has gained more and more weight.      Review of Systems   Constitutional, HEENT, cardiovascular, pulmonary, gi and gu systems are negative, except as otherwise noted.    peak weight was 361, down to 277 and now is at 330  Objective    /89   Pulse 94   Temp 97.7  F (36.5  C) (Oral)   Resp 16   Wt 149.7 kg (330 lb)   SpO2 98%   BMI 41.25 kg/m    Body mass index is 41.25 kg/m .  Physical Exam   GENERAL: healthy, alert and no distress  RESP: lungs clear to auscultation - no rales, rhonchi or wheezes  CV: regular rate and rhythm, normal S1 S2, no S3 or S4, no murmur, click or rub, no peripheral edema and peripheral pulses strong  MS: no edema, he has significant bilateral diabetes neuropathy and some scarring from an old severe injury to his right lower extremity .     Orders Placed This Encounter   Procedures     Hemoglobin A1c     Basic metabolic panel     Lipid panel reflex to direct LDL Fasting     TSH with free T4 reflex     CBC with platelets differential         I spent a total of 24 minutes face-to-face with Rhett Elizabeth during today's office visit.  Over 50% of this time was spent counseling the patient and/or coordinating care regarding multiple medical problems .  See note for details.

## 2021-01-07 ENCOUNTER — TELEPHONE (OUTPATIENT)
Dept: INTERNAL MEDICINE | Facility: CLINIC | Age: 61
End: 2021-01-07

## 2021-01-07 DIAGNOSIS — E11.40 TYPE 2 DIABETES MELLITUS WITH DIABETIC NEUROPATHY, WITHOUT LONG-TERM CURRENT USE OF INSULIN (H): Primary | Chronic | ICD-10-CM

## 2021-01-07 NOTE — TELEPHONE ENCOUNTER
Left message on answering machine for patient to call back to the nurse at [phone].      Note that pt is currently taking Victoza.    Tatiana Noriega RN  [facility]

## 2021-01-07 NOTE — TELEPHONE ENCOUNTER
Ac Frederick MD   1/4/2021 10:50 PM CST      Please huddle with me on this case tomorrow . These laboratory studies are ok except for the fact that the diabetes mellitus has taken off markedly so. The hemoglobin a1c  [ diabetes test ] is now wildly out of control and the blood glucose is well past 350. This is almost certainly tied in with the marked weight gain you've had during the last 12 months.     Plan - we should start Liraglutide [ Victoza ] , Trulicity (dulaglutide), or Ozempic  [semaglutide], which ever one your health insurance pays the best for. This is likely to a] markedly improve the diabetes mellitus and b] help a lot with weight loss      Lets get started and have a follow up office visit in 3 months      Ac Frederick MD

## 2021-01-12 DIAGNOSIS — M79.2 NEUROPATHIC PAIN: ICD-10-CM

## 2021-01-12 DIAGNOSIS — I10 ESSENTIAL HYPERTENSION WITH GOAL BLOOD PRESSURE LESS THAN 140/90: ICD-10-CM

## 2021-01-12 DIAGNOSIS — E11.40 TYPE 2 DIABETES MELLITUS WITH DIABETIC NEUROPATHY, WITHOUT LONG-TERM CURRENT USE OF INSULIN (H): Chronic | ICD-10-CM

## 2021-01-12 DIAGNOSIS — G62.9 PERIPHERAL POLYNEUROPATHY: ICD-10-CM

## 2021-01-15 RX ORDER — TRAMADOL HYDROCHLORIDE 50 MG/1
TABLET ORAL
Qty: 120 TABLET | Refills: 5 | Status: SHIPPED | OUTPATIENT
Start: 2021-01-15 | End: 2021-07-14

## 2021-01-15 RX ORDER — LOSARTAN POTASSIUM 25 MG/1
25 TABLET ORAL DAILY
Qty: 90 TABLET | Refills: 1 | Status: SHIPPED | OUTPATIENT
Start: 2021-01-15 | End: 2022-09-26

## 2021-01-15 NOTE — TELEPHONE ENCOUNTER
Controlled Substance Refill Request for Tramadol 50mg  Problem List Complete:  No     PROVIDER TO CONSIDER COMPLETION OF PROBLEM LIST AND OVERVIEW/CONTROLLED SUBSTANCE AGREEMENT    Last Written Prescription Date:  12/14/20  Last Fill Quantity: 120,   # refills: 0    THE MOST RECENT OFFICE VISIT MUST BE WITHIN THE PAST 3 MONTHS. AT LEAST ONE FACE TO FACE VISIT MUST OCCUR EVERY 6 MONTHS. ADDITIONAL VISITS CAN BE VIRTUAL.  (THIS STATEMENT SHOULD BE DELETED.)    Last Office Visit with [facility] primary care provider: 1/4/21    Future Office visit:     Controlled substance agreement:   Encounter-Level CSA:    There are no encounter-level csa.     Patient-Level CSA:    Controlled Substance Agreement - Opioid - Scan on 2/3/2020  4:24 PM         Last Urine Drug Screen: No results found for: CDAUT, No results found for: COMDAT, No results found for: THC13, PCP13, COC13, MAMP13, OPI13, AMP13, BZO13, TCA13, MTD13, BAR13, OXY13, PPX13, BUP13         [email or web address]       checked in past 3 months?  No, route to RN

## 2021-01-18 NOTE — TELEPHONE ENCOUNTER
Spoke with patient, he states that he is currently on Victoza, injects 1.2 mg daily. Please advise if there are any further medication changes needed.   Pt reports that he does not take any other oral or injection medications for diabetes. He had done metformin in the past, he had discontinued metformin about a year ago.    Called patient and notified him of lab result message from provider. Pt verbalized understanding. See his reply above. Pt states that it is okay to leave a detailed voice message if he does not answer when we call back.

## 2021-01-18 NOTE — TELEPHONE ENCOUNTER
What's going to completely fix his diabetes mellitus problem is the following     1. Needs to restart Metformin ( Glucophage)  Metformin Titration    Start with 1 tab each am times 1 week   Then 1 tab in the morning and 1 in the the evening times 1 week   Then 2 tabs am and 1 tab each evening times 1 week   Then finally 2 tabs 2 times a day which is the maintenance dose    2. The dose of Liraglutide [ Victoza ] is not the right dose. 1.2 is the mid-way and we want patient on the 1.8 milligram dose.    Lets get both of these changes made and do a recheck hemoglobin a1c  [ diabetes test ] in 3 months     Ac Frederick MD

## 2021-01-19 DIAGNOSIS — E11.40 TYPE 2 DIABETES MELLITUS WITH DIABETIC NEUROPATHY, WITHOUT LONG-TERM CURRENT USE OF INSULIN (H): Chronic | ICD-10-CM

## 2021-01-19 RX ORDER — LIRAGLUTIDE 6 MG/ML
1.8 INJECTION SUBCUTANEOUS DAILY
Qty: 18 ML | Refills: 1 | Status: SHIPPED | OUTPATIENT
Start: 2021-01-19 | End: 2021-06-17

## 2021-01-19 RX ORDER — METFORMIN HCL 500 MG
TABLET, EXTENDED RELEASE 24 HR ORAL
Status: CANCELLED | OUTPATIENT
Start: 2021-01-19

## 2021-01-19 RX ORDER — BLOOD-GLUCOSE METER
EACH MISCELLANEOUS
Qty: 1 KIT | Refills: 0 | Status: SHIPPED | OUTPATIENT
Start: 2021-01-19

## 2021-01-19 RX ORDER — LANCETS 33 GAUGE
1 EACH MISCELLANEOUS 3 TIMES DAILY
Qty: 300 EACH | Refills: 1 | Status: SHIPPED | OUTPATIENT
Start: 2021-01-19

## 2021-01-19 NOTE — TELEPHONE ENCOUNTER
Dr. Frederick-- please clarify metformin, unsure if is it should be immediate release or extended release. Cued both prescriptions, please sign for correct script and then can close encounter.    Called patient and notified him of message from provider. Pt verbalized understanding and had no further questions. New Rx sent for Victoza. Future lab order placed for A1c test. Will clarify metformin order with provider. Pt is expecting his new prescriptions to be sent to [facility] pharmacy.

## 2021-01-19 NOTE — TELEPHONE ENCOUNTER
Patient has one touch verio monitor at home and insurance doesn't cover this brand anymore, so we just need a new script for monitor and lancets.  Thank you   Ceci Casiano. Pharmacy Technician   [facility]

## 2021-04-12 DIAGNOSIS — I10 ESSENTIAL HYPERTENSION WITH GOAL BLOOD PRESSURE LESS THAN 140/90: ICD-10-CM

## 2021-04-12 DIAGNOSIS — I50.22 CHRONIC SYSTOLIC CONGESTIVE HEART FAILURE (H): Chronic | ICD-10-CM

## 2021-04-16 NOTE — TELEPHONE ENCOUNTER
Called patient and left VM to call clinic. Please help schedule med check appointment if patient returns call.  Tatiana BURDEN CMA ([facility])

## 2021-04-19 NOTE — TELEPHONE ENCOUNTER
Attempt 2, Called patient and left VM to call clinic. Please help schedule med check appointment if patient returns call.  Tatiana BURDEN CMA ([facility])

## 2021-04-21 RX ORDER — SPIRONOLACTONE 25 MG/1
25 TABLET ORAL DAILY
Qty: 30 TABLET | Refills: 0 | Status: SHIPPED | OUTPATIENT
Start: 2021-04-21 | End: 2021-05-21

## 2021-04-21 NOTE — TELEPHONE ENCOUNTER
Routing below messages to PCP.  Staff was not able to reach patient to schedule an appointment after 3 attempts.      Please address refill.  RX passed protocol but had a drug interaction.    Gabino Maradiaga RN

## 2021-05-17 ENCOUNTER — OFFICE VISIT (OUTPATIENT)
Dept: INTERNAL MEDICINE | Facility: CLINIC | Age: 61
End: 2021-05-17
Payer: COMMERCIAL

## 2021-05-17 VITALS
WEIGHT: 307 LBS | DIASTOLIC BLOOD PRESSURE: 81 MMHG | BODY MASS INDEX: 38.37 KG/M2 | SYSTOLIC BLOOD PRESSURE: 127 MMHG | RESPIRATION RATE: 14 BRPM | HEART RATE: 78 BPM | OXYGEN SATURATION: 96 % | TEMPERATURE: 97.9 F

## 2021-05-17 DIAGNOSIS — E78.5 HYPERLIPIDEMIA LDL GOAL <100: ICD-10-CM

## 2021-05-17 DIAGNOSIS — I10 ESSENTIAL HYPERTENSION WITH GOAL BLOOD PRESSURE LESS THAN 140/90: ICD-10-CM

## 2021-05-17 DIAGNOSIS — G62.9 PERIPHERAL POLYNEUROPATHY: ICD-10-CM

## 2021-05-17 DIAGNOSIS — Z95.810 S/P ICD (INTERNAL CARDIAC DEFIBRILLATOR) PROCEDURE: ICD-10-CM

## 2021-05-17 DIAGNOSIS — I50.22 CHRONIC SYSTOLIC CONGESTIVE HEART FAILURE (H): Chronic | ICD-10-CM

## 2021-05-17 DIAGNOSIS — E03.9 ACQUIRED HYPOTHYROIDISM: ICD-10-CM

## 2021-05-17 DIAGNOSIS — I50.22 CHRONIC SYSTOLIC CONGESTIVE HEART FAILURE (H): ICD-10-CM

## 2021-05-17 DIAGNOSIS — E66.01 MORBID OBESITY (H): ICD-10-CM

## 2021-05-17 DIAGNOSIS — E11.40 TYPE 2 DIABETES MELLITUS WITH DIABETIC NEUROPATHY, WITHOUT LONG-TERM CURRENT USE OF INSULIN (H): Primary | Chronic | ICD-10-CM

## 2021-05-17 DIAGNOSIS — G47.33 OSA (OBSTRUCTIVE SLEEP APNEA): ICD-10-CM

## 2021-05-17 DIAGNOSIS — I48.19 PERSISTENT ATRIAL FIBRILLATION (H): ICD-10-CM

## 2021-05-17 LAB — HBA1C MFR BLD: 6.2 % (ref 0–5.6)

## 2021-05-17 PROCEDURE — 83036 HEMOGLOBIN GLYCOSYLATED A1C: CPT | Performed by: INTERNAL MEDICINE

## 2021-05-17 PROCEDURE — 99214 OFFICE O/P EST MOD 30 MIN: CPT | Performed by: INTERNAL MEDICINE

## 2021-05-17 PROCEDURE — 36415 COLL VENOUS BLD VENIPUNCTURE: CPT | Performed by: INTERNAL MEDICINE

## 2021-05-17 PROCEDURE — 80048 BASIC METABOLIC PNL TOTAL CA: CPT | Performed by: INTERNAL MEDICINE

## 2021-05-17 NOTE — PROGRESS NOTES
Assessment & Plan     Type 2 diabetes mellitus with diabetic neuropathy, without long-term current use of insulin (H)  Further follow up appointment with this gentleman regarding his diabetes mellitus type 2 . He's come along way from his hemoglobin a1c  [ diabetes test ] of 8.5% in January. Now with 4 months of treatment with (glucagon-like peptide-1) GLP-1 agonist he's down 27 pounds and his hemoglobin a1c  [ diabetes test ] today is 6.2% YAY this is terrific !  - **A1C FUTURE anytime  Recheck appointment in 6 months     S/P ICD (internal cardiac defibrillator) procedure  Problem is stable and ongoing monitoring      Peripheral polyneuropathy  Problem is stable and ongoing monitoring      Essential hypertension with goal blood pressure less than 140/90  Controlled within acceptable limits   - Basic metabolic panel    Morbid obesity (H)  Weight loss measures reviewed , therapeutic lifestyle changes encouraged     Hyperlipidemia LDL goal <100  Lab Results   Component Value Date    CHOL 111 01/04/2021     Lab Results   Component Value Date    HDL 27 01/04/2021     Lab Results   Component Value Date    LDL 37 01/04/2021     Lab Results   Component Value Date    TRIG 235 01/04/2021     No results found for: CHOLHDLRATIO  Not due for this test today     Acquired hypothyroidism  TSH   Date Value Ref Range Status   01/04/2021 0.87 0.40 - 4.00 mU/L Final    not due for this test today     LANEY (obstructive sleep apnea)  Uses cpap religiously     Persistent atrial fibrillation (H)  Problem is stable and ongoing monitoring      Chronic systolic congestive heart failure (H)  Problem is stable and ongoing monitoring        Prescription drug management  25 minutes spent on the date of the encounter doing chart review, history and exam, documentation and further activities per the note       Tobacco Cessation:   reports that he has never smoked. His smokeless tobacco use includes snuff.      Return in about 6 months (around  11/17/2021).    Ac Frederick MD  [facility] TERI Delaney is a 60 year old who presents for the following health issues   Encounter Diagnoses   Name Primary?     Type 2 diabetes mellitus with diabetic neuropathy, without long-term current use of insulin (H) Yes     S/P ICD (internal cardiac defibrillator) procedure      Peripheral polyneuropathy      Essential hypertension with goal blood pressure less than 140/90      Morbid obesity (H)      Hyperlipidemia LDL goal <100      Acquired hypothyroidism      LANEY (obstructive sleep apnea)      Persistent atrial fibrillation (H)      Chronic systolic congestive heart failure (H)        HPI     ED/UC Followup:    Facility:  [facility] ER  Date of visit: 03/23/2021  Reason for visit: A fib   Current Status: pt states he is feeling better.      Patient received a cardioversion at the emergency room for his atrial fibrillation / atrial flutter . He's had this happen now for the third time he was shocked. He's had a workup a his permanent pacemaker checked and other testing. Patient sees a cardiologist with [facility] on a regularly scheduled basis , sees them around every six months . He's taking Tikosyn (Dofetilide)     Diabetes Follow-up    How often are you checking your blood sugar? A few times a week  What time of day are you checking your blood sugars (select all that apply)?  Before and after meals  Have you had any blood sugars above 200?  Yes a few times  Have you had any blood sugars below 70?  No    What symptoms do you notice when your blood sugar is low?  Other: sweating     What concerns do you have today about your diabetes? None     Do you have any of these symptoms? (Select all that apply)  Numbness in feet and Burning in feet    Have you had a diabetic eye exam in the last 12 months? No    But the hemoglobin a1c  [ diabetes test ] is now 6.2% which is terrific and we have seen patient successfully drop 27 pounds !!  yay     Lab Results   Component Value Date    A1C 8.5 01/04/2021    A1C 6.7 01/20/2020    A1C 5.3 10/11/2019    A1C 5.7 05/17/2019    A1C 6.4 07/11/2018         Wt Readings from Last 5 Encounters:   05/17/21 139.3 kg (307 lb)   01/04/21 149.7 kg (330 lb)   01/20/20 131.5 kg (290 lb)   10/31/19 122.1 kg (269 lb 3.2 oz)   10/11/19 121.5 kg (267 lb 12.8 oz)     Body mass index is 38.37 kg/m .      BP Readings from Last 2 Encounters:   01/04/21 135/89   01/20/20 124/84     Hemoglobin A1C (%)   Date Value   01/04/2021 8.5 (H)   01/20/2020 6.7 (H)     LDL Cholesterol Calculated (mg/dL)   Date Value   01/04/2021 37   05/17/2019 46         How many servings of fruits and vegetables do you eat daily?  0-1    On average, how many sweetened beverages do you drink each day (Examples: soda, juice, sweet tea, etc.  Do NOT count diet or artificially sweetened beverages)?   Couple times a week     How many days per week do you exercise enough to make your heart beat faster? 3 or less    How many minutes a day do you exercise enough to make your heart beat faster? 9 or less    How many days per week do you miss taking your medication? 0      Review of Systems   Constitutional, HEENT, cardiovascular, pulmonary, gi and gu systems are negative, except as otherwise noted.      Objective    /81   Pulse 78   Temp 97.9  F (36.6  C) (Oral)   Resp 14   Wt 139.3 kg (307 lb)   SpO2 96%   BMI 38.37 kg/m    Body mass index is 38.37 kg/m .  Physical Exam   GENERAL: healthy, alert and no distress  RESP: lungs clear to auscultation - no rales, rhonchi or wheezes  CV: regular rate and rhythm, normal S1 S2, no S3 or S4, no murmur, click or rub, no peripheral edema and peripheral pulses strong  MS: no gross musculoskeletal defects noted, no edema  Diabetic foot exam: direct visual exam today is declined by patient     Orders Placed This Encounter   Procedures     Basic metabolic panel

## 2021-05-17 NOTE — LETTER
May 19, 2021    [address]  [place] [address]          Dear ,    We are writing to inform you of your test results.    All of these tests are within acceptable limits , things look good !       Resulted Orders   **A1C FUTURE anytime   Result Value Ref Range    Hemoglobin A1C 6.2 (H) 0 - 5.6 %      Comment:      Normal <5.7% Prediabetes 5.7-6.4%  Diabetes 6.5% or higher - adopted from ADA   consensus guidelines.     Basic metabolic panel   Result Value Ref Range    Sodium 139 133 - 144 mmol/L    Potassium 4.3 3.4 - 5.3 mmol/L    Chloride 106 94 - 109 mmol/L    Carbon Dioxide 30 20 - 32 mmol/L    Anion Gap 3 3 - 14 mmol/L    Glucose 124 (H) 70 - 99 mg/dL    Urea Nitrogen 16 7 - 30 mg/dL    Creatinine 1.01 0.66 - 1.25 mg/dL    GFR Estimate 81 >60 mL/min/[1.73_m2]      Comment:      Non  GFR Calc  Starting 12/18/2018, serum creatinine based estimated GFR (eGFR) will be   calculated using the Chronic Kidney Disease Epidemiology Collaboration   (CKD-EPI) equation.      GFR Estimate If Black >90 >60 mL/min/[1.73_m2]      Comment:       GFR Calc  Starting 12/18/2018, serum creatinine based estimated GFR (eGFR) will be   calculated using the Chronic Kidney Disease Epidemiology Collaboration   (CKD-EPI) equation.      Calcium 9.0 8.5 - 10.1 mg/dL       If you have any questions or concerns, please call the clinic at the number listed above.       Sincerely,      Ac Frederick MD

## 2021-05-18 LAB
ANION GAP SERPL CALCULATED.3IONS-SCNC: 3 MMOL/L (ref 3–14)
BUN SERPL-MCNC: 16 MG/DL (ref 7–30)
CALCIUM SERPL-MCNC: 9 MG/DL (ref 8.5–10.1)
CHLORIDE SERPL-SCNC: 106 MMOL/L (ref 94–109)
CO2 SERPL-SCNC: 30 MMOL/L (ref 20–32)
CREAT SERPL-MCNC: 1.01 MG/DL (ref 0.66–1.25)
GFR SERPL CREATININE-BSD FRML MDRD: 81 ML/MIN/{1.73_M2}
GLUCOSE SERPL-MCNC: 124 MG/DL (ref 70–99)
POTASSIUM SERPL-SCNC: 4.3 MMOL/L (ref 3.4–5.3)
SODIUM SERPL-SCNC: 139 MMOL/L (ref 133–144)

## 2021-05-21 RX ORDER — SPIRONOLACTONE 25 MG/1
25 TABLET ORAL DAILY
Qty: 30 TABLET | Refills: 0 | Status: SHIPPED | OUTPATIENT
Start: 2021-05-21 | End: 2021-06-22

## 2021-05-21 NOTE — TELEPHONE ENCOUNTER
Routing refill request to provider for review/approval because:  Drug interaction warning          Pending Prescriptions:                       Disp   Refills    spironolactone (ALDACTONE) 25 MG tablet [P*30 tab*0        Sig: TAKE 1 TABLET (25 MG) BY MOUTH DAILY PLEASE MAKE           APPOINTMENT WITHIN 30 DAYS        Gabino Maradiaga RN

## 2021-06-16 DIAGNOSIS — E11.40 TYPE 2 DIABETES MELLITUS WITH DIABETIC NEUROPATHY, WITHOUT LONG-TERM CURRENT USE OF INSULIN (H): Chronic | ICD-10-CM

## 2021-06-17 RX ORDER — LIRAGLUTIDE 6 MG/ML
INJECTION SUBCUTANEOUS
Qty: 18 ML | Refills: 1 | Status: SHIPPED | OUTPATIENT
Start: 2021-06-17 | End: 2021-09-20

## 2021-06-21 DIAGNOSIS — I50.22 CHRONIC SYSTOLIC CONGESTIVE HEART FAILURE (H): Chronic | ICD-10-CM

## 2021-06-21 DIAGNOSIS — I10 ESSENTIAL HYPERTENSION WITH GOAL BLOOD PRESSURE LESS THAN 140/90: ICD-10-CM

## 2021-06-22 RX ORDER — SPIRONOLACTONE 25 MG/1
25 TABLET ORAL DAILY
Qty: 30 TABLET | Refills: 0 | Status: SHIPPED | OUTPATIENT
Start: 2021-06-22 | End: 2021-07-21

## 2021-07-12 DIAGNOSIS — M79.2 NEUROPATHIC PAIN: ICD-10-CM

## 2021-07-12 DIAGNOSIS — G62.9 PERIPHERAL POLYNEUROPATHY: ICD-10-CM

## 2021-07-12 DIAGNOSIS — F11.90 CHRONIC, CONTINUOUS USE OF OPIOIDS: Primary | ICD-10-CM

## 2021-07-13 NOTE — TELEPHONE ENCOUNTER
Routing refill request to provider for review/approval because:  Drug not on the [facility] refill protocol           Pending Prescriptions:                       Disp   Refills    traMADol (ULTRAM) 50 MG tablet [Pharmacy M*120 ta*5        Sig: TAKE TWO TABLETS BY MOUTH TWICE A DAY AS NEEDED FOR           SEVERE PAIN, 28 days or more between refills        Gabino Maradiaga RN

## 2021-07-14 PROBLEM — F11.90 CHRONIC, CONTINUOUS USE OF OPIOIDS: Status: ACTIVE | Noted: 2021-07-14

## 2021-07-14 RX ORDER — TRAMADOL HYDROCHLORIDE 50 MG/1
TABLET ORAL
Qty: 120 TABLET | Refills: 2 | Status: SHIPPED | OUTPATIENT
Start: 2021-07-14 | End: 2021-10-06

## 2021-07-19 DIAGNOSIS — I10 ESSENTIAL HYPERTENSION WITH GOAL BLOOD PRESSURE LESS THAN 140/90: ICD-10-CM

## 2021-07-19 DIAGNOSIS — E11.40 TYPE 2 DIABETES MELLITUS WITH DIABETIC NEUROPATHY, WITHOUT LONG-TERM CURRENT USE OF INSULIN (H): Chronic | ICD-10-CM

## 2021-07-19 DIAGNOSIS — I50.22 CHRONIC SYSTOLIC CONGESTIVE HEART FAILURE (H): Chronic | ICD-10-CM

## 2021-07-21 RX ORDER — SPIRONOLACTONE 25 MG/1
25 TABLET ORAL DAILY
Qty: 30 TABLET | Refills: 0 | Status: SHIPPED | OUTPATIENT
Start: 2021-07-21 | End: 2021-08-24

## 2021-07-21 NOTE — TELEPHONE ENCOUNTER
Routing refill request to provider for review/approval because:  Drug interaction warning  Has diagnosis of CHF        Pending Prescriptions:                       Disp   Refills    spironolactone (ALDACTONE) 25 MG tablet [P*30 tab*0        Sig: TAKE 1 TABLET (25 MG) BY MOUTH DAILY. PLEASE MAKE           APPOINTMENT WITHIN 30 DAYS.    metFORMIN (GLUCOPHAGE) 500 MG tablet [Phar*360 ta*1        Sig: START WITH 1 TABLET BY MOUTH IN THE MORNING FOR 1           WEEK, THEN 1 TABLET IN THE MORNING AND 1 TABLET           IN THE THE EVENING FOR 1 WEEK, THEN 2 TABLETS IN           THE MORNING AND 1 TABLET EACH EVENING FOR 1 WEEK,           THEN FINALLY 2 TABLETS 2 TIMES A DAY WHICH IS THE           MAINTENANCE DOSE        Kit ANGEL Maradiaga

## 2021-08-23 DIAGNOSIS — E11.40 TYPE 2 DIABETES MELLITUS WITH DIABETIC NEUROPATHY, WITHOUT LONG-TERM CURRENT USE OF INSULIN (H): Chronic | ICD-10-CM

## 2021-08-23 DIAGNOSIS — I10 ESSENTIAL HYPERTENSION WITH GOAL BLOOD PRESSURE LESS THAN 140/90: ICD-10-CM

## 2021-08-23 DIAGNOSIS — I50.22 CHRONIC SYSTOLIC CONGESTIVE HEART FAILURE (H): Chronic | ICD-10-CM

## 2021-08-24 RX ORDER — SPIRONOLACTONE 25 MG/1
25 TABLET ORAL DAILY
Qty: 90 TABLET | Refills: 1 | Status: SHIPPED | OUTPATIENT
Start: 2021-08-24 | End: 2023-06-15

## 2021-08-24 RX ORDER — DULOXETIN HYDROCHLORIDE 60 MG/1
CAPSULE, DELAYED RELEASE ORAL
Qty: 90 CAPSULE | Refills: 1 | Status: SHIPPED | OUTPATIENT
Start: 2021-08-24 | End: 2022-03-01

## 2021-09-16 DIAGNOSIS — E03.9 HYPOTHYROIDISM, UNSPECIFIED TYPE: ICD-10-CM

## 2021-09-18 DIAGNOSIS — E11.40 TYPE 2 DIABETES MELLITUS WITH DIABETIC NEUROPATHY, WITHOUT LONG-TERM CURRENT USE OF INSULIN (H): Chronic | ICD-10-CM

## 2021-09-20 RX ORDER — LIRAGLUTIDE 6 MG/ML
INJECTION SUBCUTANEOUS
Qty: 18 ML | Refills: 0 | Status: SHIPPED | OUTPATIENT
Start: 2021-09-20 | End: 2021-11-24

## 2021-09-20 RX ORDER — LEVOTHYROXINE SODIUM 88 UG/1
TABLET ORAL
Qty: 90 TABLET | Refills: 0 | Status: SHIPPED | OUTPATIENT
Start: 2021-09-20 | End: 2021-12-14

## 2021-10-02 ENCOUNTER — HEALTH MAINTENANCE LETTER (OUTPATIENT)
Age: 61
End: 2021-10-02

## 2021-10-06 DIAGNOSIS — G62.9 PERIPHERAL POLYNEUROPATHY: ICD-10-CM

## 2021-10-06 DIAGNOSIS — F11.90 CHRONIC, CONTINUOUS USE OF OPIOIDS: ICD-10-CM

## 2021-10-06 DIAGNOSIS — M79.2 NEUROPATHIC PAIN: ICD-10-CM

## 2021-10-06 RX ORDER — TRAMADOL HYDROCHLORIDE 50 MG/1
TABLET ORAL
Qty: 120 TABLET | Refills: 0 | Status: SHIPPED | OUTPATIENT
Start: 2021-10-06 | End: 2021-11-14

## 2021-10-06 NOTE — TELEPHONE ENCOUNTER
Routing refill request to provider for review/approval because:  Drug not on the [facility] refill protocol       Requested Prescriptions   Pending Prescriptions Disp Refills     traMADol (ULTRAM) 50 MG tablet [Pharmacy Med Name: TRAMADOL HCL 50MG TABS] 120 tablet 2     Sig: TAKE TWO TABLETS BY MOUTH TWICE A DAY AS NEEDED FOR SEVERE PAIN, 28 DAYS OR MORE BETWEEN REFILLS       There is no refill protocol information for this order        Viviane Hart RN on 10/6/2021 at 4:51 PM

## 2021-11-12 DIAGNOSIS — F11.90 CHRONIC, CONTINUOUS USE OF OPIOIDS: ICD-10-CM

## 2021-11-12 DIAGNOSIS — G62.9 PERIPHERAL POLYNEUROPATHY: ICD-10-CM

## 2021-11-12 DIAGNOSIS — M79.2 NEUROPATHIC PAIN: ICD-10-CM

## 2021-11-13 NOTE — TELEPHONE ENCOUNTER
Routing refill request to provider for review/approval because:  Drug not on the [facility] refill protocol   traMADol (ULTRAM) 50 MG tablet 120 tablet 0 10/6/2021   LAST OV-5/17/21  Mónica Maravilla RN

## 2021-11-14 RX ORDER — TRAMADOL HYDROCHLORIDE 50 MG/1
TABLET ORAL
Qty: 120 TABLET | Refills: 0 | Status: SHIPPED | OUTPATIENT
Start: 2021-11-14 | End: 2021-12-14

## 2021-11-20 DIAGNOSIS — E11.40 TYPE 2 DIABETES MELLITUS WITH DIABETIC NEUROPATHY, WITHOUT LONG-TERM CURRENT USE OF INSULIN (H): Chronic | ICD-10-CM

## 2021-11-22 RX ORDER — PREGABALIN 300 MG/1
300 CAPSULE ORAL 2 TIMES DAILY
Qty: 60 CAPSULE | Refills: 1 | Status: SHIPPED | OUTPATIENT
Start: 2021-11-22 | End: 2022-01-20

## 2021-11-23 DIAGNOSIS — E11.40 TYPE 2 DIABETES MELLITUS WITH DIABETIC NEUROPATHY, WITHOUT LONG-TERM CURRENT USE OF INSULIN (H): Chronic | ICD-10-CM

## 2021-11-24 RX ORDER — LIRAGLUTIDE 6 MG/ML
INJECTION SUBCUTANEOUS
Qty: 18 ML | Refills: 0 | Status: SHIPPED | OUTPATIENT
Start: 2021-11-24 | End: 2022-02-03

## 2021-11-24 NOTE — TELEPHONE ENCOUNTER
Routing refill request to provider for review/approval because:  Labs out of range:  A1C  Appointment needed    Lab Results   Component Value Date    A1C 6.2 05/17/2021    A1C 8.5 01/04/2021    A1C 6.7 01/20/2020    A1C 5.3 10/11/2019    A1C 5.7 05/17/2019              Pending Prescriptions:                       Disp   Refills    VICTOZA PEN 18 MG/3ML soln [Pharmacy Med N*18 mL  0        Sig: INJECT 1.8 MG SUBCUTANEOUSLY DAILY        Gabino Maradiaga RN

## 2021-11-27 ENCOUNTER — HEALTH MAINTENANCE LETTER (OUTPATIENT)
Age: 61
End: 2021-11-27

## 2021-12-12 DIAGNOSIS — G62.9 PERIPHERAL POLYNEUROPATHY: ICD-10-CM

## 2021-12-12 DIAGNOSIS — E11.40 TYPE 2 DIABETES MELLITUS WITH DIABETIC NEUROPATHY, WITHOUT LONG-TERM CURRENT USE OF INSULIN (H): Chronic | ICD-10-CM

## 2021-12-12 DIAGNOSIS — I48.20 CHRONIC ATRIAL FIBRILLATION (H): ICD-10-CM

## 2021-12-12 DIAGNOSIS — E03.9 HYPOTHYROIDISM, UNSPECIFIED TYPE: ICD-10-CM

## 2021-12-12 DIAGNOSIS — F11.90 CHRONIC, CONTINUOUS USE OF OPIOIDS: ICD-10-CM

## 2021-12-12 DIAGNOSIS — M79.2 NEUROPATHIC PAIN: ICD-10-CM

## 2021-12-14 RX ORDER — LEVOTHYROXINE SODIUM 88 UG/1
TABLET ORAL
Qty: 90 TABLET | Refills: 0 | Status: SHIPPED | OUTPATIENT
Start: 2021-12-14 | End: 2022-03-16

## 2021-12-14 RX ORDER — TRAMADOL HYDROCHLORIDE 50 MG/1
TABLET ORAL
Qty: 120 TABLET | Refills: 0 | Status: SHIPPED | OUTPATIENT
Start: 2021-12-14 | End: 2022-01-11

## 2021-12-14 RX ORDER — RIVAROXABAN 20 MG/1
TABLET, FILM COATED ORAL
Qty: 90 TABLET | Refills: 0 | Status: SHIPPED | OUTPATIENT
Start: 2021-12-14

## 2021-12-14 NOTE — TELEPHONE ENCOUNTER
Routing refill request to provider for review/approval because:  Drug not on the [facility] refill protocol    Disp Refills Start End KENJI   traMADol (ULTRAM) 50 MG tablet 120 tablet 0 11/14/2021  No   Sig: TAKE TWO TABLETS BY MOUTH TWICE A DAY AS NEEDED FOR SEVERE PAIN, 28 DAYS OR MORE BETWEEN REFILLS, NO FURTHER REFILLS UNTIL APPOINTMENT   LAST OV-5/17/21

## 2021-12-27 ENCOUNTER — OFFICE VISIT (OUTPATIENT)
Dept: OPTOMETRY | Facility: CLINIC | Age: 61
End: 2021-12-27
Payer: COMMERCIAL

## 2021-12-27 DIAGNOSIS — E11.40 TYPE 2 DIABETES MELLITUS WITH DIABETIC NEUROPATHY, WITHOUT LONG-TERM CURRENT USE OF INSULIN (H): Primary | ICD-10-CM

## 2021-12-27 DIAGNOSIS — H52.4 PRESBYOPIA: ICD-10-CM

## 2021-12-27 DIAGNOSIS — H52.13 MYOPIA OF BOTH EYES: ICD-10-CM

## 2021-12-27 PROCEDURE — 92014 COMPRE OPH EXAM EST PT 1/>: CPT | Performed by: OPTOMETRIST

## 2021-12-27 PROCEDURE — 92015 DETERMINE REFRACTIVE STATE: CPT | Performed by: OPTOMETRIST

## 2021-12-27 ASSESSMENT — REFRACTION_WEARINGRX
OD_AXIS: 135
OS_SPHERE: -3.50
OS_CYLINDER: +1.75
OD_CYLINDER: +2.50
OD_ADD: +2.25
OS_ADD: +2.25
OS_CYLINDER: +1.75
OD_ADD: +2.25
OS_SPHERE: -3.50
OD_SPHERE: -3.50
OS_AXIS: 087
SPECS_TYPE: PAL
OS_AXIS: 087
OD_SPHERE: -3.50
SPECS_TYPE: PAL
OD_CYLINDER: +2.50
OD_AXIS: 135
OS_ADD: +2.25

## 2021-12-27 ASSESSMENT — TONOMETRY
IOP_METHOD: APPLANATION
OD_IOP_MMHG: 15
OS_IOP_MMHG: 16

## 2021-12-27 ASSESSMENT — KERATOMETRY
OD_K2POWER_DIOPTERS: 44.75
OS_K2POWER_DIOPTERS: 45.25
OS_K1POWER_DIOPTERS: 43.25
OS_AXISANGLE_DEGREES: 77
OS_AXISANGLE2_DEGREES: 167
OD_AXISANGLE2_DEGREES: 39
OD_K1POWER_DIOPTERS: 43.00
OD_AXISANGLE_DEGREES: 129

## 2021-12-27 ASSESSMENT — CUP TO DISC RATIO
OS_RATIO: 0.55
OD_RATIO: 0.55

## 2021-12-27 ASSESSMENT — REFRACTION_MANIFEST
OS_AXIS: 072
OS_CYLINDER: +2.25
OS_SPHERE: -3.50
OD_CYLINDER: +2.50
OS_AXIS: 090
OD_CYLINDER: +2.50
OD_AXIS: 135
OD_SPHERE: -2.25
OS_ADD: +2.50
METHOD_AUTOREFRACTION: 1
OD_AXIS: 130
OS_CYLINDER: +2.50
OD_ADD: +2.50
OD_SPHERE: -2.75
OS_SPHERE: -3.50

## 2021-12-27 ASSESSMENT — PACHYMETRY
OS_CT(UM): .509
OD_CT(UM): .501

## 2021-12-27 ASSESSMENT — VISUAL ACUITY
OD_CC: 20/20
OD_CC+: -2
CORRECTION_TYPE: GLASSES
OS_CC: 20/20
OS_SC: 20/300
OD_CC: 20/20
OD_SC: 20/250
METHOD: SNELLEN - LINEAR
OS_CC+: -1
OS_CC: 20/20

## 2021-12-27 ASSESSMENT — EXTERNAL EXAM - LEFT EYE: OS_EXAM: NORMAL

## 2021-12-27 ASSESSMENT — SLIT LAMP EXAM - LIDS
COMMENTS: NORMAL
COMMENTS: NORMAL

## 2021-12-27 ASSESSMENT — EXTERNAL EXAM - RIGHT EYE: OD_EXAM: NORMAL

## 2021-12-27 ASSESSMENT — CONF VISUAL FIELD
OS_NORMAL: 1
OD_NORMAL: 1

## 2021-12-27 NOTE — LETTER
12/27/2021         RE: Rhett Elizabeth  [address] [place] [address]        Dear Colleague,    Thank you for referring your patient, Rhett Elizabeth, to the [facility]. Please see a copy of my visit note below.    Chief Complaint   Patient presents with     Diabetic Eye Exam        Chief Complaint(s) and History of Present Illness(es)     Diabetic Eye Exam     Diabetes Type: Type 2 and taking oral medications    Duration: 15 years    Blood Sugars: is controlled               Hemoglobin A1C POCT   Date Value Ref Range Status   05/17/2021 6.2 (H) 0 - 5.6 % Final     Comment:     Normal <5.7% Prediabetes 5.7-6.4%  Diabetes 6.5% or higher - adopted from ADA   consensus guidelines.     01/04/2021 8.5 (H) 0 - 5.6 % Final     Comment:     Normal <5.7% Prediabetes 5.7-6.4%  Diabetes 6.5% or higher - adopted from ADA   consensus guidelines.     01/20/2020 6.7 (H) 0 - 5.6 % Final     Comment:     Normal <5.7% Prediabetes 5.7-6.4%  Diabetes 6.5% or higher - adopted from ADA   consensus guidelines.              Last Eye Exam: 01/03/2020  Dilated Previously: Yes, side effects of dilation explained today    What are you currently using to see?  glasses    Distance Vision Acuity: Satisfied with vision    Near Vision Acuity: Satisfied with vision while reading and using computer with glasses    Eye Comfort: dry and itchy  Do you use eye drops? : No  Occupation or Hobbies: Works for [employer] [place]    Jon Nolasco - Optometric Assistant     Medical, surgical and family histories reviewed and updated 12/27/2021.       OBJECTIVE: See Ophthalmology exam    ASSESSMENT:    ICD-10-CM    1. Type 2 diabetes mellitus with diabetic neuropathy, without long-term current use of insulin (H)  E11.40 EYE EXAM (SIMPLE-NONBILLABLE)   2. Myopia of both eyes  H52.13 EYE EXAM (SIMPLE-NONBILLABLE)     REFRACTION   3. Presbyopia  H52.4 EYE EXAM (SIMPLE-NONBILLABLE)     REFRACTION       PLAN:    Rhett Elizabeth aware  eye exam results will be sent to [facility].  Patient Instructions     Eyeglass prescription given.    There is no evidence of Diabetic Retinopathy.    The affects of the dilating drops last for 4- 6 hours.  You will be more sensitive to light and vision will be blurry up close.  Do not drive if you do not feel comfortable.  Mydriatic sunglasses were given if needed.        Return in 1 year for a complete eye exam or sooner if needed.    Valencia Gusman O.D.  [facility]   [address] [place] [address]    [phone]      Optometry Providers       Clinic Locations                                 Telephone Number   [facility]  [facility] [phone]     [facility] Hours:                [facility] Hours:       [facility] Hours:   [address] [facility]   [address] [facility]     [address] [place] [address]   [place] [address]    [place] [address]  Phone: [phone]                    Phone: [phone]     Phone: [phone]                      Monday 8:00-6:00                          Monday 8:00-6:00                          Monday 8:00-6:00              Tuesday 8:00-6:00                          Tuesday 8:00-6:00                          Tuesday 8:00-6:00              Wednesday 8:00-6:00                  Wednesday 8:00-6:00                   Wednesday 8:00-6:00      Thursday 8:00-6:00                        Thursday 8:00-6:00                         Thursday 8:00-6:00            Friday 8:00-5:00                              Friday 8:00-5:00                              Friday 8:00-5:00    [facility] Hours:   [address] [place] [id number] [phone]    Monday 9:00-6:00  Tuesday 9:00-6:00  Wednesday 9:00-6:00  Thursday 9:00-6:00 Friday 9:00-5:00  Please log on  to [email or web address] to order your contact lenses.  The link is found on the [facility] page.  As always, Thank you for trusting us with your health care needs!               Again, thank you for allowing me to participate in the care of your patient.        Sincerely,        Valencia Gusman OD

## 2021-12-27 NOTE — PROGRESS NOTES
Chief Complaint   Patient presents with     Diabetic Eye Exam        Chief Complaint(s) and History of Present Illness(es)     Diabetic Eye Exam     Diabetes Type: Type 2 and taking oral medications    Duration: 15 years    Blood Sugars: is controlled               Hemoglobin A1C POCT   Date Value Ref Range Status   05/17/2021 6.2 (H) 0 - 5.6 % Final     Comment:     Normal <5.7% Prediabetes 5.7-6.4%  Diabetes 6.5% or higher - adopted from ADA   consensus guidelines.     01/04/2021 8.5 (H) 0 - 5.6 % Final     Comment:     Normal <5.7% Prediabetes 5.7-6.4%  Diabetes 6.5% or higher - adopted from ADA   consensus guidelines.     01/20/2020 6.7 (H) 0 - 5.6 % Final     Comment:     Normal <5.7% Prediabetes 5.7-6.4%  Diabetes 6.5% or higher - adopted from ADA   consensus guidelines.              Last Eye Exam: 01/03/2020  Dilated Previously: Yes, side effects of dilation explained today    What are you currently using to see?  glasses    Distance Vision Acuity: Satisfied with vision    Near Vision Acuity: Satisfied with vision while reading and using computer with glasses    Eye Comfort: dry and itchy  Do you use eye drops? : No  Occupation or Hobbies: Works for [employer] [place]    Jon Nolasco - Optometric Assistant     Medical, surgical and family histories reviewed and updated 12/27/2021.       OBJECTIVE: See Ophthalmology exam    ASSESSMENT:    ICD-10-CM    1. Type 2 diabetes mellitus with diabetic neuropathy, without long-term current use of insulin (H)  E11.40 EYE EXAM (SIMPLE-NONBILLABLE)   2. Myopia of both eyes  H52.13 EYE EXAM (SIMPLE-NONBILLABLE)     REFRACTION   3. Presbyopia  H52.4 EYE EXAM (SIMPLE-NONBILLABLE)     REFRACTION      PLAN:    Rhett Elizabeth aware  eye exam results will be sent to [facility].  Patient Instructions     Eyeglass prescription given.    There is no evidence of Diabetic Retinopathy.    The affects of the dilating drops last for 4- 6 hours.  You will be more sensitive to  light and vision will be blurry up close.  Do not drive if you do not feel comfortable.  Mydriatic sunglasses were given if needed.        Return in 1 year for a complete eye exam or sooner if needed.    Valencia Gusman O.D.  [facility]   [address] [place] [address]    [phone]      Optometry Providers       Clinic Locations                                 Telephone Number   Dr. Shonda Adkins    [facility]  [place] [phone]     [facility] Hours:                [facility] Hours:       [facility] Hours:   [address] [facility] [address]   [address]     [address] [place] [address]   [place] [address]    [place] [address]  Phone: [phone]                    Phone: [phone]     Phone: [phone]                      Monday 8:00-6:00                          Monday 8:00-6:00                          Monday 8:00-6:00              Tuesday 8:00-6:00                          Tuesday 8:00-6:00                          Tuesday 8:00-6:00              Wednesday 8:00-6:00                  Wednesday 8:00-6:00                   Wednesday 8:00-6:00      Thursday 8:00-6:00                        Thursday 8:00-6:00                         Thursday 8:00-6:00            Friday 8:00-5:00                              Friday 8:00-5:00                              Friday 8:00-5:00    [facility] Hours:   [address] [place] [address]  [phone]    Monday 9:00-6:00  Tuesday 9:00-6:00  Wednesday 9:00-6:00  Thursday 9:00-6:00  Friday 9:00-5:00  Please log on to [email or web address] to order your contact lenses.  The link is found on the [facility] page.  As always, Thank you for trusting us with your health care needs!

## 2021-12-28 NOTE — PATIENT INSTRUCTIONS
Eyeglass prescription given.    There is no evidence of Diabetic Retinopathy.    The affects of the dilating drops last for 4- 6 hours.  You will be more sensitive to light and vision will be blurry up close.  Do not drive if you do not feel comfortable.  Mydriatic sunglasses were given if needed.        Return in 1 year for a complete eye exam or sooner if needed.    Valencia Gusman O.D.  [facility]   [address] [place] [address]    [phone]      Optometry Providers       Clinic Locations                                 Telephone Number   [facility] [place] [phone]     [facility] Hours:                [place] [facility] Hours:       [place] [facility] Hours:   [address] [facility]   [address] [facility] [address]     [address] [place] [address]   [place] [address]    [place] [address]  Phone: [phone]                    Phone: [phone]     Phone: [phone]                      Monday 8:00-6:00                          Monday 8:00-6:00                          Monday 8:00-6:00              Tuesday 8:00-6:00                          Tuesday 8:00-6:00                          Tuesday 8:00-6:00              Wednesday 8:00-6:00                  Wednesday 8:00-6:00                   Wednesday 8:00-6:00      Thursday 8:00-6:00                        Thursday 8:00-6:00                         Thursday 8:00-6:00            Friday 8:00-5:00                              Friday 8:00-5:00                              Friday 8:00-5:00    [facility] Hours:   [address] [facility] [address] [id number] [phone]    Monday 9:00-6:00  Tuesday 9:00-6:00  Wednesday 9:00-6:00  Thursday 9:00-6:00  Friday 9:00-5:00  Please log on to [email or web address] to order your contact lenses.  The link is found on the [facility] page.  As  always, Thank you for trusting us with your health care needs!

## 2022-01-10 DIAGNOSIS — F11.90 CHRONIC, CONTINUOUS USE OF OPIOIDS: ICD-10-CM

## 2022-01-10 DIAGNOSIS — M79.2 NEUROPATHIC PAIN: ICD-10-CM

## 2022-01-10 DIAGNOSIS — G62.9 PERIPHERAL POLYNEUROPATHY: ICD-10-CM

## 2022-01-11 RX ORDER — TRAMADOL HYDROCHLORIDE 50 MG/1
TABLET ORAL
Qty: 120 TABLET | Refills: 0 | Status: SHIPPED | OUTPATIENT
Start: 2022-01-11 | End: 2022-02-16

## 2022-01-19 DIAGNOSIS — E11.40 TYPE 2 DIABETES MELLITUS WITH DIABETIC NEUROPATHY, WITHOUT LONG-TERM CURRENT USE OF INSULIN (H): Chronic | ICD-10-CM

## 2022-01-20 ENCOUNTER — OFFICE VISIT (OUTPATIENT)
Dept: FAMILY MEDICINE | Facility: CLINIC | Age: 62
End: 2022-01-20
Payer: COMMERCIAL

## 2022-01-20 VITALS
SYSTOLIC BLOOD PRESSURE: 112 MMHG | WEIGHT: 267 LBS | TEMPERATURE: 97.7 F | DIASTOLIC BLOOD PRESSURE: 82 MMHG | HEART RATE: 80 BPM | OXYGEN SATURATION: 95 % | BODY MASS INDEX: 33.37 KG/M2

## 2022-01-20 DIAGNOSIS — I48.91 ATRIAL FIBRILLATION AND FLUTTER (H): ICD-10-CM

## 2022-01-20 DIAGNOSIS — R39.89 URINARY PROBLEM: ICD-10-CM
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
## 2022-01-20 DIAGNOSIS — S00.03XA CONTUSION OF SCALP, INITIAL ENCOUNTER: ICD-10-CM

## 2022-01-20 DIAGNOSIS — I48.92 ATRIAL FIBRILLATION AND FLUTTER (H): ICD-10-CM

## 2022-01-20 PROCEDURE — 87186 SC STD MICRODIL/AGAR DIL: CPT | Performed by: FAMILY MEDICINE

## 2022-01-20 PROCEDURE — 87088 URINE BACTERIA CULTURE: CPT | Performed by: FAMILY MEDICINE

## 2022-01-20 PROCEDURE — 81001 URINALYSIS AUTO W/SCOPE: CPT | Performed by: FAMILY MEDICINE

## 2022-01-20 PROCEDURE — 99214 OFFICE O/P EST MOD 30 MIN: CPT | Performed by: FAMILY MEDICINE

## 2022-01-20 PROCEDURE — 87086 URINE CULTURE/COLONY COUNT: CPT | Performed by: FAMILY MEDICINE

## 2022-01-20 RX ORDER — PREGABALIN 300 MG/1
CAPSULE ORAL
Qty: 60 CAPSULE | Refills: 1 | Status: SHIPPED | OUTPATIENT
Start: 2022-01-20 | End: 2022-03-25

## 2022-01-20 ASSESSMENT — ENCOUNTER SYMPTOMS
ARTHRALGIAS: 0
NERVOUS/ANXIOUS: 0
PARESTHESIAS: 0
DIZZINESS: 0
SORE THROAT: 0
COUGH: 0
PALPITATIONS: 0
SHORTNESS OF BREATH: 0
FEVER: 0
HEADACHES: 1
JOINT SWELLING: 0
DYSURIA: 1
WEAKNESS: 0
FREQUENCY: 1
CHILLS: 0
MYALGIAS: 0

## 2022-01-20 ASSESSMENT — PATIENT HEALTH QUESTIONNAIRE - PHQ9
SUM OF ALL RESPONSES TO PHQ QUESTIONS 1-9: 12
5. POOR APPETITE OR OVEREATING: NEARLY EVERY DAY

## 2022-01-20 ASSESSMENT — PAIN SCALES - GENERAL: PAINLEVEL: MODERATE PAIN (5)

## 2022-01-20 ASSESSMENT — ANXIETY QUESTIONNAIRES
5. BEING SO RESTLESS THAT IT IS HARD TO SIT STILL: NOT AT ALL
6. BECOMING EASILY ANNOYED OR IRRITABLE: NEARLY EVERY DAY
1. FEELING NERVOUS, ANXIOUS, OR ON EDGE: SEVERAL DAYS
IF YOU CHECKED OFF ANY PROBLEMS ON THIS QUESTIONNAIRE, HOW DIFFICULT HAVE THESE PROBLEMS MADE IT FOR YOU TO DO YOUR WORK, TAKE CARE OF THINGS AT HOME, OR GET ALONG WITH OTHER PEOPLE: VERY DIFFICULT
3. WORRYING TOO MUCH ABOUT DIFFERENT THINGS: NEARLY EVERY DAY
2. NOT BEING ABLE TO STOP OR CONTROL WORRYING: MORE THAN HALF THE DAYS
7. FEELING AFRAID AS IF SOMETHING AWFUL MIGHT HAPPEN: NOT AT ALL
GAD7 TOTAL SCORE: 12

## 2022-01-20 NOTE — PROGRESS NOTES
1. Fall, initial encounter  With LOC and trauma to head.  Continues to have headaches.  History of atrial fibrillation and currently on xarelto.  Concerning for intracerebral bleed. Called [facility] to give report.  Patient refused to be transferred via ambulance.  Patient initially refused to go to the ER until I convinced him.     2. Contusion of scalp, initial encounter  Would like to rule out intracerebral bleed.     3. Urinary problem  - UA with Microscopic reflex to Culture - Clinic Collect    4. Atrial fibrillation and flutter (H)  Currently on metoprolol and xarelto        Patient Active Problem List    Diagnosis Date Noted     Chronic, continuous use of opioids 07/14/2021     Priority: Medium     Peripheral polyneuropathy 01/20/2020     Priority: Medium     Personal history of urethral stricture 05/23/2019     Priority: Medium     See older medical records scanned into Epic electronic medical records  From [facility] . Cystoscopy  From 9-2-2010 Dr. Bill Bennett MD       Hyperlipidemia LDL goal <100 05/16/2019     Priority: Medium     Acquired hypothyroidism 11/19/2018     Priority: Medium     Recent bereavement 06/03/2018     Priority: Medium     S/P ICD (internal cardiac defibrillator) procedure 03/21/2018     Priority: Medium     Biventricular ICD implantation on 3.1.18 at [facility].       Morbid obesity (H) 02/09/2018     Priority: Medium     Drusen of macula, left 09/08/2017     Priority: Medium     Dry eyes 09/08/2017     Priority: Medium     Chronic systolic congestive heart failure (H) 05/07/2017     Priority: Medium     ICD and Bi-V. 3/1/2018    MRI completed on 1/17/2018 revealed an EF 21% with an enlarged LV as well as biatrial enlargement. Pulmonary congestion also noted.     Per 1/2017 outside ECHO (see CareEverywhere).  Summary of report:   1. Moderately increased left ventricular size.   2. Severe global hypokinesis.   3. LV poorly seen despite use of contrast. EF  appears severely reduced, 25-30%.   4. Moderately dilated left atrium.   5. Moderately dilated right atrium.   6. No hemodynamically significant valvular disease.   7. No prior echo for comparison.       Neuropathic pain 01/15/2017     Priority: Medium     Essential hypertension with goal blood pressure less than 140/90 01/15/2017     Priority: Medium     Persistent atrial fibrillation (H) 01/15/2017     Priority: Medium     LANEY (obstructive sleep apnea) 01/15/2017     Priority: Medium     1996(289#)-AHI/RDI 65, CPAP 10 cm/H20  2/25.2016(369#)-AHI 36, RDI 37, lowest oxygen saturation was 88%. Tco2 40-45, no effective pressure found.          Type 2 diabetes mellitus with diabetic neuropathy, without long-term current use of insulin (H) 01/15/2017     Priority: Medium     Current Outpatient Medications   Medication     alum & mag hydroxide-simethicone (MAALOX) 200-200-20 MG/5ML SUSP suspension     aspirin 81 MG tablet     blood glucose (ONETOUCH VERIO IQ) test strip     blood glucose monitoring (ONE TOUCH ULTRA 2) meter device kit     Cholecalciferol (VITAMIN D-3 PO)     DULoxetine (CYMBALTA) 60 MG capsule     insulin pen needle (BD ARNOLD U/F) 32G X 4 MM miscellaneous     levothyroxine (SYNTHROID/LEVOTHROID) 88 MCG tablet     lidocaine, viscous, (XYLOCAINE) 2 % solution     losartan (COZAAR) 25 MG tablet     Lutein 20 MG CAPS     metFORMIN (GLUCOPHAGE) 500 MG tablet     metoprolol succinate ER (TOPROL XL) 50 MG 24 hr tablet     Multiple Vitamin (MULTIVITAMINS PO)     Nutritional Supplements (ENSURE COMPLETE PO)     Omega-3 Fatty Acids (OMEGA-3 FISH OIL PO)     OneTouch Delica Lancets 33G MISC     pantoprazole (PROTONIX) 40 MG EC tablet     pregabalin (LYRICA) 300 MG capsule     sildenafil (REVATIO) 20 MG tablet     simvastatin (ZOCOR) 20 MG tablet     spironolactone (ALDACTONE) 25 MG tablet     traMADol (ULTRAM) 50 MG tablet     UNABLE TO FIND     VICTOZA PEN 18 MG/3ML soln     XARELTO ANTICOAGULANT 20 MG TABS tablet      No current facility-administered medications for this visit.         Raymon Lopez is a 61 year old who presents for the following health issues     HPI     Fell 10 days ago. Complaining of right ear pain when wearing cpap, headaches and bruising. Sees cardiologist.    Painful urination x1 month.    1. Fall: 10 days ago.  Patient states that he passed out while standing out.  States of LOC.  Unsure of length.  Hit head on left side of head.  History of atrial fibrillation and heart failure.  States of headaches.    2. Urinary symptoms: Ongoing for the past month.  States of dysuria and cloudy urine.  States of urinary frequency.  History of obstruction.     Review of Systems   Constitutional: Negative for chills and fever.   HENT: Negative for congestion, ear pain, hearing loss and sore throat.    Respiratory: Negative for cough and shortness of breath.    Cardiovascular: Negative for chest pain, palpitations and peripheral edema.   Genitourinary: Positive for dysuria and frequency.   Musculoskeletal: Negative for arthralgias, joint swelling and myalgias.   Skin: Negative for rash.        Bruising involving left frontal and left orbita region   Neurological: Positive for headaches. Negative for dizziness, weakness and paresthesias.   Psychiatric/Behavioral: Negative for mood changes. The patient is not nervous/anxious.             Objective    /82 (BP Location: Left arm, Cuff Size: Adult Large)   Pulse 80   Temp 97.7  F (36.5  C) (Tympanic)   Wt 121.1 kg (267 lb)   SpO2 95%   BMI 33.37 kg/m    Body mass index is 33.37 kg/m .  Physical Exam  Constitutional:       General: He is not in acute distress.     Appearance: He is well-developed.   HENT:      Head: Normocephalic and atraumatic.      Comments: Head: ecchymosis involving the left front region     Nose: Nose normal.   Eyes:      Conjunctiva/sclera: Conjunctivae normal.      Comments: Involuntary right ocular movements   Neck:      Trachea: No  tracheal deviation.   Cardiovascular:      Rate and Rhythm: Normal rate and regular rhythm.      Heart sounds: Normal heart sounds.   Pulmonary:      Effort: Pulmonary effort is normal.      Breath sounds: No wheezing.   Musculoskeletal:         General: Normal range of motion.      Cervical back: Normal range of motion.   Skin:     Findings: No erythema or rash.   Neurological:      Mental Status: He is alert and oriented to person, place, and time.   Psychiatric:         Behavior: Behavior normal.

## 2022-01-21 LAB
ALBUMIN UR-MCNC: ABNORMAL MG/DL
APPEARANCE UR: ABNORMAL
BACTERIA #/AREA URNS HPF: ABNORMAL /HPF
BILIRUB UR QL STRIP: NEGATIVE
COLOR UR AUTO: YELLOW
GLUCOSE UR STRIP-MCNC: NEGATIVE MG/DL
HGB UR QL STRIP: ABNORMAL
KETONES UR STRIP-MCNC: NEGATIVE MG/DL
LEUKOCYTE ESTERASE UR QL STRIP: ABNORMAL
NITRATE UR QL: POSITIVE
PH UR STRIP: 7 [PH] (ref 5–7)
RBC #/AREA URNS AUTO: ABNORMAL /HPF
SP GR UR STRIP: 1.02 (ref 1–1.03)
UROBILINOGEN UR STRIP-ACNC: 0.2 E.U./DL
WBC #/AREA URNS AUTO: >100 /HPF

## 2022-01-21 ASSESSMENT — ANXIETY QUESTIONNAIRES: GAD7 TOTAL SCORE: 12

## 2022-01-24 ENCOUNTER — TELEPHONE (OUTPATIENT)
Dept: FAMILY MEDICINE | Facility: CLINIC | Age: 62
End: 2022-01-24
Payer: COMMERCIAL

## 2022-01-24 DIAGNOSIS — N30.01 ACUTE CYSTITIS WITH HEMATURIA: Primary | ICD-10-CM

## 2022-01-24 RX ORDER — SULFAMETHOXAZOLE/TRIMETHOPRIM 800-160 MG
1 TABLET ORAL 2 TIMES DAILY
Qty: 10 TABLET | Refills: 0 | Status: SHIPPED | OUTPATIENT
Start: 2022-01-24 | End: 2022-01-29

## 2022-01-24 NOTE — TELEPHONE ENCOUNTER
Left message on voice mail [phone]  for patient to call clinic.   [phone]  See result note below per Dr Shima Ronquillo,    1/24/2022  2:41 PM CST Back to Top        Please call patient.  Recent urine results is consistent with an urinary tract infection.  Rx for bactrim DS 1 tablet twice a day for 5 days.  Rx sent to pharmacy.

## 2022-01-25 LAB
BACTERIA UR CULT: ABNORMAL
BACTERIA UR CULT: ABNORMAL

## 2022-01-25 NOTE — TELEPHONE ENCOUNTER
Patient called back, advised him of lab result and Rx sent.   Call if not improving.    Kamla Olmos RN  [facility]

## 2022-01-25 NOTE — TELEPHONE ENCOUNTER
Called [phone] (home)     Did patient answer the phone: No, left a message on voicemail to return call to the [facility] at [phone].    Shira RN,BSN  Triage Nurse  [facility]: [facility]

## 2022-02-01 DIAGNOSIS — E11.40 TYPE 2 DIABETES MELLITUS WITH DIABETIC NEUROPATHY, WITHOUT LONG-TERM CURRENT USE OF INSULIN (H): Chronic | ICD-10-CM

## 2022-02-03 RX ORDER — LIRAGLUTIDE 6 MG/ML
1.8 INJECTION SUBCUTANEOUS DAILY
Qty: 18 ML | Refills: 0 | Status: SHIPPED | OUTPATIENT
Start: 2022-02-03 | End: 2022-04-11

## 2022-02-03 NOTE — TELEPHONE ENCOUNTER
"Requested Prescriptions   Pending Prescriptions Disp Refills     VICTOZA PEN 18 MG/3ML soln [Pharmacy Med Name: VICTOZA 18MG/3ML SOPN] 18 mL 0     Sig: INJECT 1.8 MG SUBCUTANEOUSLY DAILY       GLP-1 Agonists Protocol Failed - 2/1/2022  4:31 PM        Failed - HgbA1C in past 3 or 6 months     If HgbA1C is 8 or greater, it needs to be on file within the past 3 months.  If less than 8, must be on file within the past 6 months.     Recent Labs   Lab Test 05/17/21  1403   A1C 6.2*             Passed - Medication is active on med list        Passed - Patient is age 18 or older        Passed - Normal serum creatinine on file in past 12 months     Recent Labs   Lab Test 05/17/21  1403   CR 1.01       Ok to refill medication if creatinine is low          Passed - Recent (6 mo) or future (30 days) visit within the authorizing provider's specialty     Patient had office visit in the last 6 months or has a visit in the next 30 days with authorizing provider.  See \"Patient Info\" tab in inbasket, or \"Choose Columns\" in Meds & Orders section of the refill encounter.                 "

## 2022-02-07 ENCOUNTER — OFFICE VISIT (OUTPATIENT)
Dept: OPTOMETRY | Facility: CLINIC | Age: 62
End: 2022-02-07
Payer: COMMERCIAL

## 2022-02-07 DIAGNOSIS — H25.13 AGE-RELATED NUCLEAR CATARACT OF BOTH EYES: ICD-10-CM

## 2022-02-07 DIAGNOSIS — E11.319 TYPE 2 DIABETES MELLITUS WITH RETINOPATHY OF BOTH EYES, MACULAR EDEMA PRESENCE UNSPECIFIED, UNSPECIFIED RETINOPATHY SEVERITY, UNSPECIFIED WHETHER LONG TERM INSULIN USE (H): Primary | ICD-10-CM

## 2022-02-07 PROCEDURE — 92012 INTRM OPH EXAM EST PATIENT: CPT | Performed by: OPTOMETRIST

## 2022-02-07 ASSESSMENT — TONOMETRY
IOP_METHOD: APPLANATION
OS_IOP_MMHG: 18
OD_IOP_MMHG: 20

## 2022-02-07 ASSESSMENT — REFRACTION_WEARINGRX
SPECS_TYPE: PAL
OD_AXIS: 135
SPECS_TYPE: PAL
OS_AXIS: 087
OD_CYLINDER: +2.50
OD_SPHERE: -3.50
OS_SPHERE: -3.50
OS_SPHERE: -3.50
OS_CYLINDER: +1.75
OS_ADD: +2.25
OD_ADD: +2.25
OD_SPHERE: -3.50
OD_ADD: +2.25
OD_AXIS: 135
OD_CYLINDER: +2.50
OS_ADD: +2.25
OS_CYLINDER: +1.75
OS_AXIS: 087

## 2022-02-07 ASSESSMENT — SLIT LAMP EXAM - LIDS
COMMENTS: NORMAL
COMMENTS: NORMAL

## 2022-02-07 ASSESSMENT — REFRACTION_MANIFEST
OD_CYLINDER: +2.50
OD_AXIS: 135
OS_SPHERE: -3.50
OS_AXIS: 090
OS_CYLINDER: +2.25
OS_ADD: +2.50
OD_ADD: +2.50
OD_SPHERE: -2.75

## 2022-02-07 ASSESSMENT — CUP TO DISC RATIO
OS_RATIO: 0.55
OD_RATIO: 0.55

## 2022-02-07 ASSESSMENT — VISUAL ACUITY
OD_CC: 20/25
OD_SC: 20/70
OS_SC: 20/150
CORRECTION_TYPE: GLASSES
OS_CC: 20/20
OD_SC+: -1
METHOD: SNELLEN - LINEAR
OD_CC: 20/20
OS_CC: 20/40

## 2022-02-07 ASSESSMENT — EXTERNAL EXAM - RIGHT EYE: OD_EXAM: NORMAL

## 2022-02-07 ASSESSMENT — CONF VISUAL FIELD
OD_NORMAL: 1
OS_NORMAL: 1

## 2022-02-07 ASSESSMENT — EXTERNAL EXAM - LEFT EYE: OS_EXAM: NORMAL

## 2022-02-07 NOTE — PROGRESS NOTES
Chief Complaint   Patient presents with     Blurred Vision Evaluation       Source of glasses: [facility]  How long have you tried the glasses:2-3 days  What is not working with glasses:vision is clear in the bifocal, feels that distance is not clear. Patient looking out of far left to see more clear. PD is correct in glasses    Patient fell shortly after exam.    Shalonda Miles      OBJECTIVE: See Ophthalmology exam    ASSESSMENT:    ICD-10-CM    1. Type 2 diabetes mellitus with retinopathy of both eyes, macular edema presence unspecified, unspecified retinopathy severity, unspecified whether long term insulin use (H)  E11.319    2. Age-related nuclear cataract of both eyes  H25.13      PLAN:  Patient Instructions   Patient Education   Diabetes weakens the blood vessels all over the body, including the eyes. Damage to the blood vessels in the eyes can cause swelling or bleeding into part of the eye (called the retina). This is called diabetic retinopathy (OPAL-tin-AH-puh-thee). If not treated, this disease can cause vision loss or blindness.   Symptoms may include blurred or distorted vision, but many people have no symptoms. It's important to see your eye doctor regularly to check for problems.   Early treatment and good control can help protect your vision. Here are the things you can do to help prevent vision loss:      1. Keep your blood sugar levels under tight control.      2. Bring high blood pressure under control.      3. No smoking.      4. Have yearly dilated eye exams.     A CONSULT IS RECOMMENDED TO ASSESS THE POSSIBILITY OF DIABETIC CHANGES IN THE EYES.    A SCAN WILL BE DONE.    NO CHANGE IN RX RECOMMENDED.    Valencia Gusman O.D.  [address] [address]    [phone]

## 2022-02-07 NOTE — LETTER
2/7/2022         RE: Rhett Elizabeth  [address] [address]        Dear Colleague,    Thank you for referring your patient, Rhett Elizabeht, to the [facility]. Please see a copy of my visit note below.    Chief Complaint   Patient presents with     Blurred Vision Evaluation       [place] [facility]  How long have you tried the glasses:2-3 days  What is not working with glasses:vision is clear in the bifocal, feels that distance is not clear. Patient looking out of far left to see more clear. PD is correct in glasses    Patient fell shortly after exam.    Shalonda Miles      OBJECTIVE: See Ophthalmology exam    ASSESSMENT:    ICD-10-CM    1. Type 2 diabetes mellitus with retinopathy of both eyes, macular edema presence unspecified, unspecified retinopathy severity, unspecified whether long term insulin use (H)  E11.319    2. Age-related nuclear cataract of both eyes  H25.13      PLAN:  Patient Instructions   Patient Education   Diabetes weakens the blood vessels all over the body, including the eyes. Damage to the blood vessels in the eyes can cause swelling or bleeding into part of the eye (called the retina). This is called diabetic retinopathy (OPAL-tin-AH-puh-thee). If not treated, this disease can cause vision loss or blindness.   Symptoms may include blurred or distorted vision, but many people have no symptoms. It's important to see your eye doctor regularly to check for problems.   Early treatment and good control can help protect your vision. Here are the things you can do to help prevent vision loss:      1. Keep your blood sugar levels under tight control.      2. Bring high blood pressure under control.      3. No smoking.      4. Have yearly dilated eye exams.     A CONSULT IS RECOMMENDED TO ASSESS THE POSSIBILITY OF DIABETIC CHANGES IN THE EYES.    A SCAN WILL BE DONE.    NO CHANGE IN RX RECOMMENDED.    Valencia Gusman,  O[facility]. [facility]   [address] [place] [address]    [phone]              Again, thank you for allowing me to participate in the care of your patient.        Sincerely,        Valencia Gusman OD

## 2022-02-07 NOTE — PATIENT INSTRUCTIONS
Patient Education   Diabetes weakens the blood vessels all over the body, including the eyes. Damage to the blood vessels in the eyes can cause swelling or bleeding into part of the eye (called the retina). This is called diabetic retinopathy (OPAL-tin-AH-puh-thee). If not treated, this disease can cause vision loss or blindness.   Symptoms may include blurred or distorted vision, but many people have no symptoms. It's important to see your eye doctor regularly to check for problems.   Early treatment and good control can help protect your vision. Here are the things you can do to help prevent vision loss:      1. Keep your blood sugar levels under tight control.      2. Bring high blood pressure under control.      3. No smoking.      4. Have yearly dilated eye exams.     A CONSULT IS RECOMMENDED TO ASSESS THE POSSIBILITY OF DIABETIC CHANGES IN THE EYES.    A SCAN WILL BE DONE.    NO CHANGE IN RX RECOMMENDED.    Valencia Gusman O.D.  [facility]   [address] [place] [address]    [phone]

## 2022-02-13 DIAGNOSIS — M79.2 NEUROPATHIC PAIN: ICD-10-CM

## 2022-02-13 DIAGNOSIS — F11.90 CHRONIC, CONTINUOUS USE OF OPIOIDS: ICD-10-CM

## 2022-02-13 DIAGNOSIS — G62.9 PERIPHERAL POLYNEUROPATHY: ICD-10-CM

## 2022-02-13 RX ORDER — TRAMADOL HYDROCHLORIDE 50 MG/1
TABLET ORAL
Qty: 120 TABLET | Refills: 0 | OUTPATIENT
Start: 2022-02-13

## 2022-02-13 NOTE — TELEPHONE ENCOUNTER
Routing refill request to provider for review/approval because:  Drug not on the [facility] refill protocol   traMADol (ULTRAM) 50 MG tablet 120 tablet 0 1/11/2022  No   Sig: TAKE TWO TABLETS BY MOUTH TWICE A DAY AS NEEDED FOR SEVERE PAIN, 28 DAYS OR MORE BETWEEN REFILLS, NO FURTHER REFILLS UNTIL APPOINTMENT   Has appt 2/14/22

## 2022-02-14 ENCOUNTER — OFFICE VISIT (OUTPATIENT)
Dept: INTERNAL MEDICINE | Facility: CLINIC | Age: 62
End: 2022-02-14
Payer: COMMERCIAL

## 2022-02-14 VITALS
WEIGHT: 263.2 LBS | OXYGEN SATURATION: 97 % | BODY MASS INDEX: 32.9 KG/M2 | RESPIRATION RATE: 12 BRPM | DIASTOLIC BLOOD PRESSURE: 72 MMHG | TEMPERATURE: 97.8 F | HEART RATE: 74 BPM | SYSTOLIC BLOOD PRESSURE: 105 MMHG

## 2022-02-14 DIAGNOSIS — Z23 NEED FOR SHINGLES VACCINE: ICD-10-CM

## 2022-02-14 DIAGNOSIS — I10 ESSENTIAL HYPERTENSION WITH GOAL BLOOD PRESSURE LESS THAN 140/90: ICD-10-CM

## 2022-02-14 DIAGNOSIS — E78.5 HYPERLIPIDEMIA LDL GOAL <100: ICD-10-CM

## 2022-02-14 DIAGNOSIS — E03.9 ACQUIRED HYPOTHYROIDISM: ICD-10-CM

## 2022-02-14 DIAGNOSIS — Z01.818 PREOP GENERAL PHYSICAL EXAM: Primary | ICD-10-CM

## 2022-02-14 DIAGNOSIS — E11.40 TYPE 2 DIABETES MELLITUS WITH DIABETIC NEUROPATHY, WITHOUT LONG-TERM CURRENT USE OF INSULIN (H): ICD-10-CM

## 2022-02-14 DIAGNOSIS — F11.90 CHRONIC, CONTINUOUS USE OF OPIOIDS: ICD-10-CM

## 2022-02-14 DIAGNOSIS — I50.22 CHRONIC SYSTOLIC CONGESTIVE HEART FAILURE (H): ICD-10-CM

## 2022-02-14 DIAGNOSIS — I48.19 PERSISTENT ATRIAL FIBRILLATION (H): ICD-10-CM

## 2022-02-14 LAB
ALT SERPL W P-5'-P-CCNC: 24 U/L (ref 0–70)
ANION GAP SERPL CALCULATED.3IONS-SCNC: 4 MMOL/L (ref 3–14)
BASOPHILS # BLD AUTO: 0 10E3/UL (ref 0–0.2)
BASOPHILS NFR BLD AUTO: 0 %
BUN SERPL-MCNC: 15 MG/DL (ref 7–30)
CALCIUM SERPL-MCNC: 9.6 MG/DL (ref 8.5–10.1)
CHLORIDE BLD-SCNC: 106 MMOL/L (ref 94–109)
CHOLEST SERPL-MCNC: 152 MG/DL
CO2 SERPL-SCNC: 28 MMOL/L (ref 20–32)
CREAT SERPL-MCNC: 0.9 MG/DL (ref 0.66–1.25)
CREAT UR-MCNC: 189 MG/DL
EOSINOPHIL # BLD AUTO: 0.2 10E3/UL (ref 0–0.7)
EOSINOPHIL NFR BLD AUTO: 4 %
ERYTHROCYTE [DISTWIDTH] IN BLOOD BY AUTOMATED COUNT: 12.9 % (ref 10–15)
FASTING STATUS PATIENT QL REPORTED: YES
GFR SERPL CREATININE-BSD FRML MDRD: >90 ML/MIN/1.73M2
GLUCOSE BLD-MCNC: 101 MG/DL (ref 70–99)
HBA1C MFR BLD: 5.6 % (ref 0–5.6)
HCT VFR BLD AUTO: 44.5 % (ref 40–53)
HDLC SERPL-MCNC: 32 MG/DL
HGB BLD-MCNC: 14.9 G/DL (ref 13.3–17.7)
LDLC SERPL CALC-MCNC: 100 MG/DL
LYMPHOCYTES # BLD AUTO: 1.3 10E3/UL (ref 0.8–5.3)
LYMPHOCYTES NFR BLD AUTO: 21 %
MCH RBC QN AUTO: 31.2 PG (ref 26.5–33)
MCHC RBC AUTO-ENTMCNC: 33.5 G/DL (ref 31.5–36.5)
MCV RBC AUTO: 93 FL (ref 78–100)
MICROALBUMIN UR-MCNC: 15 MG/L
MICROALBUMIN/CREAT UR: 7.94 MG/G CR (ref 0–17)
MONOCYTES # BLD AUTO: 0.6 10E3/UL (ref 0–1.3)
MONOCYTES NFR BLD AUTO: 10 %
NEUTROPHILS # BLD AUTO: 4.2 10E3/UL (ref 1.6–8.3)
NEUTROPHILS NFR BLD AUTO: 66 %
NONHDLC SERPL-MCNC: 120 MG/DL
PLATELET # BLD AUTO: 168 10E3/UL (ref 150–450)
POTASSIUM BLD-SCNC: 4.3 MMOL/L (ref 3.4–5.3)
RBC # BLD AUTO: 4.78 10E6/UL (ref 4.4–5.9)
SODIUM SERPL-SCNC: 138 MMOL/L (ref 133–144)
TRIGL SERPL-MCNC: 102 MG/DL
WBC # BLD AUTO: 6.4 10E3/UL (ref 4–11)

## 2022-02-14 PROCEDURE — 83036 HEMOGLOBIN GLYCOSYLATED A1C: CPT | Performed by: INTERNAL MEDICINE

## 2022-02-14 PROCEDURE — 99214 OFFICE O/P EST MOD 30 MIN: CPT | Performed by: INTERNAL MEDICINE

## 2022-02-14 PROCEDURE — 36415 COLL VENOUS BLD VENIPUNCTURE: CPT | Performed by: INTERNAL MEDICINE

## 2022-02-14 PROCEDURE — 80307 DRUG TEST PRSMV CHEM ANLYZR: CPT | Performed by: INTERNAL MEDICINE

## 2022-02-14 PROCEDURE — 82043 UR ALBUMIN QUANTITATIVE: CPT | Performed by: INTERNAL MEDICINE

## 2022-02-14 PROCEDURE — 80061 LIPID PANEL: CPT | Performed by: INTERNAL MEDICINE

## 2022-02-14 PROCEDURE — 84460 ALANINE AMINO (ALT) (SGPT): CPT | Performed by: INTERNAL MEDICINE

## 2022-02-14 PROCEDURE — 80048 BASIC METABOLIC PNL TOTAL CA: CPT | Performed by: INTERNAL MEDICINE

## 2022-02-14 PROCEDURE — 85025 COMPLETE CBC W/AUTO DIFF WBC: CPT | Performed by: INTERNAL MEDICINE

## 2022-02-14 NOTE — LETTER
February 17, 2022      John Rodriguezickson  [address] [place] [address]        Dear ,    We are writing to inform you of your test results.    All of these tests are within acceptable limits , things look good !          Resulted Orders   ALT   Result Value Ref Range    ALT 24 0 - 70 U/L   Lipid panel reflex to direct LDL Non-fasting   Result Value Ref Range    Cholesterol 152 <200 mg/dL    Triglycerides 102 <150 mg/dL    Direct Measure HDL 32 (L) >=40 mg/dL    LDL Cholesterol Calculated 100 <=100 mg/dL    Non HDL Cholesterol 120 <130 mg/dL    Patient Fasting > 8hrs? Yes     Narrative    Cholesterol  Desirable:  <200 mg/dL    Triglycerides  Normal:  Less than 150 mg/dL  Borderline High:  150-199 mg/dL  High:  200-499 mg/dL  Very High:  Greater than or equal to 500 mg/dL    Direct Measure HDL  Female:  Greater than or equal to 50 mg/dL   Male:  Greater than or equal to 40 mg/dL    LDL Cholesterol  Desirable:  <100mg/dL  Above Desirable:  100-129 mg/dL   Borderline High:  130-159 mg/dL   High:  160-189 mg/dL   Very High:  >= 190 mg/dL    Non HDL Cholesterol  Desirable:  130 mg/dL  Above Desirable:  130-159 mg/dL  Borderline High:  160-189 mg/dL  High:  190-219 mg/dL  Very High:  Greater than or equal to 220 mg/dL   Albumin Random Urine Quantitative with Creat Ratio   Result Value Ref Range    Creatinine Urine mg/dL 189 mg/dL    Albumin Urine mg/L 15 mg/L    Albumin Urine mg/g Cr 7.94 0.00 - 17.00 mg/g Cr   Hemoglobin A1c   Result Value Ref Range    Hemoglobin A1C 5.6 0.0 - 5.6 %      Comment:      Normal <5.7%   Prediabetes 5.7-6.4%    Diabetes 6.5% or higher     Note: Adopted from ADA consensus guidelines.   Basic metabolic panel   Result Value Ref Range    Sodium 138 133 - 144 mmol/L    Potassium 4.3 3.4 - 5.3 mmol/L    Chloride 106 94 - 109 mmol/L    Carbon Dioxide (CO2) 28 20 - 32 mmol/L    Anion Gap 4 3 - 14 mmol/L    Urea Nitrogen 15 7 - 30 mg/dL    Creatinine 0.90 0.66 - 1.25 mg/dL     Calcium 9.6 8.5 - 10.1 mg/dL    Glucose 101 (H) 70 - 99 mg/dL    GFR Estimate >90 >60 mL/min/1.73m2      Comment:      Effective December 21, 2021 eGFRcr in adults is calculated using the 2021 CKD-EPI creatinine equation which includes age and gender (Mouna schmidt al., [facility], DOI: 10.1056/[id number])   CBC with platelets and differential   Result Value Ref Range    WBC Count 6.4 4.0 - 11.0 10e3/uL    RBC Count 4.78 4.40 - 5.90 10e6/uL    Hemoglobin 14.9 13.3 - 17.7 g/dL    Hematocrit 44.5 40.0 - 53.0 %    MCV 93 78 - 100 fL    MCH 31.2 26.5 - 33.0 pg    MCHC 33.5 31.5 - 36.5 g/dL    RDW 12.9 10.0 - 15.0 %    Platelet Count 168 150 - 450 10e3/uL    % Neutrophils 66 %    % Lymphocytes 21 %    % Monocytes 10 %    % Eosinophils 4 %    % Basophils 0 %    Absolute Neutrophils 4.2 1.6 - 8.3 10e3/uL    Absolute Lymphocytes 1.3 0.8 - 5.3 10e3/uL    Absolute Monocytes 0.6 0.0 - 1.3 10e3/uL    Absolute Eosinophils 0.2 0.0 - 0.7 10e3/uL    Absolute Basophils 0.0 0.0 - 0.2 10e3/uL   Urine Drug Confirmation Panel   Result Value Ref Range    O-Gustavo-Tramadol ng/mL >13,000 (H) <50 ng/mL    O-Gustavo-Tramadol        Comment:      Unable to calculate: Result value above analytical measurement range    Tramadol ng/mL >13,000 (H) <50 ng/mL    Tramadol        Comment:      Unable to calculate: Result value above analytical measurement range    Pregabalin Present (A) Absent      Comment:      Sources of pregabalin are prescription medications.    Narrative    This test was developed and its performance characteristics determined by the [facility],  Special Chemistry Laboratory. It has not been cleared or approved by the FDA. The laboratory is regulated under CLIA as qualified to perform high-complexity testing. This test is used for clinical purposes. It should not be regarded as investigational or for research.   Urine Creatinine for Drug Screen Panel   Result Value Ref Range    Creatinine Urine for Drug Screen 185  mg/dL      Comment:      The reference range has not been established for creatinine in random urines. The results should be integrated into the clinical context for interpretation.       If you have any questions or concerns, please call the clinic at the number listed above.       Sincerely,      Ac Frederick MD/bruce

## 2022-02-14 NOTE — LETTER
Opioid / Opioid Plus Controlled Substance Agreement    This is an agreement between you and your provider about the safe and appropriate use of controlled substance/opioids prescribed by your care team. Controlled substances are medicines that can cause physical and mental dependence (abuse).    There are strict laws about having and using these medicines. We here at [facility] are committing to working with you in your efforts to get better. To support you in this work, we ll help you schedule regular office appointments for medicine refills. If we must cancel or change your appointment for any reason, we ll make sure you have enough medicine to last until your next appointment.     As a Provider, I will:    Listen carefully to your concerns and treat you with respect.     Recommend a treatment plan that I believe is in your best interest. This plan may involve therapies other than opioid pain medication.     Talk with you often about the possible benefits, and the risk of harm of any medicine that we prescribe for you.     Provide a plan on how to taper (discontinue or go off) using this medicine if the decision is made to stop its use.    As a Patient, I understand that opioid(s):     Are a controlled substance prescribed by my care team to help me function or work and manage my condition(s).     Are strong medicines and can cause serious side effects such as:    Drowsiness, which can seriously affect my driving ability    A lower breathing rate, enough to cause death    Harm to my thinking ability     Depression     Abuse of and addiction to this medicine    Need to be taken exactly as prescribed. Combining opioids with certain medicines or chemicals (such as illegal drugs, sedatives, sleeping pills, and benzodiazepines) can be dangerous or even fatal. If I stop opioids suddenly, I may have severe withdrawal symptoms.    Do not work for all types of pain nor for all patients. If they re not helpful, I may  be asked to stop them.        The risks, benefits and side effects of these medicine(s) were explained to me. I agree that:  1. I will take part in other treatments as advised by my care team. This may be psychiatry or counseling, physical therapy, behavioral therapy, group treatment or a referral to a specialist.     2. I will keep all my appointments. I understand that this is part of the monitoring of opioids. My care team may require an office visit for EVERY opioid/controlled substance refill. If I miss appointments or don t follow instructions, my care team may stop my medicine.    3. I will take my medicines as prescribed. I will not change the dose or schedule unless my care team tells me to. There will be no refills if I run out early.     4. I may be asked to come to the clinic and complete a urine drug test or complete a pill count at any time. If I don t give a urine sample or participate in a pill count, the care team may stop my medicine.    5. I will only receive prescriptions from this clinic for chronic pain. If I am treated by another provider for acute pain issues, I will tell them that I am taking opioid pain medication for chronic pain and that I have a treatment agreement with this provider. I will inform my [facility] care team within one business day if I am given a prescription for any pain medication by another healthcare provider. My [facility] care team can contact other providers and pharmacists about my use of any medicines.    6. It is up to me to make sure that I don t run out of my medicines on weekends or holidays. If my care team is willing to refill my opioid prescription without a visit, I must request refills only during office hours. Refills may take up to 3 business days to process. I will use one pharmacy to fill all my opioid and other controlled substance prescriptions. I will notify the clinic about any changes to my insurance or medication  availability.    7. I am responsible for my prescriptions. If the medicine/prescription is lost, stolen or destroyed, it will not be replaced. I also agree not to share controlled substance medicines with anyone.    8. I am aware I should not use any illegal or recreational drugs. I agree not to drink alcohol unless my care team says I can.       9. If I enroll in the [place] [facility] program, I will tell my care team prior to my next refill.     10. I will tell my care team right away if I become pregnant, have a new medical problem treated outside of my regular clinic, or have a change in my medications.    11. I understand that this medicine can affect my thinking, judgment and reaction time. Alcohol and drugs affect the brain and body, which can affect the safety of my driving. Being under the influence of alcohol or drugs can affect my decision-making, behaviors, personal safety, and the safety of others. Driving while impaired (DWI) can occur if a person is driving, operating, or in physical control of a car, motorcycle, boat, snowmobile, ATV, motorbike, off-road vehicle, or any other motor vehicle ([place] Statute 169A.20). I understand the risk if I choose to drive or operate any vehicle or machinery.    I understand that if I do not follow any of the conditions above, my prescriptions or treatment may be stopped or changed.          Opioids  What You Need to Know    What are opioids?   Opioids are pain medicines that must be prescribed by a doctor. They are also known as narcotics.     Examples are:   1. morphine (MS Contin, Mima)  2. oxycodone (Oxycontin)  3. oxycodone and acetaminophen (Percocet)  4. hydrocodone and acetaminophen (Vicodin, Norco)   5. fentanyl patch (Duragesic)   6. hydromorphone (Dilaudid)   7. methadone  8. codeine (Tylenol #3)     What do opioids do well?   Opioids are best for severe short-term pain such as after a surgery or injury. They may work well for cancer pain. They may  help some people with long-lasting (chronic) pain.     What do opioids NOT do well?   Opioids never get rid of pain entirely, and they don t work well for most patients with chronic pain. Opioids don t reduce swelling, one of the causes of pain.                                    Other ways to manage chronic pain and improve function include:       Treat the health problem that may be causing pain    Anti-inflammation medicines, which reduce swelling and tenderness, such as ibuprofen (Advil, Motrin) or naproxen (Aleve)    Acetaminophen (Tylenol)    Antidepressants and anti-seizure medicines, especially for nerve pain    Topical treatments such as patches or creams    Injections or nerve blocks    Chiropractic or osteopathic treatment    Acupuncture, massage, deep breathing, meditation, visual imagery, aromatherapy    Use heat or ice at the pain site    Physical therapy     Exercise    Stop smoking    Take part in therapy       Risks and side effects     Talk to your doctor before you start or decide to keep taking opioids. Possible side effects include:      Lowering your breathing rate enough to cause death    Overdose, including death, especially if taking higher than prescribed doses    Worse depression symptoms; less pleasure in things you usually enjoy    Feeling tired or sluggish    Slower thoughts or cloudy thinking    Being more sensitive to pain over time; pain is harder to control    Trouble sleeping or restless sleep    Changes in hormone levels (for example, less testosterone)    Changes in sex drive or ability to have sex    Constipation    Unsafe driving    Itching and sweating    Dizziness    Nausea, throwing up and dry mouth    What else should I know about opioids?    Opioids may lead to dependence, tolerance, or addiction.      Dependence means that if you stop or reduce the medicine too quickly, you will have withdrawal symptoms. These include loose poop (diarrhea), jitters, flu-like symptoms,  nervousness and tremors. Dependence is not the same as addiction.                       Tolerance means needing higher doses over time to get the same effect. This may increase the chance of serious side effects.      Addiction is when people improperly use a substance that harms their body, their mind or their relations with others. Use of opiates can cause a relapse of addiction if you have a history of drug or alcohol abuse.      People who have used opioids for a long time may have a lower quality of life, worse depression, higher levels of pain and more visits to doctors.    You can overdose on opioids. Take these steps to lower your risk of overdose:    1. Recognize the signs:  Signs of overdose include decrease or loss of consciousness (blackout), slowed breathing, trouble waking up and blue lips. If someone is worried about overdose, they should call 911.    2. Talk to your doctor about Narcan (naloxone).   If you are at risk for overdose, you may be given a prescription for Narcan. This medicine very quickly reverses the effects of opioids.   If you overdose, a friend or family member can give you Narcan while waiting for the ambulance. They need to know the signs of overdose and how to give Narcan.     3. Don't use alcohol or street drugs.   Taking them with opioids can cause death.    4. Do not take any of these medicines unless your doctor says it s OK. Taking these with opioids can cause death:    Benzodiazepines, such as lorazepam (Ativan), alprazolam (Xanax) or diazepam (Valium)    Muscle relaxers, such as cyclobenzaprine (Flexeril)    Sleeping pills like zolpidem (Ambien)     Other opioids      How to keep you and other people safe while taking opioids:    1. Never share your opioids with others.  Opioid medicines are regulated by the Drug Enforcement Agency ([place]). Selling or sharing medications is a criminal act.    2. Be sure to store opioids in a secure place, locked up if possible. Young children  can easily swallow them and overdose.    3. When you are traveling with your medicines, keep them in the original bottles. If you use a pill box, be sure you also carry a copy of your medicine list from your clinic or pharmacy.    4. Safe disposal of opioids    Most pharmacies have places to get rid of medicine, called disposal kiosks. Medicine disposal options are also available in every [facility]. Search your county and  medication disposal  to find more options. You can find more details at:  [email or web address]     I agree that my provider, clinic care team, and pharmacy may work with any city, state or federal law enforcement agency that investigates the misuse, sale, or other diversion of my controlled medicine. I will allow my provider to discuss my care with, or share a copy of, this agreement with any other treating provider, pharmacy or emergency room where I receive care.    I have read this agreement and have asked questions about anything I did not understand.    _______________________________________________________  Patient Signature - Rhett Elizabeth _____________________                   Date     _______________________________________________________  Provider Signature - Ac Frederick MD   _____________________                   Date     _______________________________________________________  Witness Signature (required if provider not present while patient signing)   _____________________                   Date

## 2022-02-14 NOTE — PROGRESS NOTES
[facility]  [address]  [place] [address]  Phone: [phone]  Primary Provider: Ac Frederick  Pre-op Performing Provider: AC FREDERICK    PREOPERATIVE EVALUATION:  Today's date: 2/14/2022    Rhett Elizabeth is a 61 year old male who presents for a preoperative evaluation.    Surgical Information:  Surgery/Procedure: esphagogastroduodenoscopy   Surgery Location: [facility]   Surgeon: Leonardo El with [facility]   Surgery Date: 3/11/2022  Time of Surgery: am    Where patient plans to recover: At home with family  Fax number for surgical facility: [facility] / [facility]     Type of Anesthesia Anticipated: monitored anesthesia care (MAC) /to be determined     Assessment & Plan     The proposed surgical procedure is considered LOW risk.    Preop general physical exam  This is a fully suitable operative candidate      Need for shingles vaccine  Already has half. Plans to get the second ShingRx vaccination against herpes zoster    Type 2 diabetes mellitus with diabetic neuropathy, without long-term current use of insulin (H)  Lab Results   Component Value Date    A1C 6.2 05/17/2021    A1C 8.5 01/04/2021    A1C 6.7 01/20/2020    A1C 5.3 10/11/2019    A1C 5.7 05/17/2019     Wt Readings from Last 5 Encounters:   02/14/22 119.4 kg (263 lb 3.2 oz)   01/20/22 121.1 kg (267 lb)   05/17/21 139.3 kg (307 lb)   01/04/21 149.7 kg (330 lb)   01/20/20 131.5 kg (290 lb)     This is amazing weight loss !    Acquired hypothyroidism  TSH   Date Value Ref Range Status   01/04/2021 0.87 0.40 - 4.00 mU/L Final         Essential hypertension with goal blood pressure less than 140/90  BP Readings from Last 3 Encounters:   02/14/22 105/72   01/20/22 112/82   05/17/21 127/81         Persistent atrial fibrillation (H)  Sees cardiologist with [facility], says he's stopped simvistatin [Zocor] and half dose only now with losartan ( Cozaar  )      Chronic, continuous use of opioids  Chronic Pain Contract signed, requested to have urine drug screen today , given this medication for diabetes neuropathy     Hyperlipidemia LDL goal <100  See orders section of this encounter     Chronic systolic congestive heart failure (H)  Seeing cardiologist         Implanted Device:   - Type of device: AICD/Pacemaker Patient advised to bring device information on day of surgery.      Risks and Recommendations:  The patient has the following additional risks and recommendations for perioperative complications:  Cardiovascular:   - Cardiology consulted this is an upcoming appointment in 3 days prior to the planned procedure     Medication Instructions:  Patient is to take all scheduled medications on the day of surgery EXCEPT for modifications listed below:   1. No Metformin ( Glucophage)  The night before the surgery or morning of the surgery   2. Otherwise hold aspirin and Apixaban (Eliquis) for the week prior to the planned procedure   3. Otherwise take all regularly scheduled medications      RECOMMENDATION:  APPROVAL GIVEN to proceed with proposed procedure, without further diagnostic evaluation.    35 minutes spent on the date of the encounter doing chart review, history and exam, documentation and further activities per the note      Subjective     HPI related to upcoming procedure: see problem list     Preop Questions 2/14/2022   1. Have you ever had a heart attack or stroke? YES - this was approximately 2017   2. Have you ever had surgery on your heart or blood vessels, such as a stent placement, a coronary artery bypass, or surgery on an artery in your head, neck, heart, or legs? No   3. Do you have chest pain with activity? No   4. Do you have a history of  heart failure? No   5. Do you currently have a cold, bronchitis or symptoms of other infection? No   6. Do you have a cough, shortness of breath, or wheezing? No   7. Do you or anyone in your family have  previous history of blood clots? YES - this was in his daughter    8. Do you or does anyone in your family have a serious bleeding problem such as prolonged bleeding following surgeries or cuts? No   9. Have you ever had problems with anemia or been told to take iron pills? No   10. Have you had any abnormal blood loss such as black, tarry or bloody stools? No   11. Have you ever had a blood transfusion? No   12. Are you willing to have a blood transfusion if it is medically needed before, during, or after your surgery? Yes   13. Have you or any of your relatives ever had problems with anesthesia? No   14. Do you have sleep apnea, excessive snoring or daytime drowsiness? YES - uses a cpap machine and mask regularly    14a. Do you have a CPAP machine? Yes   15. Do you have any artifical heart valves or other implanted medical devices like a pacemaker, defibrillator, or continuous glucose monitor? YES - automatic implanted cardio-defibrillator / pacemaker     15a. What type of device do you have? Pacemaker / defibrillator   15b. Name of the clinic that manages your device:  [facility]   16. Do you have artificial joints? No   17. Are you allergic to latex? No       Health Care Directive:  Patient does not have a Health Care Directive or Living Will: Discussed advance care planning with patient; information given to patient to review.    Preoperative Review of :   reviewed - controlled substances reflected in medication list.      Status of Chronic Conditions:  See problem list for active medical problems.  Problems all longstanding and stable, except as noted/documented.  See ROS for pertinent symptoms related to these conditions.      Review of Systems  CONSTITUTIONAL: NEGATIVE for fever, chills, change in weight  ENT/MOUTH: NEGATIVE for ear, mouth and throat problems  RESP: NEGATIVE for significant cough or SOB  CV: NEGATIVE for chest pain, palpitations or peripheral edema  NEURO: NEGATIVE for weakness,  dizziness or paresthesias  ENDOCRINE: NEGATIVE for temperature intolerance, skin/hair changes  HEME/ALLERGY/IMMUNE: NEGATIVE for bleeding problems  PSYCHIATRIC: NEGATIVE for changes in mood or affect  ROS otherwise negative    Patient Active Problem List    Diagnosis Date Noted     Chronic, continuous use of opioids 07/14/2021     Priority: Medium     Peripheral polyneuropathy 01/20/2020     Priority: Medium     Personal history of urethral stricture 05/23/2019     Priority: Medium     See older medical records scanned into Epic electronic medical records  From [facility] . Cystoscopy  From 9-2-2010 Dr. Bill Bennett MD       Hyperlipidemia LDL goal <100 05/16/2019     Priority: Medium     Acquired hypothyroidism 11/19/2018     Priority: Medium     Recent bereavement 06/03/2018     Priority: Medium     S/P ICD (internal cardiac defibrillator) procedure 03/21/2018     Priority: Medium     Biventricular ICD implantation on 3.1.18 at [facility].       Morbid obesity (H) 02/09/2018     Priority: Medium     Drusen of macula, left 09/08/2017     Priority: Medium     Dry eyes 09/08/2017     Priority: Medium     Chronic systolic congestive heart failure (H) 05/07/2017     Priority: Medium     ICD and Bi-V. 3/1/2018    MRI completed on 1/17/2018 revealed an EF 21% with an enlarged LV as well as biatrial enlargement. Pulmonary congestion also noted.     Per 1/2017 outside ECHO (see [facility]).  Summary of report:   1. Moderately increased left ventricular size.   2. Severe global hypokinesis.   3. LV poorly seen despite use of contrast. EF appears severely reduced, 25-30%.   4. Moderately dilated left atrium.   5. Moderately dilated right atrium.   6. No hemodynamically significant valvular disease.   7. No prior echo for comparison.       Neuropathic pain 01/15/2017     Priority: Medium     Essential hypertension with goal blood pressure less than 140/90 01/15/2017     Priority: Medium     Persistent  atrial fibrillation (H) 01/15/2017     Priority: Medium     LANEY (obstructive sleep apnea) 01/15/2017     Priority: Medium     1996(289#)-AHI/RDI 65, CPAP 10 cm/H20  2/25.2016(369#)-AHI 36, RDI 37, lowest oxygen saturation was 88%. Tco2 40-45, no effective pressure found.          Type 2 diabetes mellitus with diabetic neuropathy, without long-term current use of insulin (H) 01/15/2017     Priority: Medium      Past Medical History:   Diagnosis Date     Diabetes (H)      Hypertension      Macular degeneration      Type I or II open fracture of distal end of right tibia with routine healing, unspecified fracture morphology, subsequent encounter 1/15/2017     Past Surgical History:   Procedure Laterality Date     STRABISMUS SURGERY       Current Outpatient Medications   Medication Sig Dispense Refill     alum & mag hydroxide-simethicone (MAALOX) 200-200-20 MG/5ML SUSP suspension Take 15 mLs by mouth daily as needed for other (GI discomfort) Mix with 15 mls of Lidocaine viscous solution 710 mL 0     aspirin 81 MG tablet Take by mouth daily Reported on 5/5/2017       blood glucose (ONETOUCH VERIO IQ) test strip Use to test blood sugar 3 times daily or as directed.  Ok to substitute alternative if insurance prefers. 300 strip 1     blood glucose monitoring (ONE TOUCH ULTRA 2) meter device kit Use to test blood sugar 3 times daily or as directed. 1 kit 0     Cholecalciferol (VITAMIN D-3 PO) Take 1,000 Units by mouth daily       DULoxetine (CYMBALTA) 60 MG capsule TAKE ONE CAPSULE BY MOUTH ONCE DAILY 90 capsule 1     insulin pen needle (BD ARNOLD U/F) 32G X 4 MM miscellaneous Use 2 daily as directed. 200 each 1     levothyroxine (SYNTHROID/LEVOTHROID) 88 MCG tablet TAKE ONE TABLET BY MOUTH ONCE DAILY 90 tablet 0     lidocaine, viscous, (XYLOCAINE) 2 % solution Take 15 mLs by mouth daily as needed for moderate pain (mix with 15 mls of alum-mag hydroxide-simethicone) 200 mL 1     liraglutide (VICTOZA PEN) 18 MG/3ML solution  "Inject 1.8 mg Subcutaneous daily No further refills until appointment. Face to face encounter 18 mL 0     losartan (COZAAR) 25 MG tablet TAKE 1 TABLET (25 MG) BY MOUTH DAILY 90 tablet 1     Lutein 20 MG CAPS Take 20 mg by mouth daily 8/11/2017- patient states he takes 1 20 mg tablet daily  Patient states he takes 150 mg tablet twice a day.       metFORMIN (GLUCOPHAGE) 500 MG tablet TAKE TWO TABLETS BY MOUTH TWICE A  tablet 0     metoprolol succinate ER (TOPROL XL) 50 MG 24 hr tablet Take 1 tablet (50 mg) by mouth daily 90 tablet 0     Multiple Vitamin (MULTIVITAMINS PO) Reported on 5/22/2017       Nutritional Supplements (ENSURE COMPLETE PO)        Omega-3 Fatty Acids (OMEGA-3 FISH OIL PO) Take 1 g by mouth Reported on 5/5/2017       OneTouch Delica Lancets 33G MISC 1 Box 3 times daily 300 each 1     pantoprazole (PROTONIX) 40 MG EC tablet Take 1 tablet (40 mg) by mouth daily 30 tablet 0     pregabalin (LYRICA) 300 MG capsule TAKE ONE CAPSULE BY MOUTH TWICE A DAY (NO FURTHER REFILLS UNTIL SEEN) 60 capsule 1     sildenafil (REVATIO) 20 MG tablet Take 1 tablet (20 mg) by mouth See Admin Instructions \" take between 2-4 tablets at a time for planned sexual activity\" , maximum 5 pills per day - patient will be paying out of pocket for this med 30 tablet 1     simvastatin (ZOCOR) 20 MG tablet TAKE 1 TABLET (20 MG) BY MOUTH AT BEDTIME , please make appointment within 30 days 30 tablet 1     spironolactone (ALDACTONE) 25 MG tablet Take 1 tablet (25 mg) by mouth daily 90 tablet 1     traMADol (ULTRAM) 50 MG tablet TAKE TWO TABLETS BY MOUTH TWICE A DAY AS NEEDED FOR SEVERE PAIN, 28 DAYS OR MORE BETWEEN REFILLS, NO FURTHER REFILLS UNTIL APPOINTMENT 120 tablet 0     UNABLE TO FIND cpap       XARELTO ANTICOAGULANT 20 MG TABS tablet TAKE ONE TABLET BY MOUTH ONCE DAILY WITH DINNER 90 tablet 0       Allergies   Allergen Reactions     Amlodipine Difficulty breathing and Other (See Comments)     Other reaction(s): Other  \"legs " "swell\"  Swelling of the lips and tongue  \"legs swell\"  swelling       Ace Inhibitors Cough     Other reaction(s): Cough        Social History     Tobacco Use     Smoking status: Never Smoker     Smokeless tobacco: Current User     Types: Snuff   Substance Use Topics     Alcohol use: No     Family History   Problem Relation Age of Onset     Macular Degeneration Mother      Prostate Cancer Father      Cancer Daughter      Glaucoma No family hx of      History   Drug Use No         Objective     /72   Pulse 74   Temp 97.8  F (36.6  C) (Oral)   Resp 12   Wt 119.4 kg (263 lb 3.2 oz)   SpO2 97%   BMI 32.90 kg/m      Physical Exam  GENERAL APPEARANCE: healthy, alert and no distress  HENT: ear canals and TM's normal and nose and mouth without ulcers or lesions  RESP: lungs clear to auscultation - no rales, rhonchi or wheezes  CV: regular rate and rhythm, normal S1 S2, no S3 or S4 and no murmur, click or rub, a few palpitations suggestive of premature ventricular contractions or benign ectopy   ABDOMEN: soft, nontender, no HSM or masses and bowel sounds normal  NEURO: Normal strength and tone, sensory exam grossly normal, mentation intact and speech normal    Recent Labs   Lab Test 05/17/21  1403 01/04/21  1115   HGB  --  14.7   PLT  --  191    134   POTASSIUM 4.3 4.4   CR 1.01 1.00   A1C 6.2* 8.5*        Diagnostics:  Orders Placed This Encounter   Procedures     REVIEW OF HEALTH MAINTENANCE PROTOCOL ORDERS     ZOSTER VACCINE RECOMBINANT (Shingrix)     Basic metabolic panel     Hemoglobin A1c     Albumin Random Urine Quantitative with Creat Ratio     Lipid panel reflex to direct LDL Non-fasting     ALT     CBC with platelets and differential     Urine Drug Confirmation Panel     Urine Creatinine for Drug Screen Panel     Drug Confirmation Panel Urine with Creatinine     CBC with platelets and differential     See attached electrocardiogram        Revised Cardiac Risk Index (RCRI):  The patient has the " following serious cardiovascular risks for perioperative complications:   - No serious cardiac risks = 0 points     RCRI Interpretation: 0 points: Class I (very low risk - 0.4% complication rate)           Signed Electronically by: Ac Frederick MD  Copy of this evaluation report is provided to requesting physician.

## 2022-02-15 DIAGNOSIS — M79.2 NEUROPATHIC PAIN: ICD-10-CM

## 2022-02-15 DIAGNOSIS — F11.90 CHRONIC, CONTINUOUS USE OF OPIOIDS: ICD-10-CM

## 2022-02-15 DIAGNOSIS — G62.9 PERIPHERAL POLYNEUROPATHY: ICD-10-CM

## 2022-02-15 LAB — CREAT UR-MCNC: 185 MG/DL

## 2022-02-16 LAB
N-NORTRAMADOL/CREAT UR CFM: ABNORMAL NG/MG{CREAT}
O-NORTRAMADOL UR CFM-MCNC: ABNORMAL NG/ML
PREGABALIN UR QL CFM: PRESENT
TRAMADOL CTO UR CFM-MCNC: ABNORMAL NG/ML
TRAMADOL/CREAT UR: ABNORMAL

## 2022-02-16 RX ORDER — TRAMADOL HYDROCHLORIDE 50 MG/1
100 TABLET ORAL 2 TIMES DAILY
Qty: 120 TABLET | Refills: 5 | Status: SHIPPED | OUTPATIENT
Start: 2022-02-16 | End: 2022-08-09

## 2022-02-16 NOTE — TELEPHONE ENCOUNTER
This was denied on 2/14, because he needed to make an appointment. He had an appointment on 2/14 , he is out of his medication and needs it ASAP.

## 2022-02-21 ENCOUNTER — OFFICE VISIT (OUTPATIENT)
Dept: OPHTHALMOLOGY | Facility: CLINIC | Age: 62
End: 2022-02-21
Payer: COMMERCIAL

## 2022-02-21 DIAGNOSIS — H25.13 AGE-RELATED NUCLEAR CATARACT OF BOTH EYES: ICD-10-CM

## 2022-02-21 DIAGNOSIS — K30 GASTROINTESTINAL DISCOMFORT: ICD-10-CM

## 2022-02-21 DIAGNOSIS — E11.319 TYPE 2 DIABETES MELLITUS WITH RETINOPATHY OF BOTH EYES, MACULAR EDEMA PRESENCE UNSPECIFIED, UNSPECIFIED RETINOPATHY SEVERITY, UNSPECIFIED WHETHER LONG TERM INSULIN USE (H): ICD-10-CM

## 2022-02-21 PROCEDURE — 92134 CPTRZ OPH DX IMG PST SGM RTA: CPT | Performed by: OPHTHALMOLOGY

## 2022-02-21 PROCEDURE — 92002 INTRM OPH EXAM NEW PATIENT: CPT | Performed by: OPHTHALMOLOGY

## 2022-02-21 ASSESSMENT — VISUAL ACUITY
OD_CC+: -2
OS_CC+: -1
OS_PH_CC: 20/25
OD_CC: 20/20
CORRECTION_TYPE: GLASSES
OS_CC: 20/50
METHOD: SNELLEN - LINEAR

## 2022-02-21 ASSESSMENT — TONOMETRY
IOP_METHOD: APPLANATION
OD_IOP_MMHG: 15
OS_IOP_MMHG: 15

## 2022-02-21 ASSESSMENT — CUP TO DISC RATIO
OS_RATIO: 0.55
OD_RATIO: 0.55

## 2022-02-21 ASSESSMENT — SLIT LAMP EXAM - LIDS
COMMENTS: NORMAL
COMMENTS: NORMAL

## 2022-02-21 ASSESSMENT — EXTERNAL EXAM - LEFT EYE: OS_EXAM: NORMAL

## 2022-02-21 ASSESSMENT — EXTERNAL EXAM - RIGHT EYE: OD_EXAM: NORMAL

## 2022-02-21 NOTE — PROGRESS NOTES
Current Eye Medications:  None.       Subjective:  Dr. Valencia Carmona recommended an evaluation of Diabetic Retinopathy changes.  Patient is here for a pressure check, dilation, and OCT.    On 1-1-22, he fell, hitting his left side of his forehead, breaking his glasses, and chipping his tooth.   Since then, he feels the vision in his left eye has been blurry.      Objective:  See Ophthalmology Exam.       Assessment:  Macular pigmentary changes left eye in patient with recent head trauma; normal retinal OCT and glaucoma OCT.      Plan:  Start Prednisolone drop left eye three times daily.  Return visit 3 weeks for a refraction left eye, intraocular pressure check, and repeat retinal OCT.  Rodrigo Lassiter M.D.  [phone]

## 2022-02-21 NOTE — PATIENT INSTRUCTIONS
Start Prednisolone drop left eye three times daily.  Return visit 3 weeks for a refraction left eye, intraocular pressure check, and repeat retinal OCT.  Rodrigo Lassiter M.D.  [phone]

## 2022-02-21 NOTE — LETTER
2/21/2022         RE: Rhett Elizabeth  [address]  [place] [address]        Dear Colleague,    Thank you for referring your patient, Rhett Elizabeth, to the [facility]. Please see a copy of my visit note below.     Current Eye Medications:  None.       Subjective:  Dr. Valencia Carmona recommended an evaluation of Diabetic Retinopathy changes.  Patient is here for a pressure check, dilation, and OCT.    On 1-1-22, he fell, hitting his left side of his forehead, breaking his glasses, and chipping his tooth.   Since then, he feels the vision in his left eye has been blurry.      Objective:  See Ophthalmology Exam.       Assessment:  Macular pigmentary changes left eye in patient with recent head trauma; normal retinal OCT and glaucoma OCT.      Plan:  Start Prednisolone drop left eye three times daily.  Return visit 3 weeks for a refraction left eye, intraocular pressure check, and repeat retinal OCT.  Rodrigo Lassiter M.D.  [phone]            Again, thank you for allowing me to participate in the care of your patient.        Sincerely,        Rodrigo Lassiter MD

## 2022-02-22 ENCOUNTER — TELEPHONE (OUTPATIENT)
Dept: OPHTHALMOLOGY | Facility: CLINIC | Age: 62
End: 2022-02-22
Payer: COMMERCIAL

## 2022-02-22 DIAGNOSIS — E11.319 TYPE 2 DIABETES MELLITUS WITH RETINOPATHY OF BOTH EYES, MACULAR EDEMA PRESENCE UNSPECIFIED, UNSPECIFIED RETINOPATHY SEVERITY, UNSPECIFIED WHETHER LONG TERM INSULIN USE (H): Primary | ICD-10-CM

## 2022-02-22 RX ORDER — PREDNISOLONE ACETATE 10 MG/ML
1 SUSPENSION/ DROPS OPHTHALMIC 3 TIMES DAILY
Qty: 10 ML | Refills: 0 | Status: SHIPPED | OUTPATIENT
Start: 2022-02-22 | End: 2022-12-05

## 2022-02-22 NOTE — TELEPHONE ENCOUNTER
Patient called indicating Dr. Lassiter has prescribe Prednisolone for the left eye. Pharmacy does not have order for RX. He uses the [facility] in [place]. Any questions please call him at [phone]

## 2022-02-23 RX ORDER — PANTOPRAZOLE SODIUM 40 MG/1
40 TABLET, DELAYED RELEASE ORAL DAILY
Qty: 90 TABLET | Refills: 0 | Status: SHIPPED | OUTPATIENT
Start: 2022-02-23 | End: 2022-05-25

## 2022-02-23 NOTE — TELEPHONE ENCOUNTER
Prescription approved per [facility] Refill Protocol.    Jackelin Rios RN

## 2022-02-26 RX ORDER — PREDNISOLONE ACETATE 10 MG/ML
1 SUSPENSION/ DROPS OPHTHALMIC 3 TIMES DAILY
Qty: 5 ML | Refills: 1 | Status: SHIPPED | OUTPATIENT
Start: 2022-02-26 | End: 2022-12-05

## 2022-02-26 ASSESSMENT — PACHYMETRY
OD_CT(UM): .501
OS_CT(UM): .509

## 2022-02-28 DIAGNOSIS — E11.40 TYPE 2 DIABETES MELLITUS WITH DIABETIC NEUROPATHY, WITHOUT LONG-TERM CURRENT USE OF INSULIN (H): Chronic | ICD-10-CM

## 2022-03-01 RX ORDER — DULOXETIN HYDROCHLORIDE 60 MG/1
CAPSULE, DELAYED RELEASE ORAL
Qty: 90 CAPSULE | Refills: 1 | Status: SHIPPED | OUTPATIENT
Start: 2022-03-01 | End: 2022-08-31

## 2022-03-07 ENCOUNTER — LAB (OUTPATIENT)
Dept: LAB | Facility: CLINIC | Age: 62
End: 2022-03-07

## 2022-03-07 ENCOUNTER — TELEPHONE (OUTPATIENT)
Dept: FAMILY MEDICINE | Facility: CLINIC | Age: 62
End: 2022-03-07

## 2022-03-07 DIAGNOSIS — Z01.818 PRE-OP EXAM: Primary | ICD-10-CM

## 2022-03-07 DIAGNOSIS — Z01.818 PRE-OP EXAM: ICD-10-CM

## 2022-03-07 LAB — SARS-COV-2 RNA RESP QL NAA+PROBE: NEGATIVE

## 2022-03-07 PROCEDURE — U0003 INFECTIOUS AGENT DETECTION BY NUCLEIC ACID (DNA OR RNA); SEVERE ACUTE RESPIRATORY SYNDROME CORONAVIRUS 2 (SARS-COV-2) (CORONAVIRUS DISEASE [COVID-19]), AMPLIFIED PROBE TECHNIQUE, MAKING USE OF HIGH THROUGHPUT TECHNOLOGIES AS DESCRIBED BY CMS-2020-01-R: HCPCS

## 2022-03-07 PROCEDURE — U0005 INFEC AGEN DETEC AMPLI PROBE: HCPCS

## 2022-03-14 DIAGNOSIS — E03.9 HYPOTHYROIDISM, UNSPECIFIED TYPE: ICD-10-CM

## 2022-03-15 NOTE — TELEPHONE ENCOUNTER
Routing refill request to provider for review/approval because:  Labs not current:    TSH   Date Value Ref Range Status   01/04/2021 0.87 0.40 - 4.00 mU/L Final

## 2022-03-16 ENCOUNTER — MYC MEDICAL ADVICE (OUTPATIENT)
Dept: INTERNAL MEDICINE | Facility: CLINIC | Age: 62
End: 2022-03-16
Payer: COMMERCIAL

## 2022-03-16 RX ORDER — LEVOTHYROXINE SODIUM 88 UG/1
88 TABLET ORAL DAILY
Qty: 90 TABLET | Refills: 1 | Status: SHIPPED | OUTPATIENT
Start: 2022-03-16 | End: 2022-09-13

## 2022-03-17 NOTE — TELEPHONE ENCOUNTER
I again left a voice mail message     Lets try you reaching out to him, I am not positive he needs to chat with me but I certainly can if he still needs. We would be reviewing his esphagogastroduodenoscopy reports     Ac Frederick MD

## 2022-03-21 NOTE — TELEPHONE ENCOUNTER
Called patient and left message to call clinic back.       Isidra BERNAL CMA ([facility])

## 2022-03-23 NOTE — TELEPHONE ENCOUNTER
No answer, multiple message's left to return call and no call back, unable to reach patient-closing chart.

## 2022-03-25 ENCOUNTER — TELEPHONE (OUTPATIENT)
Dept: INTERNAL MEDICINE | Facility: CLINIC | Age: 62
End: 2022-03-25
Payer: COMMERCIAL

## 2022-03-25 DIAGNOSIS — E11.40 TYPE 2 DIABETES MELLITUS WITH DIABETIC NEUROPATHY, WITHOUT LONG-TERM CURRENT USE OF INSULIN (H): Chronic | ICD-10-CM

## 2022-03-25 RX ORDER — PREGABALIN 300 MG/1
300 CAPSULE ORAL 2 TIMES DAILY
Qty: 60 CAPSULE | Refills: 5 | Status: SHIPPED | OUTPATIENT
Start: 2022-03-25 | End: 2022-09-13

## 2022-03-25 NOTE — TELEPHONE ENCOUNTER
Med was refilled by provider.  Please notify patient if he had called to check status    Deepali Mcleod RN  [facility]

## 2022-03-25 NOTE — TELEPHONE ENCOUNTER
Controlled Substance Refill Request for pregabalin (LYRICA) 300 MG capsule  Problem List Complete:  Yes   checked in past 3 months?  No, route to RN

## 2022-03-25 NOTE — TELEPHONE ENCOUNTER
Reason for call:  Medication   If this is a refill request, has the caller requested the refill from the pharmacy already? Yes  Will the patient be using a [facility]? Yes  Name of the pharmacy and phone number for the current request: [facility] [place] [place], [place] - [address]    Name of the medication requested: pregabalin (LYRICA) 300 MG capsule    Other request: he will be out of medications over the weekend. Can you send over ASAP  Phone number to reach patient:  Cell number on file:    Telephone Information:   Mobile [phone]       Best Time:      Can we leave a detailed message on this number?  YES    Travel screening: Not Applicable

## 2022-03-25 NOTE — TELEPHONE ENCOUNTER
Pt states they are out of Rx and need as soon as possible.    Zena Frances on 3/25/2022 at 9:54 AM

## 2022-03-28 NOTE — TELEPHONE ENCOUNTER
Called patient and informed of message below and nothing else needed.josef BERNAL CMA ([facility])

## 2022-03-29 NOTE — NURSING NOTE
Chief Complaint   Patient presents with     Hospital F/U     [facility]       Initial /72 (BP Location: Right arm, Patient Position: Chair, Cuff Size: Adult Regular)  Pulse 72  Temp 97.7  F (36.5  C) (Oral)  SpO2 98% There is no height or weight on file to calculate BMI.  Medication Reconciliation: complete    Shonda Pruett CMA  
hard copy, drawn during this pregnancy

## 2022-04-09 DIAGNOSIS — E11.40 TYPE 2 DIABETES MELLITUS WITH DIABETIC NEUROPATHY, WITHOUT LONG-TERM CURRENT USE OF INSULIN (H): Chronic | ICD-10-CM

## 2022-04-11 RX ORDER — LIRAGLUTIDE 6 MG/ML
1.8 INJECTION SUBCUTANEOUS DAILY
Qty: 27 ML | Refills: 0 | Status: SHIPPED | OUTPATIENT
Start: 2022-04-11 | End: 2022-07-07

## 2022-04-11 NOTE — TELEPHONE ENCOUNTER
Prescription approved per [facility] Refill Protocol.    Jackelin Rios RN

## 2022-04-18 DIAGNOSIS — E11.40 TYPE 2 DIABETES MELLITUS WITH DIABETIC NEUROPATHY, WITHOUT LONG-TERM CURRENT USE OF INSULIN (H): Chronic | ICD-10-CM

## 2022-04-19 NOTE — TELEPHONE ENCOUNTER
Routing refill request to provider for review/approval because:  Drug not on the [facility] refill protocol    Biguanide Agents Failed 04/18/2022 03:14 PM   Protocol Details  Patient does NOT have a diagnosis of CHF.

## 2022-04-25 ENCOUNTER — OFFICE VISIT (OUTPATIENT)
Dept: OPHTHALMOLOGY | Facility: CLINIC | Age: 62
End: 2022-04-25
Payer: COMMERCIAL

## 2022-04-25 DIAGNOSIS — H53.8 BLURRED VISION: Primary | ICD-10-CM

## 2022-04-25 DIAGNOSIS — H35.362 DRUSEN OF MACULA, LEFT: ICD-10-CM

## 2022-04-25 PROCEDURE — 92012 INTRM OPH EXAM EST PATIENT: CPT | Performed by: OPHTHALMOLOGY

## 2022-04-25 PROCEDURE — 92134 CPTRZ OPH DX IMG PST SGM RTA: CPT | Performed by: OPHTHALMOLOGY

## 2022-04-25 ASSESSMENT — VISUAL ACUITY
OD_CC: 20/40
OS_CC+: -2
OS_CC: 20/50
OD_CC+: +2
OD_PH_CC: 20/25
OS_PH_CC: 20/40
METHOD: SNELLEN - LINEAR
OD_PH_CC+: -2
CORRECTION_TYPE: GLASSES

## 2022-04-25 ASSESSMENT — REFRACTION_WEARINGRX
OS_SPHERE: -3.50
OS_AXIS: 093
OD_SPHERE: -2.75
OD_ADD: +2.50
OS_ADD: +2.50
OD_CYLINDER: +2.50
OD_AXIS: 130
OS_CYLINDER: +2.25

## 2022-04-25 ASSESSMENT — REFRACTION_MANIFEST
OS_SPHERE: -3.50
OD_ADD: +2.50
OS_AXIS: 075
OD_AXIS: 130
OS_ADD: +2.50
OS_CYLINDER: +2.50
OD_CYLINDER: +2.75
OD_SPHERE: -2.50

## 2022-04-25 ASSESSMENT — TONOMETRY
OD_IOP_MMHG: 16
IOP_METHOD: APPLANATION
OS_IOP_MMHG: 14

## 2022-04-25 ASSESSMENT — SLIT LAMP EXAM - LIDS
COMMENTS: NORMAL
COMMENTS: NORMAL

## 2022-04-25 ASSESSMENT — EXTERNAL EXAM - LEFT EYE: OS_EXAM: NORMAL

## 2022-04-25 ASSESSMENT — EXTERNAL EXAM - RIGHT EYE: OD_EXAM: NORMAL

## 2022-04-25 NOTE — PATIENT INSTRUCTIONS
Okay to stop Prednisolone drop.  May use artificial tears up to 4 times daily both eyes. (Refresh Tears, Systane Ultra/Balance, or Theratears)   Call in October 2022 for an appointment in February 2023 for Complete Exam    Dr. Lassiter [phone]

## 2022-04-25 NOTE — PROGRESS NOTES
Current Eye Medications:  Prednisolone twice daily in the left eye as needed.      Subjective:  Here for follow up with OCT today. States left eye vision has not improved since last visit and is still blurry. PT notes he has to tilt his back to see clearly with his glasses.       Objective:  See Ophthalmology Exam.       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  MR today consistent with old Rx and keratometry.  Stable retinal OCT both eyes.      Plan:  Okay to stop Prednisolone drop.  May use artificial tears up to 4 times daily both eyes. (Refresh Tears, Systane Ultra/Balance, or Theratears)   Call in October 2022 for an appointment in February 2023 for Complete Exam    Dr. Lassiter [phone]

## 2022-04-25 NOTE — LETTER
4/25/2022         RE: Rhett Elizabeth  [address]  [place] [address]        Dear Colleague,    Thank you for referring your patient, Rhett Elizabeth, to the [facility]. Please see a copy of my visit note below.     Current Eye Medications:  Prednisolone twice daily in the left eye as needed.      Subjective:  Here for follow up with OCT today. States left eye vision has not improved since last visit and is still blurry. PT notes he has to tilt his back to see clearly with his glasses.       Objective:  See Ophthalmology Exam.       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  MR today consistent with old Rx and keratometry.  Stable retinal OCT both eyes.      Plan:  Okay to stop Prednisolone drop.  May use artificial tears up to 4 times daily both eyes. (Refresh Tears, Systane Ultra/Balance, or Theratears)   Call in October 2022 for an appointment in February 2023 for Complete Exam    Dr. Lassiter [phone]            Again, thank you for allowing me to participate in the care of your patient.        Sincerely,        Rodrigo Lassiter MD

## 2022-05-20 ENCOUNTER — TRANSFERRED RECORDS (OUTPATIENT)
Dept: HEALTH INFORMATION MANAGEMENT | Facility: CLINIC | Age: 62
End: 2022-05-20
Payer: COMMERCIAL

## 2022-05-23 DIAGNOSIS — K30 GASTROINTESTINAL DISCOMFORT: ICD-10-CM

## 2022-05-25 RX ORDER — PANTOPRAZOLE SODIUM 40 MG/1
40 TABLET, DELAYED RELEASE ORAL DAILY
Qty: 90 TABLET | Refills: 0 | Status: SHIPPED | OUTPATIENT
Start: 2022-05-25 | End: 2022-08-31

## 2022-05-25 NOTE — TELEPHONE ENCOUNTER
"Requested Prescriptions   Signed Prescriptions Disp Refills    pantoprazole (PROTONIX) 40 MG EC tablet 90 tablet 0     Sig: TAKE 1 TABLET (40 MG) BY MOUTH DAILY       PPI Protocol Passed - 5/23/2022  6:17 PM        Passed - Not on Clopidogrel (unless Pantoprazole ordered)        Passed - No diagnosis of osteoporosis on record        Passed - Recent (12 mo) or future (30 days) visit within the authorizing provider's specialty     Patient has had an office visit with the authorizing provider or a provider within the authorizing providers department within the previous 12 mos or has a future within next 30 days. See \"Patient Info\" tab in inbasket, or \"Choose Columns\" in Meds & Orders section of the refill encounter.              Passed - Medication is active on med list        Passed - Patient is age 18 or older           Allegra Badillo RN  [facility]     "

## 2022-06-15 ENCOUNTER — TELEPHONE (OUTPATIENT)
Dept: FAMILY MEDICINE | Facility: CLINIC | Age: 62
End: 2022-06-15

## 2022-06-15 NOTE — TELEPHONE ENCOUNTER
"PA group,     Patient stated he received letter regarding victoza pens. Letter stated that patient will need providers exception or prior authorization so that patient can continue to take medication. Per letter needs before \"on or after July 1st\" or they will no longer cover.     Per pt team please call back with update and ok to leave detail vm on number ending in [phone].    Thanks,  Allegra RN  [facility]     "

## 2022-06-20 NOTE — TELEPHONE ENCOUNTER
Central Prior Authorization Team   Phone: [phone]      PA Initiation    Medication: Victoza  Insurance Company: [employer] - Phone [phone] Fax [phone]  Pharmacy Filling the Rx: [facility] - [address] [facility]  Filling Pharmacy Phone: [phone]  Filling Pharmacy Fax:    Start Date: 6/20/2022    Started PA on CMM and a response of Drug is covered by current benefit plan. No further PA activity needed.  Per insurance, medication is still showing on their covered list so a PA cannot be completed at this time.  A PA might have to be completed after July 1 when the medication shows it is no longer on the formulary.

## 2022-06-21 NOTE — TELEPHONE ENCOUNTER
I guess I need you to file this to be refilled as a prior authorization in July ? Please huddle with me on this case if necessary     Ac Frederick MD

## 2022-06-27 ENCOUNTER — TRANSFERRED RECORDS (OUTPATIENT)
Dept: HEALTH INFORMATION MANAGEMENT | Facility: CLINIC | Age: 62
End: 2022-06-27

## 2022-07-06 ENCOUNTER — TELEPHONE (OUTPATIENT)
Dept: INTERNAL MEDICINE | Facility: CLINIC | Age: 62
End: 2022-07-06

## 2022-07-06 DIAGNOSIS — E11.40 TYPE 2 DIABETES MELLITUS WITH DIABETIC NEUROPATHY, WITHOUT LONG-TERM CURRENT USE OF INSULIN (H): Chronic | ICD-10-CM

## 2022-07-06 NOTE — TELEPHONE ENCOUNTER
PA is needed for the medication, Victoza. Would you like to start PA or Rx alternative medication? If PA, please list all medications this patient has tried along with any contraindications that may have been experienced in the past. Thank you.    [facility]  Phone: [phone]    Insurance Plan: [employer]   Phone: [phone]  Pt ID: [id number]   Group:     Forwarded to CHARLES CHACKO  for review.

## 2022-07-06 NOTE — TELEPHONE ENCOUNTER
Central Prior Authorization Team   Phone: [phone]      PA NOT NEEDED    Medication: Victoza  Insurance Company: [employer] - Phone [phone] Fax [phone]  Pharmacy Filling the Rx: [facility] - [address] [facility]  Filling Pharmacy Phone: [phone]  Filling Pharmacy Fax:    Start Date: 7/6/2022    Started PA on CMM and a response of Drug is covered by current benefit plan. No further PA activity needed.  Called pharmacy, they are unable to process since they do not have any refills and insurance states a PA is not needed.

## 2022-07-07 RX ORDER — LIRAGLUTIDE 6 MG/ML
1.8 INJECTION SUBCUTANEOUS DAILY
Qty: 27 ML | Refills: 5 | Status: SHIPPED | OUTPATIENT
Start: 2022-07-07 | End: 2023-06-15

## 2022-07-13 ENCOUNTER — TRANSFERRED RECORDS (OUTPATIENT)
Dept: HEALTH INFORMATION MANAGEMENT | Facility: CLINIC | Age: 62
End: 2022-07-13

## 2022-08-08 DIAGNOSIS — G62.9 PERIPHERAL POLYNEUROPATHY: ICD-10-CM

## 2022-08-08 DIAGNOSIS — F11.90 CHRONIC, CONTINUOUS USE OF OPIOIDS: ICD-10-CM

## 2022-08-08 DIAGNOSIS — M79.2 NEUROPATHIC PAIN: ICD-10-CM

## 2022-08-09 RX ORDER — TRAMADOL HYDROCHLORIDE 50 MG/1
100 TABLET ORAL 2 TIMES DAILY
Qty: 120 TABLET | Refills: 0 | Status: SHIPPED | OUTPATIENT
Start: 2022-08-09 | End: 2022-09-06

## 2022-08-09 NOTE — TELEPHONE ENCOUNTER
Routing refill request to provider for review/approval because:  Drug not on the [facility] refill protocol     Requested Prescriptions   Pending Prescriptions Disp Refills    traMADol (ULTRAM) 50 MG tablet [Pharmacy Med Name: TRAMADOL HCL 50MG TABS] 120 tablet 5     Sig: TAKE TWO TABLETS BY MOUTH TWICE A DAY AS NEEDED FOR SEVERE PAIN, 28 DAYS OR MORE BETWEEN REFILLS,        There is no refill protocol information for this order            Ameena Brewster RN   [facility]

## 2022-08-28 ENCOUNTER — HEALTH MAINTENANCE LETTER (OUTPATIENT)
Age: 62
End: 2022-08-28

## 2022-09-05 DIAGNOSIS — F11.90 CHRONIC, CONTINUOUS USE OF OPIOIDS: ICD-10-CM

## 2022-09-05 DIAGNOSIS — G62.9 PERIPHERAL POLYNEUROPATHY: ICD-10-CM

## 2022-09-05 DIAGNOSIS — M79.2 NEUROPATHIC PAIN: ICD-10-CM

## 2022-09-06 RX ORDER — TRAMADOL HYDROCHLORIDE 50 MG/1
TABLET ORAL
Qty: 120 TABLET | Refills: 0 | Status: SHIPPED | OUTPATIENT
Start: 2022-09-06 | End: 2022-09-25

## 2022-09-12 DIAGNOSIS — E11.40 TYPE 2 DIABETES MELLITUS WITH DIABETIC NEUROPATHY, WITHOUT LONG-TERM CURRENT USE OF INSULIN (H): Chronic | ICD-10-CM

## 2022-09-12 DIAGNOSIS — E03.9 HYPOTHYROIDISM, UNSPECIFIED TYPE: ICD-10-CM

## 2022-09-13 RX ORDER — PREGABALIN 300 MG/1
300 CAPSULE ORAL 2 TIMES DAILY
Qty: 60 CAPSULE | Refills: 5 | Status: SHIPPED | OUTPATIENT
Start: 2022-09-13 | End: 2023-03-13

## 2022-09-13 RX ORDER — LEVOTHYROXINE SODIUM 88 UG/1
88 TABLET ORAL DAILY
Qty: 60 TABLET | Refills: 1 | Status: SHIPPED | OUTPATIENT
Start: 2022-09-13 | End: 2022-09-25

## 2022-09-13 NOTE — TELEPHONE ENCOUNTER
No further refills until appointment  . Patient needs follow up for his diabetes mellitus     Ac Frederick MD

## 2022-09-19 ENCOUNTER — TELEPHONE (OUTPATIENT)
Dept: FAMILY MEDICINE | Facility: CLINIC | Age: 62
End: 2022-09-19

## 2022-09-19 NOTE — TELEPHONE ENCOUNTER
Called patient and left message to call back ans schedule appointment.       Isidra BERNAL CMA ([facility])

## 2022-09-19 NOTE — TELEPHONE ENCOUNTER
Reason for Call:  Same Day Appointment, Requested Provider:  Ac Frederick MD    PCP: Ac Frederick    Reason for visit: Med Check (in person or virtual)    Additional comments: Patient is hoping to get worked in with MD Otoniel on 9/26 as Monday's work better for him. He was given a 30 day refill but would really prefer seeing his PCP rather than another physician. Please leave detailed message if patient doesn't answer.     Can we leave a detailed message on this number? YES    Phone number patient can be reached at: Home number on file [phone] (home)    Best Time: any    Call taken on 9/19/2022 at 11:01 AM by Mata Goldsmith

## 2022-09-21 NOTE — TELEPHONE ENCOUNTER
2nd message left for patient to return call.  Okay to take a hold spot on Monday, September 26th.  Asked patient to call back as soon as possible to schedule.  [place],

## 2022-09-26 ENCOUNTER — VIRTUAL VISIT (OUTPATIENT)
Dept: INTERNAL MEDICINE | Facility: CLINIC | Age: 62
End: 2022-09-26
Payer: COMMERCIAL

## 2022-09-26 DIAGNOSIS — I10 ESSENTIAL HYPERTENSION WITH GOAL BLOOD PRESSURE LESS THAN 140/90: ICD-10-CM

## 2022-09-26 DIAGNOSIS — K30 GASTROINTESTINAL DISCOMFORT: ICD-10-CM

## 2022-09-26 DIAGNOSIS — I48.19 PERSISTENT ATRIAL FIBRILLATION (H): ICD-10-CM

## 2022-09-26 DIAGNOSIS — E11.40 TYPE 2 DIABETES MELLITUS WITH DIABETIC NEUROPATHY, WITHOUT LONG-TERM CURRENT USE OF INSULIN (H): Primary | ICD-10-CM

## 2022-09-26 DIAGNOSIS — E03.9 HYPOTHYROIDISM, UNSPECIFIED TYPE: ICD-10-CM

## 2022-09-26 DIAGNOSIS — F11.90 CHRONIC, CONTINUOUS USE OF OPIOIDS: ICD-10-CM

## 2022-09-26 DIAGNOSIS — M79.2 NEUROPATHIC PAIN: ICD-10-CM

## 2022-09-26 DIAGNOSIS — G62.9 PERIPHERAL POLYNEUROPATHY: ICD-10-CM

## 2022-09-26 PROCEDURE — 99207 PR FOOT EXAM NO CHARGE: CPT | Performed by: INTERNAL MEDICINE

## 2022-09-26 PROCEDURE — 99214 OFFICE O/P EST MOD 30 MIN: CPT | Mod: TEL | Performed by: INTERNAL MEDICINE

## 2022-09-26 RX ORDER — LOSARTAN POTASSIUM 25 MG/1
25 TABLET ORAL DAILY
Qty: 90 TABLET | Refills: 1 | Status: SHIPPED | OUTPATIENT
Start: 2022-09-26 | End: 2022-12-05

## 2022-09-26 RX ORDER — DULOXETIN HYDROCHLORIDE 60 MG/1
60 CAPSULE, DELAYED RELEASE ORAL DAILY
Qty: 90 CAPSULE | Refills: 1 | Status: SHIPPED | OUTPATIENT
Start: 2022-09-26 | End: 2023-05-23

## 2022-09-26 RX ORDER — METOPROLOL SUCCINATE 50 MG/1
100 TABLET, EXTENDED RELEASE ORAL DAILY
Qty: 90 TABLET | Refills: 0
Start: 2022-09-26

## 2022-09-26 RX ORDER — LEVOTHYROXINE SODIUM 88 UG/1
88 TABLET ORAL DAILY
Qty: 90 TABLET | Refills: 1 | Status: SHIPPED | OUTPATIENT
Start: 2022-09-26 | End: 2023-05-09

## 2022-09-26 RX ORDER — PANTOPRAZOLE SODIUM 40 MG/1
40 TABLET, DELAYED RELEASE ORAL DAILY
Qty: 90 TABLET | Refills: 1 | Status: SHIPPED | OUTPATIENT
Start: 2022-09-26 | End: 2023-05-23

## 2022-09-26 RX ORDER — TRAMADOL HYDROCHLORIDE 50 MG/1
TABLET ORAL
Qty: 120 TABLET | Refills: 5 | Status: SHIPPED | OUTPATIENT
Start: 2022-09-26 | End: 2022-10-10

## 2022-09-26 NOTE — PROGRESS NOTES
John is a 61 year old who is being evaluated via a billable telephone visit.      What phone number would you like to be contacted at? [phone]  How would you like to obtain your AVS? Edu    Assessment & Plan     Type 2 diabetes mellitus with diabetic neuropathy, without long-term current use of insulin (H)  I am chatting with this patient for a 6 month telephone MD visit. I warned him this isn't satisfactory for chronic pain follow up appointments and the next 6 month appointment has to be a face to face encounter . He is due for the laboratory studies and orders placed . He will come around in 1-2 weeks for a laboratory only appointment   - Basic metabolic panel  - Hemoglobin A1c  - FOOT EXAM  - DULoxetine (CYMBALTA) 60 MG capsule; Take 1 capsule (60 mg) by mouth daily  - losartan (COZAAR) 25 MG tablet; Take 1 tablet (25 mg) by mouth daily  - metFORMIN (GLUCOPHAGE) 500 MG tablet; TAKE TWO TABLETS BY MOUTH TWICE A DAY    Hypothyroidism, unspecified type  Due for recheck , as detailed above   - levothyroxine (SYNTHROID/LEVOTHROID) 88 MCG tablet; Take 1 tablet (88 mcg) by mouth daily  - TSH with free T4 reflex    Essential hypertension with goal blood pressure less than 140/90  Approved controlled within acceptable limits, see office visit notes with [facility] cardiologist   - losartan (COZAAR) 25 MG tablet; Take 1 tablet (25 mg) by mouth daily  - metoprolol succinate ER (TOPROL XL) 50 MG 24 hr tablet; Take 2 tablets (100 mg) by mouth daily    Gastrointestinal discomfort  Continue current plan of care    - pantoprazole (PROTONIX) 40 MG EC tablet; Take 1 tablet (40 mg) by mouth daily    Neuropathic pain  Continues with tramadol (Ultram)     Peripheral polyneuropathy  Continues with tramadol (Ultram)     Chronic, continuous use of opioids  Refills provided   - traMADol (ULTRAM) 50 MG tablet; TAKE TWO TABLETS BY MOUTH TWICE A DAY       Persistent atrial fibrillation (H)  See office visit notes with [facility] cardiologist Dr. Armendariz  - metoprolol succinate ER (TOPROL XL) 50 MG 24 hr tablet; Take 2 tablets (100 mg) by mouth daily    Review of the result(s) of each unique test - see todays tests  Prescription drug management  32 minutes spent on the date of the encounter doing chart review, history and exam, documentation and further activities per the note       Nicotine/Tobacco Cessation:  He reports that he has never smoked. His smokeless tobacco use includes snuff.          Return in about 6 months (around 3/26/2023).    Ac Frederick MD  [facility] TERI Lopez is a 61 year oldpresenting for the following health issues:  Recheck Medication    Encounter Diagnoses   Name Primary?     Type 2 diabetes mellitus with diabetic neuropathy, without long-term current use of insulin (H) Yes     Hypothyroidism, unspecified type      Essential hypertension with goal blood pressure less than 140/90      Gastrointestinal discomfort      Neuropathic pain      Peripheral polyneuropathy      Chronic, continuous use of opioids      Persistent atrial fibrillation (H)         Last appointment with me was 2/2022     Main goal for today's appointment has to be medication reconciliation   4:28 PM     HPI       Review of Systems   Constitutional, HEENT, cardiovascular, pulmonary, gi and gu systems are negative, except as otherwise noted.      Objective           Vitals:  No vitals were obtained today due to virtual visit.    Physical Exam   healthy, alert and no distress  PSYCH: Alert and oriented times 3; coherent speech, normal   rate and volume, able to articulate logical thoughts, able   to abstract reason, no tangential thoughts, no hallucinations   or delusions  His affect is normal  RESP: No cough, no audible wheezing, able to talk in full sentences  Remainder of exam unable to be completed due to telephone visits    Orders Placed This Encounter   Procedures     FOOT EXAM     Basic metabolic  panel     Hemoglobin A1c     TSH with free T4 reflex           Phone call duration: 32  minutes

## 2022-10-06 LAB — HBA1C MFR BLD: 5.8 % (ref 0–5.6)

## 2022-10-06 PROCEDURE — 36415 COLL VENOUS BLD VENIPUNCTURE: CPT | Performed by: INTERNAL MEDICINE

## 2022-10-06 PROCEDURE — 84443 ASSAY THYROID STIM HORMONE: CPT | Performed by: INTERNAL MEDICINE

## 2022-10-06 PROCEDURE — 80048 BASIC METABOLIC PNL TOTAL CA: CPT | Performed by: INTERNAL MEDICINE

## 2022-10-06 PROCEDURE — 83036 HEMOGLOBIN GLYCOSYLATED A1C: CPT | Performed by: INTERNAL MEDICINE

## 2022-10-07 LAB
ANION GAP SERPL CALCULATED.3IONS-SCNC: 6 MMOL/L (ref 3–14)
BUN SERPL-MCNC: 20 MG/DL (ref 7–30)
CALCIUM SERPL-MCNC: 9.5 MG/DL (ref 8.5–10.1)
CHLORIDE BLD-SCNC: 104 MMOL/L (ref 94–109)
CO2 SERPL-SCNC: 29 MMOL/L (ref 20–32)
CREAT SERPL-MCNC: 0.83 MG/DL (ref 0.66–1.25)
GFR SERPL CREATININE-BSD FRML MDRD: >90 ML/MIN/1.73M2
GLUCOSE BLD-MCNC: 88 MG/DL (ref 70–99)
POTASSIUM BLD-SCNC: 4.6 MMOL/L (ref 3.4–5.3)
SODIUM SERPL-SCNC: 139 MMOL/L (ref 133–144)
TSH SERPL DL<=0.005 MIU/L-ACNC: 1.11 MU/L (ref 0.4–4)

## 2022-10-10 DIAGNOSIS — F11.90 CHRONIC, CONTINUOUS USE OF OPIOIDS: ICD-10-CM

## 2022-10-10 RX ORDER — TRAMADOL HYDROCHLORIDE 50 MG/1
TABLET ORAL
Qty: 120 TABLET | Refills: 0 | Status: SHIPPED | OUTPATIENT
Start: 2022-10-10 | End: 2022-11-07

## 2022-11-07 DIAGNOSIS — F11.90 CHRONIC, CONTINUOUS USE OF OPIOIDS: ICD-10-CM

## 2022-11-07 RX ORDER — TRAMADOL HYDROCHLORIDE 50 MG/1
TABLET ORAL
Qty: 120 TABLET | Refills: 0 | Status: SHIPPED | OUTPATIENT
Start: 2022-11-07 | End: 2022-12-07

## 2022-11-28 ASSESSMENT — PATIENT HEALTH QUESTIONNAIRE - PHQ9
SUM OF ALL RESPONSES TO PHQ QUESTIONS 1-9: 14
10. IF YOU CHECKED OFF ANY PROBLEMS, HOW DIFFICULT HAVE THESE PROBLEMS MADE IT FOR YOU TO DO YOUR WORK, TAKE CARE OF THINGS AT HOME, OR GET ALONG WITH OTHER PEOPLE: SOMEWHAT DIFFICULT
SUM OF ALL RESPONSES TO PHQ QUESTIONS 1-9: 14

## 2022-12-05 ENCOUNTER — OFFICE VISIT (OUTPATIENT)
Dept: FAMILY MEDICINE | Facility: CLINIC | Age: 62
End: 2022-12-05
Payer: COMMERCIAL

## 2022-12-05 VITALS
HEART RATE: 85 BPM | RESPIRATION RATE: 18 BRPM | OXYGEN SATURATION: 96 % | BODY MASS INDEX: 31.87 KG/M2 | TEMPERATURE: 97.3 F | SYSTOLIC BLOOD PRESSURE: 100 MMHG | WEIGHT: 255 LBS | DIASTOLIC BLOOD PRESSURE: 64 MMHG

## 2022-12-05 DIAGNOSIS — I48.19 PERSISTENT ATRIAL FIBRILLATION (H): ICD-10-CM

## 2022-12-05 DIAGNOSIS — Z79.01 LONG TERM CURRENT USE OF ANTICOAGULANT THERAPY: ICD-10-CM

## 2022-12-05 DIAGNOSIS — Z01.818 PREOP GENERAL PHYSICAL EXAM: Primary | ICD-10-CM

## 2022-12-05 DIAGNOSIS — I42.8 NONISCHEMIC CARDIOMYOPATHY (H): ICD-10-CM

## 2022-12-05 DIAGNOSIS — F11.90 CHRONIC, CONTINUOUS USE OF OPIOIDS: ICD-10-CM

## 2022-12-05 DIAGNOSIS — E11.40 TYPE 2 DIABETES MELLITUS WITH DIABETIC NEUROPATHY, WITHOUT LONG-TERM CURRENT USE OF INSULIN (H): ICD-10-CM

## 2022-12-05 DIAGNOSIS — E66.01 MORBID OBESITY (H): ICD-10-CM

## 2022-12-05 PROCEDURE — 99214 OFFICE O/P EST MOD 30 MIN: CPT | Performed by: FAMILY MEDICINE

## 2022-12-05 ASSESSMENT — ANXIETY QUESTIONNAIRES
7. FEELING AFRAID AS IF SOMETHING AWFUL MIGHT HAPPEN: NOT AT ALL
3. WORRYING TOO MUCH ABOUT DIFFERENT THINGS: NOT AT ALL
5. BEING SO RESTLESS THAT IT IS HARD TO SIT STILL: NOT AT ALL
7. FEELING AFRAID AS IF SOMETHING AWFUL MIGHT HAPPEN: NOT AT ALL
6. BECOMING EASILY ANNOYED OR IRRITABLE: SEVERAL DAYS
8. IF YOU CHECKED OFF ANY PROBLEMS, HOW DIFFICULT HAVE THESE MADE IT FOR YOU TO DO YOUR WORK, TAKE CARE OF THINGS AT HOME, OR GET ALONG WITH OTHER PEOPLE?: SOMEWHAT DIFFICULT
2. NOT BEING ABLE TO STOP OR CONTROL WORRYING: SEVERAL DAYS
4. TROUBLE RELAXING: MORE THAN HALF THE DAYS
6. BECOMING EASILY ANNOYED OR IRRITABLE: SEVERAL DAYS
1. FEELING NERVOUS, ANXIOUS, OR ON EDGE: SEVERAL DAYS
1. FEELING NERVOUS, ANXIOUS, OR ON EDGE: SEVERAL DAYS
2. NOT BEING ABLE TO STOP OR CONTROL WORRYING: SEVERAL DAYS
IF YOU CHECKED OFF ANY PROBLEMS ON THIS QUESTIONNAIRE, HOW DIFFICULT HAVE THESE PROBLEMS MADE IT FOR YOU TO DO YOUR WORK, TAKE CARE OF THINGS AT HOME, OR GET ALONG WITH OTHER PEOPLE: SOMEWHAT DIFFICULT
GAD7 TOTAL SCORE: 5
IF YOU CHECKED OFF ANY PROBLEMS ON THIS QUESTIONNAIRE, HOW DIFFICULT HAVE THESE PROBLEMS MADE IT FOR YOU TO DO YOUR WORK, TAKE CARE OF THINGS AT HOME, OR GET ALONG WITH OTHER PEOPLE: SOMEWHAT DIFFICULT
GAD7 TOTAL SCORE: 5
5. BEING SO RESTLESS THAT IT IS HARD TO SIT STILL: NOT AT ALL
GAD7 TOTAL SCORE: 4
7. FEELING AFRAID AS IF SOMETHING AWFUL MIGHT HAPPEN: NOT AT ALL
3. WORRYING TOO MUCH ABOUT DIFFERENT THINGS: NOT AT ALL

## 2022-12-05 ASSESSMENT — PAIN SCALES - GENERAL: PAINLEVEL: SEVERE PAIN (7)

## 2022-12-05 ASSESSMENT — PATIENT HEALTH QUESTIONNAIRE - PHQ9: 5. POOR APPETITE OR OVEREATING: SEVERAL DAYS

## 2022-12-05 NOTE — PATIENT INSTRUCTIONS
Preparing for Your Surgery  Getting started  A nurse will call you to review your health history and instructions. They will give you an arrival time based on your scheduled surgery time. Please be ready to share:    Your doctor's clinic name and phone number    Your medical, surgical, and anesthesia history    A list of allergies and sensitivities    A list of medicines, including herbal treatments and over-the-counter drugs    Whether the patient has a legal guardian (ask how to send us the papers in advance)  Please tell us if you're pregnant--or if there's any chance you might be pregnant. Some surgeries may injure a fetus (unborn baby), so they require a pregnancy test. Surgeries that are safe for a fetus don't always need a test, and you can choose whether to have one.   If you have a child who's having surgery, please ask for a copy of Preparing for Your Child's Surgery.    Preparing for surgery    Within 10 to 30 days of surgery: Have a pre-op exam (sometimes called an H&P, or History and Physical). This can be done at a clinic or pre-operative center.  ? If you're having a , you may not need this exam. Talk to your care team.    At your pre-op exam, talk to your care team about all medicines you take. If you need to stop any medicines before surgery, ask when to start taking them again.  ? We do this for your safety. Many medicines can make you bleed too much during surgery. Some change how well surgery (anesthesia) drugs work.    Call your insurance company to let them know you're having surgery. (If you don't have insurance, call [phone].)    Call your clinic if there's any change in your health. This includes signs of a cold or flu (sore throat, runny nose, cough, rash, fever). It also includes a scrape or scratch near the surgery site.    If you have questions on the day of surgery, call your hospital or surgery center.  COVID testing  You may need to be tested for COVID-19 before having  surgery. If so, we will give you instructions (or click here).  Eating and drinking guidelines  For your safety: Unless your surgeon tells you otherwise, follow the guidelines below.    Eat and drink as usual until 8 hours before you arrive for surgery. After that, no food or milk.    Drink clear liquids until 2 hours before you arrive. These are liquids you can see through, like water, Gatorade, and Propel Water. They also include plain black coffee and tea (no cream or milk), candy, and breath mints. You can spit out gum when you arrive.    If you drink alcohol: Stop drinking it the night before surgery.    If your care team tells you to take medicine on the morning of surgery, it's okay to take it with a sip of water.  Preventing infection    Shower or bathe the night before and morning of your surgery. Follow the instructions your clinic gave you. (If no instructions, use regular soap.)    Don't shave or clip hair near your surgery site. We'll remove the hair if needed.    Don't smoke or vape the morning of surgery. You may chew nicotine gum up to 2 hours before surgery. A nicotine patch is okay.  ? Note: Some surgeries require you to completely quit smoking and nicotine. Check with your surgeon.    Your care team will make every effort to keep you safe from infection. We will:  ? Clean our hands often with soap and water (or an alcohol-based hand rub).  ? Clean the skin at your surgery site with a special soap that kills germs.  ? Give you a special gown to keep you warm. (Cold raises the risk of infection.)  ? Wear special hair covers, masks, gowns and gloves during surgery.  ? Give antibiotic medicine, if prescribed. Not all surgeries need antibiotics.  What to bring on the day of surgery    Photo ID and insurance card    Copy of your health care directive, if you have one    Glasses and hearing aids (bring cases)  ? You can't wear contacts during surgery    Inhaler and eye drops, if you use them (tell us  about these when you arrive)    CPAP machine or breathing device, if you use them    A few personal items, if spending the night    If you have . . .  ? A pacemaker, ICD (cardiac defibrillator) or other implant: Bring the ID card.  ? An implanted stimulator: Bring the remote control.  ? A legal guardian: Bring a copy of the certified (court-stamped) guardianship papers.  Please remove any jewelry, including body piercings. Leave jewelry and other valuables at home.  If you're going home the day of surgery    You must have a responsible adult drive you home. They should stay with you overnight as well.    If you don't have someone to stay with you, and you aren't safe to go home alone, we may keep you overnight. Insurance often won't pay for this.  After surgery  If it's hard to control your pain or you need more pain medicine, please call your surgeon's office.  Questions?   If you have any questions for your care team, list them here: _________________________________________________________________________________________________________________________________________________________________________ ____________________________________ ____________________________________ ____________________________________  For informational purposes only. Not to replace the advice of your health care provider. Copyright   2003, 2019 [facility]. All rights reserved. Clinically reviewed by Trupti Pham MD. [employer] [id number] - REV 10/22.

## 2022-12-05 NOTE — PROGRESS NOTES
[facility]  [address] [address] [place] [address]  Phone: [phone]  Primary Provider: Ac Frederick  Pre-op Performing Provider: DAMARIS CARLSON    [id number]}  PREOPERATIVE EVALUATION:  Today's date: 12/5/2022    Rhett Elizabeth is a 62 year old male who presents for a preoperative evaluation.    Surgical Information:  Surgery/Procedure: HC Coronary Angiography/Poss.PCI  Surgery Location: [facility]  Surgeon:    Abdirashid Rehman MD       Surgery Date: 12/7/22  Time of Surgery: 8 AM  Where patient plans to recover: At home with family  Fax number for surgical facility: Note does not need to be faxed, will be available electronically in Epic.    Type of Anesthesia Anticipated: General    Assessment & Plan     The proposed surgical procedure is considered INTERMEDIATE risk.    Preop general physical exam      Nonischemic cardiomyopathy (H)      Persistent atrial fibrillation (H) on Long term current use of anticoagulant therapy  - On Xarelto    Type 2 diabetes mellitus with diabetic neuropathy, without long-term current use of insulin (H), controlled  - Recent A1c less than 2 months ago was 5.8    Morbid obesity (H)         Implanted Device:   - For full details on implanted device, refer to device management note in Epic from date: 3/1/2018- Bi-ventricular ICD      Risks and Recommendations:  The patient has the following additional risks and recommendations for perioperative complications:   - Consult Hospitalist / IM to assist with post-op medical management    Medication Instructions:   - apixaban (Eliquis), edoxaban (Savaysa), rivaroxaban (Xarelto): Bleeding risk is low for this procedure AND CrCl (>=) 50 mL/min. HOLD 1 day before surgery.     - ACE/ARB: HOLD due to exceptional risk of hypotension during surgery.    - Beta Blockers: Continue taking on the day of surgery.   - Diuretics: HOLD on the day of surgery.   - Statins:  Continue taking on the day of surgery.    - metformin: HOLD day of surgery.   - GLP-1 Injectable (exenitide, liraglutide, semaglutide, dulaglutide, etc.): HOLD day of surgery     RECOMMENDATION:  APPROVAL GIVEN to proceed with proposed procedure, without further diagnostic evaluation.      Subjective     HPI related to upcoming procedure:     62 year old pleasant male here for a Pre-op exam.  States that he has an upcoming scheduled Coronary Angiography/Poss PCI on 12/7 following a positive Nuclear stress test that was done on 11/15/2022.   He states that he understands the risks and benefits of the procedure and wishes to proceed.    He has a known history of a nonischemic cardiomyopathy, paroxysmal atrial fibrillation/atrial flutter s/p flutter ablation in 11/22/2021, T2 DM, hypertension. LANEY, and hyperlipidemia. He underwent a Bi-V ICD in 3/1/18 - currently following with Cardiology at [facility].     He denies having any new concerns today. No recent illnesses.         Preop Questions 11/28/2022   1. Have you ever had a heart attack or stroke? YES    2. Have you ever had surgery on your heart or blood vessels, such as a stent placement, a coronary artery bypass, or surgery on an artery in your head, neck, heart, or legs? YES    3. Do you have chest pain with activity? No   4. Do you have a history of  heart failure? YES   5. Do you currently have a cold, bronchitis or symptoms of other infection? No   6. Do you have a cough, shortness of breath, or wheezing? No   7. Do you or anyone in your family have previous history of blood clots? No   8. Do you or does anyone in your family have a serious bleeding problem such as prolonged bleeding following surgeries or cuts? No   9. Have you ever had problems with anemia or been told to take iron pills? No   10. Have you had any abnormal blood loss such as black, tarry or bloody stools? No   11. Have you ever had a blood transfusion? No   12. Are you willing to have a  blood transfusion if it is medically needed before, during, or after your surgery? Yes   13. Have you or any of your relatives ever had problems with anesthesia? No   14. Do you have sleep apnea, excessive snoring or daytime drowsiness? YES    14a. Do you have a CPAP machine? Yes   15. Do you have any artifical heart valves or other implanted medical devices like a pacemaker, defibrillator, or continuous glucose monitor? YES    15a. What type of device do you have? Pace maker/ defibrillator   15b. Name of the clinic that manages your device:  [facility]   16. Do you have artificial joints? No   17. Are you allergic to latex? No       Health Care Directive:  Patient does not have a Health Care Directive or Living Will: Patient states has Advance Directive and will bring in a copy to clinic.    Preoperative Review of :   reviewed - no record of controlled substances prescribed.      Status of Chronic Conditions:  See problem list for active medical problems.  Problems all longstanding and stable, except as noted/documented.  See ROS for pertinent symptoms related to these conditions.      Review of Systems  Constitutional, neuro, ENT, endocrine, pulmonary, cardiac, gastrointestinal, genitourinary, musculoskeletal, integument and psychiatric systems are negative, except as otherwise noted.    Patient Active Problem List    Diagnosis Date Noted     Long term current use of anticoagulant therapy 12/05/2022     Priority: Medium     Chronic, continuous use of opioids 07/14/2021     Priority: Medium     Peripheral polyneuropathy 01/20/2020     Priority: Medium     Personal history of urethral stricture 05/23/2019     Priority: Medium     See older medical records scanned into Epic electronic medical records  From [facility] . Cystoscopy  From 9-2-2010 Dr. Bill Bennett MD       Hyperlipidemia LDL goal <100 05/16/2019     Priority: Medium     Acquired hypothyroidism 11/19/2018      Priority: Medium     Recent bereavement 06/03/2018     Priority: Medium     S/P ICD (internal cardiac defibrillator) procedure 03/21/2018     Priority: Medium     Biventricular ICD implantation on 3.1.18 at [facility].       Morbid obesity (H) 02/09/2018     Priority: Medium     Drusen of macula, left 09/08/2017     Priority: Medium     Dry eyes 09/08/2017     Priority: Medium     Chronic systolic congestive heart failure (H) 05/07/2017     Priority: Medium     ICD and Bi-V. 3/1/2018    MRI completed on 1/17/2018 revealed an EF 21% with an enlarged LV as well as biatrial enlargement. Pulmonary congestion also noted.     Per 1/2017 outside ECHO (see CareEverywhere).  Summary of report:   1. Moderately increased left ventricular size.   2. Severe global hypokinesis.   3. LV poorly seen despite use of contrast. EF appears severely reduced, 25-30%.   4. Moderately dilated left atrium.   5. Moderately dilated right atrium.   6. No hemodynamically significant valvular disease.   7. No prior echo for comparison.       Neuropathic pain 01/15/2017     Priority: Medium     Essential hypertension with goal blood pressure less than 140/90 01/15/2017     Priority: Medium     Persistent atrial fibrillation (H) 01/15/2017     Priority: Medium     LANEY (obstructive sleep apnea) 01/15/2017     Priority: Medium     1996(289#)-AHI/RDI 65, CPAP 10 cm/H20  2/25.2016(369#)-AHI 36, RDI 37, lowest oxygen saturation was 88%. Tco2 40-45, no effective pressure found.          Type 2 diabetes mellitus with diabetic neuropathy, without long-term current use of insulin (H) 01/15/2017     Priority: Medium      Past Medical History:   Diagnosis Date     Diabetes (H)      Hypertension      Macular degeneration      Type I or II open fracture of distal end of right tibia with routine healing, unspecified fracture morphology, subsequent encounter 1/15/2017     Past Surgical History:   Procedure Laterality Date     EP ABLATION ATRIAL FLUTTER       11/2021     PACEMAKER/ICD CHECK      3/2018     right ankle surgery      injury repair     STRABISMUS SURGERY       Current Outpatient Medications   Medication Sig Dispense Refill     aspirin 81 MG tablet Take by mouth daily Reported on 5/5/2017       blood glucose (ONETOUCH VERIO IQ) test strip Use to test blood sugar 3 times daily or as directed.  Ok to substitute alternative if insurance prefers. 300 strip 1     blood glucose monitoring (ONE TOUCH ULTRA 2) meter device kit Use to test blood sugar 3 times daily or as directed. 1 kit 0     Cholecalciferol (VITAMIN D-3 PO) Take 1,000 Units by mouth daily       DULoxetine (CYMBALTA) 60 MG capsule Take 1 capsule (60 mg) by mouth daily 90 capsule 1     empagliflozin (JARDIANCE) 10 MG TABS tablet Take 10 mg by mouth daily       insulin pen needle (BD ARNOLD U/F) 32G X 4 MM miscellaneous Use 2 daily as directed. 200 each 1     levothyroxine (SYNTHROID/LEVOTHROID) 88 MCG tablet Take 1 tablet (88 mcg) by mouth daily 90 tablet 1     liraglutide (VICTOZA PEN) 18 MG/3ML solution Inject 1.8 mg Subcutaneous daily 27 mL 5     Lutein 20 MG CAPS Take 20 mg by mouth daily 8/11/2017- patient states he takes 1 20 mg tablet daily  Patient states he takes 150 mg tablet twice a day.       metFORMIN (GLUCOPHAGE) 500 MG tablet TAKE TWO TABLETS BY MOUTH TWICE A  tablet 1     metoprolol succinate ER (TOPROL XL) 50 MG 24 hr tablet Take 2 tablets (100 mg) by mouth daily 90 tablet 0     Multiple Vitamin (MULTIVITAMINS PO) Reported on 5/22/2017       Nutritional Supplements (ENSURE COMPLETE PO)        Omega-3 Fatty Acids (OMEGA-3 FISH OIL PO) Take 1 g by mouth Reported on 5/5/2017       OneTouch Delica Lancets 33G MISC 1 Box 3 times daily 300 each 1     pantoprazole (PROTONIX) 40 MG EC tablet Take 1 tablet (40 mg) by mouth daily 90 tablet 1     pregabalin (LYRICA) 300 MG capsule TAKE 1 CAPSULE (300 MG) BY MOUTH 2 TIMES DAILY 60 capsule 5     sacubitril-valsartan (ENTRESTO) 24-26 MG  "per tablet Take 1 tablet by mouth daily       simvastatin (ZOCOR) 20 MG tablet TAKE 1 TABLET (20 MG) BY MOUTH AT BEDTIME , please make appointment within 30 days 30 tablet 1     spironolactone (ALDACTONE) 25 MG tablet Take 1 tablet (25 mg) by mouth daily 90 tablet 1     UNABLE TO FIND cpap       XARELTO ANTICOAGULANT 20 MG TABS tablet TAKE ONE TABLET BY MOUTH ONCE DAILY WITH DINNER 90 tablet 0     traMADol (ULTRAM) 50 MG tablet TAKE TWO TABLETS BY MOUTH TWICE A  tablet 2       Allergies   Allergen Reactions     Amlodipine Difficulty breathing and Other (See Comments)     Other reaction(s): Other  \"legs swell\"  Swelling of the lips and tongue  \"legs swell\"  swelling       Ace Inhibitors Cough     Other reaction(s): Cough        Social History     Tobacco Use     Smoking status: Never     Smokeless tobacco: Current     Types: Chew     Tobacco comments:     Chew tobacco   Substance Use Topics     Alcohol use: Yes     Comment: occ     Family History   Problem Relation Age of Onset     Macular Degeneration Mother      Dementia Mother      Prostate Cancer Father      Cancer Daughter      Glaucoma No family hx of      Bleeding Disorder No family hx of      Anesthesia Reaction No family hx of      History   Drug Use No         Objective     /64   Pulse 85   Temp 97.3  F (36.3  C) (Tympanic)   Resp 18   Wt 115.7 kg (255 lb)   SpO2 96%   BMI 31.87 kg/m      Physical Exam    GENERAL APPEARANCE: healthy, alert and no distress     EYES: EOMI,  PERRL     HENT: ear canals and TM's normal and nose and mouth without ulcers or lesions     NECK: no adenopathy, no asymmetry, masses, or scars and thyroid normal to palpation     RESP: lungs clear to auscultation - no rales, rhonchi or wheezes     CV: regular rates and rhythm, normal S1 S2, no S3 or S4 and no murmur, click or rub     ABDOMEN:  soft, nontender, no HSM or masses and bowel sounds normal     MS: extremities normal- no gross deformities noted, no evidence of " inflammation in joints, FROM in all extremities.     SKIN: no suspicious lesions or rashes     NEURO: Normal strength and tone, sensory exam grossly normal, mentation intact and speech normal     PSYCH: mentation appears normal. and affect normal/bright     LYMPHATICS: No cervical adenopathy    Recent Labs   Lab Test 10/06/22  1517 02/14/22  1113 05/17/21  1403 01/04/21  1115   HGB  --  14.9  --  14.7   PLT  --  168  --  191    138   < > 134   POTASSIUM 4.6 4.3   < > 4.4   CR 0.83 0.90   < > 1.00   A1C 5.8* 5.6   < > 8.5*    < > = values in this interval not displayed.        Diagnostics:  No labs were ordered during this visit.  Recent labs completed on 11/28/2022.    No EKG this visit, completed in the last 90 days.    Revised Cardiac Risk Index (RCRI):  The patient has the following serious cardiovascular risks for perioperative complications:   - Coronary Artery Disease (MI, positive stress test, angina, Qs on EKG) = 1 point     RCRI Interpretation: 1 point: Class II (low risk - 0.9% complication rate)           Signed Electronically by: Ann-Marie Coronado MD  Copy of this evaluation report is provided to requesting physician.

## 2022-12-07 RX ORDER — TRAMADOL HYDROCHLORIDE 50 MG/1
TABLET ORAL
Qty: 120 TABLET | Refills: 2 | Status: SHIPPED | OUTPATIENT
Start: 2022-12-07 | End: 2023-03-06

## 2023-01-14 ENCOUNTER — HEALTH MAINTENANCE LETTER (OUTPATIENT)
Age: 63
End: 2023-01-14

## 2023-03-06 DIAGNOSIS — F11.90 CHRONIC, CONTINUOUS USE OF OPIOIDS: ICD-10-CM

## 2023-03-06 RX ORDER — TRAMADOL HYDROCHLORIDE 50 MG/1
TABLET ORAL
Qty: 120 TABLET | Refills: 2 | Status: SHIPPED | OUTPATIENT
Start: 2023-03-06 | End: 2023-06-12

## 2023-03-13 DIAGNOSIS — E11.40 TYPE 2 DIABETES MELLITUS WITH DIABETIC NEUROPATHY, WITHOUT LONG-TERM CURRENT USE OF INSULIN (H): Chronic | ICD-10-CM

## 2023-03-13 RX ORDER — PREGABALIN 300 MG/1
1 CAPSULE ORAL 2 TIMES DAILY
Qty: 60 CAPSULE | Refills: 1 | Status: SHIPPED | OUTPATIENT
Start: 2023-03-13 | End: 2023-05-15

## 2023-04-17 DIAGNOSIS — E11.40 TYPE 2 DIABETES MELLITUS WITH DIABETIC NEUROPATHY, WITHOUT LONG-TERM CURRENT USE OF INSULIN (H): ICD-10-CM

## 2023-04-23 ENCOUNTER — HEALTH MAINTENANCE LETTER (OUTPATIENT)
Age: 63
End: 2023-04-23

## 2023-05-08 DIAGNOSIS — E03.9 HYPOTHYROIDISM, UNSPECIFIED TYPE: ICD-10-CM

## 2023-05-09 RX ORDER — LEVOTHYROXINE SODIUM 88 UG/1
TABLET ORAL
Qty: 90 TABLET | Refills: 0 | Status: SHIPPED | OUTPATIENT
Start: 2023-05-09 | End: 2023-06-15

## 2023-05-15 DIAGNOSIS — E11.40 TYPE 2 DIABETES MELLITUS WITH DIABETIC NEUROPATHY, WITHOUT LONG-TERM CURRENT USE OF INSULIN (H): Chronic | ICD-10-CM

## 2023-05-15 RX ORDER — PREGABALIN 300 MG/1
CAPSULE ORAL
Qty: 60 CAPSULE | Refills: 1 | Status: SHIPPED | OUTPATIENT
Start: 2023-05-15 | End: 2023-06-15

## 2023-06-05 DIAGNOSIS — F11.90 CHRONIC, CONTINUOUS USE OF OPIOIDS: ICD-10-CM

## 2023-06-05 RX ORDER — TRAMADOL HYDROCHLORIDE 50 MG/1
TABLET ORAL
Qty: 120 TABLET | Refills: 0 | OUTPATIENT
Start: 2023-06-05

## 2023-06-12 DIAGNOSIS — F11.90 CHRONIC, CONTINUOUS USE OF OPIOIDS: ICD-10-CM

## 2023-06-12 RX ORDER — TRAMADOL HYDROCHLORIDE 50 MG/1
100 TABLET ORAL 2 TIMES DAILY
Qty: 24 TABLET | Refills: 0 | Status: SHIPPED | OUTPATIENT
Start: 2023-06-12 | End: 2023-06-15

## 2023-06-12 NOTE — TELEPHONE ENCOUNTER
Patient called to schedule medication check appointment for his traMADol (ULTRAM) 50 MG tablet.     RN scheduled patient on 6/15/23 for this check.   Patient requesting a bridge refill for this medication as he is out of medication currently.     Please advise     Rima Dao RN on 6/12/2023 at 8:05 AM

## 2023-06-15 ENCOUNTER — OFFICE VISIT (OUTPATIENT)
Dept: INTERNAL MEDICINE | Facility: CLINIC | Age: 63
End: 2023-06-15
Payer: COMMERCIAL

## 2023-06-15 VITALS
SYSTOLIC BLOOD PRESSURE: 106 MMHG | DIASTOLIC BLOOD PRESSURE: 72 MMHG | BODY MASS INDEX: 30.92 KG/M2 | RESPIRATION RATE: 18 BRPM | HEART RATE: 71 BPM | WEIGHT: 247.4 LBS | OXYGEN SATURATION: 96 %

## 2023-06-15 DIAGNOSIS — E66.811 CLASS 1 OBESITY WITH SERIOUS COMORBIDITY AND BODY MASS INDEX (BMI) OF 30.0 TO 30.9 IN ADULT, UNSPECIFIED OBESITY TYPE: ICD-10-CM

## 2023-06-15 DIAGNOSIS — Z23 NEED FOR SHINGLES VACCINE: ICD-10-CM

## 2023-06-15 DIAGNOSIS — E78.5 HYPERLIPIDEMIA LDL GOAL <100: ICD-10-CM

## 2023-06-15 DIAGNOSIS — Z00.00 ENCOUNTER FOR PREVENTIVE CARE: ICD-10-CM

## 2023-06-15 DIAGNOSIS — I48.19 PERSISTENT ATRIAL FIBRILLATION (H): ICD-10-CM

## 2023-06-15 DIAGNOSIS — E03.9 HYPOTHYROIDISM, UNSPECIFIED TYPE: ICD-10-CM

## 2023-06-15 DIAGNOSIS — I50.22 CHRONIC SYSTOLIC CONGESTIVE HEART FAILURE (H): ICD-10-CM

## 2023-06-15 DIAGNOSIS — K30 GASTROINTESTINAL DISCOMFORT: ICD-10-CM

## 2023-06-15 DIAGNOSIS — F11.90 CHRONIC, CONTINUOUS USE OF OPIOIDS: ICD-10-CM

## 2023-06-15 DIAGNOSIS — R79.81 SERUM CARBON DIOXIDE DECREASED: ICD-10-CM

## 2023-06-15 DIAGNOSIS — I10 ESSENTIAL HYPERTENSION WITH GOAL BLOOD PRESSURE LESS THAN 140/90: ICD-10-CM

## 2023-06-15 DIAGNOSIS — Z12.11 SCREEN FOR COLON CANCER: ICD-10-CM

## 2023-06-15 DIAGNOSIS — Z23 NEED FOR PROPHYLACTIC VACCINATION AGAINST STREPTOCOCCUS PNEUMONIAE (PNEUMOCOCCUS): ICD-10-CM

## 2023-06-15 DIAGNOSIS — E11.40 TYPE 2 DIABETES MELLITUS WITH DIABETIC NEUROPATHY, WITHOUT LONG-TERM CURRENT USE OF INSULIN (H): Primary | ICD-10-CM

## 2023-06-15 PROBLEM — E66.01 MORBID OBESITY (H): Chronic | Status: RESOLVED | Noted: 2018-02-09 | Resolved: 2023-06-15

## 2023-06-15 LAB
ALT SERPL W P-5'-P-CCNC: 11 U/L (ref 0–70)
ANION GAP SERPL CALCULATED.3IONS-SCNC: 17 MMOL/L (ref 7–15)
BUN SERPL-MCNC: 18.4 MG/DL (ref 8–23)
CALCIUM SERPL-MCNC: 9.5 MG/DL (ref 8.8–10.2)
CHLORIDE SERPL-SCNC: 104 MMOL/L (ref 98–107)
CHOLEST SERPL-MCNC: 153 MG/DL
CREAT SERPL-MCNC: 1.06 MG/DL (ref 0.67–1.17)
CREAT UR-MCNC: 112 MG/DL
DEPRECATED HCO3 PLAS-SCNC: 18 MMOL/L (ref 22–29)
ERYTHROCYTE [DISTWIDTH] IN BLOOD BY AUTOMATED COUNT: 12.7 % (ref 10–15)
GFR SERPL CREATININE-BSD FRML MDRD: 80 ML/MIN/1.73M2
GLUCOSE SERPL-MCNC: 117 MG/DL (ref 70–99)
HBA1C MFR BLD: 5.7 % (ref 0–5.6)
HCT VFR BLD AUTO: 45.4 % (ref 40–53)
HDLC SERPL-MCNC: 36 MG/DL
HGB BLD-MCNC: 15.5 G/DL (ref 13.3–17.7)
LDLC SERPL CALC-MCNC: 94 MG/DL
MCH RBC QN AUTO: 31.5 PG (ref 26.5–33)
MCHC RBC AUTO-ENTMCNC: 34.1 G/DL (ref 31.5–36.5)
MCV RBC AUTO: 92 FL (ref 78–100)
MICROALBUMIN UR-MCNC: <12 MG/L
MICROALBUMIN/CREAT UR: NORMAL MG/G{CREAT}
NONHDLC SERPL-MCNC: 117 MG/DL
PLATELET # BLD AUTO: 198 10E3/UL (ref 150–450)
POTASSIUM SERPL-SCNC: 4.3 MMOL/L (ref 3.4–5.3)
RBC # BLD AUTO: 4.92 10E6/UL (ref 4.4–5.9)
SODIUM SERPL-SCNC: 139 MMOL/L (ref 136–145)
TRIGL SERPL-MCNC: 114 MG/DL
WBC # BLD AUTO: 7.9 10E3/UL (ref 4–11)

## 2023-06-15 PROCEDURE — 80061 LIPID PANEL: CPT | Performed by: INTERNAL MEDICINE

## 2023-06-15 PROCEDURE — 82043 UR ALBUMIN QUANTITATIVE: CPT | Performed by: INTERNAL MEDICINE

## 2023-06-15 PROCEDURE — 80048 BASIC METABOLIC PNL TOTAL CA: CPT | Performed by: INTERNAL MEDICINE

## 2023-06-15 PROCEDURE — 90471 IMMUNIZATION ADMIN: CPT | Performed by: INTERNAL MEDICINE

## 2023-06-15 PROCEDURE — 90472 IMMUNIZATION ADMIN EACH ADD: CPT | Performed by: INTERNAL MEDICINE

## 2023-06-15 PROCEDURE — 82570 ASSAY OF URINE CREATININE: CPT | Performed by: INTERNAL MEDICINE

## 2023-06-15 PROCEDURE — 90750 HZV VACC RECOMBINANT IM: CPT | Performed by: INTERNAL MEDICINE

## 2023-06-15 PROCEDURE — 36415 COLL VENOUS BLD VENIPUNCTURE: CPT | Performed by: INTERNAL MEDICINE

## 2023-06-15 PROCEDURE — 84460 ALANINE AMINO (ALT) (SGPT): CPT | Performed by: INTERNAL MEDICINE

## 2023-06-15 PROCEDURE — 83036 HEMOGLOBIN GLYCOSYLATED A1C: CPT | Performed by: INTERNAL MEDICINE

## 2023-06-15 PROCEDURE — 90677 PCV20 VACCINE IM: CPT | Performed by: INTERNAL MEDICINE

## 2023-06-15 PROCEDURE — 99214 OFFICE O/P EST MOD 30 MIN: CPT | Mod: 25 | Performed by: INTERNAL MEDICINE

## 2023-06-15 PROCEDURE — 85027 COMPLETE CBC AUTOMATED: CPT | Performed by: INTERNAL MEDICINE

## 2023-06-15 RX ORDER — PREGABALIN 300 MG/1
CAPSULE ORAL
Qty: 60 CAPSULE | Refills: 5 | Status: SHIPPED | OUTPATIENT
Start: 2023-06-15 | End: 2023-07-10

## 2023-06-15 RX ORDER — TRAMADOL HYDROCHLORIDE 50 MG/1
100 TABLET ORAL 2 TIMES DAILY
Qty: 120 TABLET | Refills: 5 | Status: SHIPPED | OUTPATIENT
Start: 2023-06-15 | End: 2023-12-12

## 2023-06-15 RX ORDER — DULOXETIN HYDROCHLORIDE 60 MG/1
60 CAPSULE, DELAYED RELEASE ORAL DAILY
Qty: 90 CAPSULE | Refills: 1 | Status: SHIPPED | OUTPATIENT
Start: 2023-06-15 | End: 2024-02-13

## 2023-06-15 RX ORDER — LEVOTHYROXINE SODIUM 88 UG/1
88 TABLET ORAL DAILY
Qty: 90 TABLET | Refills: 1 | Status: SHIPPED | OUTPATIENT
Start: 2023-06-15 | End: 2024-02-05

## 2023-06-15 RX ORDER — PANTOPRAZOLE SODIUM 40 MG/1
40 TABLET, DELAYED RELEASE ORAL DAILY
Qty: 90 TABLET | Refills: 1 | Status: SHIPPED | OUTPATIENT
Start: 2023-06-15 | End: 2024-02-19

## 2023-06-15 RX ORDER — LIRAGLUTIDE 6 MG/ML
1.8 INJECTION SUBCUTANEOUS DAILY
Qty: 27 ML | Refills: 5 | Status: SHIPPED | OUTPATIENT
Start: 2023-06-15 | End: 2024-05-06

## 2023-06-15 ASSESSMENT — ANXIETY QUESTIONNAIRES
4. TROUBLE RELAXING: SEVERAL DAYS
7. FEELING AFRAID AS IF SOMETHING AWFUL MIGHT HAPPEN: NOT AT ALL
3. WORRYING TOO MUCH ABOUT DIFFERENT THINGS: SEVERAL DAYS
6. BECOMING EASILY ANNOYED OR IRRITABLE: SEVERAL DAYS
GAD7 TOTAL SCORE: 5
IF YOU CHECKED OFF ANY PROBLEMS ON THIS QUESTIONNAIRE, HOW DIFFICULT HAVE THESE PROBLEMS MADE IT FOR YOU TO DO YOUR WORK, TAKE CARE OF THINGS AT HOME, OR GET ALONG WITH OTHER PEOPLE: SOMEWHAT DIFFICULT
GAD7 TOTAL SCORE: 5
7. FEELING AFRAID AS IF SOMETHING AWFUL MIGHT HAPPEN: NOT AT ALL
8. IF YOU CHECKED OFF ANY PROBLEMS, HOW DIFFICULT HAVE THESE MADE IT FOR YOU TO DO YOUR WORK, TAKE CARE OF THINGS AT HOME, OR GET ALONG WITH OTHER PEOPLE?: SOMEWHAT DIFFICULT
5. BEING SO RESTLESS THAT IT IS HARD TO SIT STILL: SEVERAL DAYS
1. FEELING NERVOUS, ANXIOUS, OR ON EDGE: NOT AT ALL
2. NOT BEING ABLE TO STOP OR CONTROL WORRYING: SEVERAL DAYS

## 2023-06-15 NOTE — LETTER
Opioid / Opioid Plus Controlled Substance Agreement    This is an agreement between you and your provider about the safe and appropriate use of controlled substance/opioids prescribed by your care team. Controlled substances are medicines that can cause physical and mental dependence (abuse).    There are strict laws about having and using these medicines. We here at [facility] are committing to working with you in your efforts to get better. To support you in this work, we ll help you schedule regular office appointments for medicine refills. If we must cancel or change your appointment for any reason, we ll make sure you have enough medicine to last until your next appointment.     As a Provider, I will:  Listen carefully to your concerns and treat you with respect.   Recommend a treatment plan that I believe is in your best interest. This plan may involve therapies other than opioid pain medication.   Talk with you often about the possible benefits, and the risk of harm of any medicine that we prescribe for you.   Provide a plan on how to taper (discontinue or go off) using this medicine if the decision is made to stop its use.    As a Patient, I understand that opioid(s):   Are a controlled substance prescribed by my care team to help me function or work and manage my condition(s).   Are strong medicines and can cause serious side effects such as:  Drowsiness, which can seriously affect my driving ability  A lower breathing rate, enough to cause death  Harm to my thinking ability   Depression   Abuse of and addiction to this medicine  Need to be taken exactly as prescribed. Combining opioids with certain medicines or chemicals (such as illegal drugs, sedatives, sleeping pills, and benzodiazepines) can be dangerous or even fatal. If I stop opioids suddenly, I may have severe withdrawal symptoms.  Do not work for all types of pain nor for all patients. If they re not helpful, I may be asked to stop  them.        The risks, benefits and side effects of these medicine(s) were explained to me. I agree that:  I will take part in other treatments as advised by my care team. This may be psychiatry or counseling, physical therapy, behavioral therapy, group treatment or a referral to a specialist.     I will keep all my appointments. I understand that this is part of the monitoring of opioids. My care team may require an office visit for EVERY opioid/controlled substance refill. If I miss appointments or don t follow instructions, my care team may stop my medicine.    I will take my medicines as prescribed. I will not change the dose or schedule unless my care team tells me to. There will be no refills if I run out early.     I may be asked to come to the clinic and complete a urine drug test or complete a pill count at any time. If I don t give a urine sample or participate in a pill count, the care team may stop my medicine.    I will only receive prescriptions from this clinic for chronic pain. If I am treated by another provider for acute pain issues, I will tell them that I am taking opioid pain medication for chronic pain and that I have a treatment agreement with this provider. I will inform my [facility] care team within one business day if I am given a prescription for any pain medication by another healthcare provider. My [facility] care team can contact other providers and pharmacists about my use of any medicines.    It is up to me to make sure that I don t run out of my medicines on weekends or holidays. If my care team is willing to refill my opioid prescription without a visit, I must request refills only during office hours. Refills may take up to 3 business days to process. I will use one pharmacy to fill all my opioid and other controlled substance prescriptions. I will notify the clinic about any changes to my insurance or medication availability.    I am responsible for my  prescriptions. If the medicine/prescription is lost, stolen or destroyed, it will not be replaced. I also agree not to share controlled substance medicines with anyone.    I am aware I should not use any illegal or recreational drugs. I agree not to drink alcohol unless my care team says I can.       If I enroll in the [place] [facility] Cann[facility] program, I will tell my care team prior to my next refill.     I will tell my care team right away if I become pregnant, have a new medical problem treated outside of my regular clinic, or have a change in my medications.    I understand that this medicine can affect my thinking, judgment and reaction time. Alcohol and drugs affect the brain and body, which can affect the safety of my driving. Being under the influence of alcohol or drugs can affect my decision-making, behaviors, personal safety, and the safety of others. Driving while impaired (DWI) can occur if a person is driving, operating, or in physical control of a car, motorcycle, boat, snowmobile, ATV, motorbike, off-road vehicle, or any other motor vehicle ([place] Statute 169A.20). I understand the risk if I choose to drive or operate any vehicle or machinery.    I understand that if I do not follow any of the conditions above, my prescriptions or treatment may be stopped or changed.          Opioids  What You Need to Know    What are opioids?   Opioids are pain medicines that must be prescribed by a doctor. They are also known as narcotics.     Examples are:   morphine (MS Contin, Mima)  oxycodone (Oxycontin)  oxycodone and acetaminophen (Percocet)  hydrocodone and acetaminophen (Vicodin, Norco)   fentanyl patch (Duragesic)   hydromorphone (Dilaudid)   methadone  codeine (Tylenol #3)     What do opioids do well?   Opioids are best for severe short-term pain such as after a surgery or injury. They may work well for cancer pain. They may help some people with long-lasting (chronic) pain.     What do opioids NOT do  well?   Opioids never get rid of pain entirely, and they don t work well for most patients with chronic pain. Opioids don t reduce swelling, one of the causes of pain.                                    Other ways to manage chronic pain and improve function include:     Treat the health problem that may be causing pain  Anti-inflammation medicines, which reduce swelling and tenderness, such as ibuprofen (Advil, Motrin) or naproxen (Aleve)  Acetaminophen (Tylenol)  Antidepressants and anti-seizure medicines, especially for nerve pain  Topical treatments such as patches or creams  Injections or nerve blocks  Chiropractic or osteopathic treatment  Acupuncture, massage, deep breathing, meditation, visual imagery, aromatherapy  Use heat or ice at the pain site  Physical therapy   Exercise  Stop smoking  Take part in therapy       Risks and side effects     Talk to your doctor before you start or decide to keep taking opioids. Possible side effects include:    Lowering your breathing rate enough to cause death  Overdose, including death, especially if taking higher than prescribed doses  Worse depression symptoms; less pleasure in things you usually enjoy  Feeling tired or sluggish  Slower thoughts or cloudy thinking  Being more sensitive to pain over time; pain is harder to control  Trouble sleeping or restless sleep  Changes in hormone levels (for example, less testosterone)  Changes in sex drive or ability to have sex  Constipation  Unsafe driving  Itching and sweating  Dizziness  Nausea, throwing up and dry mouth    What else should I know about opioids?    Opioids may lead to dependence, tolerance, or addiction.    Dependence means that if you stop or reduce the medicine too quickly, you will have withdrawal symptoms. These include loose poop (diarrhea), jitters, flu-like symptoms, nervousness and tremors. Dependence is not the same as addiction.                     Tolerance means needing higher doses over time to  get the same effect. This may increase the chance of serious side effects.    Addiction is when people improperly use a substance that harms their body, their mind or their relations with others. Use of opiates can cause a relapse of addiction if you have a history of drug or alcohol abuse.    People who have used opioids for a long time may have a lower quality of life, worse depression, higher levels of pain and more visits to doctors.    You can overdose on opioids. Take these steps to lower your risk of overdose:    Recognize the signs:  Signs of overdose include decrease or loss of consciousness (blackout), slowed breathing, trouble waking up and blue lips. If someone is worried about overdose, they should call 911.    Talk to your doctor about Narcan (naloxone).   If you are at risk for overdose, you may be given a prescription for Narcan. This medicine very quickly reverses the effects of opioids.   If you overdose, a friend or family member can give you Narcan while waiting for the ambulance. They need to know the signs of overdose and how to give Narcan.     Don't use alcohol or street drugs.   Taking them with opioids can cause death.    Do not take any of these medicines unless your doctor says it s OK. Taking these with opioids can cause death:  Benzodiazepines, such as lorazepam (Ativan), alprazolam (Xanax) or diazepam (Valium)  Muscle relaxers, such as cyclobenzaprine (Flexeril)  Sleeping pills like zolpidem (Ambien)   Other opioids      How to keep you and other people safe while taking opioids:    Never share your opioids with others.  Opioid medicines are regulated by the Drug Enforcement Agency ([place]). Selling or sharing medications is a criminal act.    2. Be sure to store opioids in a secure place, locked up if possible. Young children can easily swallow them and overdose.    3. When you are traveling with your medicines, keep them in the original bottles. If you use a pill box, be sure you also  carry a copy of your medicine list from your clinic or pharmacy.    4. Safe disposal of opioids    Most pharmacies have places to get rid of medicine, called disposal kiosks. Medicine disposal options are also available in every [facility]. Search your county and  medication disposal  to find more options. You can find more details at:  [email or web address]     I agree that my provider, clinic care team, and pharmacy may work with any city, state or federal law enforcement agency that investigates the misuse, sale, or other diversion of my controlled medicine. I will allow my provider to discuss my care with, or share a copy of, this agreement with any other treating provider, pharmacy or emergency room where I receive care.    I have read this agreement and have asked questions about anything I did not understand.    _______________________________________________________  Patient Signature - Rhett Elizabeth _____________________                   Date     _______________________________________________________  Provider Signature - Ac Frederick MD   _____________________                   Date     _______________________________________________________  Witness Signature (required if provider not present while patient signing)   _____________________                   Date

## 2023-06-15 NOTE — PROGRESS NOTES
Assessment & Plan     Type 2 diabetes mellitus with diabetic neuropathy, without long-term current use of insulin (H)  Today is a further follow up with this very pleasant gentleman with multiple co-morbidities although he does very well for himself and has no specific symptom complaints today. His diabetes mellitus is rather marginal and he does take Metformin ( Glucophage)  And Liraglutide [ Victoza ] and Jardiance  (empagliflozin) was also added by his cardiologist   - Lipid panel reflex to direct LDL Non-fasting; Future  - Albumin Random Urine Quantitative with Creat Ratio; Future  - Hemoglobin A1c; Future  - Albumin Random Urine Quantitative with Creat Ratio  - Hemoglobin A1c  - DULoxetine (CYMBALTA) 60 MG capsule; Take 1 capsule (60 mg) by mouth daily  - liraglutide (VICTOZA PEN) 18 MG/3ML solution; Inject 1.8 mg Subcutaneous daily  - metFORMIN (GLUCOPHAGE) 500 MG tablet; TAKE TWO TABLETS BY MOUTH TWICE A DAY  - pregabalin (LYRICA) 300 MG capsule; TAKE ONE CAPSULE BY MOUTH TWICE A DAY    Screen for colon cancer  He's due for the 10 year surveillance colonoscopy   - Colonoscopy Screening  Referral; Future    Hyperlipidemia LDL goal <100  We determined that he's possibly not taking the simvistatin [Zocor] and this has been prescribed refills provided good for one year by his cardiologist. See patient after-visit summary   - ALT; Future    Chronic systolic congestive heart failure (H)  Problem is stable and ongoing monitoring    - CBC with Platelets; Future  - CBC with Platelets    Essential hypertension with goal blood pressure less than 140/90  Problem is stable and ongoing monitoring   - BASIC METABOLIC PANEL; Future  - BASIC METABOLIC PANEL    Encounter for preventive care  He's current with eye exams   - EYE EXAM (SIMPLE-NONBILLABLE)    Need for shingles vaccine  Recommended   - ZOSTER VACCINE RECOMBINANT ADJUVANTED (SHINGRIX)  - REVIEW OF HEALTH MAINTENANCE PROTOCOL ORDERS    Need for  prophylactic vaccination against Streptococcus pneumoniae (pneumococcus)  Recommended   - Pneumococcal 20 Valent Conjugate (Prevnar 20)  - REVIEW OF HEALTH MAINTENANCE PROTOCOL ORDERS    Chronic, continuous use of opioids  Chronic Pain Contract generated   - [id number] - Urine Drug Confirmation Panel (Comprehensive); Future  - traMADol (ULTRAM) 50 MG tablet; Take 2 tablets (100 mg) by mouth 2 times daily    Persistent atrial fibrillation (H)  Problem is stable and ongoing monitoring      Class 1 obesity with serious comorbidity and body mass index (BMI) of 30.0 to 30.9 in adult, unspecified obesity type  He does not have morbid obesity which used to be his diagnosis , he has plain obesity and if he lost 10 more pounds he'd lose this diagnosis also !    Hypothyroidism, unspecified type  Not due for laboratory studies with this one today   - levothyroxine (SYNTHROID/LEVOTHROID) 88 MCG tablet; Take 1 tablet (88 mcg) by mouth daily    Gastrointestinal discomfort  Definitely benefiting him  - pantoprazole (PROTONIX) 40 MG EC tablet; Take 1 tablet (40 mg) by mouth daily    Review of the result(s) of each unique test - todays labs  Prescription drug management  33 minutes spent by me on the date of the encounter doing chart review, history and exam, documentation and further activities per the note      Ac Frederick MD  [facility] TERI Lopez is a 62 year old, presenting for the following health issues:  No chief complaint on file.         View : No data to display.              History of Present Illness       Reason for visit:  Refill meds    He eats 0-1 servings of fruits and vegetables daily.He consumes 1 sweetened beverage(s) daily.He exercises with enough effort to increase his heart rate 30 to 60 minutes per day.  He exercises with enough effort to increase his heart rate 4 days per week.   He is taking medications regularly.  Today's RADHA-7 Score: 5     Continues forwards with treatment  of diabetes mellitus with Jardiance  (empagliflozin), Metformin ( Glucophage)  And Liraglutide [ Victoza ]     Blood pressure superb     Hypothyroidism is also being treated 88 micrograms   TSH   Date Value Ref Range Status   10/06/2022 1.11 0.40 - 4.00 mU/L Final   01/04/2021 0.87 0.40 - 4.00 mU/L Final      Body mass index is 30.92 kg/m .    Other issues , see assessment and plan section      Review of Systems   Constitutional, HEENT, cardiovascular, pulmonary, gi and gu systems are negative, except as otherwise noted.      Objective    /72   Pulse 71   Resp 18   Wt 112.2 kg (247 lb 6.4 oz)   SpO2 96%   BMI 30.92 kg/m    Body mass index is 30.92 kg/m .  Physical Exam   GENERAL: healthy, alert and no distress  EYES: Eyes grossly normal to inspection, PERRL and conjunctivae and sclerae normal  NECK: no adenopathy, no asymmetry, masses, or scars and thyroid normal to palpation  RESP: lungs clear to auscultation - no rales, rhonchi or wheezes  CV: irregularly irregular rhythm, normal S1 S2, no S3 or S4, no murmur, click or rub, peripheral pulses strong and no peripheral edema  MS: no gross musculoskeletal defects noted, no edema    Orders Placed This Encounter   Procedures     EYE EXAM (SIMPLE-NONBILLABLE)     REVIEW OF HEALTH MAINTENANCE PROTOCOL ORDERS     Pneumococcal 20 Valent Conjugate (Prevnar 20)     ZOSTER VACCINE RECOMBINANT ADJUVANTED (SHINGRIX)     ALT     Lipid panel reflex to direct LDL Non-fasting     Albumin Random Urine Quantitative with Creat Ratio     CBC with Platelets     BASIC METABOLIC PANEL     Hemoglobin A1c     Colonoscopy Screening [facility] Referral     [id number] - Urine Drug Confirmation Panel (Comprehensive)

## 2023-06-15 NOTE — PATIENT INSTRUCTIONS
According to your chart with [facility] , you were sent a prescription for a year supply of simvastatin [ Zocor ] . I need you to clarify this by reviewing closely your home medications and update our office with this requested information , are you taking this medication !?

## 2023-06-16 ENCOUNTER — TELEPHONE (OUTPATIENT)
Dept: FAMILY MEDICINE | Facility: CLINIC | Age: 63
End: 2023-06-16
Payer: COMMERCIAL

## 2023-06-16 DIAGNOSIS — E78.5 HYPERLIPIDEMIA LDL GOAL <100: Primary | ICD-10-CM

## 2023-06-16 LAB — CREAT UR-MCNC: 111 MG/DL

## 2023-06-16 NOTE — TELEPHONE ENCOUNTER
Dr. Frederick    Cardiology told pt NOT to take simvastatin. Pt wanted RN to send this as FYI to PCP. Ok to close encounter unless you have any further advice to pt.      Thanks,  ANGEL Dinh  [facility]

## 2023-06-16 NOTE — TELEPHONE ENCOUNTER
Here's my reply    Thank you for the update  . I find that very hard to understand and so I am going to contact your cardiologist and as questions as to why that advice would be given . The problem is that you have diabetes mellitus and it is absolutely well established that you are at major risk of developing an heart attack or stroke and treatment with lipid lowering therapy is a bastion of accepted medical therapy and so patients information is not logical. We will follow up with him once we get a specific reply from the cardiologist. This may take a week or so.    In the meantime can you reach out to his cardiologist for me ? I simply need substantiation as to why patient would come away with this idea. Does the cardiologist prefer something like Crestor [ rosuvastatin ]  ? [ he has diabetes mellitus 2]    The doctor to start with is Britney Jackson at [facility] [ last appointment 1-]    Ac Frederick MD

## 2023-06-16 NOTE — TELEPHONE ENCOUNTER
See providers(Dr. Frederick) message below.    Called patient, no answer, left message on voicemail to call [facility] at [phone].    Wendy Ruth RN

## 2023-06-20 ENCOUNTER — TELEPHONE (OUTPATIENT)
Dept: INTERNAL MEDICINE | Facility: CLINIC | Age: 63
End: 2023-06-20
Payer: COMMERCIAL

## 2023-06-20 NOTE — TELEPHONE ENCOUNTER
THERE ARE 2 OPEN ENCOUNTERS. SEE 6/16/2023 PHONE ENCOUNTER      Left message on patient's  requesting a return call [facility] [phone]         Deepali Mcleod RN  [facility] Raad Frederick MD   6/19/2023  1:25 PM CDT       All of these tests are within acceptable limits except for two numbers that just make no sense to me. An anion gap of 17 and a carbon dioxide level of 18 are significantly at variance from any such values ever seen before and just don't make any sense. I am sorry to say it but I think the wisest course of action here is to simply repeat this basic metabolic panel . If we saw the same numbers a second time we will need to ask questions and do some digging. Recheck basic metabolic panel is ordered already,. I send apologies but this is as I say the wisest course of action in my opinion . Lets do this retest over the next week . Please make sure patient is ok. He's not taking any illicit substances and is fine with breathing and not sick ? Reroute if additional input requested from me right now     Ac Frederick MD

## 2023-06-20 NOTE — TELEPHONE ENCOUNTER
Patient returned call - scheduled for [facility] appointment on Thursday    Also patient is not sure what cardiologist dc'd simvastatin - states he hasn't taken this medication in a long time- unsure why it was dc'd     Katey Suazo RN  [facility]

## 2023-06-20 NOTE — TELEPHONE ENCOUNTER
January 25th of 2023- patient called and reported symptoms of dizziness and balance issues    Noticed this after starting simvastatin - Dr. Cervantes instructed to hold and was asked to contact again in 1 week but patient never called back     Cardiology DOES WANT him on this medication - wanted to trial if it was causing these SE    Katey Suazo RN  [facility]

## 2023-06-20 NOTE — TELEPHONE ENCOUNTER
THERE ARE 2 OPEN ENCOUNTERS. SEE 6/20/2023 PHONE ENCOUNTER    Left 2nd message on patient's VM requesting a return call to [facility] [place] [phone]     When patient calls back- Please relay Ac Black's message below.  Which cardiologist advised him to stop simvastatin? What was the reason? Are they replacing it with something else?    ___________________________________________    Called [facility]  Spoke with Desirae.  She stated that simvastatin is still listed as active on their end but was last refilled in January 2023 for a 3 month supply with refills.  She will send a message to cardiologist to clarify and their team will call us back with updates    Deepali Mcleod RN  [facility]

## 2023-06-21 RX ORDER — PRAVASTATIN SODIUM 10 MG
10 TABLET ORAL DAILY
Qty: 90 TABLET | Refills: 3 | Status: SHIPPED | OUTPATIENT
Start: 2023-06-21 | End: 2024-05-06

## 2023-06-21 NOTE — TELEPHONE ENCOUNTER
Thank you for the update      Here's what makes the most sense to me     1. Lets discontinue simvastatin [ Zocor ] [ and review what you heard from his cardiologist, with him]  2. Lets start pravastatin 10 milligrams a day  3. I suspect no side effects . Patient should be cautioned he's at risk of myocardial infarction and cerebrovascular accident and treatment with statin medication lowers this risk. Ultimately he gets to decide if he agrees with treatment. I think he will tolerate this medication sterling Frederick MD

## 2023-06-22 ENCOUNTER — TELEPHONE (OUTPATIENT)
Dept: FAMILY MEDICINE | Facility: CLINIC | Age: 63
End: 2023-06-22

## 2023-06-22 ENCOUNTER — LAB (OUTPATIENT)
Dept: LAB | Facility: CLINIC | Age: 63
End: 2023-06-22
Payer: COMMERCIAL

## 2023-06-22 DIAGNOSIS — R79.81 SERUM CARBON DIOXIDE DECREASED: ICD-10-CM

## 2023-06-22 LAB
ANION GAP SERPL CALCULATED.3IONS-SCNC: 13 MMOL/L (ref 7–15)
BUN SERPL-MCNC: 19.3 MG/DL (ref 8–23)
CALCIUM SERPL-MCNC: 10.1 MG/DL (ref 8.8–10.2)
CHLORIDE SERPL-SCNC: 101 MMOL/L (ref 98–107)
CREAT SERPL-MCNC: 1.03 MG/DL (ref 0.67–1.17)
DEPRECATED HCO3 PLAS-SCNC: 25 MMOL/L (ref 22–29)
GFR SERPL CREATININE-BSD FRML MDRD: 83 ML/MIN/1.73M2
GLUCOSE SERPL-MCNC: 90 MG/DL (ref 70–99)
POTASSIUM SERPL-SCNC: 4.8 MMOL/L (ref 3.4–5.3)
SODIUM SERPL-SCNC: 139 MMOL/L (ref 136–145)

## 2023-06-22 PROCEDURE — 80048 BASIC METABOLIC PNL TOTAL CA: CPT

## 2023-06-22 PROCEDURE — 36415 COLL VENOUS BLD VENIPUNCTURE: CPT

## 2023-06-22 NOTE — TELEPHONE ENCOUNTER
Reason for Call:  Form, our goal is to have forms completed with 72 hours, however, some forms may require a visit or additional information.    Type of letter, form or note:  medical    Who is the form from?: Patient    Where did the form come from: Patient or family brought in       What clinic location was the form placed at?: [facility]    Where the form was placed: Placed in Dr. Frederick's box Box/Folder    What number is listed as a contact on the form?: [phone]       Additional comments: Please fax to the number on the form. [phone]    Call taken on 6/22/2023 at 3:25 PM by Chela Patterson

## 2023-06-22 NOTE — TELEPHONE ENCOUNTER
Pt notified of provider's message as written. Pt agrees to start Pravastatin 10mg.     Tatiana Noriega RN  [facility]

## 2023-06-22 NOTE — TELEPHONE ENCOUNTER
Left message on patient's VM requesting a return call to [facility] [phone]     Deepali Mcleod RN  [facility]

## 2023-07-10 DIAGNOSIS — E11.40 TYPE 2 DIABETES MELLITUS WITH DIABETIC NEUROPATHY, WITHOUT LONG-TERM CURRENT USE OF INSULIN (H): ICD-10-CM

## 2023-07-10 RX ORDER — PREGABALIN 300 MG/1
CAPSULE ORAL
Qty: 60 CAPSULE | Refills: 5 | Status: SHIPPED | OUTPATIENT
Start: 2023-07-10 | End: 2024-03-11

## 2023-08-03 ENCOUNTER — TELEPHONE (OUTPATIENT)
Dept: GASTROENTEROLOGY | Facility: CLINIC | Age: 63
End: 2023-08-03
Payer: COMMERCIAL

## 2023-08-03 NOTE — TELEPHONE ENCOUNTER
"Endoscopy Scheduling Screen    Have you had a positive Covid test in the last 14 days?  No    Are you active on MyChart?   Yes    What insurance is in the chart?  Other:  [employer]    Ordering/Referring Provider: STEPHENIE BROWN   (If ordering provider performs procedure, schedule with ordering provider unless otherwise instructed. )    BMI: Estimated body mass index is 30.92 kg/m  as calculated from the following:    Height as of 1/20/20: 1.905 m (6' 3\").    Weight as of 6/15/23: 112.2 kg (247 lb 6.4 oz).     Sedation Ordered  moderate sedation.   If patient BMI > 50 do not schedule in ASC.    Are you taking any prescription medications for pain?   Yes (RN Review required for scheduling.)    Are you taking methadone or Suboxone?  No    Do you have a history of malignant hyperthermia or adverse reaction to anesthesia?  No    (Females) Are you currently pregnant?   No     Have you been diagnosed or told you have pulmonary hypertension?   Yes Schedule patient in Hospital with MAC sedation.    Do you have an LVAD?  Yes (Schedule at [facility] or OR. Schedule PAC evaluation.)    Have you been told you have moderate to severe sleep apnea?  Yes (RN Review required for scheduling unless scheduling in Hospital.)    Have you been told you have COPD, asthma, or any other lung disease?  No    Do you have any heart conditions?  Yes     In the past 6 months, have you had any hospitalizations for heart related issues including cardiomyopathy, heart attack, or stent placement?  No    Do you have any implantable devices in your body (pacemaker, ICD)?  Other Pacemaker and defibulator    Do you take nitroglycerine?  No    Have you ever had or are you awaiting a heart or lung transplant?   No    Have you had a stroke or transient ischemic attack (TIA aka \"mini stroke\" in the last 6 months?   No    Have you been diagnosed with or been told you have cirrhosis of the liver?   No    Are you currently on dialysis?   No    Do you need assistance " "transferring?   No    BMI: Estimated body mass index is 30.92 kg/m  as calculated from the following:    Height as of 1/20/20: 1.905 m (6' 3\").    Weight as of 6/15/23: 112.2 kg (247 lb 6.4 oz).     Is patients BMI > 40 and scheduling location [facility]?  No    Do you take the medication Phentermine, Ozempic or Wegovy?  No    Do you take the medication Naltrexone?  No    Do you take blood thinners?  Yes     Are you taking Effient/Prasugrel?  No, you must contact your prescribing provider for direction on holding or bridging with a different medication.      Prep   Are you currently on dialysis or do you have chronic kidney disease?  No    Do you have a diagnosis of diabetes?  Yes (Golytely Prep)    Do you have a diagnosis of cystic fibrosis (CF)?  No    On a regular basis do you go 3 -5 days between bowel movements?  No    BMI > 40?  No    Preferred Pharmacy:    [facility] [address] [place] [address]  [address] [facility] [address]  [facility] [address]  Phone: [phone] Fax: [phone]      Final Scheduling Details   Colonoscopy prep sent?  Standard Golytely    Procedure scheduled  Colonoscopy    Surgeon:  BREE     Date of procedure:  11/16/23     Schedule PAC:   Yes (Schedule PAC evaluation.)    Location  [facility]    Sedation   MAC/Deep Sedation    Patient Reminders:   You will receive a call from a Nurse to review instructions and health history.  This assessment must be completed prior to your procedure.  Failure to complete the Nurse assessment may result in the procedure being cancelled.      On the day of your procedure, please designate an adult(s) who can drive you home stay with you for the next 24 hours. The medicines used in the exam will make you sleepy. You will not be able to drive.      You cannot take public transportation, ride share services, or non-medical taxi service without a responsible caregiver.  Medical transport services are allowed with the requirement that a " responsible caregiver will receive you at your destination.  We require that drivers and caregivers are confirmed prior to your procedure.

## 2023-11-01 ENCOUNTER — TELEPHONE (OUTPATIENT)
Dept: GASTROENTEROLOGY | Facility: CLINIC | Age: 63
End: 2023-11-01
Payer: COMMERCIAL

## 2023-11-01 DIAGNOSIS — Z12.11 ENCOUNTER FOR SCREENING COLONOSCOPY: Primary | ICD-10-CM

## 2023-11-01 RX ORDER — BISACODYL 5 MG/1
TABLET, DELAYED RELEASE ORAL
Qty: 4 TABLET | Refills: 0 | Status: SHIPPED | OUTPATIENT
Start: 2023-11-01 | End: 2024-05-06

## 2023-11-01 NOTE — TELEPHONE ENCOUNTER
"Pre visit planning completed.      Procedure details:    Patient scheduled for Colonoscopy  on 11/16/2023.     Arrival time: 0845. Procedure time 1015    Pre op exam needed? N/A    Facility location: [facility]; [address], [place] [address]    Sedation type: MAC    Indication for procedure: Screen for colon cancer [Z12.11]       Chart review:     Electronic implanted devices? Yes: ICD    Pt answered \"yes\" to LVAD, please confirm. Writer does not see this in chart. If yes, will need to send secure chat to Manuel at [facility].    Pt answered \"yes\" to having pulmonary hypertension. Please confirm. Writer does not see this in chart. Writer see's \"no pulmonary hypertension\" in chart. If pt does have pulmonary hypertension will need a PAC eval    Recent diagnosis of diverticulitis within the last 6 weeks? No    Diabetic? Yes. See medication holding recommendations     Diabetic medication HOLDING recommendations: (if applicable)  Oral diabetic medications: Yes:  Jardiance (empagliflozin): HOLD  3 days before procedure.  Metformin (glucophage): HOLD day of procedure.  Diabetic injectables: Yes- Victoza (Saxenda, Liraglutide,). Daily or BID dosing of medication.  Hold the day before and day of procedure.  Follow up with managing provider.   Insulin: No      Medication review:    Anticoagulants? Yes Rivaroxaban (Xarelto): Recommended HOLD 2 days before procedure.  Consult with your managing provider.    NSAIDS? No NSAID medications per patient's medication list.  RN will verify with pre-assessment call.    Other medication HOLDING recommendations:  N/A      Prep for procedure:     Bowel prep recommendation: Extended prep Golytely    Due to:  chronic pain medication noted in chart.     Prep instructions sent via [employer] Bowel prep script sent to [facility] - [address] CLAUDIA Blevins RN  Endoscopy Procedure Pre Assessment RN  [phone] " option 4

## 2023-11-01 NOTE — TELEPHONE ENCOUNTER
Attempted to contact patient in order to complete pre assessment questions.     No answer. Left message to return call to [phone] option 4    Thais Blevins RN  Endoscopy Procedure Pre Assessment RN

## 2023-11-07 NOTE — TELEPHONE ENCOUNTER
Second call attempt to complete pre assessment.     No answer.  Left message to return call to [phone] #4 by next business day prior to 4PM or procedure will be sent to cancel.     Additional information needed?  verify if LVAD and pulm htn      Paris Rao, ANGEL  Endoscopy Procedure Pre Assessment RN

## 2023-11-07 NOTE — TELEPHONE ENCOUNTER
Pre assessment completed for upcoming procedure.   (Please see previous telephone encounter notes for complete details)    Patient  returned call.       Procedure details:    Arrival time and facility location reviewed.    Pre op exam needed? N/A    Designated  policy reviewed. Instructed to have someone stay 24 hours post procedure.     COVID policy reviewed.      Medication review:    Medications reviewed. Please see supporting documentation below. Holding recommendations discussed (if applicable).       Prep for procedure:     Procedure prep instructions reviewed.        Additional information needed?  Patient does NOT have an LVAD, he has a pacer/ICD. Patient also denies having pulmonary hypertension, states he has high blood pressure. Per office visit from 1/17/23 No pulmonary hypertension noted.       Patient  verbalized understanding and had no questions or concerns at this time.      Belen English RN  Endoscopy Procedure Pre Assessment RN  [phone] option 4

## 2023-11-16 ENCOUNTER — ANESTHESIA EVENT (OUTPATIENT)
Dept: GASTROENTEROLOGY | Facility: CLINIC | Age: 63
End: 2023-11-16
Payer: COMMERCIAL

## 2023-11-16 ENCOUNTER — ANESTHESIA (OUTPATIENT)
Dept: GASTROENTEROLOGY | Facility: CLINIC | Age: 63
End: 2023-11-16
Payer: COMMERCIAL

## 2023-11-16 ENCOUNTER — HOSPITAL ENCOUNTER (OUTPATIENT)
Facility: CLINIC | Age: 63
Discharge: HOME | End: 2023-11-16
Attending: INTERNAL MEDICINE | Admitting: INTERNAL MEDICINE
Payer: COMMERCIAL

## 2023-11-16 VITALS
TEMPERATURE: 97.9 F | OXYGEN SATURATION: 100 % | RESPIRATION RATE: 16 BRPM | SYSTOLIC BLOOD PRESSURE: 102 MMHG | HEART RATE: 79 BPM | DIASTOLIC BLOOD PRESSURE: 73 MMHG

## 2023-11-16 LAB
COLONOSCOPY: NORMAL
GLUCOSE BLDC GLUCOMTR-MCNC: 85 MG/DL (ref 70–99)

## 2023-11-16 PROCEDURE — 82962 GLUCOSE BLOOD TEST: CPT

## 2023-11-16 PROCEDURE — 250N000011 HC RX IP 250 OP 636: Performed by: ANESTHESIOLOGY

## 2023-11-16 PROCEDURE — 45378 DIAGNOSTIC COLONOSCOPY: CPT | Performed by: INTERNAL MEDICINE

## 2023-11-16 PROCEDURE — G0121 COLON CA SCRN NOT HI RSK IND: HCPCS | Performed by: INTERNAL MEDICINE

## 2023-11-16 PROCEDURE — 258N000003 HC RX IP 258 OP 636: Performed by: ANESTHESIOLOGY

## 2023-11-16 PROCEDURE — 250N000009 HC RX 250: Performed by: ANESTHESIOLOGY

## 2023-11-16 PROCEDURE — 370N000017 HC ANESTHESIA TECHNICAL FEE, PER MIN: Performed by: INTERNAL MEDICINE

## 2023-11-16 RX ORDER — NALOXONE HYDROCHLORIDE 0.4 MG/ML
0.4 INJECTION, SOLUTION INTRAMUSCULAR; INTRAVENOUS; SUBCUTANEOUS
Status: DISCONTINUED | OUTPATIENT
Start: 2023-11-16 | End: 2023-11-16 | Stop reason: HOSPADM

## 2023-11-16 RX ORDER — PROPOFOL 10 MG/ML
INJECTION, EMULSION INTRAVENOUS CONTINUOUS PRN
Status: DISCONTINUED | OUTPATIENT
Start: 2023-11-16 | End: 2023-11-16

## 2023-11-16 RX ORDER — ONDANSETRON 4 MG/1
4 TABLET, ORALLY DISINTEGRATING ORAL EVERY 30 MIN PRN
Status: DISCONTINUED | OUTPATIENT
Start: 2023-11-16 | End: 2023-11-16 | Stop reason: HOSPADM

## 2023-11-16 RX ORDER — NALOXONE HYDROCHLORIDE 0.4 MG/ML
0.2 INJECTION, SOLUTION INTRAMUSCULAR; INTRAVENOUS; SUBCUTANEOUS
Status: DISCONTINUED | OUTPATIENT
Start: 2023-11-16 | End: 2023-11-16 | Stop reason: HOSPADM

## 2023-11-16 RX ORDER — LIDOCAINE HYDROCHLORIDE 20 MG/ML
INJECTION, SOLUTION INFILTRATION; PERINEURAL PRN
Status: DISCONTINUED | OUTPATIENT
Start: 2023-11-16 | End: 2023-11-16

## 2023-11-16 RX ORDER — SODIUM CHLORIDE, SODIUM LACTATE, POTASSIUM CHLORIDE, CALCIUM CHLORIDE 600; 310; 30; 20 MG/100ML; MG/100ML; MG/100ML; MG/100ML
INJECTION, SOLUTION INTRAVENOUS CONTINUOUS
Status: DISCONTINUED | OUTPATIENT
Start: 2023-11-16 | End: 2023-11-16 | Stop reason: HOSPADM

## 2023-11-16 RX ORDER — PROCHLORPERAZINE MALEATE 5 MG
10 TABLET ORAL EVERY 6 HOURS PRN
Status: DISCONTINUED | OUTPATIENT
Start: 2023-11-16 | End: 2023-11-16 | Stop reason: HOSPADM

## 2023-11-16 RX ORDER — ONDANSETRON 2 MG/ML
4 INJECTION INTRAMUSCULAR; INTRAVENOUS
Status: DISCONTINUED | OUTPATIENT
Start: 2023-11-16 | End: 2023-11-16 | Stop reason: HOSPADM

## 2023-11-16 RX ORDER — ONDANSETRON 4 MG/1
4 TABLET, ORALLY DISINTEGRATING ORAL EVERY 6 HOURS PRN
Status: DISCONTINUED | OUTPATIENT
Start: 2023-11-16 | End: 2023-11-16 | Stop reason: HOSPADM

## 2023-11-16 RX ORDER — SODIUM CHLORIDE, SODIUM LACTATE, POTASSIUM CHLORIDE, CALCIUM CHLORIDE 600; 310; 30; 20 MG/100ML; MG/100ML; MG/100ML; MG/100ML
INJECTION, SOLUTION INTRAVENOUS CONTINUOUS PRN
Status: DISCONTINUED | OUTPATIENT
Start: 2023-11-16 | End: 2023-11-16

## 2023-11-16 RX ORDER — ONDANSETRON 2 MG/ML
4 INJECTION INTRAMUSCULAR; INTRAVENOUS EVERY 6 HOURS PRN
Status: DISCONTINUED | OUTPATIENT
Start: 2023-11-16 | End: 2023-11-16 | Stop reason: HOSPADM

## 2023-11-16 RX ORDER — FLUMAZENIL 0.1 MG/ML
0.2 INJECTION, SOLUTION INTRAVENOUS
Status: DISCONTINUED | OUTPATIENT
Start: 2023-11-16 | End: 2023-11-16 | Stop reason: HOSPADM

## 2023-11-16 RX ORDER — FENTANYL CITRATE 50 UG/ML
50 INJECTION, SOLUTION INTRAMUSCULAR; INTRAVENOUS EVERY 5 MIN PRN
Status: DISCONTINUED | OUTPATIENT
Start: 2023-11-16 | End: 2023-11-16 | Stop reason: HOSPADM

## 2023-11-16 RX ORDER — LIDOCAINE 40 MG/G
CREAM TOPICAL
Status: DISCONTINUED | OUTPATIENT
Start: 2023-11-16 | End: 2023-11-16 | Stop reason: HOSPADM

## 2023-11-16 RX ORDER — FENTANYL CITRATE 50 UG/ML
25 INJECTION, SOLUTION INTRAMUSCULAR; INTRAVENOUS EVERY 5 MIN PRN
Status: DISCONTINUED | OUTPATIENT
Start: 2023-11-16 | End: 2023-11-16 | Stop reason: HOSPADM

## 2023-11-16 RX ORDER — ONDANSETRON 2 MG/ML
4 INJECTION INTRAMUSCULAR; INTRAVENOUS EVERY 30 MIN PRN
Status: DISCONTINUED | OUTPATIENT
Start: 2023-11-16 | End: 2023-11-16 | Stop reason: HOSPADM

## 2023-11-16 RX ORDER — PROPOFOL 10 MG/ML
INJECTION, EMULSION INTRAVENOUS PRN
Status: DISCONTINUED | OUTPATIENT
Start: 2023-11-16 | End: 2023-11-16

## 2023-11-16 RX ADMIN — PROPOFOL 10 MG: 10 INJECTION, EMULSION INTRAVENOUS at 10:19

## 2023-11-16 RX ADMIN — PROPOFOL 100 MCG/KG/MIN: 10 INJECTION, EMULSION INTRAVENOUS at 10:14

## 2023-11-16 RX ADMIN — PROPOFOL 20 MG: 10 INJECTION, EMULSION INTRAVENOUS at 10:17

## 2023-11-16 RX ADMIN — LIDOCAINE HYDROCHLORIDE 100 MG: 20 INJECTION, SOLUTION INFILTRATION; PERINEURAL at 10:14

## 2023-11-16 RX ADMIN — SODIUM CHLORIDE, POTASSIUM CHLORIDE, SODIUM LACTATE AND CALCIUM CHLORIDE: 600; 310; 30; 20 INJECTION, SOLUTION INTRAVENOUS at 10:12

## 2023-11-16 RX ADMIN — PROPOFOL 20 MG: 10 INJECTION, EMULSION INTRAVENOUS at 10:18

## 2023-11-16 ASSESSMENT — ACTIVITIES OF DAILY LIVING (ADL)
ADLS_ACUITY_SCORE: 33
ADLS_ACUITY_SCORE: 35

## 2023-11-16 ASSESSMENT — ENCOUNTER SYMPTOMS: DYSRHYTHMIAS: 1

## 2023-11-16 NOTE — ANESTHESIA PREPROCEDURE EVALUATION
"Anesthesia Pre-Procedure Evaluation    Patient: Rhett Elizabeth   MRN: [id number] : 1960        Procedure : Procedure(s):  Colonoscopy          Past Medical History:   Diagnosis Date    Diabetes (H)     Hypertension     Macular degeneration     Type I or II open fracture of distal end of right tibia with routine healing, unspecified fracture morphology, subsequent encounter 1/15/2017      Past Surgical History:   Procedure Laterality Date    EP ABLATION ATRIAL FLUTTER      2021    PACEMAKER/ICD CHECK      3/2018    right ankle surgery      injury repair    STRABISMUS SURGERY        Allergies   Allergen Reactions    Amlodipine Difficulty breathing and Other (See Comments)     Other reaction(s): Other  \"legs swell\"  Swelling of the lips and tongue  \"legs swell\"  swelling      Ace Inhibitors Cough     Other reaction(s): Cough      Social History     Tobacco Use    Smoking status: Never    Smokeless tobacco: Current     Types: Chew    Tobacco comments:     Chew tobacco   Substance Use Topics    Alcohol use: Yes     Comment: occ      Wt Readings from Last 1 Encounters:   06/15/23 112.2 kg (247 lb 6.4 oz)        Anesthesia Evaluation   Pt has had prior anesthetic. Type: General and MAC.        ROS/MED HX  ENT/Pulmonary:     (+) sleep apnea, uses CPAP,                                     Neurologic:     (+)    peripheral neuropathy,                            Cardiovascular:     (+)  hypertension- -   -  - -   Taking blood thinners Pt has received instructions: Instructions Given to patient: Xarelto last dose 23. CHF        pacemaker, Reason placed: A fib. type: MED[employer]ON[employer], settings: DDDR, - Patient is dependent on pacemaker. ICD     dysrhythmias, a-fib,             METS/Exercise Tolerance: 3 - Able to walk 1-2 blocks without stopping    Hematologic:  - neg hematologic  ROS     Musculoskeletal:  - neg musculoskeletal ROS     GI/Hepatic:     (+) GERD, Asymptomatic on medication,                " "  Renal/Genitourinary:       Endo:     (+) type I DM,    Using insulin,     thyroid problem, hypothyroidism,           Psychiatric/Substance Use:  - neg psychiatric ROS     Infectious Disease:  - neg infectious disease ROS     Malignancy:  - neg malignancy ROS     Other:            Physical Exam    Airway        Mallampati: II   TM distance: > 3 FB   Neck ROM: limited   Mouth opening: > 3 cm    Respiratory Devices and Support         Dental       (+) Minor Abnormalities - some fillings, tiny chips      Cardiovascular             Pulmonary   pulmonary exam normal                OUTSIDE LABS:  CBC:   Lab Results   Component Value Date    WBC 7.9 06/15/2023    WBC 6.4 02/14/2022    HGB 15.5 06/15/2023    HGB 14.9 02/14/2022    HCT 45.4 06/15/2023    HCT 44.5 02/14/2022     06/15/2023     02/14/2022     BMP:   Lab Results   Component Value Date     06/22/2023     06/15/2023    POTASSIUM 4.8 06/22/2023    POTASSIUM 4.3 06/15/2023    CHLORIDE 101 06/22/2023    CHLORIDE 104 06/15/2023    CO2 25 06/22/2023    CO2 18 (L) 06/15/2023    BUN 19.3 06/22/2023    BUN 18.4 06/15/2023    CR 1.03 06/22/2023    CR 1.06 06/15/2023    GLC 85 11/16/2023    GLC 90 06/22/2023     COAGS: No results found for: \"PTT\", \"INR\", \"FIBR\"  POC: No results found for: \"BGM\", \"HCG\", \"HCGS\"  HEPATIC:   Lab Results   Component Value Date    ALBUMIN 3.6 05/31/2018    PROTTOTAL 8.0 05/31/2018    ALT 11 06/15/2023    AST 34 05/31/2018    ALKPHOS 141 05/31/2018    BILITOTAL 0.7 05/31/2018     OTHER:   Lab Results   Component Value Date    A1C 5.7 (H) 06/15/2023    DEVENDRA 10.1 06/22/2023    TSH 1.11 10/06/2022    T4 1.53 (H) 10/11/2019       Anesthesia Plan    ASA Status:  3    NPO Status:  NPO Appropriate    Anesthesia Type: MAC.   Induction: Propofol.   Maintenance: TIVA.        Consents    Anesthesia Plan(s) and associated risks, benefits, and realistic alternatives discussed. Questions answered and patient/representative(s) " expressed understanding.     - Discussed:     - Discussed with:  Patient      - Extended Intubation/Ventilatory Support Discussed: Yes.      - Patient is DNR/DNI Status: No     Use of blood products discussed: No .     Postoperative Care    Pain management: Multi-modal analgesia.   PONV prophylaxis: Ondansetron (or other 5HT-3)     Comments:              PAC Discussion and Assessment    ASA Classification: 3    Anesthetic techniques and relevant risks discussed: MAC with GA as backup  Invasive monitoring and risk discussed: No    Possibility and Risk of blood transfusion discussed: No  NPO instructions given: NPO after midnight    Needs early admission to pre-op area: No                                             Lidia Islas MD

## 2023-11-16 NOTE — OR NURSING
Pt underwent colonoscopy under MAC. Specimens: none. Pt transferred to recovery and report given to lina ORTIZ.       Jennifer Dailey RN

## 2023-11-16 NOTE — ANESTHESIA CARE TRANSFER NOTE
Patient: Rhett Elizabeth    Procedure: Procedure(s):  Colonoscopy       Diagnosis: Screen for colon cancer [Z12.11]  Diagnosis Additional Information: No value filed.    Anesthesia Type:   MAC     Note:    Oropharynx: spontaneously breathing and oropharynx clear of all foreign objects  Level of Consciousness: awake  Oxygen Supplementation: room air    Independent Airway: airway patency satisfactory and stable  Dentition: dentition unchanged  Vital Signs Stable: post-procedure vital signs reviewed and stable  Report to RN Given: handoff report given  Patient transferred to: Phase II    Handoff Report: Identifed the Patient, Identified the Reponsible Provider, Reviewed the pertinent medical history, Discussed the surgical course, Reviewed Intra-OP anesthesia mangement and issues during anesthesia, Set expectations for post-procedure period and Allowed opportunity for questions and acknowledgement of understanding      Vitals:  Vitals Value Taken Time   BP 98/65 11/16/23 1048   Temp     Pulse 62 11/16/230 1048   Resp     SpO2 96% 11/16/23  1048       Electronically Signed By: CHANCE Mishra CRNA  November 16, 2023  10:49 AM

## 2023-11-16 NOTE — ANESTHESIA POSTPROCEDURE EVALUATION
Patient: Rhett Elizabeth    Procedure: Procedure(s):  Colonoscopy       Anesthesia Type:  MAC    Note:  Disposition: Outpatient   Postop Pain Control: Uneventful            Sign Out: Well controlled pain   PONV: No   Neuro/Psych: Uneventful            Sign Out: Acceptable/Baseline neuro status   Airway/Respiratory: Uneventful            Sign Out: Acceptable/Baseline resp. status   CV/Hemodynamics: Uneventful            Sign Out: Acceptable CV status; No obvious hypovolemia; No obvious fluid overload   Other NRE: NONE   DID A NON-ROUTINE EVENT OCCUR? No           Last vitals:  Vitals Value Taken Time   /73 11/16/23 1113   Temp     Pulse 69 11/16/23 1113   Resp 16 11/16/23 1100   SpO2 95 % 11/16/23 1115   Vitals shown include unfiled device data.    Electronically Signed By: Lidia Islas MD  November 16, 2023  11:24 AM

## 2023-12-11 DIAGNOSIS — F11.90 CHRONIC, CONTINUOUS USE OF OPIOIDS: ICD-10-CM

## 2023-12-12 RX ORDER — TRAMADOL HYDROCHLORIDE 50 MG/1
100 TABLET ORAL 2 TIMES DAILY
Qty: 120 TABLET | Refills: 0 | Status: SHIPPED | OUTPATIENT
Start: 2023-12-12 | End: 2024-01-10

## 2023-12-18 DIAGNOSIS — E11.40 TYPE 2 DIABETES MELLITUS WITH DIABETIC NEUROPATHY, WITHOUT LONG-TERM CURRENT USE OF INSULIN (H): ICD-10-CM

## 2024-01-08 DIAGNOSIS — F11.90 CHRONIC, CONTINUOUS USE OF OPIOIDS: ICD-10-CM

## 2024-01-10 RX ORDER — TRAMADOL HYDROCHLORIDE 50 MG/1
100 TABLET ORAL 2 TIMES DAILY
Qty: 120 TABLET | Refills: 0 | Status: SHIPPED | OUTPATIENT
Start: 2024-01-10 | End: 2024-02-05

## 2024-02-05 DIAGNOSIS — E03.9 HYPOTHYROIDISM, UNSPECIFIED TYPE: ICD-10-CM

## 2024-02-05 DIAGNOSIS — F11.90 CHRONIC, CONTINUOUS USE OF OPIOIDS: ICD-10-CM

## 2024-02-05 RX ORDER — TRAMADOL HYDROCHLORIDE 50 MG/1
100 TABLET ORAL 2 TIMES DAILY
Qty: 120 TABLET | Refills: 0 | Status: SHIPPED | OUTPATIENT
Start: 2024-02-05 | End: 2024-03-11

## 2024-02-05 RX ORDER — LEVOTHYROXINE SODIUM 88 UG/1
88 TABLET ORAL DAILY
Qty: 60 TABLET | Refills: 0 | Status: SHIPPED | OUTPATIENT
Start: 2024-02-05 | End: 2024-04-02

## 2024-02-05 NOTE — TELEPHONE ENCOUNTER
Lab Results   Component Value Date    A1C 5.7 06/15/2023    A1C 5.8 10/06/2022    A1C 5.6 02/14/2022    A1C 6.2 05/17/2021    A1C 8.5 01/04/2021    A1C 6.7 01/20/2020    A1C 5.3 10/11/2019    A1C 5.7 05/17/2019     TSH   Date Value Ref Range Status   10/06/2022 1.11 0.40 - 4.00 mU/L Final   01/04/2021 0.87 0.40 - 4.00 mU/L Final     No further refills until appointment      See prescription     Ac Frederick MD

## 2024-02-12 DIAGNOSIS — E11.40 TYPE 2 DIABETES MELLITUS WITH DIABETIC NEUROPATHY, WITHOUT LONG-TERM CURRENT USE OF INSULIN (H): ICD-10-CM

## 2024-02-13 RX ORDER — DULOXETIN HYDROCHLORIDE 60 MG/1
60 CAPSULE, DELAYED RELEASE ORAL DAILY
Qty: 90 CAPSULE | Refills: 1 | Status: SHIPPED | OUTPATIENT
Start: 2024-02-13 | End: 2024-08-13

## 2024-02-17 ENCOUNTER — HEALTH MAINTENANCE LETTER (OUTPATIENT)
Age: 64
End: 2024-02-17

## 2024-02-19 DIAGNOSIS — K30 GASTROINTESTINAL DISCOMFORT: ICD-10-CM

## 2024-02-19 RX ORDER — PANTOPRAZOLE SODIUM 40 MG/1
40 TABLET, DELAYED RELEASE ORAL DAILY
Qty: 90 TABLET | Refills: 1 | Status: SHIPPED | OUTPATIENT
Start: 2024-02-19 | End: 2024-05-06

## 2024-03-11 DIAGNOSIS — F11.90 CHRONIC, CONTINUOUS USE OF OPIOIDS: ICD-10-CM

## 2024-03-11 DIAGNOSIS — E11.40 TYPE 2 DIABETES MELLITUS WITH DIABETIC NEUROPATHY, WITHOUT LONG-TERM CURRENT USE OF INSULIN (H): ICD-10-CM

## 2024-03-11 RX ORDER — TRAMADOL HYDROCHLORIDE 50 MG/1
100 TABLET ORAL 2 TIMES DAILY
Qty: 60 TABLET | Refills: 0 | Status: SHIPPED | OUTPATIENT
Start: 2024-03-11 | End: 2024-03-26

## 2024-03-11 RX ORDER — PREGABALIN 300 MG/1
CAPSULE ORAL
Qty: 60 CAPSULE | Refills: 5 | Status: SHIPPED | OUTPATIENT
Start: 2024-03-11 | End: 2024-05-06

## 2024-03-25 DIAGNOSIS — F11.90 CHRONIC, CONTINUOUS USE OF OPIOIDS: ICD-10-CM

## 2024-03-25 RX ORDER — TRAMADOL HYDROCHLORIDE 50 MG/1
TABLET ORAL
Qty: 60 TABLET | Refills: 0 | OUTPATIENT
Start: 2024-03-25

## 2024-03-26 ENCOUNTER — TELEPHONE (OUTPATIENT)
Dept: FAMILY MEDICINE | Facility: CLINIC | Age: 64
End: 2024-03-26
Payer: COMMERCIAL

## 2024-03-26 DIAGNOSIS — F11.90 CHRONIC, CONTINUOUS USE OF OPIOIDS: ICD-10-CM

## 2024-03-26 NOTE — TELEPHONE ENCOUNTER
Medication Question or Refill    Contacts         Type Contact Phone/Fax    03/26/2024 03:42 PM CDT Phone (Incoming) John Elizabeth (Self) [phone] (H)            What medication are you calling about (include dose and sig)?: traMADol (ULTRAM) 50 MG tablet     Preferred Pharmacy:   [facility] - [address]  [address]  [address]  [facility] [address]  Phone: [phone] Fax: [phone]      Controlled Substance Agreement on file:   CSA -- Patient Level:     [Media Unavailable] Controlled Substance Agreement - Opioid - Scan on 6/15/2023  4:10 PM   [Media Unavailable] Controlled Substance Agreement - Opioid - Scan on 2/15/2022  6:34 AM   [Media Unavailable] Controlled Substance Agreement - Opioid - Scan on 2/3/2020  4:24 PM       Who prescribed the medication?: PCP    Do you need a refill? Yes    When did you use the medication last? today    Patient offered an appointment? No    Do you have any questions or concerns?  He would also like lab orders placed      Could we [place] this [place] you [place] or would you prefer to receive a phone call?:   No preference   Okay to leave a detailed message?: Yes at Cell number on file:    Telephone Information:   Mobile [phone]

## 2024-03-27 ENCOUNTER — MYC MEDICAL ADVICE (OUTPATIENT)
Dept: INTERNAL MEDICINE | Facility: CLINIC | Age: 64
End: 2024-03-27
Payer: COMMERCIAL

## 2024-03-27 DIAGNOSIS — F11.90 CHRONIC, CONTINUOUS USE OF OPIOIDS: ICD-10-CM

## 2024-03-27 RX ORDER — TRAMADOL HYDROCHLORIDE 50 MG/1
100 TABLET ORAL 2 TIMES DAILY
Qty: 60 TABLET | Refills: 0 | OUTPATIENT
Start: 2024-03-27

## 2024-03-27 RX ORDER — TRAMADOL HYDROCHLORIDE 50 MG/1
100 TABLET ORAL 2 TIMES DAILY
Qty: 60 TABLET | Refills: 0 | Status: SHIPPED | OUTPATIENT
Start: 2024-03-27 | End: 2024-04-08

## 2024-03-27 NOTE — TELEPHONE ENCOUNTER
Patient calling to let provider know he has appt scheduled for 4/25/24. Patient took his last ones today.

## 2024-03-28 NOTE — TELEPHONE ENCOUNTER
Please notify patient that tramadol has been refilled.    Annual HM orders current. The lab should be able to draw the routine labs that are currently due.    Deepali Mcleod RN  [facility]

## 2024-04-01 DIAGNOSIS — E03.9 HYPOTHYROIDISM, UNSPECIFIED TYPE: ICD-10-CM

## 2024-04-02 RX ORDER — LEVOTHYROXINE SODIUM 88 UG/1
TABLET ORAL
Qty: 90 TABLET | Refills: 0 | Status: SHIPPED | OUTPATIENT
Start: 2024-04-02 | End: 2024-07-02

## 2024-04-08 ENCOUNTER — MYC REFILL (OUTPATIENT)
Dept: INTERNAL MEDICINE | Facility: CLINIC | Age: 64
End: 2024-04-08
Payer: COMMERCIAL

## 2024-04-08 DIAGNOSIS — G89.4 CHRONIC PAIN SYNDROME: Primary | ICD-10-CM

## 2024-04-08 DIAGNOSIS — F11.90 CHRONIC, CONTINUOUS USE OF OPIOIDS: ICD-10-CM

## 2024-04-08 DIAGNOSIS — E11.40 TYPE 2 DIABETES MELLITUS WITH DIABETIC NEUROPATHY, WITHOUT LONG-TERM CURRENT USE OF INSULIN (H): ICD-10-CM

## 2024-04-08 RX ORDER — PREGABALIN 300 MG/1
CAPSULE ORAL
Qty: 60 CAPSULE | Refills: 5 | Status: CANCELLED | OUTPATIENT
Start: 2024-04-08

## 2024-04-09 PROBLEM — G89.4 CHRONIC PAIN SYNDROME: Status: ACTIVE | Noted: 2024-04-09

## 2024-04-09 RX ORDER — TRAMADOL HYDROCHLORIDE 50 MG/1
100 TABLET ORAL 2 TIMES DAILY
Qty: 120 TABLET | Refills: 1 | Status: SHIPPED | OUTPATIENT
Start: 2024-04-09 | End: 2024-05-06

## 2024-04-17 ENCOUNTER — APPOINTMENT (OUTPATIENT)
Dept: OPTOMETRY | Facility: CLINIC | Age: 64
End: 2024-04-17
Payer: COMMERCIAL

## 2024-04-17 PROCEDURE — 92342 FIT SPECTACLES MULTIFOCAL: CPT | Performed by: OPTOMETRIST

## 2024-05-06 ENCOUNTER — LAB (OUTPATIENT)
Dept: LAB | Facility: CLINIC | Age: 64
End: 2024-05-06
Payer: COMMERCIAL

## 2024-05-06 ENCOUNTER — OFFICE VISIT (OUTPATIENT)
Dept: INTERNAL MEDICINE | Facility: CLINIC | Age: 64
End: 2024-05-06
Payer: COMMERCIAL

## 2024-05-06 VITALS
DIASTOLIC BLOOD PRESSURE: 76 MMHG | BODY MASS INDEX: 29.12 KG/M2 | HEART RATE: 68 BPM | WEIGHT: 233 LBS | RESPIRATION RATE: 16 BRPM | OXYGEN SATURATION: 98 % | TEMPERATURE: 97.3 F | SYSTOLIC BLOOD PRESSURE: 106 MMHG

## 2024-05-06 DIAGNOSIS — F11.90 CHRONIC, CONTINUOUS USE OF OPIOIDS: ICD-10-CM

## 2024-05-06 DIAGNOSIS — E03.9 ACQUIRED HYPOTHYROIDISM: Chronic | ICD-10-CM

## 2024-05-06 DIAGNOSIS — E78.5 HYPERLIPIDEMIA LDL GOAL <100: ICD-10-CM

## 2024-05-06 DIAGNOSIS — Z29.11 NEED FOR PROPHYLACTIC VACCINATION AND INOCULATION AGAINST RESPIRATORY SYNCYTIAL VIRUS (RSV): ICD-10-CM

## 2024-05-06 DIAGNOSIS — E11.40 TYPE 2 DIABETES MELLITUS WITH DIABETIC NEUROPATHY, WITHOUT LONG-TERM CURRENT USE OF INSULIN (H): Primary | ICD-10-CM

## 2024-05-06 DIAGNOSIS — I48.19 PERSISTENT ATRIAL FIBRILLATION (H): ICD-10-CM

## 2024-05-06 DIAGNOSIS — K30 GASTROINTESTINAL DISCOMFORT: ICD-10-CM

## 2024-05-06 DIAGNOSIS — I10 ESSENTIAL HYPERTENSION WITH GOAL BLOOD PRESSURE LESS THAN 140/90: ICD-10-CM

## 2024-05-06 DIAGNOSIS — I50.22 CHRONIC SYSTOLIC CONGESTIVE HEART FAILURE (H): ICD-10-CM

## 2024-05-06 LAB
ANION GAP SERPL CALCULATED.3IONS-SCNC: 12 MMOL/L (ref 7–15)
BUN SERPL-MCNC: 16.6 MG/DL (ref 8–23)
CALCIUM SERPL-MCNC: 9.7 MG/DL (ref 8.8–10.2)
CHLORIDE SERPL-SCNC: 103 MMOL/L (ref 98–107)
CHOLEST SERPL-MCNC: 120 MG/DL
CREAT SERPL-MCNC: 0.99 MG/DL (ref 0.67–1.17)
DEPRECATED HCO3 PLAS-SCNC: 24 MMOL/L (ref 22–29)
EGFRCR SERPLBLD CKD-EPI 2021: 86 ML/MIN/1.73M2
GLUCOSE SERPL-MCNC: 80 MG/DL (ref 70–99)
HBA1C MFR BLD: 5.7 % (ref 0–5.6)
HDLC SERPL-MCNC: 37 MG/DL
LDLC SERPL CALC-MCNC: 66 MG/DL
NONHDLC SERPL-MCNC: 83 MG/DL
POTASSIUM SERPL-SCNC: 4.9 MMOL/L (ref 3.4–5.3)
SODIUM SERPL-SCNC: 139 MMOL/L (ref 135–145)
TRIGL SERPL-MCNC: 85 MG/DL
TSH SERPL DL<=0.005 MIU/L-ACNC: 1.48 UIU/ML (ref 0.3–4.2)

## 2024-05-06 PROCEDURE — 99214 OFFICE O/P EST MOD 30 MIN: CPT | Mod: 25 | Performed by: INTERNAL MEDICINE

## 2024-05-06 PROCEDURE — 80048 BASIC METABOLIC PNL TOTAL CA: CPT

## 2024-05-06 PROCEDURE — 90471 IMMUNIZATION ADMIN: CPT | Performed by: INTERNAL MEDICINE

## 2024-05-06 PROCEDURE — 83036 HEMOGLOBIN GLYCOSYLATED A1C: CPT

## 2024-05-06 PROCEDURE — 80061 LIPID PANEL: CPT

## 2024-05-06 PROCEDURE — 99207 PR FOOT EXAM NO CHARGE: CPT | Performed by: INTERNAL MEDICINE

## 2024-05-06 PROCEDURE — 36415 COLL VENOUS BLD VENIPUNCTURE: CPT

## 2024-05-06 PROCEDURE — 90678 RSV VACC PREF BIVALENT IM: CPT | Performed by: INTERNAL MEDICINE

## 2024-05-06 PROCEDURE — 84443 ASSAY THYROID STIM HORMONE: CPT

## 2024-05-06 RX ORDER — LIRAGLUTIDE 6 MG/ML
1.8 INJECTION SUBCUTANEOUS DAILY
Qty: 27 ML | Refills: 5 | Status: SHIPPED | OUTPATIENT
Start: 2024-05-06

## 2024-05-06 RX ORDER — PANTOPRAZOLE SODIUM 40 MG/1
40 TABLET, DELAYED RELEASE ORAL DAILY
Qty: 90 TABLET | Refills: 1 | Status: SHIPPED | OUTPATIENT
Start: 2024-05-06

## 2024-05-06 RX ORDER — PRAVASTATIN SODIUM 10 MG
10 TABLET ORAL DAILY
Qty: 90 TABLET | Refills: 3 | Status: SHIPPED | OUTPATIENT
Start: 2024-05-06

## 2024-05-06 RX ORDER — TRAMADOL HYDROCHLORIDE 50 MG/1
100 TABLET ORAL 2 TIMES DAILY
Qty: 120 TABLET | Refills: 5 | Status: SHIPPED | OUTPATIENT
Start: 2024-05-06

## 2024-05-06 RX ORDER — PREGABALIN 300 MG/1
CAPSULE ORAL
Qty: 60 CAPSULE | Refills: 5 | Status: SHIPPED | OUTPATIENT
Start: 2024-05-06

## 2024-05-06 ASSESSMENT — PATIENT HEALTH QUESTIONNAIRE - PHQ9
10. IF YOU CHECKED OFF ANY PROBLEMS, HOW DIFFICULT HAVE THESE PROBLEMS MADE IT FOR YOU TO DO YOUR WORK, TAKE CARE OF THINGS AT HOME, OR GET ALONG WITH OTHER PEOPLE: SOMEWHAT DIFFICULT
SUM OF ALL RESPONSES TO PHQ QUESTIONS 1-9: 7
SUM OF ALL RESPONSES TO PHQ QUESTIONS 1-9: 7

## 2024-05-06 NOTE — PROGRESS NOTES
"  Assessment & Plan     Type 2 diabetes mellitus with diabetic neuropathy, without long-term current use of insulin (H)  This patient has well controlled diabetes mellitus and we do not need the make any medication adjustments today. Recheck in 6 months   - Hemoglobin A1c; Future  - Basic metabolic panel  (Ca, Cl, CO2, Creat, Gluc, K, Na, BUN); Future  - FOOT EXAM  - liraglutide (VICTOZA PEN) 18 MG/3ML solution; Inject 1.8 mg Subcutaneous daily  - metFORMIN (GLUCOPHAGE) 500 MG tablet; TAKE TWO TABLETS BY MOUTH TWICE A DAY  - pregabalin (LYRICA) 300 MG capsule; TAKE ONE CAPSULE BY MOUTH TWICE A DAY    Chronic systolic congestive heart failure (H)  See office visit notes with [facility] cardiologist     Persistent atrial fibrillation (H)  As above     Acquired hypothyroidism  Overdue for this test, I did not send in refills for levothyroxine because I want to see his TSH ( thyroid test ) first  - TSH with free T4 reflex; Future    Need for prophylactic vaccination and inoculation against respiratory syncytial virus (RSV)  Administered   - RSV VACCINE (ABRYSVO)    Gastrointestinal discomfort  Nonspecific symptoms helped with proton pump inhibitor   - pantoprazole (PROTONIX) 40 MG EC tablet; Take 1 tablet (40 mg) by mouth daily    Hyperlipidemia LDL goal <100  Continue current plan of care  , fasting lipid panel pending from today   Lab Results   Component Value Date    CHOL 153 06/15/2023    CHOL 111 01/04/2021     Lab Results   Component Value Date    HDL 36 06/15/2023    HDL 27 01/04/2021     Lab Results   Component Value Date    LDL 94 06/15/2023    LDL 37 01/04/2021     Lab Results   Component Value Date    TRIG 114 06/15/2023    TRIG 235 01/04/2021     No results found for: \"CHOLHDLRATIO\"    - pravastatin (PRAVACHOL) 10 MG tablet; Take 1 tablet (10 mg) by mouth daily    Chronic, continuous use of opioids  Chronic oral opioid therapy, current with chronic pain care package   - traMADol (ULTRAM) 50 MG tablet; " Take 2 tablets (100 mg) by mouth 2 times daily 28 days or more between refills    Review of the result(s) of each unique test - today's tests  Prescription drug management  26 minutes spent by me on the date of the encounter doing chart review, history and exam, documentation and further activities per the note        Subjective   John is a 63 year old, presenting for the following health issues:  No chief complaint on file.         No data to display              History of Present Illness       Diabetes:   He presents for follow up of diabetes.  He is checking home blood glucose a few times a month.   He checks blood glucose before and after meals.  Blood glucose is never over 200 and sometimes under 70. He is aware of hypoglycemia symptoms including shakiness, dizziness, weakness and blurred vision.    He has no concerns regarding his diabetes at this time.  He is having numbness in feet.  The patient has not had a diabetic eye exam in the last 12 months.          Heart Failure:  He presents for follow up of heart failure. He is not experiencing shortness of breath at night, with rest or with activity  He is experiencing lower extremity edema which is same as usual.   He denies orthopenea and is not coughing at night. Patient is not checking weight daily. He has recently had a weight decrease.  He has no side effects from medications.  He has has a medical visit for heart failure 1 time since the last visit.    Hypertension: He presents for follow up of hypertension.  He does not check blood pressure  regularly outside of the clinic. Outpatient blood pressures have not been over 140/90. He follows a low salt diet.     Vascular Disease:  He presents for follow up of vascular disease.     He never takes nitroglycerin. He takes daily aspirin.    He eats 0-1 servings of fruits and vegetables daily.He consumes 0 sweetened beverage(s) daily.He exercises with enough effort to increase his heart rate 10 to 19 minutes per  day.  He exercises with enough effort to increase his heart rate 5 days per week.   He is taking medications regularly.     Lab Results   Component Value Date    A1C 5.7 05/06/2024    A1C 5.7 06/15/2023    A1C 5.8 10/06/2022    A1C 5.6 02/14/2022    A1C 6.2 05/17/2021    A1C 8.5 01/04/2021    A1C 6.7 01/20/2020    A1C 5.3 10/11/2019    A1C 5.7 05/17/2019     Patient has a pending eye exam for next month   He has a chronic peripheral neuropathy , this has been for many many years. He had many many years of undiagnosed diabetes mellitus and so this is still considered to be diabetes neuropathy     TSH   Date Value Ref Range Status   10/06/2022 1.11 0.40 - 4.00 mU/L Final   01/04/2021 0.87 0.40 - 4.00 mU/L Final     Receives his heart care with [facility] - I reviewed his refills that were sent by his cardiologist , Ofe Smith, APRN, CNP     Ordered Prescriptions by [facility] listed below  Prescription Sig Dispensed Refills Start Date End Date   sacubitriL-valsartan (ENTRESTO) 24-26 mg oral Tab tablet   Indications: Hypertension, unspecified type Take 1 tablet by mouth twice a day. 180 tablet  1 04/19/2024     rivaroxaban (XARELTO) 20 mg oral tablet  Take 1 tablet (20 mg) by mouth once a day with evening meal. 90 tablet  3 04/19/2024     metoprolol succinate, XL, (TOPROL XL) 50 mg oral extended release tablet 24 HR  Take 1 tablet (50 mg) by mouth once daily. 90 tablet  3 04/19/2024     empagliflozin (JARDIANCE) 10 mg oral tablet   Indications: Type 2 diabetes mellitus with diabetic neuropathy, without long-term current use of insulin (HCC) Take 1 tablet (10 mg) by mouth once daily. Requires BMP lab every 6 months. 90 tablet    04/19/2024        BP Readings from Last 6 Encounters:   05/06/24 106/76   11/16/23 102/73   06/15/23 106/72   12/05/22 100/64   02/14/22 105/72   01/20/22 112/82     Body mass index is 29.12 kg/m .   Wt Readings from Last 5 Encounters:   05/06/24 105.7 kg (233 lb)   06/15/23  112.2 kg (247 lb 6.4 oz)   12/05/22 115.7 kg (255 lb)   02/14/22 119.4 kg (263 lb 3.2 oz)   01/20/22 121.1 kg (267 lb)   Patient once weighed 365, 6-7 years ago.    Patient takes credit for his weight loss   He does not eat after 5 pm     Patient meets the recommended weekly exercising.     Last Echo: No results found.        Review of Systems  Constitutional, HEENT, cardiovascular, pulmonary, gi and gu systems are negative, except as otherwise noted.      Objective    /76   Pulse 68   Temp 97.3  F (36.3  C) (Temporal)   Resp 16   Wt 105.7 kg (233 lb)   SpO2 98%   BMI 29.12 kg/m    Body mass index is 29.12 kg/m .  Physical Exam   GENERAL: alert and no distress  EYES: Eyes grossly normal to inspection, PERRL and conjunctivae and sclerae normal  RESP: lungs clear to auscultation - no rales, rhonchi or wheezes  CV: irregularly irregular rhythm consistent with atrial fibrillation , normal S1 S2, no S3 or S4, no murmur, click or rub, no peripheral edema  MS: no gross musculoskeletal defects noted, no edema    Orders Placed This Encounter   Procedures    FOOT EXAM    RSV VACCINE (ABRYSVO)    Hemoglobin A1c    Basic metabolic panel  (Ca, Cl, CO2, Creat, Gluc, K, Na, BUN)    TSH with free T4 reflex    Lipid panel reflex to direct LDL Fasting             Signed Electronically by: Ac Frederick MD

## 2024-06-10 ENCOUNTER — OFFICE VISIT (OUTPATIENT)
Dept: OPTOMETRY | Facility: CLINIC | Age: 64
End: 2024-06-10
Payer: COMMERCIAL

## 2024-06-10 DIAGNOSIS — H52.223 REGULAR ASTIGMATISM OF BOTH EYES: ICD-10-CM

## 2024-06-10 DIAGNOSIS — H35.89 RETINAL PIGMENT EPITHELIAL MOTTLING OF MACULA: ICD-10-CM

## 2024-06-10 DIAGNOSIS — Z83.518 FAMILY HISTORY OF MACULAR DEGENERATION: ICD-10-CM

## 2024-06-10 DIAGNOSIS — E11.9 TYPE 2 DIABETES MELLITUS WITHOUT RETINOPATHY (H): ICD-10-CM

## 2024-06-10 DIAGNOSIS — Z01.01 ENCOUNTER FOR EXAMINATION OF EYES AND VISION WITH ABNORMAL FINDINGS: Primary | ICD-10-CM

## 2024-06-10 DIAGNOSIS — H52.13 MYOPIA OF BOTH EYES: ICD-10-CM

## 2024-06-10 DIAGNOSIS — H25.13 NUCLEAR AGE-RELATED CATARACT, BOTH EYES: ICD-10-CM

## 2024-06-10 DIAGNOSIS — H52.4 PRESBYOPIA: ICD-10-CM

## 2024-06-10 PROCEDURE — 92015 DETERMINE REFRACTIVE STATE: CPT | Performed by: OPTOMETRIST

## 2024-06-10 PROCEDURE — 92014 COMPRE OPH EXAM EST PT 1/>: CPT | Performed by: OPTOMETRIST

## 2024-06-10 ASSESSMENT — SLIT LAMP EXAM - LIDS
COMMENTS: NORMAL
COMMENTS: NORMAL

## 2024-06-10 ASSESSMENT — REFRACTION_MANIFEST
OS_AXIS: 065
OD_CYLINDER: +2.75
OS_CYLINDER: +2.50
OD_ADD: +2.50
OS_ADD: +2.50
OD_SPHERE: -2.00
OS_SPHERE: -3.25
OD_AXIS: 134

## 2024-06-10 ASSESSMENT — CONF VISUAL FIELD
OD_INFERIOR_NASAL_RESTRICTION: 0
METHOD: COUNTING FINGERS
OS_INFERIOR_NASAL_RESTRICTION: 0
OD_SUPERIOR_NASAL_RESTRICTION: 0
OS_SUPERIOR_NASAL_RESTRICTION: 0
OD_INFERIOR_TEMPORAL_RESTRICTION: 0
OD_NORMAL: 1
OS_INFERIOR_TEMPORAL_RESTRICTION: 0
OS_SUPERIOR_TEMPORAL_RESTRICTION: 0
OS_NORMAL: 1
OD_SUPERIOR_TEMPORAL_RESTRICTION: 0

## 2024-06-10 ASSESSMENT — REFRACTION_WEARINGRX
OD_ADD: +2.50
OS_ADD: +2.50
SPECS_TYPE: PAL
OS_CYLINDER: +2.50
OS_AXIS: 075
OD_CYLINDER: +2.75
OS_SPHERE: -3.50
OD_SPHERE: -2.50
OD_AXIS: 130

## 2024-06-10 ASSESSMENT — VISUAL ACUITY
OS_SC: 20/125
OD_SC: 20/120
OS_CC+: +1
METHOD: SNELLEN - LINEAR
OD_CC: 20/40-1
OD_CC: 20/30
OS_CC: 20/30
OD_CC+: -1
CORRECTION_TYPE: GLASSES
OS_CC: 20/30-2
OS_SC: 20/40
OD_SC: 20/100

## 2024-06-10 ASSESSMENT — CUP TO DISC RATIO
OD_RATIO: 0.55
OS_RATIO: 0.55

## 2024-06-10 ASSESSMENT — EXTERNAL EXAM - LEFT EYE: OS_EXAM: NORMAL

## 2024-06-10 ASSESSMENT — EXTERNAL EXAM - RIGHT EYE: OD_EXAM: NORMAL

## 2024-06-10 ASSESSMENT — TONOMETRY
IOP_METHOD: APPLANATION
OS_IOP_MMHG: 14
OD_IOP_MMHG: 12

## 2024-06-10 NOTE — LETTER
6/10/2024      [place] [address]      Dear Colleague,    Thank you for referring your patient, Rhett Elizabeth, to the [facility]. Please see a copy of my visit note below.    Chief Complaint   Patient presents with     Diabetic Eye Exam     Drusen Evaluation        Chief Complaint(s) and History of Present Illness(es)       Diabetic Eye Exam              Diabetes Type: Type 2, on insulin and taking oral medications    Duration: years    Blood Sugars: is controlled (Checks ~1x/week)              Drusen Evaluation              Laterality: left eye                   Lab Results   Component Value Date    A1C 5.7 05/06/2024    A1C 5.7 06/15/2023    A1C 5.8 10/06/2022    A1C 5.6 02/14/2022    A1C 6.2 05/17/2021    A1C 8.5 01/04/2021    A1C 6.7 01/20/2020    A1C 5.3 10/11/2019    A1C 5.7 05/17/2019     -Drusen left eye - takes Lutein  -Family hx of wet AMD - both parents  -Family hx of glaucoma - father   -S/P strabismus sx      Last Eye Exam: 12/27/2021 Dr. Cesia Gusman - saw Dr. Lassiter a few times in 2022 for AMD eval  Dilated Previously: Yes, side effects of dilation explained today    What are you currently using to see?  Glasses - PAL's - wears full time     Distance Vision Acuity: Satisfied with vision overall - tilts head back to drive sometimes     Near Vision Acuity: Not satisfied - has to tilt head and move around a lot at work     Eye Comfort: watery and mattery   Do you use eye drops? 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       OBJECTIVE: See Ophthalmology exam    ASSESSMENT:    ICD-10-CM    1. Encounter for examination of eyes and vision with abnormal findings  Z01.01       2. Type 2 diabetes mellitus without retinopathy (H)  E11.9       3. 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 Nuclear  age-related cataract, both eyes  H25.13       5. Family history of macular degeneration  Z83.518       6. Myopia of both eyes  H52.13       7. Regular astigmatism of both eyes  H52.223       8. Presbyopia  H52.4           PLAN:    Rhett Elizabeth aware  eye exam results will be sent to Ac Frederick.  Patient Instructions   Patient educated on importance of good blood sugar control.  Letter sent to primary care provider with diabetic eye exam report.     You have the start of mild cataracts.  You may notice some blurred vision or glare with night driving.  It is important that you wear good sunglasses to protect your eyes from the ultraviolet light from the sun.     Updated glasses prescription provided today.   Allow 2 weeks to adapt to the new glasses.     Continue AREDS supplements to help support retinal health.     Return in 1 year for a comprehensive eye exam, or sooner if needed.      The effects of the dilating drops last for 4- 6 hours.  You will be more sensitive to light and vision will be blurry up close.  Mydriatic sunglasses were given if needed.     Abdirashid Diaz OD  [address] [place]  [address]    [phone]             Again, thank you for allowing me to participate in the care of your patient.        Sincerely,        Abdirashid Diaz OD

## 2024-06-10 NOTE — PROGRESS NOTES
Chief Complaint   Patient presents with    Diabetic Eye Exam    Drusen Evaluation        Chief Complaint(s) and History of Present Illness(es)       Diabetic Eye Exam              Diabetes Type: Type 2, on insulin and taking oral medications    Duration: years    Blood Sugars: is controlled (Checks ~1x/week)              Drusen Evaluation              Laterality: left eye                   Lab Results   Component Value Date    A1C 5.7 05/06/2024    A1C 5.7 06/15/2023    A1C 5.8 10/06/2022    A1C 5.6 02/14/2022    A1C 6.2 05/17/2021    A1C 8.5 01/04/2021    A1C 6.7 01/20/2020    A1C 5.3 10/11/2019    A1C 5.7 05/17/2019     -Drusen left eye - takes Lutein  -Family hx of wet AMD - both parents  -Family hx of glaucoma - father   -S/P strabismus sx      Last Eye Exam: 12/27/2021 Dr. Cesia Gusman - saw Dr. Lassiter a few times in 2022 for AMD eval  Dilated Previously: Yes, side effects of dilation explained today    What are you currently using to see?  Glasses - PAL's - wears full time     Distance Vision Acuity: Satisfied with vision overall - tilts head back to drive sometimes     Near Vision Acuity: Not satisfied - has to tilt head and move around a lot at work     Eye Comfort: watery and mattery   Do you use eye drops? 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       OBJECTIVE: See Ophthalmology exam    ASSESSMENT:    ICD-10-CM    1. Encounter for examination of eyes and vision with abnormal findings  Z01.01       2. Type 2 diabetes mellitus without retinopathy (H)  E11.9       3. 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 Nuclear age-related cataract, both eyes  H25.13       5. Family history of macular degeneration  Z83.518       6. Myopia of both eyes  H52.13       7. Regular astigmatism of both eyes  H52.223       8. Presbyopia  H52.4           PLAN:    Rhett CALLE  Glenn aware  eye exam results will be sent to Ac Frederick.  Patient Instructions   Patient educated on importance of good blood sugar control.  Letter sent to primary care provider with diabetic eye exam report.     You have the start of mild cataracts.  You may notice some blurred vision or glare with night driving.  It is important that you wear good sunglasses to protect your eyes from the ultraviolet light from the sun.     Updated glasses prescription provided today.   Allow 2 weeks to adapt to the new glasses.     Continue AREDS supplements to help support retinal health.     Return in 1 year for a comprehensive eye exam, or sooner if needed.      The effects of the dilating drops last for 4- 6 hours.  You will be more sensitive to light and vision will be blurry up close.  Mydriatic sunglasses were given if needed.     Abdirashid Diaz, OD  [facility]  [address] [place] [address]. [place]  [place]  [id number]    [phone]

## 2024-06-10 NOTE — PATIENT INSTRUCTIONS
Patient educated on importance of good blood sugar control.  Letter sent to primary care provider with diabetic eye exam report.     You have the start of mild cataracts.  You may notice some blurred vision or glare with night driving.  It is important that you wear good sunglasses to protect your eyes from the ultraviolet light from the sun.     Updated glasses prescription provided today.   Allow 2 weeks to adapt to the new glasses.     Continue AREDS supplements to help support retinal health.     Return in 1 year for a comprehensive eye exam, or sooner if needed.      The effects of the dilating drops last for 4- 6 hours.  You will be more sensitive to light and vision will be blurry up close.  Mydriatic sunglasses were given if needed.     Abdirashid Diaz, OD  [facility] [place]  [address] [phone]

## 2024-06-17 ENCOUNTER — TELEPHONE (OUTPATIENT)
Dept: FAMILY MEDICINE | Facility: CLINIC | Age: 64
End: 2024-06-17

## 2024-06-17 NOTE — TELEPHONE ENCOUNTER
Patient Quality Outreach    Patient is due for the following:   Diabetes -  A1C    Next Steps:   No follow up needed at this time.    Type of outreach:    Chart review performed, no outreach needed.      Questions for provider review:    None           Isidra Rincon CMA  Chart routed to none.

## 2024-07-01 DIAGNOSIS — E03.9 HYPOTHYROIDISM, UNSPECIFIED TYPE: ICD-10-CM

## 2024-07-02 RX ORDER — LEVOTHYROXINE SODIUM 88 UG/1
TABLET ORAL
Qty: 90 TABLET | Refills: 0 | Status: SHIPPED | OUTPATIENT
Start: 2024-07-02 | End: 2024-09-30

## 2024-08-12 DIAGNOSIS — E11.40 TYPE 2 DIABETES MELLITUS WITH DIABETIC NEUROPATHY, WITHOUT LONG-TERM CURRENT USE OF INSULIN (H): ICD-10-CM

## 2024-08-13 RX ORDER — DULOXETIN HYDROCHLORIDE 60 MG/1
60 CAPSULE, DELAYED RELEASE ORAL DAILY
Qty: 90 CAPSULE | Refills: 1 | Status: SHIPPED | OUTPATIENT
Start: 2024-08-13

## 2024-09-24 ENCOUNTER — OFFICE VISIT (OUTPATIENT)
Dept: FAMILY MEDICINE | Facility: CLINIC | Age: 64
End: 2024-09-24
Payer: COMMERCIAL

## 2024-09-24 VITALS
TEMPERATURE: 97.9 F | OXYGEN SATURATION: 97 % | RESPIRATION RATE: 19 BRPM | DIASTOLIC BLOOD PRESSURE: 77 MMHG | SYSTOLIC BLOOD PRESSURE: 114 MMHG | HEIGHT: 73 IN | BODY MASS INDEX: 31.81 KG/M2 | WEIGHT: 240 LBS | HEART RATE: 59 BPM

## 2024-09-24 DIAGNOSIS — M54.50 ACUTE MIDLINE LOW BACK PAIN WITHOUT SCIATICA: ICD-10-CM

## 2024-09-24 DIAGNOSIS — Z79.01 LONG TERM CURRENT USE OF ANTICOAGULANT THERAPY: ICD-10-CM

## 2024-09-24 DIAGNOSIS — M25.551 HIP PAIN, RIGHT: ICD-10-CM

## 2024-09-24 DIAGNOSIS — S32.010A COMPRESSION FRACTURE OF L1 VERTEBRA, INITIAL ENCOUNTER (H): ICD-10-CM

## 2024-09-24 DIAGNOSIS — M79.2 NEUROPATHIC PAIN: Primary | ICD-10-CM
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
## 2024-09-24 DIAGNOSIS — E11.40 TYPE 2 DIABETES MELLITUS WITH DIABETIC NEUROPATHY, WITHOUT LONG-TERM CURRENT USE OF INSULIN (H): Chronic | ICD-10-CM

## 2024-09-24 DIAGNOSIS — R20.2 ARM PARESTHESIA, LEFT: ICD-10-CM

## 2024-09-24 PROCEDURE — 99214 OFFICE O/P EST MOD 30 MIN: CPT | Performed by: PHYSICIAN ASSISTANT

## 2024-09-24 PROCEDURE — G2211 COMPLEX E/M VISIT ADD ON: HCPCS | Performed by: PHYSICIAN ASSISTANT

## 2024-09-24 RX ORDER — PREDNISONE 20 MG/1
40 TABLET ORAL DAILY
Qty: 10 TABLET | Refills: 0 | Status: SHIPPED | OUTPATIENT
Start: 2024-09-24 | End: 2024-09-29

## 2024-09-24 RX ORDER — CYCLOBENZAPRINE HCL 5 MG
5-10 TABLET ORAL 3 TIMES DAILY PRN
Qty: 40 TABLET | Refills: 0 | Status: SHIPPED | OUTPATIENT
Start: 2024-09-24

## 2024-09-24 ASSESSMENT — ANXIETY QUESTIONNAIRES
8. IF YOU CHECKED OFF ANY PROBLEMS, HOW DIFFICULT HAVE THESE MADE IT FOR YOU TO DO YOUR WORK, TAKE CARE OF THINGS AT HOME, OR GET ALONG WITH OTHER PEOPLE?: NOT DIFFICULT AT ALL
GAD7 TOTAL SCORE: 1
7. FEELING AFRAID AS IF SOMETHING AWFUL MIGHT HAPPEN: NOT AT ALL

## 2024-09-24 ASSESSMENT — PAIN SCALES - GENERAL: PAINLEVEL: EXTREME PAIN (8)

## 2024-09-24 NOTE — PROGRESS NOTES
"  Assessment & Plan     1. Neuropathic pain    2. Long term current use of anticoagulant therapy    3. Fall, initial encounter    4. Arm paresthesia, left    5. Acute midline low back pain without sciatica    6. Hip pain, right    7. Type 2 diabetes mellitus with diabetic neuropathy, without long-term current use of insulin (H)      Fall causing low back pain and some numbness in the arms. Will start with lumbar x-ray and flexeril as needed. Patient will also benefit from prednisone. His sugars are currently well controlled but he was advised to monitor them with the prednisone.   Home exercises given. If pain is not improving may need further work up.   No changes to chronic pain medications.   If patient needs additional time off of work he will let us know.     The longitudinal plan of care for the diagnosis(es)/condition(s) as documented were addressed during this visit. Due to the added complexity in care, I will continue to support John in the subsequent management and with ongoing continuity of care.      BMI  Estimated body mass index is 31.45 kg/m  as calculated from the following:    Height as of this encounter: 1.861 m (6' 1.25\").    Weight as of this encounter: 108.9 kg (240 lb).             Subjective   John is a 63 year old, presenting for the following health issues:  No chief complaint on file.      9/24/2024    10:39 AM   Additional Questions   Roomed by mariya saravia         9/24/2024   Forms   Any forms needing to be completed Yes          History of Present Illness       Back Pain:  He presents for follow up of back pain. Patient's back pain is a new problem.    Original cause of back pain: a fall  First noticed back pain: in the last week  Patient feels back pain: constantlyLocation of back pain:  Right lower back, left lower back and right middle of back  Description of back pain: gnawing, sharp, shooting and stabbing  Back pain spreads: right buttocks    Since patient first noticed back pain, pain " "is: always present, but gets better and worse  Does back pain interfere with his job:  Yes  On a scale of 1-10 (10 being the worst), patient describes pain as:  9  What makes back pain worse: bending, coughing, certain positions, standing and twisting   Acupuncture: not tried  Acetaminophen: not tried  Activity or exercise: not tried  Chiropractor:  Not tried  Cold: not tried  Heat: helpful  Massage: not tried  Muscle relaxants: not tried  NSAIDS: not helpful  Opioids: not helpful  Physical Therapy: not tried  Rest: not tried  Steroid Injection: not tried  Stretching: not helpful  Surgery: not tried  TENS unit: not tried  Topical pain relievers: not tried  Other healthcare providers patient is seeing for back pain: None   He is taking medications regularly.     Fell last Wednesday. Fell on his butt and hip, hit his right side off the edge of the tub. Needed help in order to stand. Did not hit his head. Is on anticoagulants.   Hands have been going numb on both sides. No weakness.   No bruising.   No leg pain.   Twisting and turning and bending with the low back causes pain. Down the right hip as well.   Sugars are well controlled.   Works as an  at the [employer]-took off the rest of the week. Hopeful he can go back on Monday.                 Objective    /77 (BP Location: Left arm, Patient Position: Chair, Cuff Size: Adult Large)   Pulse 59   Temp 97.9  F (36.6  C) (Temporal)   Resp 19   Ht 1.861 m (6' 1.25\")   Wt 108.9 kg (240 lb)   SpO2 97%   BMI 31.45 kg/m    Body mass index is 31.45 kg/m .  Physical Exam   GENERAL: alert and no distress  ABDOMEN: soft, nontender,  MS: no gross musculoskeletal defects noted, no edema- negative straight leg raise bilaterally. Pain with palpation along the right paraspinal muscles and the lumbar spinous processes. No pain with palpation to the cervical spinous processes or the trapezius. Normal  strength.   SKIN: no suspicious lesions or " rashes  NEURO: Normal strength and tone, mentation intact and speech normal deep tendon reflexes normal.             Signed Electronically by: Ceci Rios PA-C

## 2024-09-26 ENCOUNTER — TELEPHONE (OUTPATIENT)
Dept: FAMILY MEDICINE | Facility: CLINIC | Age: 64
End: 2024-09-26
Payer: COMMERCIAL

## 2024-09-26 NOTE — TELEPHONE ENCOUNTER
MTM referral from: [place] clinic visit (referral by provider)    MTM referral outreach attempt #2 on September 26, 2024 at 9:22 AM      Outcome: Left Message    Use vbc for the carrier/Plan on the flowsheet      [employer] Message Sent    Shruti Barlow CMA  MTM

## 2024-09-29 ENCOUNTER — MYC MEDICAL ADVICE (OUTPATIENT)
Dept: FAMILY MEDICINE | Facility: CLINIC | Age: 64
End: 2024-09-29
Payer: COMMERCIAL

## 2024-09-30 ENCOUNTER — TELEPHONE (OUTPATIENT)
Dept: FAMILY MEDICINE | Facility: CLINIC | Age: 64
End: 2024-09-30

## 2024-09-30 ENCOUNTER — ANCILLARY PROCEDURE (OUTPATIENT)
Dept: GENERAL RADIOLOGY | Facility: CLINIC | Age: 64
End: 2024-09-30
Attending: PHYSICIAN ASSISTANT
Payer: COMMERCIAL

## 2024-09-30 DIAGNOSIS — M54.50 ACUTE MIDLINE LOW BACK PAIN WITHOUT SCIATICA: ICD-10-CM
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
## 2024-09-30 DIAGNOSIS — E03.9 HYPOTHYROIDISM, UNSPECIFIED TYPE: ICD-10-CM
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
## 2024-09-30 PROCEDURE — 73502 X-RAY EXAM HIP UNI 2-3 VIEWS: CPT | Mod: TC | Performed by: RADIOLOGY

## 2024-09-30 PROCEDURE — 72100 X-RAY EXAM L-S SPINE 2/3 VWS: CPT | Mod: TC | Performed by: RADIOLOGY

## 2024-09-30 RX ORDER — LEVOTHYROXINE SODIUM 88 UG/1
TABLET ORAL
Qty: 90 TABLET | Refills: 0 | Status: SHIPPED | OUTPATIENT
Start: 2024-09-30

## 2024-09-30 NOTE — TELEPHONE ENCOUNTER
Routing to Provider. Pt seen in clinic 9/24/24 and right hip pain was dicussed at visit.     Pt requesting x ray of right hip to be done with Back x ray this am at 9:15.    Thanks!    Tatiana Noriega RN  [facility]

## 2024-09-30 NOTE — TELEPHONE ENCOUNTER
Order/Referral Request    Who is requesting: PT     Orders being requested: He would like to add an X-ray of his Hip as well he has some set up of lower back     Reason service is needed/diagnosis: He had a fall and hurt his back but feels like pain is coming from hip     When are orders needed by: ASAP     Has this been discussed with Provider: Yes Dr son Rios     Does patient have a preference on a Group/Provider/Facility? [facility]     Does patient have an appointment scheduled?: Yes: Today 485     Where to send orders: Place orders within Epic    Could we send this information to you in [facility] or would you prefer to receive a phone call?:   No preference   Okay to leave a detailed message?: Yes at Cell number on file:    Telephone Information:   Mobile [phone]

## 2024-10-01 ENCOUNTER — OFFICE VISIT (OUTPATIENT)
Dept: PHYSICAL MEDICINE AND REHAB | Facility: CLINIC | Age: 64
End: 2024-10-01
Payer: COMMERCIAL

## 2024-10-01 ENCOUNTER — TELEPHONE (OUTPATIENT)
Dept: PHYSICAL MEDICINE AND REHAB | Facility: CLINIC | Age: 64
End: 2024-10-01

## 2024-10-01 VITALS
SYSTOLIC BLOOD PRESSURE: 102 MMHG | HEIGHT: 73 IN | HEART RATE: 73 BPM | BODY MASS INDEX: 32.27 KG/M2 | WEIGHT: 243.5 LBS | DIASTOLIC BLOOD PRESSURE: 70 MMHG

## 2024-10-01 DIAGNOSIS — S32.020A COMPRESSION FRACTURE OF L2 VERTEBRA, INITIAL ENCOUNTER (H): Primary | ICD-10-CM

## 2024-10-01 PROCEDURE — 99204 OFFICE O/P NEW MOD 45 MIN: CPT | Performed by: PHYSICIAN ASSISTANT

## 2024-10-01 RX ORDER — LIDOCAINE 50 MG/G
1 PATCH TOPICAL EVERY 24 HOURS
Qty: 30 PATCH | Refills: 1 | Status: SHIPPED | OUTPATIENT
Start: 2024-10-01

## 2024-10-01 RX ORDER — CYCLOBENZAPRINE HCL 10 MG
10 TABLET ORAL 3 TIMES DAILY PRN
Qty: 90 TABLET | Refills: 1 | Status: SHIPPED | OUTPATIENT
Start: 2024-10-01

## 2024-10-01 ASSESSMENT — PAIN SCALES - GENERAL: PAINLEVEL: EXTREME PAIN (8)

## 2024-10-01 NOTE — PATIENT INSTRUCTIONS
[facility] Scheduling    Please call [phone] to schedule your image(s) (select option#1).

## 2024-10-01 NOTE — PROGRESS NOTES
ASSESSMENT: Rhett Elizabeth is a 63 year old male with past medical history significant for type 2 diabetes mellitus with peripheral neuropathy, chronic pain syndrome, obstructive sleep apnea, hypothyroidism, hyperlipidemia, chronic congestive heart failure, hypertension, atrial fibrillation (on Xarelto), status post ICD, chronic opiate use who presents today for new patient evaluation of acute right back pain.  Pain began after a slip and fall in the shower on September 18, 2024.  An x-ray lumbar spine from September 30, 2024 shows an age-indeterminate L2 superior endplate compression fracture.  I do suspect this is an acute fracture.  On exam patient reported a sensory deficit in the feet related to diabetic peripheral neuropathy.  Otherwise he was neurologically intact.    PLAN:  A shared decision making model was used.  The patient's values and choices were respected.  The following represents what was discussed and decided upon by the physician assistant and the patient.      1.  DIAGNOSTIC TESTS:  - I reviewed the x-ray lumbar spine.  - I did order a CT lumbar spine to better characterize the fracture.  I would also like to rule out any potential neural compromise.  Patient did have 1 experience of shooting pain into the right hip since his injury.    2.  PHYSICAL THERAPY: No physical therapy was ordered.    3.  MEDICATIONS:  - I prescribed cyclobenzaprine 10 mg 3 times daily as needed.  - Also prescribed lidocaine patches.  - Patient takes duloxetine 60 mg daily.  - Patient takes gabapentin 300 g twice daily  - Patient takes tramadol 100 mg twice daily    4.  INTERVENTIONS: No interventions were ordered.    5.  PATIENT EDUCATION:  - I reviewed the typical natural course of traumatic compression fracture.  Reviewed that is a 12-week healing process.    6.  REFERRALS: Entered a referral to orthotics to get a TLSO brace.  He should wear the brace when up out of bed    7.  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  He has not been back to work since his injury.  I provided a work note indicating that he may return to work on October 7, 2024 with the following restrictions: No lifting/pushing/pulling more than 5 pounds.  No bending or twisting.  Must be able to alternate sit to stand every 20 minutes as needed.  He also needs to follow these restrictions at home.    8.  FOLLOW-UP:   My nurse to call the patient with results of his CT scan.  If there is any concern for unstable fracture would recommend a referral to neurosurgery.  Otherwise I would recommend follow-up with me in 4 weeks with repeat AP and lateral lumbar spine x-rays.  If he has any questions or concerns in the meantime, he should not decided to call.      SUBJECTIVE:  Rhett Elizabeth  Is a 63 year old male who presents today for new patient evaluation of low back pain.  Patient reports that pain began on September 18, 2024.  He was goose hunting in [place].  He was standing in the shower that did not have any sticky mat in it.  He slipped while turning around and fell, landing on his right side.  His right arm was outstretched over the side of the tub.  He had abrupt onset of severe pain.  He needed the help of his friends to stand up.  He mated home several days later.  After returning home he went to his primary clinic on September 24, 2024.  They ordered x-rays which will be described below and he was referred to our clinic for further evaluation and treatment.  Patient denies any history of back pain prior to this fall.  Patient states that pain remains severe.    Patient complains of right-sided low back pain.  Pain extends along the right iliac crest.  He had 1 episode of shooting pain into the right hip when he twisted.  When this happened he states he actually passed out in his bathroom.  He has a black eye on the left from this fall.  Otherwise pain has not radiated down the leg.  He describes the pain  as burning and throbbing with walking.  He has been wearing an off-the-shelf back brace which is helpful.  He states without the brace he feels an unstable sensation in his lower back.  He has chronic numbness and tingling in both feet which is stable.  Denies any new numbness or tingling down the legs.  Denies weakness.  Denies loss of bowel or bladder control.  Denies recent fevers, chills, sweats.  Patient reports that immediately after the fall he had numbness and tingling in his arms for a few days but that has completely resolved.  Back pain is aggravated with transitioning from seated to standing and vice versa, walking, sitting, bending, twisting, reaching.  Pain is alleviated with applying a back brace, applying heat, and staying still.    Treatment to date:  - No physical therapy  - No chiropractic treatment  - L5-S1 interlaminar epidural steroid injection at [facility] August 11, 2010 which was helpful  - No spine surgeries  - Tramadol 100 mg twice daily  - Pregabalin 300 mg twice daily  - Duloxetine 60 mg daily  - Cyclobenzaprine 10 mg 3 times daily    Current Outpatient Medications   Medication Sig Dispense Refill    aspirin 81 MG tablet Take by mouth daily Reported on 5/5/2017      cyclobenzaprine (FLEXERIL) 10 MG tablet Take 1 tablet (10 mg) by mouth 3 times daily as needed for muscle spasms. 90 tablet 1    cyclobenzaprine (FLEXERIL) 5 MG tablet Take 1-2 tablets (5-10 mg) by mouth 3 times daily as needed for muscle spasms. 40 tablet 0    DULoxetine (CYMBALTA) 60 MG capsule TAKE ONE CAPSULE BY MOUTH ONCE DAILY 90 capsule 1    lidocaine (LIDODERM) 5 % patch Place 1 patch over 12 hours onto the skin every 24 hours. To prevent lidocaine toxicity, patient should be patch free for 12 hrs daily. 30 patch 1    pregabalin (LYRICA) 300 MG capsule TAKE ONE CAPSULE BY MOUTH TWICE A DAY 60 capsule 5    traMADol (ULTRAM) 50 MG tablet Take 2 tablets (100 mg) by mouth 2 times daily 28 days or more between refills 120 tablet  5    XARELTO ANTICOAGULANT 20 MG TABS tablet TAKE ONE TABLET BY MOUTH ONCE DAILY WITH DINNER 90 tablet 0    blood glucose (ONETOUCH VERIO IQ) test strip Use to test blood sugar 3 times daily or as directed.  Ok to substitute alternative if insurance prefers. 300 strip 1    blood glucose monitoring (ONE TOUCH ULTRA 2) meter device kit Use to test blood sugar 3 times daily or as directed. 1 kit 0    Cholecalciferol (VITAMIN D-3 PO) Take 1,000 Units by mouth daily      empagliflozin (JARDIANCE) 10 MG TABS tablet Take 10 mg by mouth daily      insulin pen needle (BD ARNOLD U/F) 32G X 4 MM miscellaneous Use 2 daily as directed. 200 each 1    levothyroxine (SYNTHROID/LEVOTHROID) 88 MCG tablet TAKE ONE TABLET BY MOUTH ONCE DAILY. NEED APPOINTMENT FOR FURTHER REFILLS. 90 tablet 0    liraglutide (VICTOZA PEN) 18 MG/3ML solution Inject 1.8 mg Subcutaneous daily 27 mL 5    Lutein 20 MG CAPS Take 20 mg by mouth daily 8/11/2017- patient states he takes 1 20 mg tablet daily  Patient states he takes 150 mg tablet twice a day.      metFORMIN (GLUCOPHAGE) 500 MG tablet TAKE TWO TABLETS BY MOUTH TWICE A  tablet 1    metoprolol succinate ER (TOPROL XL) 50 MG 24 hr tablet Take 2 tablets (100 mg) by mouth daily 90 tablet 0    Multiple Vitamin (MULTIVITAMINS PO) Reported on 5/22/2017      Nutritional Supplements (ENSURE COMPLETE PO)       Omega-3 Fatty Acids (OMEGA-3 FISH OIL PO) Take 1 g by mouth Reported on 5/5/2017      OneTouch Delica Lancets 33G MISC 1 Box 3 times daily 300 each 1    pantoprazole (PROTONIX) 40 MG EC tablet Take 1 tablet (40 mg) by mouth daily 90 tablet 1    pravastatin (PRAVACHOL) 10 MG tablet Take 1 tablet (10 mg) by mouth daily 90 tablet 3    sacubitril-valsartan (ENTRESTO) 24-26 MG per tablet Take 1 tablet by mouth daily      UNABLE TO FIND cpap       No current facility-administered medications for this visit.       Allergies   Allergen Reactions    Amlodipine Difficulty breathing and Other (See Comments)  "    Other reaction(s): Other  \"legs swell\"  Swelling of the lips and tongue  \"legs swell\"  swelling      Seasonal Allergies     Ace Inhibitors Cough     Other reaction(s): Cough       Past Medical History:   Diagnosis Date    Diabetes (H)     Hypertension     Macular degeneration     Type I or II open fracture of distal end of right tibia with routine healing, unspecified fracture morphology, subsequent encounter 1/15/2017        Patient Active Problem List   Diagnosis    Neuropathic pain    Essential hypertension with goal blood pressure less than 140/90    Persistent atrial fibrillation (H)    LANEY (obstructive sleep apnea)    Type 2 diabetes mellitus with diabetic neuropathy, without long-term current use of insulin (H)    Chronic systolic congestive heart failure (H)    Drusen of macula, left    Dry eyes    S/P ICD (internal cardiac defibrillator) procedure    Recent bereavement    Acquired hypothyroidism    Hyperlipidemia LDL goal <100    Personal history of urethral stricture    Peripheral polyneuropathy    Chronic, continuous use of opioids    Long term current use of anticoagulant therapy    Chronic pain syndrome       Past Surgical History:   Procedure Laterality Date    COLONOSCOPY N/A 11/16/2023    Procedure: Colonoscopy;  Surgeon: Andrew Richard MD;  Location:  GI    EP ABLATION ATRIAL FLUTTER      11/2021    PACEMAKER/ICD CHECK      3/2018    right ankle surgery      injury repair    STRABISMUS SURGERY         Family History   Problem Relation Age of Onset    Macular Degeneration Mother         wet    Dementia Mother     Macular Degeneration Father         wet    Glaucoma Father     Prostate Cancer Father     Cancer Daughter     Bleeding Disorder No family hx of     Anesthesia Reaction No family hx of     Diabetes No family hx of        Social history: Patient is .  He works as a  at the [employer].  He denies tobacco, alcohol, recreational drug " use.      ROS: Positive for weight loss, sexual dysfunction, ringing in ears, feet/leg swelling, difficulty urinating, loss of bladder control, joint pain, muscle pain, muscle fatigue, itching, falls.  Specifically negative for bowel/bladder dysfunction, fevers,chills, appetite changes, unexplained weight loss.   Otherwise 13 systems reviewed are negative.  Please see the patient's intake questionnaire from today for details.      OBJECTIVE:  PHYSICAL EXAMINATION:    CONSTITUTIONAL:  Vital signs as above.  No acute distress.  The patient is well nourished and well groomed.  PSYCHIATRIC:  The patient is awake, alert, oriented to person, place, time and answering questions appropriately with clear speech.    HEENT: Normocephalic, ecchymosis left thigh.  Sclera clear.    SKIN:  Skin over the face, bilateral lower extremities, and posterior torso is clean, dry, intact without rashes.    GAIT:  Gait is guarded.  Patient is able to rise onto toes and heels bilaterally with support.  STANDING EXAMINATION: No tenderness palpation midline lumbar spine.  No tenderness palpation lumbar paraspinous muscles, although he does have hypertonicity right lumbar paraspinous muscles.  No tenderness palpation right sacroiliac joint.  No tenderness palpation right iliac crest.  MUSCLE STRENGTH:  The patient has 5/5 strength for the bilateral hip flexors, knee flexors/extensors, ankle dorsiflexors/plantar flexors, great toe extensors.  NEUROLOGICAL: Absent patellar, and achilles reflexes bilaterally.  Negative Babinski's bilaterally.  No ankle clonus bilaterally.  Diminished sensation bilateral feet stocking distribution.  VASCULAR:  There is no pitting edema of the bilateral lower extremities.  MUSCULOSKELETAL: Straight leg raise is negative bilaterally.    RESULTS:    I reviewed the x-ray lumbar spine dated September 30, 2024.  This shows 5 mm anterolisthesis L5-S1 with pars defects are suspected.  There is an age-indeterminate superior  endplate compression fracture at L2.    I reviewed the x-ray right hip dated September 30, 2024.  This is normal.

## 2024-10-01 NOTE — LETTER
October 1, 2024      Rhett Elizabeth  [address] [facility] [id number]        To Whom It May Concern:    Rhett Elizabeth was seen in our clinic. He may return to work 10/7/24 with the following: no lifting/pushing/pulling more than 5 pounds, no bending/twisting at the waist, must be able to alternate sit to stand every 20 minutes as needed.  Work restrictions will remain in place until follow up visit in approximately 4 weeks.      Sincerely,      Katherine Claire

## 2024-10-01 NOTE — LETTER
10/1/2024      Rhett [place] [address]      Dear Colleague,    Thank you for referring your patient, Rhett Elizabeth, to the [facility]. Please see a copy of my visit note below.    ASSESSMENT: Rhett Elizabeth is a 63 year old male with past medical history significant for type 2 diabetes mellitus with peripheral neuropathy, chronic pain syndrome, obstructive sleep apnea, hypothyroidism, hyperlipidemia, chronic congestive heart failure, hypertension, atrial fibrillation (on Xarelto), status post ICD, chronic opiate use who presents today for new patient evaluation of acute right back pain.  Pain began after a slip and fall in the shower on September 18, 2024.  An x-ray lumbar spine from September 30, 2024 shows an age-indeterminate L2 superior endplate compression fracture.  I do suspect this is an acute fracture.  On exam patient reported a sensory deficit in the feet related to diabetic peripheral neuropathy.  Otherwise he was neurologically intact.    PLAN:  A shared decision making model was used.  The patient's values and choices were respected.  The following represents what was discussed and decided upon by the physician assistant and the patient.      1.  DIAGNOSTIC TESTS:  - I reviewed the x-ray lumbar spine.  - I did order a CT lumbar spine to better characterize the fracture.  I would also like to rule out any potential neural compromise.  Patient did have 1 experience of shooting pain into the right hip since his injury.    2.  PHYSICAL THERAPY: No physical therapy was ordered.    3.  MEDICATIONS:  - I prescribed cyclobenzaprine 10 mg 3 times daily as needed.  - Also prescribed lidocaine patches.  - Patient takes duloxetine 60 mg daily.  - Patient takes gabapentin 300 g twice daily  - Patient takes tramadol 100 mg twice daily    4.  INTERVENTIONS: No interventions were ordered.    5.  PATIENT EDUCATION:  - I reviewed the typical  natural course of traumatic compression fracture.  Reviewed that is a 12-week healing process.    6.  REFERRALS: Entered a referral to orthotics to get a TLSO brace.  He should wear the brace when up out of bed    7.  WORK: Patient works as a  at the [employer].  He has not been back to work since his injury.  I provided a work note indicating that he may return to work on October 7, 2024 with the following restrictions: No lifting/pushing/pulling more than 5 pounds.  No bending or twisting.  Must be able to alternate sit to stand every 20 minutes as needed.  He also needs to follow these restrictions at home.    8.  FOLLOW-UP:   My nurse to call the patient with results of his CT scan.  If there is any concern for unstable fracture would recommend a referral to neurosurgery.  Otherwise I would recommend follow-up with me in 4 weeks with repeat AP and lateral lumbar spine x-rays.  If he has any questions or concerns in the meantime, he should not decided to call.      SUBJECTIVE:  Rhett Elizabeth  Is a 63 year old male who presents today for new patient evaluation of low back pain.  Patient reports that pain began on September 18, 2024.  He was goose hunting in [place].  He was standing in the shower that did not have any sticky mat in it.  He slipped while turning around and fell, landing on his right side.  His right arm was outstretched over the side of the tub.  He had abrupt onset of severe pain.  He needed the help of his friends to stand up.  He mated home several days later.  After returning home he went to his primary clinic on September 24, 2024.  They ordered x-rays which will be described below and he was referred to our clinic for further evaluation and treatment.  Patient denies any history of back pain prior to this fall.  Patient states that pain remains severe.    Patient complains of right-sided low back pain.  Pain extends along the right iliac crest.  He  had 1 episode of shooting pain into the right hip when he twisted.  When this happened he states he actually passed out in his bathroom.  He has a black eye on the left from this fall.  Otherwise pain has not radiated down the leg.  He describes the pain as burning and throbbing with walking.  He has been wearing an off-the-shelf back brace which is helpful.  He states without the brace he feels an unstable sensation in his lower back.  He has chronic numbness and tingling in both feet which is stable.  Denies any new numbness or tingling down the legs.  Denies weakness.  Denies loss of bowel or bladder control.  Denies recent fevers, chills, sweats.  Patient reports that immediately after the fall he had numbness and tingling in his arms for a few days but that has completely resolved.  Back pain is aggravated with transitioning from seated to standing and vice versa, walking, sitting, bending, twisting, reaching.  Pain is alleviated with applying a back brace, applying heat, and staying still.    Treatment to date:  - No physical therapy  - No chiropractic treatment  - L5-S1 interlaminar epidural steroid injection at [facility] August 11, 2010 which was helpful  - No spine surgeries  - Tramadol 100 mg twice daily  - Pregabalin 300 mg twice daily  - Duloxetine 60 mg daily  - Cyclobenzaprine 10 mg 3 times daily    Current Outpatient Medications   Medication Sig Dispense Refill     aspirin 81 MG tablet Take by mouth daily Reported on 5/5/2017       cyclobenzaprine (FLEXERIL) 10 MG tablet Take 1 tablet (10 mg) by mouth 3 times daily as needed for muscle spasms. 90 tablet 1     cyclobenzaprine (FLEXERIL) 5 MG tablet Take 1-2 tablets (5-10 mg) by mouth 3 times daily as needed for muscle spasms. 40 tablet 0     DULoxetine (CYMBALTA) 60 MG capsule TAKE ONE CAPSULE BY MOUTH ONCE DAILY 90 capsule 1     lidocaine (LIDODERM) 5 % patch Place 1 patch over 12 hours onto the skin every 24 hours. To prevent lidocaine toxicity, patient  should be patch free for 12 hrs daily. 30 patch 1     pregabalin (LYRICA) 300 MG capsule TAKE ONE CAPSULE BY MOUTH TWICE A DAY 60 capsule 5     traMADol (ULTRAM) 50 MG tablet Take 2 tablets (100 mg) by mouth 2 times daily 28 days or more between refills 120 tablet 5     XARELTO ANTICOAGULANT 20 MG TABS tablet TAKE ONE TABLET BY MOUTH ONCE DAILY WITH DINNER 90 tablet 0     blood glucose (ONETOUCH VERIO IQ) test strip Use to test blood sugar 3 times daily or as directed.  Ok to substitute alternative if insurance prefers. 300 strip 1     blood glucose monitoring (ONE TOUCH ULTRA 2) meter device kit Use to test blood sugar 3 times daily or as directed. 1 kit 0     Cholecalciferol (VITAMIN D-3 PO) Take 1,000 Units by mouth daily       empagliflozin (JARDIANCE) 10 MG TABS tablet Take 10 mg by mouth daily       insulin pen needle (BD ARNOLD U/F) 32G X 4 MM miscellaneous Use 2 daily as directed. 200 each 1     levothyroxine (SYNTHROID/LEVOTHROID) 88 MCG tablet TAKE ONE TABLET BY MOUTH ONCE DAILY. NEED APPOINTMENT FOR FURTHER REFILLS. 90 tablet 0     liraglutide (VICTOZA PEN) 18 MG/3ML solution Inject 1.8 mg Subcutaneous daily 27 mL 5     Lutein 20 MG CAPS Take 20 mg by mouth daily 8/11/2017- patient states he takes 1 20 mg tablet daily  Patient states he takes 150 mg tablet twice a day.       metFORMIN (GLUCOPHAGE) 500 MG tablet TAKE TWO TABLETS BY MOUTH TWICE A  tablet 1     metoprolol succinate ER (TOPROL XL) 50 MG 24 hr tablet Take 2 tablets (100 mg) by mouth daily 90 tablet 0     Multiple Vitamin (MULTIVITAMINS PO) Reported on 5/22/2017       Nutritional Supplements (ENSURE COMPLETE PO)        Omega-3 Fatty Acids (OMEGA-3 FISH OIL PO) Take 1 g by mouth Reported on 5/5/2017       OneTouch Delica Lancets 33G MISC 1 Box 3 times daily 300 each 1     pantoprazole (PROTONIX) 40 MG EC tablet Take 1 tablet (40 mg) by mouth daily 90 tablet 1     pravastatin (PRAVACHOL) 10 MG tablet Take 1 tablet (10 mg) by mouth daily 90  "tablet 3     sacubitril-valsartan (ENTRESTO) 24-26 MG per tablet Take 1 tablet by mouth daily       UNABLE TO FIND cpap       No current facility-administered medications for this visit.       Allergies   Allergen Reactions     Amlodipine Difficulty breathing and Other (See Comments)     Other reaction(s): Other  \"legs swell\"  Swelling of the lips and tongue  \"legs swell\"  swelling       Seasonal Allergies      Ace Inhibitors Cough     Other reaction(s): Cough       Past Medical History:   Diagnosis Date     Diabetes (H)      Hypertension      Macular degeneration      Type I or II open fracture of distal end of right tibia with routine healing, unspecified fracture morphology, subsequent encounter 1/15/2017        Patient Active Problem List   Diagnosis     Neuropathic pain     Essential hypertension with goal blood pressure less than 140/90     Persistent atrial fibrillation (H)     LANEY (obstructive sleep apnea)     Type 2 diabetes mellitus with diabetic neuropathy, without long-term current use of insulin (H)     Chronic systolic congestive heart failure (H)     Drusen of macula, left     Dry eyes     S/P ICD (internal cardiac defibrillator) procedure     Recent bereavement     Acquired hypothyroidism     Hyperlipidemia LDL goal <100     Personal history of urethral stricture     Peripheral polyneuropathy     Chronic, continuous use of opioids     Long term current use of anticoagulant therapy     Chronic pain syndrome       Past Surgical History:   Procedure Laterality Date     COLONOSCOPY N/A 11/16/2023    Procedure: Colonoscopy;  Surgeon: Andrew Richard MD;  Location: [facility]     EP ABLATION ATRIAL FLUTTER      11/2021     PACEMAKER/ICD CHECK      3/2018     right ankle surgery      injury repair     STRABISMUS SURGERY         Family History   Problem Relation Age of Onset     Macular Degeneration Mother         wet     Dementia Mother      Macular Degeneration Father         wet     Glaucoma Father  "     Prostate Cancer Father      Cancer Daughter      Bleeding Disorder No family hx of      Anesthesia Reaction No family hx of      Diabetes No family hx of        Social history: Patient is .  He works as a  at the [employer].  He denies tobacco, alcohol, recreational drug use.      ROS: Positive for weight loss, sexual dysfunction, ringing in ears, feet/leg swelling, difficulty urinating, loss of bladder control, joint pain, muscle pain, muscle fatigue, itching, falls.  Specifically negative for bowel/bladder dysfunction, fevers,chills, appetite changes, unexplained weight loss.   Otherwise 13 systems reviewed are negative.  Please see the patient's intake questionnaire from today for details.      OBJECTIVE:  PHYSICAL EXAMINATION:    CONSTITUTIONAL:  Vital signs as above.  No acute distress.  The patient is well nourished and well groomed.  PSYCHIATRIC:  The patient is awake, alert, oriented to person, place, time and answering questions appropriately with clear speech.    HEENT: Normocephalic, ecchymosis left thigh.  Sclera clear.    SKIN:  Skin over the face, bilateral lower extremities, and posterior torso is clean, dry, intact without rashes.    GAIT:  Gait is guarded.  Patient is able to rise onto toes and heels bilaterally with support.  STANDING EXAMINATION: No tenderness palpation midline lumbar spine.  No tenderness palpation lumbar paraspinous muscles, although he does have hypertonicity right lumbar paraspinous muscles.  No tenderness palpation right sacroiliac joint.  No tenderness palpation right iliac crest.  MUSCLE STRENGTH:  The patient has 5/5 strength for the bilateral hip flexors, knee flexors/extensors, ankle dorsiflexors/plantar flexors, great toe extensors.  NEUROLOGICAL: Absent patellar, and achilles reflexes bilaterally.  Negative Babinski's bilaterally.  No ankle clonus bilaterally.  Diminished sensation bilateral feet stocking  distribution.  VASCULAR:  There is no pitting edema of the bilateral lower extremities.  MUSCULOSKELETAL: Straight leg raise is negative bilaterally.    RESULTS:    I reviewed the x-ray lumbar spine dated September 30, 2024.  This shows 5 mm anterolisthesis L5-S1 with pars defects are suspected.  There is an age-indeterminate superior endplate compression fracture at L2.    I reviewed the x-ray right hip dated September 30, 2024.  This is normal.      Again, thank you for allowing me to participate in the care of your patient.        Sincerely,        Katherine Claire PA-C

## 2024-10-04 NOTE — TELEPHONE ENCOUNTER
PA Initiation    Medication: LIDOCAINE 5 % EX PTCH  Insurance Company: [employer] Non-Specialty PA's - Phone [phone] Fax [phone]  Pharmacy Filling the Rx: [facility] - [address]  Filling Pharmacy Phone: [phone]  Filling Pharmacy Fax: [phone]  Start Date: 10/4/2024

## 2024-10-04 NOTE — TELEPHONE ENCOUNTER
Prior Authorization Approval    Medication: LIDOCAINE 5 % EX PTCH  Authorization Effective Date: 9/4/2024  Authorization Expiration Date: 10/4/2025  Approved Dose/Quantity:  Reference #:     Insurance Company: [employer] Non-Specialty PA's - Phone [phone] Fax [phone]  Expected CoPay: $    CoPay Card Available:      Financial Assistance Needed:   Which Pharmacy is filling the prescription: [facility] [place] [place] - [address]  Pharmacy Notified: YES  Patient Notified: **Instructed pharmacy to notify patient when script is ready to /ship.**

## 2024-10-09 ENCOUNTER — ANCILLARY PROCEDURE (OUTPATIENT)
Dept: CT IMAGING | Facility: CLINIC | Age: 64
End: 2024-10-09
Attending: PHYSICIAN ASSISTANT
Payer: COMMERCIAL

## 2024-10-09 DIAGNOSIS — S32.020A COMPRESSION FRACTURE OF L2 VERTEBRA, INITIAL ENCOUNTER (H): ICD-10-CM

## 2024-10-09 PROCEDURE — 72131 CT LUMBAR SPINE W/O DYE: CPT | Mod: TC | Performed by: RADIOLOGY

## 2024-10-10 ENCOUNTER — TELEPHONE (OUTPATIENT)
Dept: FAMILY MEDICINE | Facility: CLINIC | Age: 64
End: 2024-10-10
Payer: COMMERCIAL

## 2024-10-10 ENCOUNTER — TELEPHONE (OUTPATIENT)
Dept: PHYSICAL MEDICINE AND REHAB | Facility: CLINIC | Age: 64
End: 2024-10-10
Payer: COMMERCIAL

## 2024-10-10 DIAGNOSIS — S32.020A COMPRESSION FRACTURE OF L2 VERTEBRA, INITIAL ENCOUNTER (H): Primary | ICD-10-CM

## 2024-10-10 NOTE — TELEPHONE ENCOUNTER
----- Message from Katherine Alethea sent at 10/10/2024 12:41 PM CDT -----  Please call this patient and let him know that his CT shows a recent appearing L2 compression fracture.  There is also chronic appearing narrowing around the nerves at the exit of the spine at L5-S1.  Plan will be to treat the fracture conservatively as we discussed with bracing and activity restrictions.  Patient should follow-up with me in 4 weeks with repeat upright AP and lateral lumbar spine x-rays.

## 2024-10-10 NOTE — TELEPHONE ENCOUNTER
Phone call to patient to review results and provider's recommendations. Left message to return call.     Results as well as recommendations also sent to patient through [employer].

## 2024-10-10 NOTE — TELEPHONE ENCOUNTER
Patient Quality Outreach    Patient is due for the following:   Physical Preventive Adult Physical    Next Steps:   Schedule a Annual Wellness Visit    Type of outreach:    Sent [employer] message.      Questions for provider review:    None           Isidra Rincon [facility]  Chart routed to none.

## 2024-10-26 ENCOUNTER — MYC MEDICAL ADVICE (OUTPATIENT)
Dept: FAMILY MEDICINE | Facility: CLINIC | Age: 64
End: 2024-10-26
Payer: COMMERCIAL

## 2024-10-26 ENCOUNTER — MYC REFILL (OUTPATIENT)
Dept: PHYSICAL MEDICINE AND REHAB | Facility: CLINIC | Age: 64
End: 2024-10-26
Payer: COMMERCIAL

## 2024-10-26 DIAGNOSIS — M25.551 HIP PAIN, RIGHT: ICD-10-CM

## 2024-10-26 DIAGNOSIS — M54.50 ACUTE MIDLINE LOW BACK PAIN WITHOUT SCIATICA: ICD-10-CM

## 2024-10-26 DIAGNOSIS — S32.020A COMPRESSION FRACTURE OF L2 VERTEBRA, INITIAL ENCOUNTER (H): ICD-10-CM

## 2024-10-26 DIAGNOSIS — R20.2 ARM PARESTHESIA, LEFT: ICD-10-CM

## 2024-10-28 RX ORDER — CYCLOBENZAPRINE HCL 5 MG
5-10 TABLET ORAL 3 TIMES DAILY PRN
Qty: 40 TABLET | Refills: 0 | Status: SHIPPED | OUTPATIENT
Start: 2024-10-28 | End: 2024-11-11

## 2024-10-28 RX ORDER — CYCLOBENZAPRINE HCL 10 MG
10 TABLET ORAL 3 TIMES DAILY PRN
Qty: 90 TABLET | Refills: 1 | Status: SHIPPED | OUTPATIENT
Start: 2024-10-28

## 2024-10-28 NOTE — TELEPHONE ENCOUNTER
Last appointment: 10/01/2024  Next appointment: 11/11/2024    Notes/Comments:      Rx request(s) has been reviewed.

## 2024-11-04 DIAGNOSIS — E11.40 TYPE 2 DIABETES MELLITUS WITH DIABETIC NEUROPATHY, WITHOUT LONG-TERM CURRENT USE OF INSULIN (H): ICD-10-CM

## 2024-11-04 DIAGNOSIS — F11.90 CHRONIC, CONTINUOUS USE OF OPIOIDS: ICD-10-CM

## 2024-11-04 NOTE — PROGRESS NOTES
Assessment:   Rhett Elizabeth is a 64 year old y.o. male with past medical history significant for  type 2 diabetes mellitus with peripheral neuropathy, chronic pain syndrome, obstructive sleep apnea, hypothyroidism, hyperlipidemia, chronic congestive heart failure, hypertension, atrial fibrillation (on Xarelto), status post ICD, chronic opiate use  who presents today for follow-up regarding right back pain which began after a slip and fall in the shower on September 18, 2024.  A CT lumbar spine October 9, 2024 shows a recent appearing L2 compression fracture without retropulsion.  Follow-up x-rays were stable.  Plan will be to treat the fracture conservatively with bracing, activity restrictions, serial x-rays.  -Patient reports that since he was last seen the pain in his back has resolved.  However, he continues have significant pain along the right lateral iliac crest.  The TLSO brace makes this pain much worse.  Suspect that he could have a deep bruise from his fall.  He landed on his right side over a tub.  Suspect the TLSO brace is irritating it.  We will have him discontinue his brace.  If the right lateral pelvic pain does not improve I recommend a CT to evaluate for occult fracture.       Plan:     A shared decision making plan was used.  The patient's values and choices were respected.  The following represents what was discussed and decided upon by the physician assistant and the patient.      1.  DIAGNOSTIC TESTS:  - I reviewed the CT lumbar spine.  - I reviewed the follow-up lumbar spine x-rays.  - I ordered a DEXA scan to evaluate for osteoporosis.  - I ordered follow-up AP and lateral lumbar spine x-rays to be performed in approximately 4 weeks.  - Patient is going to send me a [employer] message in the next 1 to 2 weeks if his right lateral pelvic pain fails to improve with discontinuation of his TLSO brace.  In that case I would order a CT pelvis.    2.  PHYSICAL THERAPY: No physical therapy was  ordered.    3.  MEDICATIONS:    - Patient has been taking cyclobenzaprine 20 mg at bedtime.  Advised him to limit cyclobenzaprine dosing to 10 mg.  - Patient can try applying lidocaine patches over his right iliac crest to see if that helps.  - Patient takes duloxetine 60 mg daily.  - Patient takes pregabalin 300 mg twice daily  - Patient takes tramadol 100 mg twice daily    4.  INTERVENTIONS: No interventions were ordered.    5.  PATIENT EDUCATION: I recommended the patient discontinue his TLSO brace.  At this point I think it is doing more harm than good.  His back pain has resolved.  He is almost 2 months out from his injury.  It is causing significantly worsening right lateral pelvic pain.  Patient should continue to avoid lifting more than 5 pounds.  is up out of bed.  He should avoid bending and twisting.    6.  WORK: Patient works as a  at the [employer].  I wrote an updated work letter maintaining his same light duty work restrictions until his follow-up visit with me in about 4 weeks.    7.  FOLLOW-UP: I will send patient [employer] message with his DEXA results.  If this shows osteoporosis I recommend a referral to endocrinology.  Otherwise patient will follow-up with me in 4 weeks with AP and lateral lumbar spine x-rays.  If he has questions or concerns in the meantime, he should not hesitate to call.    Subjective:     Rhett Elizabeth is a 64 year old male who presents today for follow-up regarding back pain which began after a slip and fall in the shower on September 18, 2024.  He was found to have an L2 compression fracture.  Patient reports that since he was last seen his back pain has resolved completely.  However, he has pain along the right iliac crest.  This is worse with applying his TLSO brace.  He states he feels much better when he takes his brace off at the end of the day.  He has been very careful to wear his brace when he is up out of bed.    Patient  complains of pain along the right iliac crest.  Pain is most pronounced on the right lateral iliac crest.  He denies any pain in the groin.  Denies any back pain.  Denies any pain down the leg.  Denies any new numbness or tingling down the leg.  He has chronic numbness and tingling in both feet from neuropathy.  He rates his pain today as a 7 out of 10.  At its worst it is an 8 out of 10.  At its best it is a 3 out of 10.  Pain is alleviated with not moving and applying a heating pad.  Pain is aggravated with wearing his TLSO brace and bending.    Treatment to date:  - No physical therapy  - No chiropractic treatment  - L5-S1 interlaminar epidural steroid injection at [facility] August 11, 2010 which was helpful  - No spine surgeries  - Tramadol 100 mg twice daily  - Pregabalin 300 mg twice daily  - Duloxetine 60 mg daily  - Cyclobenzaprine 10 mg 3 times daily as needed  - Lidocaine patch    Review of Systems:  Positive for loss of bladder control, wetting pants, inability to urinate, pain much worse at night.  Negative for numbness/tingling, weakness, loss of bowel control, headache, trip/stumble/falls, difficulty swallowing, difficulty with hand skills, fevers, unintentional weight loss.     Objective:   CONSTITUTIONAL:  Vital signs as above.  No acute distress.  The patient is well nourished and well groomed.    PSYCHIATRIC:  The patient is awake, alert, oriented to person, place and time.  The patient is answering questions appropriately with clear speech.  Normal affect.  HEENT: Normocephalic, atraumatic.  Sclera clear.    SKIN:  Skin over the face, posterior torso, bilateral upper and lower extremities is clean, dry, intact without rashes.  VASCULAR: No significant lower extremity edema.  MUSCULOSKELETAL:  Gait is non-antalgic.  The patient is able to heel and toe walk without any difficulty.  No tenderness palpation lumbar spinous processes.  Specifically no tenderness to palpation over the L2 spinous process.   Tender to palpation along the right lateral iliac crest.    The patient has 5/5 strength for the bilateral hip flexors, knee flexors/extensors, ankle dorsiflexors/plantar flexors, ankle evertors/invertors.    NEUROLOGICAL:   No ankle clonus bilaterally.  Sensation to light touch is intact in the bilateral L4, L5, and S1 dermatomes.       RESULTS:    I reviewed the CT lumbar spine dated October 9, 2024.  This shows a recent appearing L2 compression fracture involving the superior endplate but with fracture lines that extend vertically through the inferior endplate and a linear sclerotic fracture line that extends into the posterior superior aspect of the vertebral body.  There is mild to moderate associated vertebral body height loss but no retropulsion.  There are bilateral chronic L5 pars defects with grade 1 anterolisthesis L5-S1.  There is mild to moderate scattered degenerative facet disease.  No definite high-grade central canal stenosis.  Moderate to severe left greater than right L5-S1 foraminal stenosis and mild to moderate bilateral L4-5 foraminal stenosis.    I reviewed the x-ray lumbar spine dated November 8, 2024 this shows a stable L2 superior endplate compression deformity.  There is persistent anterolisthesis L5-S1 with bilateral L5 pars defects.    I reviewed the x-ray right hip dated November 8, 2024.  This is normal.

## 2024-11-05 RX ORDER — PREGABALIN 300 MG/1
CAPSULE ORAL
Qty: 60 CAPSULE | Refills: 0 | Status: SHIPPED | OUTPATIENT
Start: 2024-11-05

## 2024-11-05 RX ORDER — TRAMADOL HYDROCHLORIDE 50 MG/1
100 TABLET ORAL 2 TIMES DAILY
Qty: 120 TABLET | Refills: 0 | Status: SHIPPED | OUTPATIENT
Start: 2024-11-05

## 2024-11-06 ASSESSMENT — PATIENT HEALTH QUESTIONNAIRE - PHQ9: SUM OF ALL RESPONSES TO PHQ QUESTIONS 1-9: 14

## 2024-11-06 ASSESSMENT — ANXIETY QUESTIONNAIRES: GAD7 TOTAL SCORE: 4

## 2024-11-08 ENCOUNTER — ANCILLARY PROCEDURE (OUTPATIENT)
Dept: GENERAL RADIOLOGY | Facility: CLINIC | Age: 64
End: 2024-11-08
Attending: INTERNAL MEDICINE
Payer: COMMERCIAL

## 2024-11-08 ENCOUNTER — ANCILLARY PROCEDURE (OUTPATIENT)
Dept: GENERAL RADIOLOGY | Facility: CLINIC | Age: 64
End: 2024-11-08
Attending: PHYSICIAN ASSISTANT
Payer: COMMERCIAL

## 2024-11-08 DIAGNOSIS — M25.551 HIP PAIN, RIGHT: ICD-10-CM

## 2024-11-08 DIAGNOSIS — S32.020A COMPRESSION FRACTURE OF L2 VERTEBRA, INITIAL ENCOUNTER (H): ICD-10-CM

## 2024-11-08 PROCEDURE — 73502 X-RAY EXAM HIP UNI 2-3 VIEWS: CPT | Mod: TC | Performed by: RADIOLOGY

## 2024-11-08 PROCEDURE — 72100 X-RAY EXAM L-S SPINE 2/3 VWS: CPT | Mod: TC | Performed by: RADIOLOGY

## 2024-11-11 ENCOUNTER — OFFICE VISIT (OUTPATIENT)
Dept: PHYSICAL MEDICINE AND REHAB | Facility: CLINIC | Age: 64
End: 2024-11-11
Payer: COMMERCIAL

## 2024-11-11 VITALS
WEIGHT: 243 LBS | HEIGHT: 73 IN | DIASTOLIC BLOOD PRESSURE: 77 MMHG | SYSTOLIC BLOOD PRESSURE: 118 MMHG | BODY MASS INDEX: 32.2 KG/M2 | HEART RATE: 106 BPM

## 2024-11-11 DIAGNOSIS — K30 GASTROINTESTINAL DISCOMFORT: ICD-10-CM

## 2024-11-11 DIAGNOSIS — S32.020D COMPRESSION FRACTURE OF L2 VERTEBRA WITH ROUTINE HEALING, SUBSEQUENT ENCOUNTER: Primary | ICD-10-CM

## 2024-11-11 PROCEDURE — 99214 OFFICE O/P EST MOD 30 MIN: CPT | Performed by: PHYSICIAN ASSISTANT

## 2024-11-11 RX ORDER — PANTOPRAZOLE SODIUM 40 MG/1
40 TABLET, DELAYED RELEASE ORAL DAILY
Qty: 90 TABLET | Refills: 1 | Status: SHIPPED | OUTPATIENT
Start: 2024-11-11

## 2024-11-11 ASSESSMENT — PAIN SCALES - GENERAL: PAINLEVEL_OUTOF10: SEVERE PAIN (7)

## 2024-11-11 NOTE — LETTER
11/11/2024      [place] [address]      Dear Colleague,    Thank you for referring your patient, Rhett Elizabeth, to the [facility]. Please see a copy of my visit note below.    Assessment:   Rhett Elizabeth is a 64 year old y.o. male with past medical history significant for  type 2 diabetes mellitus with peripheral neuropathy, chronic pain syndrome, obstructive sleep apnea, hypothyroidism, hyperlipidemia, chronic congestive heart failure, hypertension, atrial fibrillation (on Xarelto), status post ICD, chronic opiate use  who presents today for follow-up regarding right back pain which began after a slip and fall in the shower on September 18, 2024.  A CT lumbar spine October 9, 2024 shows a recent appearing L2 compression fracture without retropulsion.  Follow-up x-rays were stable.  Plan will be to treat the fracture conservatively with bracing, activity restrictions, serial x-rays.  -Patient reports that since he was last seen the pain in his back has resolved.  However, he continues have significant pain along the right lateral iliac crest.  The TLSO brace makes this pain much worse.  Suspect that he could have a deep bruise from his fall.  He landed on his right side over a tub.  Suspect the TLSO brace is irritating it.  We will have him discontinue his brace.  If the right lateral pelvic pain does not improve I recommend a CT to evaluate for occult fracture.       Plan:     A shared decision making plan was used.  The patient's values and choices were respected.  The following represents what was discussed and decided upon by the physician assistant and the patient.      1.  DIAGNOSTIC TESTS:  - I reviewed the CT lumbar spine.  - I reviewed the follow-up lumbar spine x-rays.  - I ordered a DEXA scan to evaluate for osteoporosis.  - I ordered follow-up AP and lateral lumbar spine x-rays to be performed in approximately 4 weeks.  -  Patient is going to send me a [employer] message in the next 1 to 2 weeks if his right lateral pelvic pain fails to improve with discontinuation of his TLSO brace.  In that case I would order a CT pelvis.    2.  PHYSICAL THERAPY: No physical therapy was ordered.    3.  MEDICATIONS:    - Patient has been taking cyclobenzaprine 20 mg at bedtime.  Advised him to limit cyclobenzaprine dosing to 10 mg.  - Patient can try applying lidocaine patches over his right iliac crest to see if that helps.  - Patient takes duloxetine 60 mg daily.  - Patient takes pregabalin 300 mg twice daily  - Patient takes tramadol 100 mg twice daily    4.  INTERVENTIONS: No interventions were ordered.    5.  PATIENT EDUCATION: I recommended the patient discontinue his TLSO brace.  At this point I think it is doing more harm than good.  His back pain has resolved.  He is almost 2 months out from his injury.  It is causing significantly worsening right lateral pelvic pain.  Patient should continue to avoid lifting more than 5 pounds.  is up out of bed.  He should avoid bending and twisting.    6.  WORK: Patient works as a  at the [employer].  I wrote an updated work letter maintaining his same light duty work restrictions until his follow-up visit with me in about 4 weeks.    7.  FOLLOW-UP: I will send patient [employer] message with his DEXA results.  If this shows osteoporosis I recommend a referral to endocrinology.  Otherwise patient will follow-up with me in 4 weeks with AP and lateral lumbar spine x-rays.  If he has questions or concerns in the meantime, he should not hesitate to call.    Subjective:     Rhett Elizabeth is a 64 year old male who presents today for follow-up regarding back pain which began after a slip and fall in the shower on September 18, 2024.  He was found to have an L2 compression fracture.  Patient reports that since he was last seen his back pain has resolved completely.  However,  he has pain along the right iliac crest.  This is worse with applying his TLSO brace.  He states he feels much better when he takes his brace off at the end of the day.  He has been very careful to wear his brace when he is up out of bed.    Patient complains of pain along the right iliac crest.  Pain is most pronounced on the right lateral iliac crest.  He denies any pain in the groin.  Denies any back pain.  Denies any pain down the leg.  Denies any new numbness or tingling down the leg.  He has chronic numbness and tingling in both feet from neuropathy.  He rates his pain today as a 7 out of 10.  At its worst it is an 8 out of 10.  At its best it is a 3 out of 10.  Pain is alleviated with not moving and applying a heating pad.  Pain is aggravated with wearing his TLSO brace and bending.    Treatment to date:  - No physical therapy  - No chiropractic treatment  - L5-S1 interlaminar epidural steroid injection at [facility] August 11, 2010 which was helpful  - No spine surgeries  - Tramadol 100 mg twice daily  - Pregabalin 300 mg twice daily  - Duloxetine 60 mg daily  - Cyclobenzaprine 10 mg 3 times daily as needed  - Lidocaine patch    Review of Systems:  Positive for loss of bladder control, wetting pants, inability to urinate, pain much worse at night.  Negative for numbness/tingling, weakness, loss of bowel control, headache, trip/stumble/falls, difficulty swallowing, difficulty with hand skills, fevers, unintentional weight loss.     Objective:   CONSTITUTIONAL:  Vital signs as above.  No acute distress.  The patient is well nourished and well groomed.    PSYCHIATRIC:  The patient is awake, alert, oriented to person, place and time.  The patient is answering questions appropriately with clear speech.  Normal affect.  HEENT: Normocephalic, atraumatic.  Sclera clear.    SKIN:  Skin over the face, posterior torso, bilateral upper and lower extremities is clean, dry, intact without rashes.  VASCULAR: No significant lower  extremity edema.  MUSCULOSKELETAL:  Gait is non-antalgic.  The patient is able to heel and toe walk without any difficulty.  No tenderness palpation lumbar spinous processes.  Specifically no tenderness to palpation over the L2 spinous process.  Tender to palpation along the right lateral iliac crest.    The patient has 5/5 strength for the bilateral hip flexors, knee flexors/extensors, ankle dorsiflexors/plantar flexors, ankle evertors/invertors.    NEUROLOGICAL:   No ankle clonus bilaterally.  Sensation to light touch is intact in the bilateral L4, L5, and S1 dermatomes.       RESULTS:    I reviewed the CT lumbar spine dated October 9, 2024.  This shows a recent appearing L2 compression fracture involving the superior endplate but with fracture lines that extend vertically through the inferior endplate and a linear sclerotic fracture line that extends into the posterior superior aspect of the vertebral body.  There is mild to moderate associated vertebral body height loss but no retropulsion.  There are bilateral chronic L5 pars defects with grade 1 anterolisthesis L5-S1.  There is mild to moderate scattered degenerative facet disease.  No definite high-grade central canal stenosis.  Moderate to severe left greater than right L5-S1 foraminal stenosis and mild to moderate bilateral L4-5 foraminal stenosis.    I reviewed the x-ray lumbar spine dated November 8, 2024 this shows a stable L2 superior endplate compression deformity.  There is persistent anterolisthesis L5-S1 with bilateral L5 pars defects.    I reviewed the x-ray right hip dated November 8, 2024.  This is normal.      Again, thank you for allowing me to participate in the care of your patient.        Sincerely,        Katherine Claire PA-C

## 2024-11-11 NOTE — LETTER
November 11, 2024      Rhett Elizabeth  [address] [address]        To Whom It May Concern:    Rhett Elizabeth was seen in our clinic. He may return to work with the following: no lifting/pushing/pulling more than 5 pounds, no bending/twisting at the waist, must be able to alternate sit to stand every 20 minutes as needed. Work restrictions will remain in place until follow up visit in approximately 4 weeks.       Sincerely,      Katherine Claire

## 2024-11-11 NOTE — PATIENT INSTRUCTIONS
[facility] Scheduling    Please call [phone] to schedule your image(s) (select option#1). There are 3 different locations, see below. You can do walk-in visits for xray only images if you want.     [facility]  [address]  [address] [facility] [place]  [address], [address]  [place] [address]     Please send me a [employer] message within the next 1-2 weeks.  If your hip pain isn't getting better, I will order a CT of your pelvis.

## 2024-11-12 ENCOUNTER — PATIENT OUTREACH (OUTPATIENT)
Dept: CARE COORDINATION | Facility: CLINIC | Age: 64
End: 2024-11-12

## 2024-11-12 ENCOUNTER — LAB (OUTPATIENT)
Dept: LAB | Facility: CLINIC | Age: 64
End: 2024-11-12
Payer: COMMERCIAL

## 2024-11-12 DIAGNOSIS — E11.40 TYPE 2 DIABETES MELLITUS WITH DIABETIC NEUROPATHY, WITHOUT LONG-TERM CURRENT USE OF INSULIN (H): ICD-10-CM

## 2024-11-12 LAB
EST. AVERAGE GLUCOSE BLD GHB EST-MCNC: 128 MG/DL
HBA1C MFR BLD: 6.1 % (ref 0–5.6)

## 2024-11-12 PROCEDURE — 83036 HEMOGLOBIN GLYCOSYLATED A1C: CPT

## 2024-11-12 PROCEDURE — 36415 COLL VENOUS BLD VENIPUNCTURE: CPT

## 2024-11-12 PROCEDURE — 80048 BASIC METABOLIC PNL TOTAL CA: CPT

## 2024-11-13 LAB
ANION GAP SERPL CALCULATED.3IONS-SCNC: 13 MMOL/L (ref 7–15)
BUN SERPL-MCNC: 16.8 MG/DL (ref 8–23)
CALCIUM SERPL-MCNC: 9.7 MG/DL (ref 8.8–10.4)
CHLORIDE SERPL-SCNC: 102 MMOL/L (ref 98–107)
CREAT SERPL-MCNC: 1.04 MG/DL (ref 0.67–1.17)
EGFRCR SERPLBLD CKD-EPI 2021: 81 ML/MIN/1.73M2
GLUCOSE SERPL-MCNC: 93 MG/DL (ref 70–99)
HCO3 SERPL-SCNC: 26 MMOL/L (ref 22–29)
POTASSIUM SERPL-SCNC: 4 MMOL/L (ref 3.4–5.3)
SODIUM SERPL-SCNC: 141 MMOL/L (ref 135–145)

## 2024-11-18 ENCOUNTER — TELEPHONE (OUTPATIENT)
Dept: UROLOGY | Facility: CLINIC | Age: 64
End: 2024-11-18
Payer: COMMERCIAL

## 2024-11-18 ENCOUNTER — NURSE TRIAGE (OUTPATIENT)
Dept: NURSING | Facility: CLINIC | Age: 64
End: 2024-11-18
Payer: COMMERCIAL

## 2024-11-18 NOTE — TELEPHONE ENCOUNTER
[facility] Call Center    Phone Message    May a detailed message be left on voicemail: yes     Reason for Call: Appointment Intake    Referring Provider Name: self  Diagnosis and/or Symptoms: retention    Pt has retention to the point of almost no voiding at all. Doing a red flag triage message. Pt is scheduled for first available on 12/17. Please see if you can get him in asap. Thank you!    Action Taken: Message routed to:  [facility]    Travel Screening: Not Applicable     Date of Service:

## 2024-11-18 NOTE — TELEPHONE ENCOUNTER
Dr Gresham doesn't round at [facility]. [facility] is there contact.    RUDDY Price RN 11/18/2024 11:30 AM

## 2024-11-18 NOTE — TELEPHONE ENCOUNTER
"Pt will go to [facility] ER. Is 5 miles  away from there. Has appt 12/17/24 [place] [facility] new pt.      Reason for Disposition   Unable to urinate (or only a few drops) and bladder feels very full    Additional Information   Negative: Shock suspected (e.g., cold/pale/clammy skin, too weak to stand, low BP, rapid pulse)   Negative: Sounds like a life-threatening emergency to the triager    Answer Assessment - Initial Assessment Questions  1. SEVERITY: \"How bad is the pain?\"  (e.g., Scale 1-10; mild, moderate, or severe)    - MILD (1-3): Complains slightly about urination hurting.    - MODERATE (4-7): Interferes with normal activities.      - SEVERE (8-10): Excruciating, unwilling or unable to urinate because of the pain.       severe  2. FREQUENCY: \"How many times have you had painful urination today?\"       5  3. PATTERN: \"Is pain present every time you urinate or just sometimes?\"       Every time  4. ONSET: \"When did the painful urination start?\"       Worse x one month  5. FEVER: \"Do you have a fever?\" If Yes, ask: \"What is your temperature, how was it measured, and when did it start?\"      Sweaty ,temp not checked.  6. PAST UTI: \"Have you had a urine infection before?\" If Yes, ask: \"When was the last time?\" and \"What happened that time?\"    yes  7. CAUSE: \"What do you think is causing the painful urination?\"       can't void;kidneys hurt. Rt side worse.  8. OTHER SYMPTOMS: \"Do you have any other symptoms?\" (e.g., flank pain, penis discharge, scrotal pain, blood in urine)      Flank pain, hx spine fx, no penis discharge,no scrotal. Urine white cloudy    Protocols used: Urination Pain - Male-A-OH    "

## 2024-11-18 NOTE — TELEPHONE ENCOUNTER
[facility] Call Center    Phone Message    May a detailed message be left on voicemail: yes     Reason for Call: Other: Pt is in the ED currently and Dr Brady needs a urology consult asap. Writer tried the [facility] to provider number and they said they only take calls for when clinic isn't open. Also tried [place] location and was told Gresham and his nurse are not in.      Action Taken: Message routed to:  [facility] [place] [facility]    Travel Screening: Not Applicable     Date of Service:

## 2024-11-18 NOTE — CONFIDENTIAL NOTE
Per Chart review:  Pt is not established with urology  Dr. Gresham has never seen this pt  Pt is currently in     Raissa MAX RN   [facility], Urology   11/18/2024 10:59 AM       within normal limits

## 2024-11-18 NOTE — TELEPHONE ENCOUNTER
[facility] Call Center    Phone Message    May a detailed message be left on voicemail: yes     Reason for Call: Other: Provider Dr. Robert has called from [facility] to ask for a sooner appt date for the patient . Provider wants the patient seen before 12/17. Patient is open to other facilities Please call patient     Action Taken: Other: [place] [facility]    Travel Screening: Not Applicable     Date of Service:

## 2024-11-19 NOTE — TELEPHONE ENCOUNTER
Returned call to ED and spoke with MD who stated   C/b # given    RUDDY Price RN 11/19/2024 1:00 PM

## 2024-11-20 NOTE — CONFIDENTIAL NOTE
See other TE.    Raissa MAX RN   [facility], Urology   11/20/2024 10:41 AM

## 2024-11-20 NOTE — CONFIDENTIAL NOTE
Per chart review, pt has an appt with     Appt with Dr. Gresham cancelled.    Raissa MAX RN   [facility], Urology   11/20/2024 10:41 AM

## 2024-11-22 ENCOUNTER — ANCILLARY PROCEDURE (OUTPATIENT)
Dept: BONE DENSITY | Facility: CLINIC | Age: 64
End: 2024-11-22
Attending: PHYSICIAN ASSISTANT
Payer: COMMERCIAL

## 2024-11-22 DIAGNOSIS — S32.020D COMPRESSION FRACTURE OF L2 VERTEBRA WITH ROUTINE HEALING, SUBSEQUENT ENCOUNTER: ICD-10-CM

## 2024-11-22 PROCEDURE — 77081 DXA BONE DENSITY APPENDICULR: CPT | Mod: TC | Performed by: PHYSICIAN ASSISTANT

## 2024-11-22 PROCEDURE — 77080 DXA BONE DENSITY AXIAL: CPT | Mod: TC | Performed by: PHYSICIAN ASSISTANT

## 2024-11-25 ENCOUNTER — OFFICE VISIT (OUTPATIENT)
Dept: UROLOGY | Facility: CLINIC | Age: 64
End: 2024-11-25
Payer: COMMERCIAL

## 2024-11-25 VITALS — DIASTOLIC BLOOD PRESSURE: 73 MMHG | SYSTOLIC BLOOD PRESSURE: 100 MMHG | HEART RATE: 71 BPM | TEMPERATURE: 97.6 F

## 2024-11-25 DIAGNOSIS — R33.9 URINARY RETENTION: Primary | ICD-10-CM

## 2024-11-25 DIAGNOSIS — N39.0 URINARY TRACT INFECTION WITHOUT HEMATURIA, SITE UNSPECIFIED: ICD-10-CM

## 2024-11-25 DIAGNOSIS — Z87.448 HISTORY OF URETHRAL STRICTURE: ICD-10-CM

## 2024-11-25 PROCEDURE — 99204 OFFICE O/P NEW MOD 45 MIN: CPT | Performed by: PHYSICIAN ASSISTANT

## 2024-11-25 RX ORDER — TAMSULOSIN HYDROCHLORIDE 0.4 MG/1
0.4 CAPSULE ORAL DAILY
Qty: 90 CAPSULE | Refills: 3 | Status: SHIPPED | OUTPATIENT
Start: 2024-11-25 | End: 2025-11-25

## 2024-11-25 RX ORDER — TAMSULOSIN HYDROCHLORIDE 0.4 MG/1
0.4 CAPSULE ORAL
COMMUNITY
Start: 2024-11-18 | End: 2024-11-25

## 2024-11-25 NOTE — PATIENT INSTRUCTIONS
**      Bladder Retraining  In this packet, you will learn 3 steps to help you improve your bladder control. If you have any questions regarding any of these steps once you are home, please feel free to call the office.   The 3 steps you will learn are:   Double Voiding   Fluid Guidelines  Timed Voiding   Double Voiding   A technique called double voiding can be used to help ensure that you have fully emptied your bladder while on the toilet. The main idea behind double voiding is to try to void two or even three times during each trip to the bathroom.  By doing this you can reduce residual urine volumes and minimize your chance of having an accident, an infection, or leakage later on.   The technique is simple.  Some people find that they can void, remain on the toilet for a rest period of two to five to ten minutes, and void again.  Others find it useful to void, then stand up and sit back down and attempt to void again.   You can also compress the bladder in order to empty more fully.  To do the Crede maneuver, press firmly with one hand (or both hands) directly into the abdomen over the bladder.  You may also find it helpful to lean forward or rock while sitting on the toilet in order to help empty the bladder better.  Fluid Guidelines   Managing your fluid intake can also help you improve your bladder control.  It is VERY important that you drink at least 5 to 7 glasses of fluid each day. The bulk of this fluid should be water. Drinking appropriate amounts of fluid daily and emptying your bladder at regular intervals helps decrease bladder infections. Managing your problem by restricting fluid intake is counterproductive, and NOT recommended.     Suggestions Regarding Fluid Intake  Try to spread your fluid consumption out over the course of the day rather than consuming large amounts at one sitting and then going long periods of time without drinking.   Try to minimize fluid consumption after your evening meal.    Try to minimize caffeine and alcohol consumption. Use only decaffeinated coffee, tea or soda when possible.      Timed Voiding    It might be a good idea to start this approach to managing your problem on a weekend or when you plan to be at home or near a bathroom. The purpose of timed voiding is to gradually:   increase the amount of time between emptying your bladder   increase the amount of fluid your bladder can hold, and hopefully,   diminish the sense of urgency and/or leakage associated with your problem.     Week 1 - After awakening, empty your bladder every half-hour on the hour (even if you do NOT feel the need to go). Make sure you are drinking frequently. During the night only go to the bathroom if you waken and find it necessary   Week 2 - Increase the time between emptying your bladder to once per hour, following the above fluid and night instructions.   Week 3 - Increase the time between emptying your bladder to every 1 1/2 hours, following the above fluid and night instructions.   Week 4 - Increase the time between emptying your bladder to every 2 hours, following the above fluid and night instructions.  Work up to voiding every 3 to 3   hours if you can.     If you can already hold your bladder longer than 1/2 hour, you do not need to start at Week 1. Start at the point that is appropriate for you and work up from there. Just remember to 1) increase your voiding intervals by no more than 30 minutes at a time, 2) void regularly even if you do not feel the need to go, and 3) during the night, only go to the bathroom if you awaken and find it necessary.   You will be the best  of how quickly you can advance to the next step. These instructions are an outline which you can modify (for example, you may find it more comfortable to stretch from 1 to 1 1/4 hours).   You may also increase the pace of this sample schedule, depending on your individual symptoms and bladder capacity. For example, you may  increase the hourly increments every 5 days, instead of every 7 days.     Don t Get Discouraged  This program works! The keys to success are self-motivation and gradual increases in the time interval between voids. If you try to progress too rapidly, you will exceed your capabilities and become frustrated.    Some Additional Behavioral Techniques to Help Bladder Control  Do not rush to the bathroom. Try to be calm and maintain control. Rushing to the bathroom can intensify the urge for the bladder to contract.  Do several quick contractions of the pelvic floor muscles. Use effort to keep from leaking. If possible, sit down for direct pressure on the pelvic floor.  Relax. If you have practiced diaphragmatic breathing in the past, use that skill to relax. (Take slow, deep breaths through your nose, and then slowly breathe out through pursed lips.) Use distraction techniques to try and make the urinary urge go away.  When you feel the urge subside, walk slowly and normally to the bathroom. You can repeat the above steps to gain control if the urge returns.  You can slowly proceed to the toilet to empty your bladder once the urge has subsided.   __________________________    Below is a list of things that can irritate the bladder and should be eliminated:  Caffeinated soft drinks.  Coffee.  Tea.  Chocolate.  Tomato-based foods.  Acidic juices and fruits. (includes cranberry juice)  Alcohol.  Nicotine  Carbonated drinks.  Aspartame/Nutrasweet.    ________________________________

## 2024-11-25 NOTE — PROGRESS NOTES
Patient educated regarding bladder scan procedure which writer performed.  Patient voiced understanding of information.  3 ml of urine remaining in bladder.  Felicia Madrid RN

## 2024-11-25 NOTE — PROGRESS NOTES
[facility] UROLOGY OUTPATIENT VISIT     Chief Complaint:   Urinary Retention     Synopsis:   Mr. Rhett Elizabeth, a 64 year old male with a history of DM2, posterior urethral stricture s/p urethral dilation and DVIU. One episode of urinary retention in 2010 with UTI. Per chart review, the patient was seen by Dr. Schuster in 2010 for consultation given he had 2 procedures for his urethral stricture. At that time, Dr. Schuster recommend end-to-end urethroplasty.     He is seen today in the urology clinic in hospital follow up of urinary retention. Per chart review, the patient was seen in the ED on 11/18 with urinary retention. UA with concern for UTI, started on keflex. Patient did not wish to have cuevas placed given it had to be placed in the OR many years ago. Started on Flomax 0.4 mg daily.    The patient has not been seen by urology in about 15 years. States at the time he was not interested in surgery with Dr. Vargas. He has been living with his symptoms which including slow/hesitant stream. Takes about 10-15 minutes to completely urinated. He presented to the ED on the 18th due to complete inability to urinate. Ever since starting Flomax, he has been able to urinate without difficulty. He no longer has to sit to urinate and feels that he completely empties. He is very happy with the medication. Currently denies fevers, chills, N/V, abdominal/back pain.     ROS:   A comprehensive 14 point ROS was obtained and was  otherwise negative except for that outlined above in the HPI.     Medical History:     Past Medical History:   Diagnosis Date    Diabetes (H)     Hypertension     Macular degeneration     Type I or II open fracture of distal end of right tibia with routine healing, unspecified fracture morphology, subsequent encounter 1/15/2017       Past Surgical History:   Procedure Laterality Date    COLONOSCOPY N/A 11/16/2023    Procedure: Colonoscopy;  Surgeon: Andrew Richard MD;  Location: [facility]  ABLATION ATRIAL FLUTTER      11/2021    PACEMAKER/ICD CHECK      3/2018    right ankle surgery      injury repair    STRABISMUS SURGERY       FAMILY HISTORY: Father with history of prostate cancer    Social History     Socioeconomic History    Marital status:      Spouse name: Not on file    Number of children: Not on file    Years of education: Not on file    Highest education level: Not on file   Occupational History    Not on file   Tobacco Use    Smoking status: Never     Passive exposure: Past    Smokeless tobacco: Former     Types: Chew     Quit date: 5/2/2024    Tobacco comments:     Chew tobacco   Vaping Use    Vaping status: Never Used   Substance and Sexual Activity    Alcohol use: Yes     Comment: occ    Drug use: No    Sexual activity: Not Currently     Partners: Female   Other Topics Concern    Parent/sibling w/ CABG, MI or angioplasty before 65F 55M? Not Asked   Social History Narrative    Not on file     Social Drivers of Health     Financial Resource Strain: Not on file   Food Insecurity: Not on file   Transportation Needs: Not on file   Physical Activity: Not on file   Stress: Not on file   Social Connections: Not on file   Interpersonal Safety: Not At Risk (11/18/2024)    Received from [facility]     Humiliation, Afraid, Rape, and Kick questionnaire     Fear of Current or Ex-Partner: No     Emotionally Abused: No     Physically Abused: No     Sexually Abused: No   Housing Stability: Not on file      reports that he has never smoked. He has been exposed to tobacco smoke. He quit smokeless tobacco use about 6 months ago.  His smokeless tobacco use included chew.    Current Outpatient Medications   Medication Sig Dispense Refill    aspirin 81 MG tablet Take by mouth daily Reported on 5/5/2017      blood glucose (ONETOUCH VERIO IQ) test strip Use to test blood sugar 3 times daily or as directed.  Ok to substitute alternative if insurance prefers. 300 strip 1    blood glucose  monitoring (ONE TOUCH ULTRA 2) meter device kit Use to test blood sugar 3 times daily or as directed. 1 kit 0    Cholecalciferol (VITAMIN D-3 PO) Take 1,000 Units by mouth daily      cyclobenzaprine (FLEXERIL) 10 MG tablet Take 1 tablet (10 mg) by mouth 3 times daily as needed for muscle spasms. 90 tablet 1    DULoxetine (CYMBALTA) 60 MG capsule TAKE ONE CAPSULE BY MOUTH ONCE DAILY 90 capsule 1    empagliflozin (JARDIANCE) 10 MG TABS tablet Take 10 mg by mouth daily      insulin pen needle (BD ARNOLD U/F) 32G X 4 MM miscellaneous Use 2 daily as directed. 200 each 1    levothyroxine (SYNTHROID/LEVOTHROID) 88 MCG tablet TAKE ONE TABLET BY MOUTH ONCE DAILY. NEED APPOINTMENT FOR FURTHER REFILLS. 90 tablet 0    lidocaine (LIDODERM) 5 % patch Place 1 patch over 12 hours onto the skin every 24 hours. To prevent lidocaine toxicity, patient should be patch free for 12 hrs daily. 30 patch 1    liraglutide (VICTOZA PEN) 18 MG/3ML solution Inject 1.8 mg Subcutaneous daily 27 mL 5    Lutein 20 MG CAPS Take 20 mg by mouth daily 8/11/2017- patient states he takes 1 20 mg tablet daily  Patient states he takes 150 mg tablet twice a day.      metFORMIN (GLUCOPHAGE) 500 MG tablet TAKE TWO TABLETS BY MOUTH TWICE A  tablet 1    metoprolol succinate ER (TOPROL XL) 50 MG 24 hr tablet Take 2 tablets (100 mg) by mouth daily 90 tablet 0    Multiple Vitamin (MULTIVITAMINS PO) Reported on 5/22/2017      Nutritional Supplements (ENSURE COMPLETE PO)       Omega-3 Fatty Acids (OMEGA-3 FISH OIL PO) Take 1 g by mouth Reported on 5/5/2017      OneTouch Delica Lancets 33G MISC 1 Box 3 times daily 300 each 1    pantoprazole (PROTONIX) 40 MG EC tablet TAKE ONE TABLET BY MOUTH ONCE DAILY 90 tablet 1    pravastatin (PRAVACHOL) 10 MG tablet Take 1 tablet (10 mg) by mouth daily 90 tablet 3    pregabalin (LYRICA) 300 MG capsule TAKE ONE CAPSULE BY MOUTH TWICE A DAY 60 capsule 0    sacubitril-valsartan (ENTRESTO) 24-26 MG per tablet Take 1 tablet by  mouth daily      tamsulosin (FLOMAX) 0.4 MG capsule Take 1 capsule (0.4 mg) by mouth daily. 90 capsule 3    traMADol (ULTRAM) 50 MG tablet TAKE TWO TABLETS BY MOUTH TWICE A DAY. 120 tablet 0    UNABLE TO FIND cpap      XARELTO ANTICOAGULANT 20 MG TABS tablet TAKE ONE TABLET BY MOUTH ONCE DAILY WITH DINNER 90 tablet 0       ALLERGIES: Amlodipine, Seasonal allergies, and Ace inhibitors          Medications     Current Outpatient Medications   Medication Sig Dispense Refill    aspirin 81 MG tablet Take by mouth daily Reported on 5/5/2017      blood glucose (ONETOUCH VERIO IQ) test strip Use to test blood sugar 3 times daily or as directed.  Ok to substitute alternative if insurance prefers. 300 strip 1    blood glucose monitoring (ONE TOUCH ULTRA 2) meter device kit Use to test blood sugar 3 times daily or as directed. 1 kit 0    Cholecalciferol (VITAMIN D-3 PO) Take 1,000 Units by mouth daily      cyclobenzaprine (FLEXERIL) 10 MG tablet Take 1 tablet (10 mg) by mouth 3 times daily as needed for muscle spasms. 90 tablet 1    DULoxetine (CYMBALTA) 60 MG capsule TAKE ONE CAPSULE BY MOUTH ONCE DAILY 90 capsule 1    empagliflozin (JARDIANCE) 10 MG TABS tablet Take 10 mg by mouth daily      insulin pen needle (BD ARNOLD U/F) 32G X 4 MM miscellaneous Use 2 daily as directed. 200 each 1    levothyroxine (SYNTHROID/LEVOTHROID) 88 MCG tablet TAKE ONE TABLET BY MOUTH ONCE DAILY. NEED APPOINTMENT FOR FURTHER REFILLS. 90 tablet 0    lidocaine (LIDODERM) 5 % patch Place 1 patch over 12 hours onto the skin every 24 hours. To prevent lidocaine toxicity, patient should be patch free for 12 hrs daily. 30 patch 1    liraglutide (VICTOZA PEN) 18 MG/3ML solution Inject 1.8 mg Subcutaneous daily 27 mL 5    Lutein 20 MG CAPS Take 20 mg by mouth daily 8/11/2017- patient states he takes 1 20 mg tablet daily  Patient states he takes 150 mg tablet twice a day.      metFORMIN (GLUCOPHAGE) 500 MG tablet TAKE TWO TABLETS BY MOUTH TWICE A   tablet 1    metoprolol succinate ER (TOPROL XL) 50 MG 24 hr tablet Take 2 tablets (100 mg) by mouth daily 90 tablet 0    Multiple Vitamin (MULTIVITAMINS PO) Reported on 5/22/2017      Nutritional Supplements (ENSURE COMPLETE PO)       Omega-3 Fatty Acids (OMEGA-3 FISH OIL PO) Take 1 g by mouth Reported on 5/5/2017      OneTouch Delica Lancets 33G MISC 1 Box 3 times daily 300 each 1    pantoprazole (PROTONIX) 40 MG EC tablet TAKE ONE TABLET BY MOUTH ONCE DAILY 90 tablet 1    pravastatin (PRAVACHOL) 10 MG tablet Take 1 tablet (10 mg) by mouth daily 90 tablet 3    pregabalin (LYRICA) 300 MG capsule TAKE ONE CAPSULE BY MOUTH TWICE A DAY 60 capsule 0    sacubitril-valsartan (ENTRESTO) 24-26 MG per tablet Take 1 tablet by mouth daily      tamsulosin (FLOMAX) 0.4 MG capsule Take 1 capsule (0.4 mg) by mouth daily. 90 capsule 3    traMADol (ULTRAM) 50 MG tablet TAKE TWO TABLETS BY MOUTH TWICE A DAY. 120 tablet 0    UNABLE TO FIND cpap      XARELTO ANTICOAGULANT 20 MG TABS tablet TAKE ONE TABLET BY MOUTH ONCE DAILY WITH DINNER 90 tablet 0     No current facility-administered medications for this visit.         The following distinct labs were reviewed    I personally reviewed all applicable laboratory data and went over findings with patient  Significant for:    CBC RESULTS:  Recent Labs   Lab Test 06/15/23  1416 02/14/22  1113 01/04/21  1115 05/17/19  0913   WBC 7.9 6.4 8.8 12.9*   HGB 15.5 14.9 14.7 14.9    168 191 212        BMP RESULTS:  Recent Labs   Lab Test 11/12/24  1500 05/06/24  1122 11/16/23  0916 06/22/23  1528 06/15/23  1416 02/14/22  1113 05/17/21  1403 01/04/21  1115 01/20/20  0911 05/17/19  0913    139  --  139 139   < > 139 134 135 136   POTASSIUM 4.0 4.9  --  4.8 4.3   < > 4.3 4.4 4.2 4.5   CHLORIDE 102 103  --  101 104   < > 106 102 102 100   CO2 26 24  --  25 18*   < > 30 26 29 25   ANIONGAP 13 12  --  13 17*   < > 3 6 4 11   GLC 93 80 85 90 117*   < > 124* 369* 248* 148*   BUN 16.8 16.6  --   19.3 18.4   < > 16 16 25 19   CR 1.04 0.99  --  1.03 1.06   < > 1.01 1.00 0.90 0.94   GFRESTIMATED 81 86  --  83 80   < > 81 82 >90 90   GFRESTBLACK  --   --   --   --   --   --  >90 >90 >90 >90    < > = values in this interval not displayed.       CALCIUM RESULTS:  Recent Labs   Lab Test 11/12/24  1500 05/06/24  1122 06/22/23  1528 06/15/23  1416   DEVENDRA 9.7 9.7 10.1 9.5     HGB A1C RESULTS:  Lab Results   Component Value Date    A1C 6.1 11/12/2024    A1C 5.7 05/06/2024    A1C 5.7 06/15/2023    A1C 5.8 10/06/2022    A1C 5.6 02/14/2022    A1C 6.2 05/17/2021    A1C 8.5 01/04/2021    A1C 6.7 01/20/2020    A1C 5.3 10/11/2019    A1C 5.7 05/17/2019     UA RESULTS:   Recent Labs   Lab Test 01/20/22  1201 05/17/19  0913   SG 1.020 >1.030   URINEPH 7.0 6.0   NITRITE Positive* Negative   RBCU 0-2 2-5*   WBCU >100* >100*      Physical Exam:     Vitals:    11/25/24 1348   BP: 100/73   BP Location: Left arm   Patient Position: Sitting   Cuff Size: Adult Regular   Pulse: 71   Temp: 97.6  F (36.4  C)     GEN: NAD  EYES: EOMI  NEURO: AAO         Assessment/Plan   Rhett Elizabeth is a 64 year old male with a history of DM2, posterior urethral stricture s/p urethral dilation and DVIU. One episode of urinary retention in 2010 with UTI who developed acute urinary retention on 11/18 but declined cuevas given past history of difficulty with cuevas catheters. Has been taking tamsulosin daily.     - Continue taking Flomax daily, refill provided  - Bladder retraining and bladder irritant list provided  - The patient should return for a follow up office visit in 1 month for UA and PVR  - PSA ordered today as he has not been screened in many years and has juan f hx of prostate cancer  - Given it has been many years since his stricture has been evaluated, if the patient has worsening symptoms or the medication no longer works, would recommend follow up with imaging and cystoscopy to further evaluate possible stricture, BPH or other  etiology    I have enjoyed participating in the medical care of this patient.  Please do not hesitate to contact me with any questions or concerns.      Liya Chen PA-C  [facility]    55 minutes spent on the date of the encounter doing chart review, review of outside records, review of test results, interpretation of tests, patient visit and documentation     CC:  Ac Frederick

## 2024-12-02 ENCOUNTER — TELEPHONE (OUTPATIENT)
Dept: UROLOGY | Facility: CLINIC | Age: 64
End: 2024-12-02
Payer: COMMERCIAL

## 2024-12-02 NOTE — TELEPHONE ENCOUNTER
Spoke with patient regarding typical side effects of flomax.  He also started a muscle relaxer at the same time, and is unsure if the side effects may be from that.  He is checking in with his cardiologist and he might stop the flomax and see how he feels and will let us know. Felicia Madrid RN

## 2024-12-02 NOTE — TELEPHONE ENCOUNTER
[facility] Call Center    Phone Message    May a detailed message be left on voicemail: yes     Reason for Call: Medication Question or concern regarding medication   Prescription Clarification  Name of Medication: Flomax  Prescribing Provider: Liya Chen PA-C   Pharmacy: [facility] [place] - [address]   What on the order needs clarification? Patient is calling saying sometimes when he stands up he feels short of breath and difficulty breathing. Patient is asking if this is a normal side effect of flomax. Patient is not currently having these symptoms (per pt.). Patient is at work today, so please leave a detailed voice message if patient is not available.        Action Taken: Other: [facility] - Urology    Travel Screening: Not Applicable     Date of Service:

## 2024-12-03 DIAGNOSIS — F11.90 CHRONIC, CONTINUOUS USE OF OPIOIDS: ICD-10-CM

## 2024-12-03 NOTE — TELEPHONE ENCOUNTER
Spoke with daughter regarding flomax.  Message to provider regarding an alternative. Felicia Madrid RN

## 2024-12-03 NOTE — TELEPHONE ENCOUNTER
[facility] Call Center    Phone Message    May a detailed message be left on voicemail: yes     Reason for Call: Other: Patient's daughter called in requesting to speak with a nurse in regards to patient's flomax. Patient is currently admitted and has been taken off of the flomax. Daughter is wondering what he can take instead. Please call her back to discuss.     Action Taken: Other: [facility]    Travel Screening: Not Applicable     Date of Service:

## 2024-12-04 RX ORDER — TRAMADOL HYDROCHLORIDE 50 MG/1
100 TABLET ORAL 2 TIMES DAILY PRN
Qty: 120 TABLET | Refills: 0 | Status: SHIPPED | OUTPATIENT
Start: 2024-12-04

## 2024-12-05 ENCOUNTER — TELEPHONE (OUTPATIENT)
Dept: UROLOGY | Facility: CLINIC | Age: 64
End: 2024-12-05
Payer: COMMERCIAL

## 2024-12-05 DIAGNOSIS — N40.0 BPH (BENIGN PROSTATIC HYPERPLASIA): Primary | ICD-10-CM

## 2024-12-05 RX ORDER — FINASTERIDE 5 MG/1
5 TABLET, FILM COATED ORAL DAILY
Qty: 30 TABLET | Refills: 2 | Status: SHIPPED | OUTPATIENT
Start: 2024-12-05

## 2024-12-05 NOTE — TELEPHONE ENCOUNTER
Message left for patient to call back regarding alternative to flomax.  Direct number given for nurse and clinic. Patient can start finasteride and/or can talk to cardiologist regarding possibly restarting flomax. Felicia Madrid RN

## 2024-12-05 NOTE — TELEPHONE ENCOUNTER
Patient returned call and would like to try the finasteride. Medication ordered. He will also discuss the flomax with his cardiologist.  Felicia Madrid RN

## 2024-12-10 ENCOUNTER — ANCILLARY PROCEDURE (OUTPATIENT)
Dept: GENERAL RADIOLOGY | Facility: CLINIC | Age: 64
End: 2024-12-10
Attending: PHYSICIAN ASSISTANT
Payer: COMMERCIAL

## 2024-12-10 DIAGNOSIS — E11.40 TYPE 2 DIABETES MELLITUS WITH DIABETIC NEUROPATHY, WITHOUT LONG-TERM CURRENT USE OF INSULIN (H): ICD-10-CM

## 2024-12-10 DIAGNOSIS — S32.020D COMPRESSION FRACTURE OF L2 VERTEBRA WITH ROUTINE HEALING, SUBSEQUENT ENCOUNTER: ICD-10-CM

## 2024-12-10 PROCEDURE — 72100 X-RAY EXAM L-S SPINE 2/3 VWS: CPT | Mod: TC | Performed by: RADIOLOGY

## 2024-12-10 RX ORDER — PREGABALIN 300 MG/1
CAPSULE ORAL
Qty: 60 CAPSULE | Refills: 0 | Status: SHIPPED | OUTPATIENT
Start: 2024-12-10

## 2024-12-11 ENCOUNTER — TELEPHONE (OUTPATIENT)
Dept: PHYSICAL MEDICINE AND REHAB | Facility: CLINIC | Age: 64
End: 2024-12-11
Payer: COMMERCIAL

## 2024-12-11 NOTE — TELEPHONE ENCOUNTER
Call placed to patient with provider's results and recommendations.  Left message to return call.  Also sent [employer] message with results.

## 2024-12-11 NOTE — TELEPHONE ENCOUNTER
----- Message from Katherine Claire sent at 12/11/2024 12:34 PM CST -----  Please call patient and let him know that the fracture is stable on xray which is great news.  Patient should schedule a follow up visit with me.

## 2024-12-24 ENCOUNTER — MYC MEDICAL ADVICE (OUTPATIENT)
Dept: INTERNAL MEDICINE | Facility: CLINIC | Age: 64
End: 2024-12-24
Payer: COMMERCIAL

## 2024-12-24 DIAGNOSIS — K30 GASTROINTESTINAL DISCOMFORT: ICD-10-CM

## 2024-12-24 NOTE — TELEPHONE ENCOUNTER
Routing "[employer]hart message to PCP    Pt having stomach pain, has previously been prescribed MAALOX and Xylocaine by provider.      Willing to prescribe again?     Medications pended for provider review.    Ambreen Abbott RN

## 2024-12-26 RX ORDER — MAGNESIUM HYDROXIDE/ALUMINUM HYDROXICE/SIMETHICONE 120; 1200; 1200 MG/30ML; MG/30ML; MG/30ML
15 SUSPENSION ORAL DAILY PRN
Qty: 710 ML | Refills: 0 | Status: SHIPPED | OUTPATIENT
Start: 2024-12-26

## 2024-12-26 RX ORDER — LIDOCAINE HYDROCHLORIDE 20 MG/ML
15 SOLUTION OROPHARYNGEAL DAILY PRN
Qty: 200 ML | Refills: 0 | Status: SHIPPED | OUTPATIENT
Start: 2024-12-26

## 2024-12-30 ENCOUNTER — LAB (OUTPATIENT)
Dept: LAB | Facility: CLINIC | Age: 64
End: 2024-12-30
Payer: COMMERCIAL

## 2024-12-30 DIAGNOSIS — R33.9 URINARY RETENTION: ICD-10-CM

## 2024-12-30 LAB — PSA SERPL DL<=0.01 NG/ML-MCNC: 0.21 NG/ML (ref 0–4.5)

## 2024-12-30 PROCEDURE — 36415 COLL VENOUS BLD VENIPUNCTURE: CPT

## 2024-12-30 PROCEDURE — G0103 PSA SCREENING: HCPCS

## 2024-12-31 DIAGNOSIS — F11.90 CHRONIC, CONTINUOUS USE OF OPIOIDS: ICD-10-CM

## 2025-01-03 NOTE — TELEPHONE ENCOUNTER
Called patient to inform him that Dr. Frederick is out until next week. Per medication order to see Dr. Frederick in clinic, encouraged the patient to use my chart to schedule a visit or to call us back to help with scheduling.    Rylee Torres RN on 1/3/2025 at 2:19 PM

## 2025-01-05 RX ORDER — TRAMADOL HYDROCHLORIDE 50 MG/1
100 TABLET ORAL 2 TIMES DAILY PRN
Qty: 60 TABLET | Refills: 0 | Status: SHIPPED | OUTPATIENT
Start: 2025-01-05

## 2025-01-07 DIAGNOSIS — E03.9 HYPOTHYROIDISM, UNSPECIFIED TYPE: ICD-10-CM

## 2025-01-07 DIAGNOSIS — E11.40 TYPE 2 DIABETES MELLITUS WITH DIABETIC NEUROPATHY, WITHOUT LONG-TERM CURRENT USE OF INSULIN (H): ICD-10-CM

## 2025-01-07 RX ORDER — PREGABALIN 300 MG/1
CAPSULE ORAL
Qty: 60 CAPSULE | Refills: 0 | Status: SHIPPED | OUTPATIENT
Start: 2025-01-07

## 2025-01-08 RX ORDER — LEVOTHYROXINE SODIUM 88 UG/1
TABLET ORAL
Qty: 90 TABLET | Refills: 0 | Status: SHIPPED | OUTPATIENT
Start: 2025-01-08

## 2025-01-08 NOTE — TELEPHONE ENCOUNTER
Looks like pt had TSH testing done 12/2/24 outside of [place].    Still willing to refill?        Liya RN    Triage Nurse  [facility]

## 2025-02-04 DIAGNOSIS — F11.90 CHRONIC, CONTINUOUS USE OF OPIOIDS: ICD-10-CM

## 2025-02-05 RX ORDER — TRAMADOL HYDROCHLORIDE 50 MG/1
100 TABLET ORAL 2 TIMES DAILY PRN
Qty: 60 TABLET | Refills: 0 | Status: SHIPPED | OUTPATIENT
Start: 2025-02-05

## 2025-02-11 DIAGNOSIS — E11.40 TYPE 2 DIABETES MELLITUS WITH DIABETIC NEUROPATHY, WITHOUT LONG-TERM CURRENT USE OF INSULIN (H): ICD-10-CM

## 2025-02-12 DIAGNOSIS — E11.40 TYPE 2 DIABETES MELLITUS WITH DIABETIC NEUROPATHY, WITHOUT LONG-TERM CURRENT USE OF INSULIN (H): ICD-10-CM

## 2025-02-12 RX ORDER — PREGABALIN 300 MG/1
CAPSULE ORAL
Qty: 60 CAPSULE | Refills: 0 | Status: SHIPPED | OUTPATIENT
Start: 2025-02-12

## 2025-02-12 RX ORDER — DULOXETIN HYDROCHLORIDE 60 MG/1
60 CAPSULE, DELAYED RELEASE ORAL DAILY
Qty: 90 CAPSULE | Refills: 1 | Status: SHIPPED | OUTPATIENT
Start: 2025-02-12

## 2025-02-13 ENCOUNTER — VIRTUAL VISIT (OUTPATIENT)
Dept: INTERNAL MEDICINE | Facility: CLINIC | Age: 65
End: 2025-02-13
Payer: COMMERCIAL

## 2025-02-13 DIAGNOSIS — I50.32 CHRONIC DIASTOLIC CONGESTIVE HEART FAILURE (H): ICD-10-CM

## 2025-02-13 DIAGNOSIS — G89.4 CHRONIC PAIN SYNDROME: Primary | ICD-10-CM

## 2025-02-13 DIAGNOSIS — E11.40 TYPE 2 DIABETES MELLITUS WITH DIABETIC NEUROPATHY, WITHOUT LONG-TERM CURRENT USE OF INSULIN (H): ICD-10-CM

## 2025-02-13 DIAGNOSIS — I48.92 ATRIAL FLUTTER, UNSPECIFIED TYPE (H): ICD-10-CM

## 2025-02-13 NOTE — PROGRESS NOTES
"John is a 64 year old who is being evaluated via a billable video visit.    How would you like to obtain your AVS? MyChart  If the video visit is dropped, the invitation should be resent by: Text to cell phone: [phone]  Will anyone else be joining your video visit? No      Assessment & Plan     (G89.4) Chronic pain syndrome  (primary encounter diagnosis)  Comment: today is a routine follow up office visit , via virtual office visit format to maintain his connection with follow up appointments regarding his use of a controlled substances . He takes tramadol (Ultram) and feels this is an effective medication   Plan: recheck in 6 months     (I50.32) Chronic diastolic congestive heart failure (H)  Comment: problem is stable and ongoing monitoring    Plan: problem is stable and ongoing monitoring      (I48.92) Atrial flutter, unspecified type (H)  Comment: problem is stable and ongoing monitoring  , status post cardiac ablation procedure with [facility]   Plan: problem is stable and ongoing monitoring      (E11.40) Type 2 diabetes mellitus with diabetic neuropathy, without long-term current use of insulin (H)  Comment: problem is stable and ongoing monitoring    Plan: return to clinic in 6 months            BMI  Estimated body mass index is 31.84 kg/m  as calculated from the following:    Height as of 11/11/24: 1.861 m (6' 1.25\").    Weight as of 11/11/24: 110.2 kg (243 lb).   Weight management plan: Discussed healthy diet and exercise guidelines      11:06 AM start  11:19 AM end   Subjective   John is a 64 year old, presenting for the following health issues:  Recheck Medication      2/13/2025    10:43 AM   Additional Questions   Roomed by kera Jorge of Present Illness       Reason for visit:  To get meds renewed   He is taking medications regularly.     He's current with all medication refills     May 2024 had a cardiac ablation procedure for his atrial flutter   Lab Results   Component " Value Date    A1C 6.1 11/12/2024    A1C 5.7 05/06/2024    A1C 5.7 06/15/2023    A1C 5.8 10/06/2022    A1C 5.6 02/14/2022    A1C 6.2 05/17/2021    A1C 8.5 01/04/2021    A1C 6.7 01/20/2020    A1C 5.3 10/11/2019    A1C 5.7 05/17/2019     He is usually around a 100 with  blood glucose   He sees a cardiologist and doing well - his cardiologist is out of [facility]     Health Maintenance Due   Topic Date Due    YEARLY PREVENTIVE VISIT  Never done    HF ACTION PLAN  05/17/2022    ALT  06/15/2024    MICROALBUMIN  06/15/2024    URINE DRUG SCREEN  06/15/2024    ANNUAL REVIEW OF HM ORDERS  06/15/2024    CBC  06/15/2024    CONTROLLED SUBSTANCE AGREEMENT FOR CHRONIC PAIN MANAGEMENT  06/15/2024    INFLUENZA VACCINE (1) 09/01/2024    COVID-19 Vaccine (4 - 2024-25 season) 09/01/2024            Review of Systems  Constitutional, HEENT, cardiovascular, pulmonary, gi and gu systems are negative, except as otherwise noted.      Objective           Vitals:  No vitals were obtained today due to virtual visit.    Physical Exam   GENERAL: alert and no distress  EYES: Eyes grossly normal to inspection.  No discharge or erythema, or obvious scleral/conjunctival abnormalities.  RESP: No audible wheeze, cough, or visible cyanosis.    SKIN: Visible skin clear. No significant rash, abnormal pigmentation or lesions.  NEURO: Cranial nerves grossly intact.  Mentation and speech appropriate for age.  PSYCH: Appropriate affect, tone, and pace of words    No orders of the defined types were placed in this encounter.          Video-Visit Details    Type of service:  Video Visit   Originating Location (pt. Location): Home    Distant Location (provider location):  On-site  Platform used for Video Visit: Hilaria  Signed Electronically by: Ac Frederick MD

## 2025-02-18 DIAGNOSIS — F11.90 CHRONIC, CONTINUOUS USE OF OPIOIDS: ICD-10-CM

## 2025-02-19 RX ORDER — TRAMADOL HYDROCHLORIDE 50 MG/1
100 TABLET ORAL 2 TIMES DAILY
Qty: 120 TABLET | Refills: 0 | Status: SHIPPED | OUTPATIENT
Start: 2025-02-19

## 2025-03-05 ENCOUNTER — MYC MEDICAL ADVICE (OUTPATIENT)
Dept: UROLOGY | Facility: CLINIC | Age: 65
End: 2025-03-05
Payer: COMMERCIAL

## 2025-03-05 NOTE — TELEPHONE ENCOUNTER
Instructions from 12/27/24:    Today:  -RUG VCUG (special XR with dye to help locate the stricture). The radiology team will reach out to set this up for you  -Once you have the XR, please follow up with Dr. Newton, Dr. Trinidad or any of the other reconstruction surgeons. Please tell this to central scheduling so you can see the correct surgeon.  -Red flag symptoms to watch out for: Mid back pain, inability to urinate, blood in the urine then please present to the ER.     [facility] phone number [phone]

## 2025-03-08 ENCOUNTER — HEALTH MAINTENANCE LETTER (OUTPATIENT)
Age: 65
End: 2025-03-08

## 2025-03-11 DIAGNOSIS — E11.40 TYPE 2 DIABETES MELLITUS WITH DIABETIC NEUROPATHY, WITHOUT LONG-TERM CURRENT USE OF INSULIN (H): ICD-10-CM

## 2025-03-12 DIAGNOSIS — N40.0 BPH (BENIGN PROSTATIC HYPERPLASIA): ICD-10-CM

## 2025-03-13 RX ORDER — FINASTERIDE 5 MG/1
5 TABLET, FILM COATED ORAL DAILY
Qty: 30 TABLET | Refills: 2 | Status: SHIPPED | OUTPATIENT
Start: 2025-03-13

## 2025-03-13 NOTE — TELEPHONE ENCOUNTER
Per last note, unable to take Flomax per cardiology.  Advised to continue Finasteride. Will send refill today

## 2025-03-17 ENCOUNTER — MYC MEDICAL ADVICE (OUTPATIENT)
Dept: INTERNAL MEDICINE | Facility: CLINIC | Age: 65
End: 2025-03-17
Payer: COMMERCIAL

## 2025-03-17 NOTE — TELEPHONE ENCOUNTER
See refill request encounter from 3/11, sent to covering providers to review request. [employer] message sent to pt. Closing this encounter.     Ambreen Abbott RN

## 2025-03-17 NOTE — TELEPHONE ENCOUNTER
Patient calling to request refill for this med; notified that this request was sent through and is pending. Aware the Dr. Frederick is currently out of office and covering provider will need to review.    RUDDY Bhatt RN  [facility]

## 2025-03-17 NOTE — TELEPHONE ENCOUNTER
Routing to PCP    Pt needing refill of lyrica Rx. Med pended for review with pharmacy.     Ambreen Abbott RN

## 2025-03-18 DIAGNOSIS — E11.40 TYPE 2 DIABETES MELLITUS WITH DIABETIC NEUROPATHY, WITHOUT LONG-TERM CURRENT USE OF INSULIN (H): ICD-10-CM

## 2025-03-18 DIAGNOSIS — F11.90 CHRONIC, CONTINUOUS USE OF OPIOIDS: ICD-10-CM

## 2025-03-18 RX ORDER — PREGABALIN 300 MG/1
CAPSULE ORAL
Qty: 60 CAPSULE | Refills: 0 | Status: SHIPPED | OUTPATIENT
Start: 2025-03-18

## 2025-03-18 NOTE — TELEPHONE ENCOUNTER
Pt is calling to follow-up on status of refill request for pregabalin. States that his feet are in pain and he is wondering if refill can be sent. While on phone with patient, note that a covering provider was addressing the refill and it was sent today at 12:03 pm. Pt notified.    Arpita Carter RN

## 2025-03-18 NOTE — TELEPHONE ENCOUNTER
Received call from patient to check the status of refill. Patient has been out since yesterday. He states he is having pain in his feet currently from neuropathy without taking it and would like refill as soon as possible. Patient will be awaiting call once this has been addressed. Dr. Frederick is signed out of office; covering provider please review.    Callback: [phone]  RUDDY Perez RN  [facility]

## 2025-03-18 NOTE — TELEPHONE ENCOUNTER
See refill encounter 3/11/25 with same request; spoke regarding symptoms (neuropathy in feet). Awaiting response from covering provider; will close encounter d/t duplicate work.    CHELLE BhattN ANGEL  [facility]

## 2025-03-19 RX ORDER — TRAMADOL HYDROCHLORIDE 50 MG/1
100 TABLET ORAL 2 TIMES DAILY
Qty: 120 TABLET | Refills: 4 | Status: SHIPPED | OUTPATIENT
Start: 2025-03-19

## 2025-03-28 ENCOUNTER — ANCILLARY PROCEDURE (OUTPATIENT)
Dept: GENERAL RADIOLOGY | Facility: CLINIC | Age: 65
End: 2025-03-28
Attending: PHYSICIAN ASSISTANT
Payer: COMMERCIAL

## 2025-03-28 DIAGNOSIS — R33.9 URINARY RETENTION: ICD-10-CM

## 2025-03-28 DIAGNOSIS — Z87.448 HISTORY OF URETHRAL STRICTURE: ICD-10-CM

## 2025-03-28 PROCEDURE — 74455 X-RAY URETHRA/BLADDER: CPT | Mod: GC | Performed by: RADIOLOGY

## 2025-03-28 PROCEDURE — 51600 INJECTION FOR BLADDER X-RAY: CPT | Mod: GC | Performed by: RADIOLOGY

## 2025-03-28 RX ORDER — LIDOCAINE HYDROCHLORIDE 20 MG/ML
JELLY TOPICAL ONCE
Status: COMPLETED | OUTPATIENT
Start: 2025-03-28 | End: 2025-03-28

## 2025-03-28 RX ORDER — IOPAMIDOL 510 MG/ML
150 INJECTION, SOLUTION INTRAVASCULAR ONCE
Status: COMPLETED | OUTPATIENT
Start: 2025-03-28 | End: 2025-03-28

## 2025-03-28 RX ADMIN — LIDOCAINE HYDROCHLORIDE 1 TUBE: 20 JELLY TOPICAL at 15:44

## 2025-03-28 RX ADMIN — IOPAMIDOL 750 ML: 510 INJECTION, SOLUTION INTRAVASCULAR at 15:05

## 2025-03-31 ENCOUNTER — TELEPHONE (OUTPATIENT)
Dept: UROLOGY | Facility: CLINIC | Age: 65
End: 2025-03-31
Payer: COMMERCIAL

## 2025-03-31 NOTE — TELEPHONE ENCOUNTER
Left Voicemail (1st Attempt) for the patient to call back and schedule the following:    Appointment type: New Urology  Provider: Dr. April Encinas  Return date: next available  Specialty phone number: [phone]  Appointment notes: urethral stricture; RUG VCUG done 3/28

## 2025-03-31 NOTE — TELEPHONE ENCOUNTER
----- Message from Hayde DOHERTY sent at 3/31/2025  2:53 PM CDT -----  Regarding: FW: Recon follow up  Diagnosis: urethral stricture  Visit Type: return  Mode: Either virtual or In person  Provider: Dr. Newton, Dr Chen or Rona Del Real  Schedule: First available  Has the pt been contacted:No  Has the chart been reviewed: Yes  Appt notes:  had RUG/VCUG on 3/28/25      Thank you,  Hayde Dupree RN, BSN   Urology Triage Nurse  ----- Message -----  From: Gabrielle Roe RN  Sent: 3/31/2025   1:59 PM CDT  To: [facility]  Subject: FW: Recon follow up                              Will send to [facility] team to schedule.  ----- Message -----  From: Liya Chen PA-C  Sent: 3/31/2025   9:08 AM CDT  To: [facility] Support Pool  Subject: Recon follow up                                  Hi team  This patient had RUG VCUG and needs follow up with the recon team about possible urethral stricture. Could we please message their team and have them in contact with the patient? Thank you!    Liya

## 2025-04-01 ENCOUNTER — TELEPHONE (OUTPATIENT)
Dept: UROLOGY | Facility: CLINIC | Age: 65
End: 2025-04-01
Payer: COMMERCIAL

## 2025-04-01 DIAGNOSIS — R39.89 SUSPECTED URINARY TRACT INFECTION: Primary | ICD-10-CM

## 2025-04-02 ENCOUNTER — TELEPHONE (OUTPATIENT)
Dept: UROLOGY | Facility: CLINIC | Age: 65
End: 2025-04-02
Payer: COMMERCIAL

## 2025-04-03 ENCOUNTER — LAB (OUTPATIENT)
Dept: LAB | Facility: CLINIC | Age: 65
End: 2025-04-03
Payer: COMMERCIAL

## 2025-04-03 ENCOUNTER — TELEPHONE (OUTPATIENT)
Dept: UROLOGY | Facility: CLINIC | Age: 65
End: 2025-04-03

## 2025-04-03 DIAGNOSIS — R39.89 SUSPECTED URINARY TRACT INFECTION: Primary | ICD-10-CM

## 2025-04-03 DIAGNOSIS — I50.22 CHRONIC SYSTOLIC CONGESTIVE HEART FAILURE (H): ICD-10-CM

## 2025-04-03 DIAGNOSIS — R39.89 SUSPECTED URINARY TRACT INFECTION: ICD-10-CM

## 2025-04-03 DIAGNOSIS — E78.5 HYPERLIPIDEMIA LDL GOAL <100: Primary | ICD-10-CM

## 2025-04-03 DIAGNOSIS — E11.40 TYPE 2 DIABETES MELLITUS WITH DIABETIC NEUROPATHY, WITHOUT LONG-TERM CURRENT USE OF INSULIN (H): ICD-10-CM

## 2025-04-03 LAB
ALBUMIN UR-MCNC: 30 MG/DL
APPEARANCE UR: ABNORMAL
BACTERIA #/AREA URNS HPF: ABNORMAL /HPF
BILIRUB UR QL STRIP: NEGATIVE
COLOR UR AUTO: YELLOW
ERYTHROCYTE [DISTWIDTH] IN BLOOD BY AUTOMATED COUNT: 12.6 % (ref 10–15)
GLUCOSE UR STRIP-MCNC: 500 MG/DL
HCT VFR BLD AUTO: 44.5 % (ref 40–53)
HGB BLD-MCNC: 14 G/DL (ref 13.3–17.7)
HGB UR QL STRIP: ABNORMAL
KETONES UR STRIP-MCNC: NEGATIVE MG/DL
LEUKOCYTE ESTERASE UR QL STRIP: ABNORMAL
MCH RBC QN AUTO: 30.3 PG (ref 26.5–33)
MCHC RBC AUTO-ENTMCNC: 31.5 G/DL (ref 31.5–36.5)
MCV RBC AUTO: 96 FL (ref 78–100)
NITRATE UR QL: POSITIVE
PH UR STRIP: 6 [PH] (ref 5–7)
PLATELET # BLD AUTO: 178 10E3/UL (ref 150–450)
RBC # BLD AUTO: 4.62 10E6/UL (ref 4.4–5.9)
RBC #/AREA URNS AUTO: ABNORMAL /HPF
SP GR UR STRIP: 1.01 (ref 1–1.03)
UROBILINOGEN UR STRIP-ACNC: 0.2 E.U./DL
WBC # BLD AUTO: 7.7 10E3/UL (ref 4–11)
WBC #/AREA URNS AUTO: >100 /HPF

## 2025-04-03 RX ORDER — SULFAMETHOXAZOLE AND TRIMETHOPRIM 800; 160 MG/1; MG/1
1 TABLET ORAL 2 TIMES DAILY
Qty: 14 TABLET | Refills: 0 | Status: SHIPPED | OUTPATIENT
Start: 2025-04-03 | End: 2025-04-10

## 2025-04-04 LAB — ALT SERPL W P-5'-P-CCNC: 32 U/L (ref 0–70)

## 2025-04-07 ENCOUNTER — TELEPHONE (OUTPATIENT)
Dept: UROLOGY | Facility: CLINIC | Age: 65
End: 2025-04-07
Payer: COMMERCIAL

## 2025-04-07 ENCOUNTER — PRE VISIT (OUTPATIENT)
Dept: UROLOGY | Facility: CLINIC | Age: 65
End: 2025-04-07

## 2025-04-07 ENCOUNTER — OFFICE VISIT (OUTPATIENT)
Dept: UROLOGY | Facility: CLINIC | Age: 65
End: 2025-04-07
Payer: COMMERCIAL

## 2025-04-07 VITALS
WEIGHT: 243 LBS | SYSTOLIC BLOOD PRESSURE: 112 MMHG | DIASTOLIC BLOOD PRESSURE: 76 MMHG | HEART RATE: 73 BPM | HEIGHT: 73 IN | OXYGEN SATURATION: 95 % | BODY MASS INDEX: 32.2 KG/M2

## 2025-04-07 DIAGNOSIS — N99.111 POSTPROCEDURAL BULBOUS URETHRAL STRICTURE: Primary | ICD-10-CM

## 2025-04-07 LAB
BACTERIA UR CULT: ABNORMAL
BACTERIA UR CULT: ABNORMAL

## 2025-04-07 PROCEDURE — 1125F AMNT PAIN NOTED PAIN PRSNT: CPT | Performed by: UROLOGY

## 2025-04-07 PROCEDURE — 3078F DIAST BP <80 MM HG: CPT | Performed by: UROLOGY

## 2025-04-07 PROCEDURE — 99214 OFFICE O/P EST MOD 30 MIN: CPT | Performed by: UROLOGY

## 2025-04-07 PROCEDURE — 3074F SYST BP LT 130 MM HG: CPT | Performed by: UROLOGY

## 2025-04-07 ASSESSMENT — PAIN SCALES - GENERAL: PAINLEVEL_OUTOF10: MODERATE PAIN (4)

## 2025-04-07 NOTE — TELEPHONE ENCOUNTER
Action 2025 2:27 PM ABT   Action Taken Called [facility] [phone] and spoke to Dia, confirms that CT AP 21 will be pushed to [facility] PACS     MEDICAL RECORDS REQUEST   [facility]  Urology Clinic  [address] [place] [address]  PHONE: [phone]  Fax: [phone]        FUTURE VISIT INFORMATION                                                   Rhett Elizabeth, : 1960 scheduled for future visit at [facility]    APPOINTMENT INFORMATION:  Date: 25  Provider:  Dr. Danie Schuster  Reason for Visit/Diagnosis: urethral stricture     REFERRAL INFORMATION:  Referring provider:  SELF    RECORDS REQUESTED FOR VISIT                                                     NOTES STATUS DETAILS   Office note from other specialist Internal 24: Liya Chen PA-C   Medication List Internal    LABS     Urinalysis (UA) Internal 25   URETHRAL STRICTURE     XR RUG/ VCUG (images & reports) PACS 25   CT Abd/Pel (images & reports) In process 21     PRE-VISIT CHECKLIST      Joint diagnostic appointment coordinated correctly          (ensure right order & amount of time) Yes   RECORD COLLECTION COMPLETE If no, please explain PENDING

## 2025-04-07 NOTE — NURSING NOTE
"Chief Complaint   Patient presents with    New Urethral Stricture     Reports difficulty urinating needing to push so hard. Reports not feeling completely emtpy.        Blood pressure 112/76, pulse 73, height 1.854 m (6' 1\"), weight 110.2 kg (243 lb), SpO2 95%. Body mass index is 32.06 kg/m .    Patient Active Problem List   Diagnosis    Neuropathic pain    Essential hypertension with goal blood pressure less than 140/90    Persistent atrial fibrillation (H)    LANEY (obstructive sleep apnea)    Type 2 diabetes mellitus with diabetic neuropathy, without long-term current use of insulin (H)    Chronic systolic congestive heart failure (H)    Drusen of macula, left    Dry eyes    S/P ICD (internal cardiac defibrillator) procedure    Recent bereavement    Acquired hypothyroidism    Hyperlipidemia LDL goal <100    Personal history of urethral stricture    Peripheral polyneuropathy    Chronic, continuous use of opioids    Long term current use of anticoagulant therapy    Chronic pain syndrome       Allergies   Allergen Reactions    Amlodipine Difficulty breathing and Other (See Comments)     Other reaction(s): Other  \"legs swell\"  Swelling of the lips and tongue  \"legs swell\"  swelling      Seasonal Allergies     Ace Inhibitors Cough     Other reaction(s): Cough       Current Outpatient Medications   Medication Sig Dispense Refill    alum & mag hydroxide-simethicone (MAALOX) 200-200-20 MG/5ML SUSP suspension Take 15 mLs by mouth daily as needed for other (GI discomfort). Mix with 15 mls of Lidocaine viscous solution 710 mL 0    aspirin 81 MG tablet Take by mouth daily Reported on 5/5/2017      blood glucose (ONETOUCH VERIO IQ) test strip Use to test blood sugar 3 times daily or as directed.  Ok to substitute alternative if insurance prefers. 300 strip 1    blood glucose monitoring (ONE TOUCH ULTRA 2) meter device kit Use to test blood sugar 3 times daily or as directed. 1 kit 0    Cholecalciferol (VITAMIN D-3 PO) Take " 1,000 Units by mouth daily      cyclobenzaprine (FLEXERIL) 10 MG tablet Take 1 tablet (10 mg) by mouth 3 times daily as needed for muscle spasms. 90 tablet 1    DULoxetine (CYMBALTA) 60 MG capsule TAKE ONE CAPSULE BY MOUTH ONCE DAILY 90 capsule 1    empagliflozin (JARDIANCE) 10 MG TABS tablet Take 10 mg by mouth daily      finasteride (PROSCAR) 5 MG tablet Take 1 tablet (5 mg) by mouth daily. 30 tablet 2    insulin pen needle (BD ARNOLD U/F) 32G X 4 MM miscellaneous Use 2 daily as directed. 200 each 1    levothyroxine (SYNTHROID/LEVOTHROID) 88 MCG tablet TAKE ONE TABLET BY MOUTH ONCE DAILY. NEED APPOINTMENT FOR FURTHER REFILLS. 90 tablet 0    lidocaine (LIDODERM) 5 % patch Place 1 patch over 12 hours onto the skin every 24 hours. To prevent lidocaine toxicity, patient should be patch free for 12 hrs daily. 30 patch 1    lidocaine, viscous, (XYLOCAINE) 2 % solution Take 15 mLs by mouth daily as needed for moderate pain. (mix with 15 mls of alum-mag hydroxide-simethicone) 200 mL 0    liraglutide (VICTOZA PEN) 18 MG/3ML solution Inject 1.8 mg Subcutaneous daily 27 mL 5    Lutein 20 MG CAPS Take 20 mg by mouth daily 8/11/2017- patient states he takes 1 20 mg tablet daily  Patient states he takes 150 mg tablet twice a day.      metFORMIN (GLUCOPHAGE) 500 MG tablet Two tabs 2 times a day 360 tablet 1    metoprolol succinate ER (TOPROL XL) 50 MG 24 hr tablet Take 2 tablets (100 mg) by mouth daily 90 tablet 0    Multiple Vitamin (MULTIVITAMINS PO) Reported on 5/22/2017      Nutritional Supplements (ENSURE COMPLETE PO)       Omega-3 Fatty Acids (OMEGA-3 FISH OIL PO) Take 1 g by mouth Reported on 5/5/2017      OneTouch Delica Lancets 33G MISC 1 Box 3 times daily 300 each 1    pantoprazole (PROTONIX) 40 MG EC tablet TAKE ONE TABLET BY MOUTH ONCE DAILY 90 tablet 1    pravastatin (PRAVACHOL) 10 MG tablet Take 1 tablet (10 mg) by mouth daily 90 tablet 3    pregabalin (LYRICA) 300 MG capsule TAKE ONE CAPSULE BY MOUTH TWICE A DAY  (NEED TO BE SEEN IN CLINIC FOR FURTHER REFILLS) 60 capsule 0    sacubitril-valsartan (ENTRESTO) 24-26 MG per tablet Take 1 tablet by mouth daily      sulfamethoxazole-trimethoprim (BACTRIM DS) 800-160 MG tablet Take 1 tablet by mouth 2 times daily for 7 days. 14 tablet 0    tamsulosin (FLOMAX) 0.4 MG capsule Take 1 capsule (0.4 mg) by mouth daily. 90 capsule 3    traMADol (ULTRAM) 50 MG tablet TAKE TWO TABLETS BY MOUTH TWICE A  tablet 4    traMADol (ULTRAM) 50 MG tablet Take 2 tablets (100 mg) by mouth 2 times daily as needed for severe pain. No further refills until appointment 60 tablet 0    UNABLE TO FIND cpap      XARELTO ANTICOAGULANT 20 MG TABS tablet TAKE ONE TABLET BY MOUTH ONCE DAILY WITH DINNER 90 tablet 0       Social History     Tobacco Use    Smoking status: Never     Passive exposure: Past    Smokeless tobacco: Former     Types: Chew     Quit date: 5/2/2024    Tobacco comments:     Chew tobacco   Vaping Use    Vaping status: Never Used   Substance Use Topics    Alcohol use: Yes     Comment: occ    Drug use: No       Denise Reeves  4/7/2025  3:14 PM

## 2025-04-07 NOTE — PROGRESS NOTES
"HPI:  Rhett Elizabeth is a 64 year old male with a history of DM2, anterior urethral stricture s/p urethral dilation and DVIU being seen for recurrent urethral stricture. Mr. Elizabeth endorses >10yr history of recurrent urethral stricture disease. He was reportedly lost to follow-up with community Urologist and is here to discuss treatment options for his stricture. RUG/VCUG on 03/28/2025 showed two short segment narrowings of the bulbar urethra as well as a significantly distended bladder without signs of vesicoureteral reflux. Regarding his obstructive symptoms 2/2 his stricture disease, he reports significant difficulty with emptying his bladder, straining, occasional leakage, urinary frequency and urgency. He denies any hematuria or dysuria. He does report several episodes of Rafa and acute urinary retention 2/2 his stricture. His most recent episode of acute retention was in 11/2024, for which he was started on tamsulosin, which he self-discontinued in December due to poor tolerance. He otherwise denies any fevers, chills, nausea, vomiting, changes to BM.       Medsurg History  - Multiple urethral dilations (date unknown)  - DVIU (date unknown)  - PVC Ablation (03/31/2025)    Today  - Denies any recent Rafa and is not taking any antibiotics or medications for obstructive urinary symptoms currently. He is interested in pursuing with surgical treatment for his chronic recurrent urethral stricture.    Exam:  /76   Pulse 73   Ht 1.854 m (6' 1\")   Wt 110.2 kg (243 lb)   SpO2 95%   BMI 32.06 kg/m    General: age-appropriate appearing male in NAD sitting in an exam chair  Resp: no respiratory distress  Abdomen: Degree of obesity is mild. Abdomen is soft and  largely nontender, endorsed some discomfort over SP area with palpation, which is appropriate . No organomegaly.  : not performed    Review of Imaging:  The following imaging exams were independently viewed and interpreted by me and discussed with " patient:    2 strictures, both short. One is in prox bulb and one is in mid to distal bulb.     Review of Labs:  The following labs were reviewed by me and discussed with the patient:  Recent Results (from the past 720 hours)   Routine UA with micro reflex to culture    Collection Time: 04/03/25  2:38 PM    Specimen: Urine, Clean Catch   Result Value Ref Range    Color Urine Yellow Colorless, Straw, Light Yellow, Yellow    Appearance Urine Cloudy (A) Clear    Glucose Urine 500 (A) Negative mg/dL    Bilirubin Urine Negative Negative    Ketones Urine Negative Negative mg/dL    Specific Gravity Urine 1.015 1.003 - 1.035    Blood Urine Moderate (A) Negative    pH Urine 6.0 5.0 - 7.0    Protein Albumin Urine 30 (A) Negative mg/dL    Urobilinogen Urine 0.2 0.2, 1.0 E.U./dL    Nitrite Urine Positive (A) Negative    Leukocyte Esterase Urine Small (A) Negative   Albumin Random Urine Quantitative with Creat Ratio    Collection Time: 04/03/25  2:38 PM   Result Value Ref Range    Creatinine Urine mg/dL 52.4 mg/dL    Albumin Urine mg/L 98.9 mg/L    Albumin Urine mg/g Cr 188.74 (H) 0.00 - 17.00 mg/g Cr   ALT    Collection Time: 04/03/25  2:38 PM   Result Value Ref Range    ALT 32 0 - 70 U/L   CBC with Platelets    Collection Time: 04/03/25  2:38 PM   Result Value Ref Range    WBC Count 7.7 4.0 - 11.0 10e3/uL    RBC Count 4.62 4.40 - 5.90 10e6/uL    Hemoglobin 14.0 13.3 - 17.7 g/dL    Hematocrit 44.5 40.0 - 53.0 %    MCV 96 78 - 100 fL    MCH 30.3 26.5 - 33.0 pg    MCHC 31.5 31.5 - 36.5 g/dL    RDW 12.6 10.0 - 15.0 %    Platelet Count 178 150 - 450 10e3/uL   Urine Microscopic Exam    Collection Time: 04/03/25  2:38 PM   Result Value Ref Range    Bacteria Urine Few (A) None Seen /HPF    RBC Urine 0-2 0-2 /HPF /HPF    WBC Urine >100 (A) 0-5 /HPF /HPF   Urine Culture    Collection Time: 04/03/25  2:38 PM    Specimen: Urine, Clean Catch   Result Value Ref Range    Culture >100,000 CFU/mL Staphylococcus epidermidis (A)     Culture  "50,000-100,000 CFU/mL Staphylococcus epidermidis (A)        Susceptibility    Staphylococcus epidermidis - MARY ELLEN*     Oxacillin* <=0.25 Susceptible ug/mL      * Oxacillin susceptible isolates are susceptible to cephalosporins (example: cefazolin and cephalexin) and beta lactam combination agents. Oxacillin resistant isolates are resistant to these agents.     Gentamicin <=0.5 Susceptible ug/mL     Ciprofloxacin <=0.5 Susceptible ug/mL     Levofloxacin <=0.12 Susceptible ug/mL     Vancomycin 1 Susceptible ug/mL     Daptomycin 0.25 Susceptible ug/mL     Tetracycline <=1 Susceptible ug/mL     Doxycycline <=0.5 Susceptible ug/mL     Nitrofurantoin <=16 Susceptible ug/mL     Trimethoprim/Sulfamethoxazole  Susceptible     Staphylococcus epidermidis - MARY ELLEN*     Oxacillin* <=0.25 Susceptible ug/mL      * Oxacillin susceptible isolates are susceptible to cephalosporins (example: cefazolin and cephalexin) and beta lactam combination agents. Oxacillin resistant isolates are resistant to these agents.     Gentamicin <=0.5 Susceptible ug/mL     Ciprofloxacin <=0.5 Susceptible ug/mL     Levofloxacin <=0.12 Susceptible ug/mL     Vancomycin <=0.5 Susceptible ug/mL     Daptomycin 0.25 Susceptible ug/mL     Tetracycline 2 Susceptible ug/mL     Doxycycline <=0.5 Susceptible ug/mL     Nitrofurantoin <=16 Susceptible ug/mL     Trimethoprim/Sulfamethoxazole  Susceptible      * Antibiotics listed as \"No Interpretation\" have no regulatory guidelines for susceptibility/resistance available.     Antibiotics listed as \"No Interpretation\" have no regulatory guidelines for susceptibility/resistance available.         Assessment & Plan   Recurrent urethral strictures, anterior, located in the bulbar urethra  Recurrent UTI and episodes of urinary retention    After discussing treatment options and r/b/a for his recurrent urethral strictures, including drug-coated balloon dilation and urethroplasty with buccal mucosal graft, the patient would like to " proceed with urethroplasty.    Will communicate with Cardiologist at [facility] regarding clearance for surgery given his recent ablation and anticoagulation regimen, and will schedule accordingly.  We discussed the risks, benefits and alternatives of Optilume for urethral stricture. Patient understands risks to include but not be limited to stricture recurrence 25-50%, hematuria and dysuria which can last weeks. Also understands that I was PI of the seminal trial and that I consult for [employer], the owners of the device.   He understands the risks of urethroplasty to include but not be limited to bleeding, infection, penile or scrotal pain/numbness, change in erectile or ejaculatory function, need for additional procedures and recurrence of primary disease. He wishes to proceed.  Proceed with Urethroplasty with buccal mucosal graft, to be scheduled      ========================  Rona Del Real, NP  [facility]    Remy Cardoso, MS4  [facility]     I acted as a scribe for this note. All portions of the documented history and physical were personally performed by Danie Newton MD as the attending physician.     All portions of the documented history and physical were personally performed by me as the attending physician. I have reviewed and edited the scribe's note as appropriate to reflect my personal interactions with the patient.      Danie Newton MD  [facility] [place]    ==========================      Additional Coding Information:    Problems:  4 -- one acute complicated injury    Data Reviewed  Independent interpretation of a test performed by another physician/other qualified health care professional (not separately reported) - RUG    Level of risk:  4 -- minor surgery with patient or procedure risks

## 2025-04-07 NOTE — LETTER
"4/7/2025       RE: Rhett Elizabeth  [address] [address]     Dear Colleague,    Thank you for referring your patient, Rhett Elizabeth, to the [facility] [place] at [facility]. Please see a copy of my visit note below.    HPI:  Rhett Elizabeth is a 64 year old male with a history of DM2, anterior urethral stricture s/p urethral dilation and DVIU being seen for recurrent urethral stricture. Mr. Elizabeth endorses >10yr history of recurrent urethral stricture disease. He was reportedly lost to follow-up with community Urologist and is here to discuss treatment options for his stricture. RUG/VCUG on 03/28/2025 showed two short segment narrowings of the bulbar urethra as well as a significantly distended bladder without signs of vesicoureteral reflux. Regarding his obstructive symptoms 2/2 his stricture disease, he reports significant difficulty with emptying his bladder, straining, occasional leakage, urinary frequency and urgency. He denies any hematuria or dysuria. He does report several episodes of Rafa and acute urinary retention 2/2 his stricture. His most recent episode of acute retention was in 11/2024, for which he was started on tamsulosin, which he self-discontinued in December due to poor tolerance. He otherwise denies any fevers, chills, nausea, vomiting, changes to BM.       Medsurg History  - Multiple urethral dilations (date unknown)  - DVIU (date unknown)  - PVC Ablation (03/31/2025)    Today  - Denies any recent Rafa and is not taking any antibiotics or medications for obstructive urinary symptoms currently. He is interested in pursuing with surgical treatment for his chronic recurrent urethral stricture.    Exam:  /76   Pulse 73   Ht 1.854 m (6' 1\")   Wt 110.2 kg (243 lb)   SpO2 95%   BMI 32.06 kg/m    General: age-appropriate appearing male in NAD sitting in an exam chair  Resp: no " respiratory distress  Abdomen: Degree of obesity is mild. Abdomen is soft and  largely nontender, endorsed some discomfort over SP area with palpation, which is appropriate . No organomegaly.  : not performed    Review of Imaging:  The following imaging exams were independently viewed and interpreted by me and discussed with patient:    2 strictures, both short. One is in prox bulb and one is in mid to distal bulb.     Review of Labs:  The following labs were reviewed by me and discussed with the patient:  Recent Results (from the past 720 hours)   Routine UA with micro reflex to culture    Collection Time: 04/03/25  2:38 PM    Specimen: Urine, Clean Catch   Result Value Ref Range    Color Urine Yellow Colorless, Straw, Light Yellow, Yellow    Appearance Urine Cloudy (A) Clear    Glucose Urine 500 (A) Negative mg/dL    Bilirubin Urine Negative Negative    Ketones Urine Negative Negative mg/dL    Specific Gravity Urine 1.015 1.003 - 1.035    Blood Urine Moderate (A) Negative    pH Urine 6.0 5.0 - 7.0    Protein Albumin Urine 30 (A) Negative mg/dL    Urobilinogen Urine 0.2 0.2, 1.0 E.U./dL    Nitrite Urine Positive (A) Negative    Leukocyte Esterase Urine Small (A) Negative   Albumin Random Urine Quantitative with Creat Ratio    Collection Time: 04/03/25  2:38 PM   Result Value Ref Range    Creatinine Urine mg/dL 52.4 mg/dL    Albumin Urine mg/L 98.9 mg/L    Albumin Urine mg/g Cr 188.74 (H) 0.00 - 17.00 mg/g Cr   ALT    Collection Time: 04/03/25  2:38 PM   Result Value Ref Range    ALT 32 0 - 70 U/L   CBC with Platelets    Collection Time: 04/03/25  2:38 PM   Result Value Ref Range    WBC Count 7.7 4.0 - 11.0 10e3/uL    RBC Count 4.62 4.40 - 5.90 10e6/uL    Hemoglobin 14.0 13.3 - 17.7 g/dL    Hematocrit 44.5 40.0 - 53.0 %    MCV 96 78 - 100 fL    MCH 30.3 26.5 - 33.0 pg    MCHC 31.5 31.5 - 36.5 g/dL    RDW 12.6 10.0 - 15.0 %    Platelet Count 178 150 - 450 10e3/uL   Urine Microscopic Exam    Collection Time:  "04/03/25  2:38 PM   Result Value Ref Range    Bacteria Urine Few (A) None Seen /HPF    RBC Urine 0-2 0-2 /HPF /HPF    WBC Urine >100 (A) 0-5 /HPF /HPF   Urine Culture    Collection Time: 04/03/25  2:38 PM    Specimen: Urine, Clean Catch   Result Value Ref Range    Culture >100,000 CFU/mL Staphylococcus epidermidis (A)     Culture 50,000-100,000 CFU/mL Staphylococcus epidermidis (A)        Susceptibility    Staphylococcus epidermidis - MARY ELLEN*     Oxacillin* <=0.25 Susceptible ug/mL      * Oxacillin susceptible isolates are susceptible to cephalosporins (example: cefazolin and cephalexin) and beta lactam combination agents. Oxacillin resistant isolates are resistant to these agents.     Gentamicin <=0.5 Susceptible ug/mL     Ciprofloxacin <=0.5 Susceptible ug/mL     Levofloxacin <=0.12 Susceptible ug/mL     Vancomycin 1 Susceptible ug/mL     Daptomycin 0.25 Susceptible ug/mL     Tetracycline <=1 Susceptible ug/mL     Doxycycline <=0.5 Susceptible ug/mL     Nitrofurantoin <=16 Susceptible ug/mL     Trimethoprim/Sulfamethoxazole  Susceptible     Staphylococcus epidermidis - MARY ELLEN*     Oxacillin* <=0.25 Susceptible ug/mL      * Oxacillin susceptible isolates are susceptible to cephalosporins (example: cefazolin and cephalexin) and beta lactam combination agents. Oxacillin resistant isolates are resistant to these agents.     Gentamicin <=0.5 Susceptible ug/mL     Ciprofloxacin <=0.5 Susceptible ug/mL     Levofloxacin <=0.12 Susceptible ug/mL     Vancomycin <=0.5 Susceptible ug/mL     Daptomycin 0.25 Susceptible ug/mL     Tetracycline 2 Susceptible ug/mL     Doxycycline <=0.5 Susceptible ug/mL     Nitrofurantoin <=16 Susceptible ug/mL     Trimethoprim/Sulfamethoxazole  Susceptible      * Antibiotics listed as \"No Interpretation\" have no regulatory guidelines for susceptibility/resistance available.     Antibiotics listed as \"No Interpretation\" have no regulatory guidelines for susceptibility/resistance available. "         Assessment & Plan  Recurrent urethral strictures, anterior, located in the bulbar urethra  Recurrent UTI and episodes of urinary retention    After discussing treatment options and r/b/a for his recurrent urethral strictures, including drug-coated balloon dilation and urethroplasty with buccal mucosal graft, the patient would like to proceed with urethroplasty.    Will communicate with Cardiologist at [facility] regarding clearance for surgery given his recent ablation and anticoagulation regimen, and will schedule accordingly.  We discussed the risks, benefits and alternatives of Optilume for urethral stricture. Patient understands risks to include but not be limited to stricture recurrence 25-50%, hematuria and dysuria which can last weeks. Also understands that I was PI of the seminal trial and that I consult for [employer], the owners of the device.   He understands the risks of urethroplasty to include but not be limited to bleeding, infection, penile or scrotal pain/numbness, change in erectile or ejaculatory function, need for additional procedures and recurrence of primary disease. He wishes to proceed.  Proceed with Urethroplasty with buccal mucosal graft, to be scheduled      ========================  Rona Del Real, NP  [facility]    Remy Cardoso, MS4  [facility]     I acted as a scribe for this note. All portions of the documented history and physical were personally performed by Danie Newton MD as the attending physician.     All portions of the documented history and physical were personally performed by me as the attending physician. I have reviewed and edited the scribe's note as appropriate to reflect my personal interactions with the patient.      Danie Newton MD  [facility] [place]    ==========================      Additional Coding Information:    Problems:  4 -- one acute complicated injury    Data  Reviewed  Independent interpretation of a test performed by another physician/other qualified health care professional (not separately reported) - RUG    Level of risk:  4 -- minor surgery with patient or procedure risks              Again, thank you for allowing me to participate in the care of your patient.      Sincerely,    Danie Newton MD

## 2025-04-08 ENCOUNTER — DOCUMENTATION ONLY (OUTPATIENT)
Dept: UROLOGY | Facility: CLINIC | Age: 65
End: 2025-04-08
Payer: COMMERCIAL

## 2025-04-08 DIAGNOSIS — E03.9 HYPOTHYROIDISM, UNSPECIFIED TYPE: ICD-10-CM

## 2025-04-08 RX ORDER — ACETAMINOPHEN 650 MG/1
650 SUPPOSITORY RECTAL ONCE
OUTPATIENT
Start: 2025-04-08

## 2025-04-08 RX ORDER — ACETAMINOPHEN 325 MG/1
975 TABLET ORAL ONCE
OUTPATIENT
Start: 2025-04-08 | End: 2025-04-08

## 2025-04-08 RX ORDER — CEFAZOLIN SODIUM 2 G/50ML
2 SOLUTION INTRAVENOUS
OUTPATIENT
Start: 2025-04-08

## 2025-04-08 RX ORDER — CEFAZOLIN SODIUM 2 G/50ML
2 SOLUTION INTRAVENOUS SEE ADMIN INSTRUCTIONS
OUTPATIENT
Start: 2025-04-08

## 2025-04-08 NOTE — PROGRESS NOTES
Spoke with team at [facility] cardiology. Dr. Liao team. Nurse will discuss if/when pt will be able to come off xarelto for 7 days prior to surgery for urethroplasty with buccal graft. Nurse will discuss with Dr. Liao and return call this week. Number to clinic is: [phone], option #2. Pt recently had cardiac ablation.     Tatiana Cruz  [facility]  Phone: [phone]

## 2025-04-09 RX ORDER — LEVOTHYROXINE SODIUM 88 UG/1
TABLET ORAL
Qty: 90 TABLET | Refills: 2 | Status: SHIPPED | OUTPATIENT
Start: 2025-04-09

## 2025-04-14 DIAGNOSIS — N99.111 POSTPROCEDURAL BULBOUS URETHRAL STRICTURE: Primary | ICD-10-CM

## 2025-04-15 DIAGNOSIS — E11.40 TYPE 2 DIABETES MELLITUS WITH DIABETIC NEUROPATHY, WITHOUT LONG-TERM CURRENT USE OF INSULIN (H): ICD-10-CM

## 2025-04-15 RX ORDER — PREGABALIN 300 MG/1
CAPSULE ORAL
Qty: 60 CAPSULE | Refills: 4 | Status: SHIPPED | OUTPATIENT
Start: 2025-04-15

## 2025-04-16 ENCOUNTER — TELEPHONE (OUTPATIENT)
Dept: UROLOGY | Facility: CLINIC | Age: 65
End: 2025-04-16
Payer: COMMERCIAL

## 2025-04-16 DIAGNOSIS — R39.89 SUSPECTED URINARY TRACT INFECTION: Primary | ICD-10-CM

## 2025-04-16 RX ORDER — CIPROFLOXACIN 500 MG/1
500 TABLET, FILM COATED ORAL 2 TIMES DAILY
Qty: 20 TABLET | Refills: 0 | Status: SHIPPED | OUTPATIENT
Start: 2025-04-16 | End: 2025-04-26

## 2025-04-17 NOTE — TELEPHONE ENCOUNTER
FUTURE VISIT INFORMATION      SURGERY INFORMATION:  Date: 25  Location: [facility] OR  Surgeon:  Danie Newton MD   Anesthesia Type:  General   Procedure: URETHROPLASTY, USING BUCCAL MUCOSA GRAFT  Consult: 25    RECORDS REQUESTED FROM:       Primary Care Provider: Ac Frederick MD - [facility]     Pertinent Medical History: T2DM; LANEY; Hypothyroidism; Hyperlipidemia; Chronic systolic congestive heart failure; Essential hypertension; Persistent atrial fibrillation    Most recent EKG+ Tracin25 - [facility]     Most recent ECHO: 24 - [facility]     Most recent Cardiac Stress Test: 25 - [facility]     Most recent Coronary Angiogram: 22 - [facility]     Most recent Sleep Study: 16 - [facility]

## 2025-04-24 ENCOUNTER — OFFICE VISIT (OUTPATIENT)
Dept: SURGERY | Facility: CLINIC | Age: 65
End: 2025-04-24
Payer: COMMERCIAL

## 2025-04-24 ENCOUNTER — ANESTHESIA EVENT (OUTPATIENT)
Dept: SURGERY | Facility: CLINIC | Age: 65
End: 2025-04-24
Payer: COMMERCIAL

## 2025-04-24 ENCOUNTER — PRE VISIT (OUTPATIENT)
Dept: SURGERY | Facility: CLINIC | Age: 65
End: 2025-04-24

## 2025-04-24 ENCOUNTER — LAB (OUTPATIENT)
Dept: LAB | Facility: CLINIC | Age: 65
End: 2025-04-24
Payer: COMMERCIAL

## 2025-04-24 VITALS
HEART RATE: 58 BPM | SYSTOLIC BLOOD PRESSURE: 112 MMHG | TEMPERATURE: 97.5 F | WEIGHT: 249.4 LBS | DIASTOLIC BLOOD PRESSURE: 78 MMHG | BODY MASS INDEX: 33.05 KG/M2 | HEIGHT: 73 IN | RESPIRATION RATE: 16 BRPM | OXYGEN SATURATION: 98 %

## 2025-04-24 DIAGNOSIS — I10 ESSENTIAL HYPERTENSION WITH GOAL BLOOD PRESSURE LESS THAN 140/90: ICD-10-CM

## 2025-04-24 DIAGNOSIS — N99.111 POSTPROCEDURAL BULBOUS URETHRAL STRICTURE: ICD-10-CM

## 2025-04-24 DIAGNOSIS — E11.9 TYPE 2 DIABETES MELLITUS WITHOUT COMPLICATION, WITHOUT LONG-TERM CURRENT USE OF INSULIN (H): ICD-10-CM

## 2025-04-24 DIAGNOSIS — Z01.818 PREOP EXAMINATION: Primary | ICD-10-CM

## 2025-04-24 DIAGNOSIS — E11.40 TYPE 2 DIABETES MELLITUS WITH DIABETIC NEUROPATHY, WITHOUT LONG-TERM CURRENT USE OF INSULIN (H): ICD-10-CM

## 2025-04-24 DIAGNOSIS — Z01.818 PREOP EXAMINATION: ICD-10-CM

## 2025-04-24 LAB
ANION GAP SERPL CALCULATED.3IONS-SCNC: 10 MMOL/L (ref 7–15)
BUN SERPL-MCNC: 14.4 MG/DL (ref 8–23)
CALCIUM SERPL-MCNC: 9.4 MG/DL (ref 8.8–10.4)
CHLORIDE SERPL-SCNC: 103 MMOL/L (ref 98–107)
CHOLEST SERPL-MCNC: 153 MG/DL
CREAT SERPL-MCNC: 1.03 MG/DL (ref 0.67–1.17)
EGFRCR SERPLBLD CKD-EPI 2021: 82 ML/MIN/1.73M2
EST. AVERAGE GLUCOSE BLD GHB EST-MCNC: 126 MG/DL
FASTING STATUS PATIENT QL REPORTED: NORMAL
FASTING STATUS PATIENT QL REPORTED: NORMAL
GLUCOSE SERPL-MCNC: 82 MG/DL (ref 70–99)
HBA1C MFR BLD: 6 %
HCO3 SERPL-SCNC: 27 MMOL/L (ref 22–29)
HDLC SERPL-MCNC: 45 MG/DL
LDLC SERPL CALC-MCNC: 96 MG/DL
NONHDLC SERPL-MCNC: 108 MG/DL
POTASSIUM SERPL-SCNC: 4.6 MMOL/L (ref 3.4–5.3)
SODIUM SERPL-SCNC: 140 MMOL/L (ref 135–145)
TRIGL SERPL-MCNC: 62 MG/DL

## 2025-04-24 PROCEDURE — 99204 OFFICE O/P NEW MOD 45 MIN: CPT | Performed by: PHYSICIAN ASSISTANT

## 2025-04-24 PROCEDURE — 83036 HEMOGLOBIN GLYCOSYLATED A1C: CPT | Performed by: PHYSICIAN ASSISTANT

## 2025-04-24 PROCEDURE — 99000 SPECIMEN HANDLING OFFICE-LAB: CPT | Performed by: PATHOLOGY

## 2025-04-24 ASSESSMENT — NEW YORK HEART ASSOCIATION (NYHA) CLASSIFICATION: NYHA FUNCTIONAL CLASS: II

## 2025-04-24 ASSESSMENT — PAIN SCALES - GENERAL: PAINLEVEL_OUTOF10: MODERATE PAIN (5)

## 2025-04-24 ASSESSMENT — ENCOUNTER SYMPTOMS
DYSRHYTHMIAS: 1
SEIZURES: 1

## 2025-04-24 ASSESSMENT — LIFESTYLE VARIABLES: TOBACCO_USE: 1

## 2025-04-24 NOTE — PATIENT INSTRUCTIONS
"Preparing for Your Surgery      Name:  Rhett Elizabeth \"John\"  MRN:  [id number]   :  1960   Today's Date:  2025     The [place] [employer] [address] [employer] Project                                Timeline 2025 -2025    This project will affect travel to the [facility] and [facility], as well as the [facility].      Please check the [facility] [address] project website for the most up to date information and give yourself additional time to reach your destination.        Arriving for surgery:  Surgery date:  25  Arrival time:  5:30 am  Surgery time: 7:30 am    Please come to:     Please come to:       [facility]    [address] [place]  [address]     The [facility] [place] Patient/Visitor Ramp is at [address]. Patients and visitors who self-park will receive the reduced hospital parking rate. If the Patient /Visitor Ramp is full, please follow the signs to the [employer] car park located at the [facility] entrance.       parking is available (24 hours/ 7 days a week)      Discounted parking pass options are available for patients and visitors. They can be purchased at the [employer] desk at the [facility] entrance.     -    Stop at the security desk and they will direct surgery patients to the Surgery Check in and Family Lounge. [phone]        - If you need directions, a wheelchair or an escort please stop at the Information/security desk in the lobby.     What can I eat or drink?  -  You may eat and drink normally up to 8 hours prior to arrival time. (Until 9:30 pm on 25)  -  You may have clear liquids until 2 hours prior to arrival time. (Until 3:30 am on 25)    Examples of clear liquids:  Water  Clear broth  Juices (apple, white grape, white cranberry  and cider) without " pulp  Noncarbonated, powder based beverages  (lemonade and Henry-Aid)  Sodas (Sprite, 7-Up, ginger ale and seltzer)  Coffee or tea (without milk or cream)  Gatorade    -  No Alcohol or cannabis products for at least 24 hours before surgery.     Which medicines can I take?    Hold Multivitamins for 7 days before surgery.  Hold Supplements for 7 days before surgery. Omega 3, Lutein    Hold Ibuprofen (Advil, Motrin) for 1 day(s) before surgery--unless otherwise directed by surgeon.  Hold Naproxen (Aleve) for 4 days before surgery.    Hold Empagliflozin (Jardiance) for 3 days before surgery. Take last dose 5/4/25    Hold Xarelto for 2 days before surgery. Take last dose 5/5/25    Hold Liraglutide (Victoza) the day before and day of surgery-5/7 & 5/8    -  DO NOT take these medications the day of surgery:   Maalox   Vitamin D   Metformin (Glucophage)          -  PLEASE TAKE these medications the day of surgery:   Ciprofloxacin (Cipro)   Cyclobenzaprine (Flexeril) as needed   Duloxetine (Cymbalta)   Levothyroxine (Synthroid)   Metoprolol succinate   Pantoprazole (Protonix)   Pravastatin (Pravachol)   Pregabalin (Lyrica)   Tramadol (Ultram) as needed    How do I prepare myself?  - Please take 2 showers (one the night prior to surgery and one the morning of surgery) using Scrubcare or Hibiclens soap.    Use this soap only from the neck to your toes. Avoid genital area      Leave the soap on your skin for one minute--then rinse thoroughly.      You may use your own shampoo and conditioner. No other hair products.   - Please remove all jewelry and body piercings.  - No lotions, deodorants or fragrance.  - Bring your ID and insurance card.    -For patients being admitted to the [facility]  Family members are to take the patient belongings with them and place them in the lockers provided in the Family Lounge.  Please limit the items you bring to 1 bag as the lockers are small.      -If you use a CPAP machine, please  bring the CPAP machine, tubing, and mask to hospital.    -If you have a Deep Brain Stimulator, Spinal Cord Stimulator, or any Neuro Stimulator device---you must bring the remote control to the hospital.      ALL PATIENTS GOING HOME THE SAME DAY OF SURGERY ARE REQUIRED TO HAVE A RESPONSIBLE ADULT TO DRIVE AND BE IN ATTENDANCE WITH THEM FOR 24 HOURS FOLLOWING SURGERY.    Covid testing policy as of 12/06/2022  Your surgeon will notify and schedule you for a COVID test if one is needed before surgery--please direct any questions or COVID symptoms to your surgeon      Questions or Concerns:    - For any questions regarding the day of surgery or your hospital stay, please contact the Pre Admission Nursing Office at [phone].       - If you have health changes between today and your surgery, please call your surgeon.       - For questions after surgery, please call your surgeons office.           Current Visitor Guidelines    2 adult visitors for adult patients in the pre op area    If additional visitors come (beyond a patient care attendant or a group home caregiver), the additional visitors will be asked to wait in the main lobby of the hospital    Visiting hours: 8 a.m. to 8:30 p.m.    Patients confirmed or suspected to have symptoms of COVID 19 or flu:     No visitors allowed for adult patients.   Children (under age 18) can have 1 named visitor.     People who are sick or showing symptoms of COVID 19 or flu:    Are not allowed to visit patients--we can only make exceptions in special situations.       Please follow these guidelines for your visit:          Please maintain social distance          Masking is optional--however at times you may be asked to wear a mask for the safety of yourself and others     Clean your hands with alcohol hand . Do this when you arrive at and leave the building and patient room,    And again after you touch your mask or anything in the room.     Go directly to and from the  room you are visiting.     Stay in the patient s room during your visit. Limit going to other places in the hospital as much as possible     Leave bags and jackets at home or in the car.     For everyone s health, please don t come and go during your visit. That includes for smoking   during your visit.

## 2025-04-24 NOTE — H&P
Pre-Operative H & P     CC:  Preoperative exam to assess for increased cardiopulmonary risk while undergoing surgery and anesthesia.    Date of Encounter: 4/24/2025  Primary Care Physician:  Ac Frederick     Reason for visit:   Encounter Diagnoses   Name Primary?    Postprocedural bulbous urethral stricture Yes    Preop examination     Type 2 diabetes mellitus without complication, without long-term current use of insulin (H)        HPI  Rhett Elizabeth is a 64 year old male who presents for pre-operative H & P in preparation for  Procedure Information       Case: [id number] Date/Time: 05/08/25 0730    Procedure: URETHROPLASTY, USING BUCCAL MUCOSA GRAFT (Urethra)    Anesthesia type: General    Diagnosis: Postprocedural bulbous urethral stricture [N99.111]    Pre-op diagnosis: Postprocedural bulbous urethral stricture [N99.111]    Location: [facility]  /  OR    Providers: Danie Newton MD            Patient is being evaluated for comorbid conditions of HTN, A-fib/flutter s/p ablation, PVCs s/p ablation, NICM, s/p Medtronic PM/ICD implant, CHF, HLD, LBBB, chronic anticoagulation, LANEY, DM2, obesity, chronic pain.     Mr. Elizabeth has a >10yr history of recurrent urethral stricture disease, s/p urethral dilation and DVIU. He reports significant difficulty with emptying his bladder, straining, occasional leakage, urinary frequency and urgency. His most recent episode of acute retention was in 11/2024, for which he was started on tamsulosin, which he self-discontinued in December due to poor tolerance. He has been counseled by Dr. Newton and now presents for the above procedure.      History is obtained from the patient and chart review. He is accompanied by his daughter.    Hx of abnormal bleeding or anti-platelet use: on Xarelto      Past Medical History  Past Medical History:   Diagnosis Date    Atrial flutter (H)     CHF (congestive heart failure) (H)     Diabetes (H)     HLD (hyperlipidemia)      Hypertension     ICD (implantable cardioverter-defibrillator) in place     medtronic    LBBB (left bundle branch block)     Macular degeneration     NICM (nonischemic cardiomyopathy) (H)     LANEY (obstructive sleep apnea)     Paroxysmal atrial fibrillation (H)     Postprocedural bulbous urethral stricture, male     Type I or II open fracture of distal end of right tibia with routine healing, unspecified fracture morphology, subsequent encounter 01/15/2017       Past Surgical History  Past Surgical History:   Procedure Laterality Date    COLONOSCOPY N/A 11/16/2023    Procedure: Colonoscopy;  Surgeon: Andrew Richard MD;  Location:  GI    EP ABLATION ATRIAL FLUTTER      11/2021    PACEMAKER/ICD CHECK      3/2018    right ankle surgery      injury repair    STRABISMUS SURGERY         Prior to Admission Medications  Current Outpatient Medications   Medication Sig Dispense Refill    alum & mag hydroxide-simethicone (MAALOX) 200-200-20 MG/5ML SUSP suspension Take 15 mLs by mouth daily as needed for other (GI discomfort). Mix with 15 mls of Lidocaine viscous solution 710 mL 0    Cholecalciferol (VITAMIN D-3 PO) Take 1,000 Units by mouth daily      ciprofloxacin (CIPRO) 500 MG tablet Take 1 tablet (500 mg) by mouth 2 times daily for 10 days. 20 tablet 0    cyclobenzaprine (FLEXERIL) 10 MG tablet Take 1 tablet (10 mg) by mouth 3 times daily as needed for muscle spasms. 90 tablet 1    DULoxetine (CYMBALTA) 60 MG capsule TAKE ONE CAPSULE BY MOUTH ONCE DAILY (Patient taking differently: Take 60 mg by mouth every morning.) 90 capsule 1    empagliflozin (JARDIANCE) 10 MG TABS tablet Take 10 mg by mouth every evening.      finasteride (PROSCAR) 5 MG tablet Take 1 tablet (5 mg) by mouth daily. (Patient taking differently: Take 5 mg by mouth at bedtime.) 30 tablet 2    levothyroxine (SYNTHROID/LEVOTHROID) 88 MCG tablet TAKE ONE TABLET BY MOUTH ONCE DAILY. NEED APPOINTMENT FOR FURTHER REFILLS. (Patient taking differently:  Take 88 mcg by mouth every morning (before breakfast).) 90 tablet 2    lidocaine, viscous, (XYLOCAINE) 2 % solution Take 15 mLs by mouth daily as needed for moderate pain. (mix with 15 mls of alum-mag hydroxide-simethicone) 200 mL 0    liraglutide (VICTOZA PEN) 18 MG/3ML solution Inject 1.8 mg Subcutaneous daily (Patient taking differently: Inject 1.8 mg subcutaneously at bedtime.) 27 mL 5    Lutein 20 MG CAPS Take 20 mg by mouth every evening.      metFORMIN (GLUCOPHAGE) 500 MG tablet Two tabs 2 times a day (Patient taking differently: Take 1,000 mg by mouth 2 times daily (with meals). Two tabs 2 times a day) 360 tablet 1    metoprolol succinate ER (TOPROL XL) 50 MG 24 hr tablet Take 2 tablets (100 mg) by mouth daily (Patient taking differently: Take 100 mg by mouth every morning.) 90 tablet 0    Multiple Vitamin (MULTIVITAMINS PO) Reported on 5/22/2017      Omega-3 Fatty Acids (OMEGA-3 FISH OIL PO) Take 1 g by mouth every evening. Reported on 5/5/2017      pantoprazole (PROTONIX) 40 MG EC tablet TAKE ONE TABLET BY MOUTH ONCE DAILY (Patient taking differently: Take 40 mg by mouth every morning.) 90 tablet 1    pravastatin (PRAVACHOL) 10 MG tablet Take 1 tablet (10 mg) by mouth daily (Patient taking differently: Take 10 mg by mouth every morning.) 90 tablet 3    pregabalin (LYRICA) 300 MG capsule TAKE ONE CAPSULE BY MOUTH TWICE A DAY 60 capsule 4    sacubitril-valsartan (ENTRESTO) 24-26 MG per tablet Take 1 tablet by mouth every evening.      traMADol (ULTRAM) 50 MG tablet TAKE TWO TABLETS BY MOUTH TWICE A  tablet 4    traMADol (ULTRAM) 50 MG tablet Take 2 tablets (100 mg) by mouth 2 times daily as needed for severe pain. No further refills until appointment 60 tablet 0    XARELTO ANTICOAGULANT 20 MG TABS tablet TAKE ONE TABLET BY MOUTH ONCE DAILY WITH DINNER 90 tablet 0    blood glucose (ONETOUCH VERIO IQ) test strip Use to test blood sugar 3 times daily or as directed.  Ok to substitute alternative if  "insurance prefers. 300 strip 1    blood glucose monitoring (ONE TOUCH ULTRA 2) meter device kit Use to test blood sugar 3 times daily or as directed. 1 kit 0    insulin pen needle (BD ARNOLD U/F) 32G X 4 MM miscellaneous Use 2 daily as directed. 200 each 1    OneTouch Delica Lancets 33G MISC 1 Box 3 times daily 300 each 1    UNABLE TO FIND cpap         Allergies  Allergies   Allergen Reactions    Amlodipine Difficulty breathing and Other (See Comments)     Other reaction(s): Other  \"legs swell\"  Swelling of the lips and tongue  \"legs swell\"  swelling      Seasonal Allergies     Ace Inhibitors Cough     Other reaction(s): Cough       Social History  Social History     Socioeconomic History    Marital status:      Spouse name: Not on file    Number of children: Not on file    Years of education: Not on file    Highest education level: Not on file   Occupational History    Not on file   Tobacco Use    Smoking status: Never     Passive exposure: Past    Smokeless tobacco: Former     Types: Chew     Quit date: 5/2/2024    Tobacco comments:     Chew tobacco   Vaping Use    Vaping status: Never Used   Substance and Sexual Activity    Alcohol use: Yes     Comment: occ    Drug use: No    Sexual activity: Not Currently     Partners: Female   Other Topics Concern    Parent/sibling w/ CABG, MI or angioplasty before 65F 55M? Not Asked   Social History Narrative    Not on file     Social Drivers of Health     Financial Resource Strain: Not on file   Food Insecurity: No Food Insecurity (12/2/2024)    Received from [facility]     Hunger Vital Sign     Worried About Running Out of Food in the Last Year: Never true     Ran Out of Food in the Last Year: Never true   Transportation Needs: No Transportation Needs (12/2/2024)    [facility]     [facility]     Lack of Transportation (Medical): No     Lack of Transportation (Non-Medical): No   Physical Activity: Not on file   Stress: " Not on file   Social Connections: Not on file   Interpersonal Safety: Not At Risk (12/2/2024)    Received from [facility]     Humiliation, Afraid, Rape, and Kick questionnaire     Fear of Current or Ex-Partner: No     Emotionally Abused: No     Physically Abused: No     Sexually Abused: No   Housing Stability: Unknown (12/2/2024)    Received from [facility]     Housing Stability Vital Sign     Unable to Pay for Housing in the Last Year: No     Number of Times Moved in the Last Year: Not on file     Homeless in the Last Year: No       Family History  Family History   Problem Relation Age of Onset    Macular Degeneration Mother         wet    Dementia Mother     Macular Degeneration Father         wet    Glaucoma Father     Prostate Cancer Father     Cancer Daughter     Bleeding Disorder No family hx of     Anesthesia Reaction No family hx of     Diabetes No family hx of        Review of Systems  The complete review of systems is negative other than noted in the HPI or here.     Anesthesia Evaluation   Pt has had prior anesthetic.     No history of anesthetic complications       ROS/MED HX  ENT/Pulmonary:     (+) sleep apnea, uses CPAP,              tobacco use (Chewed tobacco, quit 5/2024), Past use,                    (-) asthma and recent URI   Neurologic:     (+)       seizures, last seizure: many years ago, single event, no recurrence,                     (-) no CVA   Cardiovascular: Comment: PVCs s/p ablation 3/31/25    A-fib/flutter, s/p ablation 2021    [id number] 3 (diabetes, hypertension, cardiomyopathy)    (+)  - -   -  - -      CHF (EF 30-35% despite biventricular pacing) etiology: NICM Last EF: 34% date: 12/2/24 NYHA classification: II.    pacemaker, Reason placed: NICM. type: Medtronic,   ICD Reason placed:NICM.  type;Medtronic   dysrhythmias (LBBB), Other, a-fib, a-flutter and PVCs,        Previous cardiac testing   Echo: Date: 12/2/24 Results:  Interpretation Summary     *  Contrast-enhanced transthoracic echocardiogram.     * Severe left ventricular enlargement with a moderate reduction in function   and a calculated ejection fraction 34%.     * Global hypokinesis with wall motion abnormalities, see text.     * Right ventricular enlargement with depressed systolic function.     * Mild aortic regurgitation.     * Mild mitral regurgitation.     * Grade 1 diastolic dysfunction.     * Right heart pacemaker lead.     * Findings similar to a previous transthoracic echocardiogram dated 5/7/2024   by direct study comparison.     Stress Test:  Date: 12/26/24 Results:  Summary    1. Abnormal pharmacologic nuclear stress test with perfusion defects perhaps   due to artifact or attenuation.  No definite isotope changes to confirm   ischemia (see text).     2. Stress EKG is non-diagnostic secondary to baseline abnormalities.     3. Test stopped for protocol completion.  Symptoms of chest discomfort,   dyspnea, flushing, lightheadedness and nausea.     4. Severe left ventricular enlargement with post stress left ventricular   ejection fraction severely depressed, estimated 27%.     5. Findings similar to the previous stress nuclear report dated 11/15/2022.     ECG Reviewed:  Date: 2/18/25 Results:  UNCERTAIN IRREGULAR RHYTHM   ELECTRONIC VENTRICULAR PACEMAKER     Cath:  Date: 2022 Results:  Diagnostic Summary     * Left main:  Large size vessel free of angiographic disease.     * Ramus:  Large size vessel free of angiographic disease.     * Circumflex:  Large size dominant vessel which gives off small sized first   obtuse marginal branch, moderate-sized second obtuse marginal branch,   moderate-sized left-sided AYDEN and a moderate-sized left-sided PDA.  The   circumflex and its branches are free of angiographic disease.,     * LAD:  Large size vessel which gives off two moderate sized diagonal   branches before terminating at the apex.  The LAD and its branches are free of   angiographic disease.     " * RCA:  Moderate-sized nondominant vessel free of angiographic disease.       METS/Exercise Tolerance: 3 - Able to walk 1-2 blocks without stopping Comment: Activity limited by pain from urethral stricture & right flank pain.   Hematologic:  - neg hematologic  ROS  (-) history of blood clots and history of blood transfusion   Musculoskeletal: Comment: Right foot/ankle pain, prior injury      GI/Hepatic:     (+) GERD, Asymptomatic on medication,               (-) liver disease   Renal/Genitourinary: Comment: Recurrent urethral stricture, s/p urethral dilation and DVIU.    Currently taking antibiotic for ongoing UTIs.    Baseline creatinine 0.87 - 1.02        Endo:     (+)  type II DM, Last HgA1c: 6.1, date: 11/12/24, Not using insulin,          Obesity,       Psychiatric/Substance Use:  - neg psychiatric ROS     Infectious Disease:  - neg infectious disease ROS     Malignancy:  - neg malignancy ROS     Other:  - neg other ROS    (+)  , H/O Chronic Pain,         /78 (BP Location: Right arm, Patient Position: Sitting, Cuff Size: Adult Regular)   Pulse 58   Temp 97.5  F (36.4  C) (Oral)   Resp 16   Ht 1.854 m (6' 1\")   Wt 113.1 kg (249 lb 6.4 oz)   SpO2 98%   BMI 32.90 kg/m      Physical Exam  Constitutional: Awake, alert, cooperative, no apparent distress, and appears stated age.  Eyes: Pupils equal, round and reactive to light, extra ocular muscles intact, sclera clear, conjunctiva normal.  HENT: Normocephalic, oral pharynx with moist mucus membranes, good dentition. No goiter appreciated. No removable dental hardware.  Respiratory: Clear to auscultation bilaterally, no crackles or wheezing. No SOB when supine.  Cardiovascular: Irregular rate and rhythm, normal S1 and S2, and no murmur noted.  Carotids +1, no bruits. No edema. Palpable pulses to radial arteries.   GI: Normal bowel sounds, soft, non-distended, non-tender, no masses palpated.    Lymph/Hematologic: No cervical lymphadenopathy and no " supraclavicular lymphadenopathy.  Genitourinary:  deferred  Skin: Warm and dry.  No rashes.   Musculoskeletal: full ROM of neck. There is no redness, warmth, or swelling of the joints. Gross motor strength is normal.    Neurologic: Awake, alert, oriented to name, place and time. Cranial nerves II-XII are grossly intact. Gait is normal. Ambulates from chair to exam table, seats self, lies supine and sits back up w/o assistance.  Neuropsychiatric: Calm, cooperative. Normal affect. Pleasant. Answers questions appropriately, follows commands w/o difficulty.        PRIOR LABS/DIAGNOSTIC STUDIES:    All pertinent records, results, labs and imaging personally reviewed        EKG/ stress test/ echocardiogram/ heart cath -  please see in ROS above       Outside records reviewed from: Care Everywhere    LAB/DIAGNOSTIC STUDIES TODAY:  A1c   (UA to be collected on 5/1)    Assessment    Rhett Elizabeth is a 64 year old male seen as a PAC referral for risk assessment and optimization for anesthesia.    Plan/Recommendations  Pt will be optimized for the proposed procedure.  See below for details on the assessment, risk, and preoperative recommendations    NEUROLOGY  - No h/o CVA or TIA  - Pt reports remote h/o single seizure, no recurrence  -Post Op delirium risk factors:  No risk identified    ENT  - No current airway concerns.  Will need to be reassessed day of surgery.  Mallampati: III  TM: > 3    CARDIAC  - NICM  - s/p Medtronic PM/ICD  - A-fib/flutter, s/p ablation 2021  - PVCs, s/p ablation 3/31/25  - LBBB    - Echo 12/2/24:  EF 34%, results as above  - Cath 2022: No CAD, results above.    - Stress test 12/26/24: Abnormal pharmacologic nuclear stress test with perfusion defects perhaps due to artifact or attenuation.  No definite isotope changes to confirm ischemia.   Stress EKG is non-diagnostic secondary to baseline abnormalities. Findings similar to the previous stress nuclear report dated 11/15/2022.  Results as  "above.    - Followed at [facility]   - Continue Entresto and metoprolol    - METS (Metabolic Equivalents)  Activity limited by pain from urethral stricture & right flank pain.  Patient CANNOT perform 4 METS exercise without symptoms             Total Score: 1    Functional Capacity: Unable to complete 4 METS      RCRI-Moderate risk: Class 3  6.6% complication rate             Total Score: 2    RCRI: High Risk Surgery    RCRI: CHF        PULMONARY  - LANEY on CPAP     - Denies asthma or inhaler use  - Tobacco History    History   Smoking Status    Never   Smokeless Tobacco    Former    Types: Chew    Quit date: 5/2/2024       GI  - GERD, Controlled on medications: Proton Pump Inhibitor  PONV Medium Risk  Total Score: 2           1 AN PONV: Patient is not a current smoker    1 AN PONV: Intended Post Op Opioids        /RENAL  - Baseline Creatinine  0.87 - 1.02  - Recurrent urethral stricture, s/p urethral dilation and DVIU.    ENDOCRINE    - BMI: Estimated body mass index is 32.9 kg/m  as calculated from the following:    Height as of this encounter: 1.854 m (6' 1\").    Weight as of this encounter: 113.1 kg (249 lb 6.4 oz).  Obesity (BMI >30)  - DM2, hold daily Victoza one day prior and on DOS. Hold Jardiance x3d prior.  - A1c today is 6.0    HEME  VTE Low Risk 0.5%             Total Score: 3    VTE: Greater than 59 yrs old    VTE: Male      - on Xarelto, hold x24h prior    MSK  Patient IS Frail             Total Score: 3    Frailty: Decrease in strength    Frailty: Increased exhaustion    Frailty: Overall lack of energy      - Chronic right foot/ ankle pain 2/2 prior injury      The patient is aware that the final anesthesia plan will be decided by the assigned anesthesia provider on the date of service.      The patient is optimized for their procedure. AVS with information on surgery time/arrival time, meds and NPO status given by nursing staff. No further diagnostic testing " indicated.      On the day of service:     Prep time: 18 minutes  Visit time: 19 minutes  Documentation time: 15 minutes  ------------------------------------------  Total time: 52 minutes      Felicia Clayton PA-C  Preoperative Assessment Center  [facility]  Phone: [phone]  Fax: [phone]

## 2025-05-01 ENCOUNTER — LAB (OUTPATIENT)
Dept: LAB | Facility: CLINIC | Age: 65
End: 2025-05-01
Payer: COMMERCIAL

## 2025-05-01 DIAGNOSIS — Z13.6 SCREENING FOR CARDIOVASCULAR CONDITION: Primary | ICD-10-CM

## 2025-05-01 DIAGNOSIS — N99.111 POSTPROCEDURAL BULBOUS URETHRAL STRICTURE: ICD-10-CM

## 2025-05-01 DIAGNOSIS — I10 ESSENTIAL HYPERTENSION WITH GOAL BLOOD PRESSURE LESS THAN 140/90: ICD-10-CM

## 2025-05-01 DIAGNOSIS — E11.40 TYPE 2 DIABETES MELLITUS WITH DIABETIC NEUROPATHY, WITHOUT LONG-TERM CURRENT USE OF INSULIN (H): ICD-10-CM

## 2025-05-01 LAB
ALBUMIN UR-MCNC: NEGATIVE MG/DL
APPEARANCE UR: CLEAR
BACTERIA #/AREA URNS HPF: ABNORMAL /HPF
BILIRUB UR QL STRIP: NEGATIVE
COLOR UR AUTO: YELLOW
GLUCOSE UR STRIP-MCNC: 500 MG/DL
HGB UR QL STRIP: NEGATIVE
KETONES UR STRIP-MCNC: NEGATIVE MG/DL
LEUKOCYTE ESTERASE UR QL STRIP: NEGATIVE
NITRATE UR QL: NEGATIVE
PH UR STRIP: 6 [PH] (ref 5–7)
RBC #/AREA URNS AUTO: ABNORMAL /HPF
SP GR UR STRIP: 1.01 (ref 1–1.03)
SQUAMOUS #/AREA URNS AUTO: ABNORMAL /LPF
UROBILINOGEN UR STRIP-ACNC: 0.2 E.U./DL
WBC #/AREA URNS AUTO: ABNORMAL /HPF

## 2025-05-03 LAB — BACTERIA UR CULT: NO GROWTH

## 2025-05-07 ASSESSMENT — NEW YORK HEART ASSOCIATION (NYHA) CLASSIFICATION: NYHA FUNCTIONAL CLASS: II

## 2025-05-07 ASSESSMENT — ENCOUNTER SYMPTOMS: DYSRHYTHMIAS: 0

## 2025-05-07 NOTE — ANESTHESIA PREPROCEDURE EVALUATION
"Anesthesia Pre-Procedure Evaluation    Patient: Rhett Elizabeth   MRN: [id number] : 1960          Procedure : Procedure(s):  URETHROPLASTY, USING BUCCAL MUCOSA GRAFT         Past Medical History:   Diagnosis Date    Atrial flutter (H)     CHF (congestive heart failure) (H)     Diabetes (H)     HLD (hyperlipidemia)     Hypertension     ICD (implantable cardioverter-defibrillator) in place     medtronic    LBBB (left bundle branch block)     Macular degeneration     NICM (nonischemic cardiomyopathy) (H)     LANEY (obstructive sleep apnea)     Paroxysmal atrial fibrillation (H)     Postprocedural bulbous urethral stricture, male     Type I or II open fracture of distal end of right tibia with routine healing, unspecified fracture morphology, subsequent encounter 01/15/2017      Past Surgical History:   Procedure Laterality Date    COLONOSCOPY N/A 2023    Procedure: Colonoscopy;  Surgeon: Andrew Richard MD;  Location:  GI    EP ABLATION ATRIAL FLUTTER      2021    PACEMAKER/ICD CHECK      3/2018    right ankle surgery      injury repair    STRABISMUS SURGERY        Allergies   Allergen Reactions    Amlodipine Difficulty breathing and Other (See Comments)     Other reaction(s): Other  \"legs swell\"  Swelling of the lips and tongue  \"legs swell\"  swelling      Seasonal Allergies     Ace Inhibitors Cough     Other reaction(s): Cough      Social History     Tobacco Use    Smoking status: Never     Passive exposure: Past    Smokeless tobacco: Former     Types: Chew     Quit date: 2024    Tobacco comments:     Chew tobacco   Substance Use Topics    Alcohol use: Yes     Comment: occ      Wt Readings from Last 1 Encounters:   25 113.1 kg (249 lb 6.4 oz)        Anesthesia Evaluation   Pt has had prior anesthetic. Type: General.    History of anesthetic complications  - PONV.      ROS/MED HX  ENT/Pulmonary:     (+) sleep apnea, moderate, uses CPAP,                                    "   Neurologic:     (+)    peripheral neuropathy, - both feet have numbness.                           Cardiovascular:     (+)  hypertension- -   -  - -      CHF    NYHA classification: II.    pacemaker, Reason placed: NICM.                  Previous cardiac testing   Echo: Date: 12/2/24 Results:  Procedure: Ultrasound was performed by me in parasternal long region,   parasternal short axis region, and Apical 4 chamber and subxiphoid   [windows] region   Findings: Decreased EF (c/w previous baseline), LVH, no right ventricular   enlargement, question of slight pericardial effusion but I suspect this   may be a little pericardial fat   Interpretation: No evidence of large pericardial effusion or increased   right sided pressures. LVH with decreased EF (but not profound - likely   similar to previous).   Images: Images were archived.     Stress Test:  Date: Results:    ECG Reviewed:  Date: Results:    Cath:  Date: Results:   (-) arrhythmias   METS/Exercise Tolerance: 4 - Raking leaves, gardening    Hematologic:       Musculoskeletal:       GI/Hepatic:     (+) GERD, Asymptomatic on medication,                  Renal/Genitourinary:       Endo:     (+) type I DM, type II DM, Last HgA1c: 6.1, date: 11/12/2024, Using insulin, - not using insulin pump.    thyroid problem, hypothyroidism,           Psychiatric/Substance Use:     (+) psychiatric history anxiety       Infectious Disease:       Malignancy:       Other:              Physical Exam  Airway  Mallampati: I  TM distance: >3 FB  Neck ROM: full  Upper bite lip test: I    Cardiovascular   Rhythm: regular  Rate: normal rate     Dental   (+) Minor Abnormalities - some fillings, tiny chips      Pulmonary Breath sounds clear to auscultation        Neurological   Other Findings       OUTSIDE LABS:  CBC:   Lab Results   Component Value Date    WBC 7.7 04/03/2025    WBC 7.9 06/15/2023    HGB 14.0 04/03/2025    HGB 15.5 06/15/2023    HCT 44.5 04/03/2025    HCT 45.4 06/15/2023     " 04/03/2025     06/15/2023     BMP:   Lab Results   Component Value Date     04/24/2025     11/12/2024    POTASSIUM 4.6 04/24/2025    POTASSIUM 4.0 11/12/2024    CHLORIDE 103 04/24/2025    CHLORIDE 102 11/12/2024    CO2 27 04/24/2025    CO2 26 11/12/2024    BUN 14.4 04/24/2025    BUN 16.8 11/12/2024    CR 1.03 04/24/2025    CR 1.04 11/12/2024    GLC 82 04/24/2025    GLC 93 11/12/2024     COAGS: No results found for: \"PTT\", \"INR\", \"FIBR\"  POC: No results found for: \"BGM\", \"HCG\", \"HCGS\"  HEPATIC:   Lab Results   Component Value Date    ALBUMIN 3.6 05/31/2018    PROTTOTAL 8.0 05/31/2018    ALT 32 04/03/2025    AST 34 05/31/2018    ALKPHOS 141 05/31/2018    BILITOTAL 0.7 05/31/2018     OTHER:   Lab Results   Component Value Date    A1C 6.0 (H) 04/24/2025    DEVENDRA 9.4 04/24/2025    TSH 1.48 05/06/2024    T4 1.53 (H) 10/11/2019       Anesthesia Plan    ASA Status:  3       Anesthesia Type: general.   Induction: intravenous.   Techniques and Equipment:     - Airway:    Arterial Line Kit: Radial     - Monitoring Plan: standard ASA monitoring, train of four monitoring, invasive blood pressure, processed EEG monitor.     Consents    Anesthesia Plan(s) and associated risks, benefits, and realistic alternatives discussed. Questions answered and patient/representative(s) expressed understanding.     - Discussed: CRNA, surgeon     - Discussed with:  Patient            Postoperative Care    Pain management: multimodal analgesia.        Comments:    Other Comments: Patient had ate Donut at 4 am. High risk of aspiration discussed with patient.  Should wait for 8 hours for anesthesia.               Doug Urbina MD    I have reviewed the pertinent notes and labs in the chart from the past 30 days and (re)examined the patient.  Any updates or changes from those notes are reflected in this note.    Clinically Significant Risk Factors Present on Admission                   # Hypertension: Noted on problem list    " "       # Obesity: Estimated body mass index is 32.9 kg/m  as calculated from the following:    Height as of 4/24/25: 1.854 m (6' 1\").    Weight as of 4/24/25: 113.1 kg (249 lb 6.4 oz).                    "

## 2025-05-08 ENCOUNTER — HOSPITAL ENCOUNTER (OUTPATIENT)
Facility: CLINIC | Age: 65
Discharge: HOME | End: 2025-05-08
Attending: UROLOGY | Admitting: UROLOGY
Payer: COMMERCIAL

## 2025-05-08 ENCOUNTER — ANESTHESIA (OUTPATIENT)
Dept: SURGERY | Facility: CLINIC | Age: 65
End: 2025-05-08
Payer: COMMERCIAL

## 2025-05-08 VITALS
BODY MASS INDEX: 31.26 KG/M2 | HEIGHT: 74 IN | SYSTOLIC BLOOD PRESSURE: 118 MMHG | WEIGHT: 243.61 LBS | RESPIRATION RATE: 16 BRPM | OXYGEN SATURATION: 99 % | HEART RATE: 57 BPM | TEMPERATURE: 98 F | DIASTOLIC BLOOD PRESSURE: 82 MMHG

## 2025-05-08 DIAGNOSIS — N99.111 POSTPROCEDURAL BULBOUS URETHRAL STRICTURE: ICD-10-CM

## 2025-05-08 DIAGNOSIS — G89.18 ACUTE POST-OPERATIVE PAIN: Primary | ICD-10-CM

## 2025-05-08 LAB
BASE EXCESS BLDA CALC-SCNC: -0.1 MMOL/L (ref -3–3)
CA-I BLD-MCNC: 4.7 MG/DL (ref 4.4–5.2)
GLUCOSE BLD-MCNC: 85 MG/DL (ref 70–99)
GLUCOSE BLDC GLUCOMTR-MCNC: 96 MG/DL (ref 70–99)
GLUCOSE BLDC GLUCOMTR-MCNC: 97 MG/DL (ref 70–99)
HCO3 BLDA-SCNC: 25 MMOL/L (ref 21–28)
HGB BLD-MCNC: 14.6 G/DL (ref 13.3–17.7)
LACTATE BLD-SCNC: 0.7 MMOL/L (ref 0.7–2)
O2/TOTAL GAS SETTING VFR VENT: 50 %
OXYHGB MFR BLDA: 95 % (ref 92–100)
PCO2 BLDA: 43 MM HG (ref 35–45)
PH BLDA: 7.38 [PH] (ref 7.35–7.45)
PO2 BLDA: 98 MM HG (ref 80–105)
POTASSIUM BLD-SCNC: 4.1 MMOL/L (ref 3.4–5.3)
SAO2 % BLDA: 98 % (ref 96–97)
SODIUM BLD-SCNC: 141 MMOL/L (ref 135–145)

## 2025-05-08 PROCEDURE — 360N000075 HC SURGERY LEVEL 2, PER MIN: Performed by: UROLOGY

## 2025-05-08 PROCEDURE — 272N000001 HC OR GENERAL SUPPLY STERILE: Performed by: UROLOGY

## 2025-05-08 PROCEDURE — 999N000141 HC STATISTIC PRE-PROCEDURE NURSING ASSESSMENT: Performed by: UROLOGY

## 2025-05-08 PROCEDURE — 710N000012 HC RECOVERY PHASE 2, PER MINUTE: Performed by: UROLOGY

## 2025-05-08 PROCEDURE — 258N000003 HC RX IP 258 OP 636: Performed by: REGISTERED NURSE

## 2025-05-08 PROCEDURE — 250N000011 HC RX IP 250 OP 636: Performed by: UROLOGY

## 2025-05-08 PROCEDURE — 250N000009 HC RX 250: Performed by: UROLOGY

## 2025-05-08 PROCEDURE — 250N000013 HC RX MED GY IP 250 OP 250 PS 637

## 2025-05-08 PROCEDURE — 710N000010 HC RECOVERY PHASE 1, LEVEL 2, PER MIN: Performed by: UROLOGY

## 2025-05-08 PROCEDURE — 84295 ASSAY OF SERUM SODIUM: CPT

## 2025-05-08 PROCEDURE — 250N000009 HC RX 250: Performed by: REGISTERED NURSE

## 2025-05-08 PROCEDURE — 370N000017 HC ANESTHESIA TECHNICAL FEE, PER MIN: Performed by: UROLOGY

## 2025-05-08 PROCEDURE — 250N000011 HC RX IP 250 OP 636: Mod: JZ

## 2025-05-08 PROCEDURE — 53410 RECONSTRUCTION OF URETHRA: CPT | Mod: 22 | Performed by: UROLOGY

## 2025-05-08 PROCEDURE — 250N000025 HC SEVOFLURANE, PER MIN: Performed by: UROLOGY

## 2025-05-08 PROCEDURE — 250N000011 HC RX IP 250 OP 636: Performed by: REGISTERED NURSE

## 2025-05-08 PROCEDURE — 250N000013 HC RX MED GY IP 250 OP 250 PS 637: Performed by: UROLOGY

## 2025-05-08 PROCEDURE — 82962 GLUCOSE BLOOD TEST: CPT

## 2025-05-08 RX ORDER — CEFAZOLIN SODIUM/WATER 2 G/20 ML
2 SYRINGE (ML) INTRAVENOUS SEE ADMIN INSTRUCTIONS
Status: DISCONTINUED | OUTPATIENT
Start: 2025-05-08 | End: 2025-05-08 | Stop reason: HOSPADM

## 2025-05-08 RX ORDER — FENTANYL CITRATE 50 UG/ML
50 INJECTION, SOLUTION INTRAMUSCULAR; INTRAVENOUS EVERY 5 MIN PRN
Status: DISCONTINUED | OUTPATIENT
Start: 2025-05-08 | End: 2025-05-08 | Stop reason: HOSPADM

## 2025-05-08 RX ORDER — NALOXONE HYDROCHLORIDE 0.4 MG/ML
0.1 INJECTION, SOLUTION INTRAMUSCULAR; INTRAVENOUS; SUBCUTANEOUS
Status: DISCONTINUED | OUTPATIENT
Start: 2025-05-08 | End: 2025-05-08 | Stop reason: HOSPADM

## 2025-05-08 RX ORDER — CIPROFLOXACIN 500 MG/1
500 TABLET, FILM COATED ORAL ONCE
Qty: 1 TABLET | Refills: 0 | Status: SHIPPED | OUTPATIENT
Start: 2025-05-08 | End: 2025-05-08

## 2025-05-08 RX ORDER — OXYCODONE HYDROCHLORIDE 5 MG/1
5 TABLET ORAL ONCE
Status: COMPLETED | OUTPATIENT
Start: 2025-05-08 | End: 2025-05-08

## 2025-05-08 RX ORDER — DEXAMETHASONE SODIUM PHOSPHATE 4 MG/ML
4 INJECTION, SOLUTION INTRA-ARTICULAR; INTRALESIONAL; INTRAMUSCULAR; INTRAVENOUS; SOFT TISSUE
Status: DISCONTINUED | OUTPATIENT
Start: 2025-05-08 | End: 2025-05-08 | Stop reason: HOSPADM

## 2025-05-08 RX ORDER — PHENYLEPHRINE HCL IN 0.9% NACL 50MG/250ML
.5-6 PLASTIC BAG, INJECTION (ML) INTRAVENOUS CONTINUOUS
Status: DISCONTINUED | OUTPATIENT
Start: 2025-05-08 | End: 2025-05-08 | Stop reason: HOSPADM

## 2025-05-08 RX ORDER — OXYCODONE HYDROCHLORIDE 5 MG/1
5 TABLET ORAL
Status: DISCONTINUED | OUTPATIENT
Start: 2025-05-08 | End: 2025-05-08 | Stop reason: HOSPADM

## 2025-05-08 RX ORDER — FENTANYL CITRATE 50 UG/ML
INJECTION, SOLUTION INTRAMUSCULAR; INTRAVENOUS PRN
Status: DISCONTINUED | OUTPATIENT
Start: 2025-05-08 | End: 2025-05-08

## 2025-05-08 RX ORDER — ONDANSETRON 4 MG/1
4 TABLET, ORALLY DISINTEGRATING ORAL EVERY 30 MIN PRN
Status: DISCONTINUED | OUTPATIENT
Start: 2025-05-08 | End: 2025-05-08 | Stop reason: HOSPADM

## 2025-05-08 RX ORDER — BACITRACIN ZINC 500 [USP'U]/G
OINTMENT TOPICAL 2 TIMES DAILY
Qty: 425 G | Refills: 0 | Status: SHIPPED | OUTPATIENT
Start: 2025-05-08

## 2025-05-08 RX ORDER — TOLTERODINE 4 MG/1
4 CAPSULE, EXTENDED RELEASE ORAL
Status: DISCONTINUED | OUTPATIENT
Start: 2025-05-08 | End: 2025-05-08 | Stop reason: HOSPADM

## 2025-05-08 RX ORDER — ACETAMINOPHEN 325 MG/1
975 TABLET ORAL ONCE
Status: COMPLETED | OUTPATIENT
Start: 2025-05-08 | End: 2025-05-08

## 2025-05-08 RX ORDER — PROPOFOL 10 MG/ML
INJECTION, EMULSION INTRAVENOUS PRN
Status: DISCONTINUED | OUTPATIENT
Start: 2025-05-08 | End: 2025-05-08

## 2025-05-08 RX ORDER — DEXAMETHASONE SODIUM PHOSPHATE 4 MG/ML
INJECTION, SOLUTION INTRA-ARTICULAR; INTRALESIONAL; INTRAMUSCULAR; INTRAVENOUS; SOFT TISSUE PRN
Status: DISCONTINUED | OUTPATIENT
Start: 2025-05-08 | End: 2025-05-08

## 2025-05-08 RX ORDER — LIDOCAINE HYDROCHLORIDE 20 MG/ML
INJECTION, SOLUTION INFILTRATION; PERINEURAL PRN
Status: DISCONTINUED | OUTPATIENT
Start: 2025-05-08 | End: 2025-05-08

## 2025-05-08 RX ORDER — AMOXICILLIN 250 MG
1-2 CAPSULE ORAL 2 TIMES DAILY
Qty: 30 TABLET | Refills: 0 | Status: SHIPPED | OUTPATIENT
Start: 2025-05-08

## 2025-05-08 RX ORDER — ONDANSETRON 2 MG/ML
4 INJECTION INTRAMUSCULAR; INTRAVENOUS EVERY 30 MIN PRN
Status: DISCONTINUED | OUTPATIENT
Start: 2025-05-08 | End: 2025-05-08 | Stop reason: HOSPADM

## 2025-05-08 RX ORDER — CHLORHEXIDINE GLUCONATE ORAL RINSE 1.2 MG/ML
SOLUTION DENTAL PRN
Status: DISCONTINUED | OUTPATIENT
Start: 2025-05-08 | End: 2025-05-08 | Stop reason: HOSPADM

## 2025-05-08 RX ORDER — HYDROMORPHONE HCL IN WATER/PF 6 MG/30 ML
0.4 PATIENT CONTROLLED ANALGESIA SYRINGE INTRAVENOUS EVERY 5 MIN PRN
Status: DISCONTINUED | OUTPATIENT
Start: 2025-05-08 | End: 2025-05-08 | Stop reason: HOSPADM

## 2025-05-08 RX ORDER — SODIUM CHLORIDE, SODIUM LACTATE, POTASSIUM CHLORIDE, CALCIUM CHLORIDE 600; 310; 30; 20 MG/100ML; MG/100ML; MG/100ML; MG/100ML
INJECTION, SOLUTION INTRAVENOUS CONTINUOUS
Status: DISCONTINUED | OUTPATIENT
Start: 2025-05-08 | End: 2025-05-08 | Stop reason: HOSPADM

## 2025-05-08 RX ORDER — OXYCODONE HYDROCHLORIDE 10 MG/1
10 TABLET ORAL
Status: DISCONTINUED | OUTPATIENT
Start: 2025-05-08 | End: 2025-05-08 | Stop reason: HOSPADM

## 2025-05-08 RX ORDER — ACETAMINOPHEN 325 MG/1
650 TABLET ORAL EVERY 4 HOURS PRN
Qty: 50 TABLET | Refills: 0 | Status: SHIPPED | OUTPATIENT
Start: 2025-05-08

## 2025-05-08 RX ORDER — CEFAZOLIN SODIUM/WATER 2 G/20 ML
2 SYRINGE (ML) INTRAVENOUS
Status: COMPLETED | OUTPATIENT
Start: 2025-05-08 | End: 2025-05-08

## 2025-05-08 RX ORDER — ACETAMINOPHEN 650 MG/1
650 SUPPOSITORY RECTAL ONCE
Status: COMPLETED | OUTPATIENT
Start: 2025-05-08 | End: 2025-05-08

## 2025-05-08 RX ORDER — BUPIVACAINE HYDROCHLORIDE 2.5 MG/ML
INJECTION, SOLUTION INFILTRATION; PERINEURAL PRN
Status: DISCONTINUED | OUTPATIENT
Start: 2025-05-08 | End: 2025-05-08 | Stop reason: HOSPADM

## 2025-05-08 RX ORDER — OXYCODONE HYDROCHLORIDE 5 MG/1
5-10 TABLET ORAL EVERY 6 HOURS PRN
Qty: 10 TABLET | Refills: 0 | Status: SHIPPED | OUTPATIENT
Start: 2025-05-08 | End: 2025-05-11

## 2025-05-08 RX ORDER — HYDROMORPHONE HCL IN WATER/PF 6 MG/30 ML
0.2 PATIENT CONTROLLED ANALGESIA SYRINGE INTRAVENOUS EVERY 5 MIN PRN
Status: DISCONTINUED | OUTPATIENT
Start: 2025-05-08 | End: 2025-05-08 | Stop reason: HOSPADM

## 2025-05-08 RX ORDER — TOLTERODINE TARTRATE 2 MG/1
2 TABLET, EXTENDED RELEASE ORAL EVERY 12 HOURS PRN
Qty: 30 TABLET | Refills: 0 | Status: SHIPPED | OUTPATIENT
Start: 2025-05-08

## 2025-05-08 RX ORDER — SODIUM CHLORIDE, SODIUM LACTATE, POTASSIUM CHLORIDE, CALCIUM CHLORIDE 600; 310; 30; 20 MG/100ML; MG/100ML; MG/100ML; MG/100ML
INJECTION, SOLUTION INTRAVENOUS CONTINUOUS PRN
Status: DISCONTINUED | OUTPATIENT
Start: 2025-05-08 | End: 2025-05-08

## 2025-05-08 RX ORDER — EPHEDRINE SULFATE 50 MG/ML
INJECTION, SOLUTION INTRAMUSCULAR; INTRAVENOUS; SUBCUTANEOUS PRN
Status: DISCONTINUED | OUTPATIENT
Start: 2025-05-08 | End: 2025-05-08

## 2025-05-08 RX ORDER — FENTANYL CITRATE 50 UG/ML
25 INJECTION, SOLUTION INTRAMUSCULAR; INTRAVENOUS EVERY 5 MIN PRN
Status: DISCONTINUED | OUTPATIENT
Start: 2025-05-08 | End: 2025-05-08 | Stop reason: HOSPADM

## 2025-05-08 RX ORDER — ONDANSETRON 2 MG/ML
INJECTION INTRAMUSCULAR; INTRAVENOUS PRN
Status: DISCONTINUED | OUTPATIENT
Start: 2025-05-08 | End: 2025-05-08

## 2025-05-08 RX ADMIN — HYDROMORPHONE HYDROCHLORIDE 0.2 MG: 0.2 INJECTION, SOLUTION INTRAMUSCULAR; INTRAVENOUS; SUBCUTANEOUS at 15:35

## 2025-05-08 RX ADMIN — Medication 2 G: at 12:22

## 2025-05-08 RX ADMIN — Medication 20 MG: at 13:47

## 2025-05-08 RX ADMIN — Medication 100 MG: at 12:03

## 2025-05-08 RX ADMIN — HYDROMORPHONE HYDROCHLORIDE 0.5 MG: 1 INJECTION, SOLUTION INTRAMUSCULAR; INTRAVENOUS; SUBCUTANEOUS at 12:53

## 2025-05-08 RX ADMIN — Medication 10 MG: at 13:18

## 2025-05-08 RX ADMIN — EPHEDRINE SULFATE 5 MG: 5 INJECTION INTRAVENOUS at 12:17

## 2025-05-08 RX ADMIN — PROPOFOL 150 MG: 10 INJECTION, EMULSION INTRAVENOUS at 12:02

## 2025-05-08 RX ADMIN — Medication 20 MG: at 14:28

## 2025-05-08 RX ADMIN — PHENYLEPHRINE HYDROCHLORIDE 100 MCG: 10 INJECTION INTRAVENOUS at 12:18

## 2025-05-08 RX ADMIN — Medication 100 MG: at 14:49

## 2025-05-08 RX ADMIN — PHENYLEPHRINE HYDROCHLORIDE 100 MCG: 10 INJECTION INTRAVENOUS at 13:06

## 2025-05-08 RX ADMIN — DEXAMETHASONE SODIUM PHOSPHATE 4 MG: 4 INJECTION, SOLUTION INTRAMUSCULAR; INTRAVENOUS at 12:45

## 2025-05-08 RX ADMIN — Medication 100 MG: at 14:48

## 2025-05-08 RX ADMIN — EPHEDRINE SULFATE 5 MG: 5 INJECTION INTRAVENOUS at 12:18

## 2025-05-08 RX ADMIN — EPHEDRINE SULFATE 5 MG: 5 INJECTION INTRAVENOUS at 13:12

## 2025-05-08 RX ADMIN — Medication 0.3 MCG/KG/MIN: at 13:09

## 2025-05-08 RX ADMIN — PHENYLEPHRINE HYDROCHLORIDE 100 MCG: 10 INJECTION INTRAVENOUS at 13:12

## 2025-05-08 RX ADMIN — LIDOCAINE HYDROCHLORIDE 100 MG: 20 INJECTION, SOLUTION INFILTRATION; PERINEURAL at 12:01

## 2025-05-08 RX ADMIN — OXYCODONE HYDROCHLORIDE 5 MG: 5 TABLET ORAL at 15:36

## 2025-05-08 RX ADMIN — ONDANSETRON 4 MG: 2 INJECTION INTRAMUSCULAR; INTRAVENOUS at 14:35

## 2025-05-08 RX ADMIN — PHENYLEPHRINE HYDROCHLORIDE 200 MCG: 10 INJECTION INTRAVENOUS at 14:47

## 2025-05-08 RX ADMIN — SODIUM CHLORIDE, SODIUM LACTATE, POTASSIUM CHLORIDE, AND CALCIUM CHLORIDE: .6; .31; .03; .02 INJECTION, SOLUTION INTRAVENOUS at 11:54

## 2025-05-08 RX ADMIN — EPHEDRINE SULFATE 5 MG: 5 INJECTION INTRAVENOUS at 13:06

## 2025-05-08 RX ADMIN — EPHEDRINE SULFATE 5 MG: 5 INJECTION INTRAVENOUS at 12:25

## 2025-05-08 RX ADMIN — FENTANYL CITRATE 100 MCG: 50 INJECTION INTRAMUSCULAR; INTRAVENOUS at 12:00

## 2025-05-08 RX ADMIN — ACETAMINOPHEN 975 MG: 325 TABLET ORAL at 10:28

## 2025-05-08 ASSESSMENT — ACTIVITIES OF DAILY LIVING (ADL)
ADLS_ACUITY_SCORE: 35
ADLS_ACUITY_SCORE: 37
ADLS_ACUITY_SCORE: 35
ADLS_ACUITY_SCORE: 35
ADLS_ACUITY_SCORE: 37
ADLS_ACUITY_SCORE: 35

## 2025-05-08 NOTE — BRIEF OP NOTE
[facility]    Brief Operative Note    Pre-operative diagnosis: Postprocedural bulbous urethral stricture [N99.111]  Post-operative diagnosis Same as pre-operative diagnosis    Procedure: URETHROPLASTY, N/A - Urethra  Cystoscopy flexible, N/A - Urethra    Surgeon: Surgeons and Role:     * Danie Newton MD - Primary     * Carmen Castillo MD - Resident - Assisting     * Jose Miller MD - Fellow - Assisting  Anesthesia: General   Estimated Blood Loss: Less than 50 ml    Drains: 16 Dominican cuevas catheter   Specimens: * No specimens in log *  Findings:  None  Complications: None.  Implants: * No implants in log *    Post-op Plan  - Discharge once meets criteria  - Resume PTA xarelto in 5 days  - Follow up as scheduled on 5/30.25    Carmen Castillo MD  Urology Resident PGY-2

## 2025-05-08 NOTE — ANESTHESIA POSTPROCEDURE EVALUATION
Patient: Rhett Elizabeth    Procedure: Procedure(s):  URETHROPLASTY  Cystoscopy flexible       Anesthesia Type:  General    Note:  Disposition: Outpatient   Postop Pain Control: Uneventful            Sign Out: Well controlled pain   PONV: No   Neuro/Psych: Uneventful            Sign Out: Acceptable/Baseline neuro status   Airway/Respiratory: Uneventful            Sign Out: Acceptable/Baseline resp. status   CV/Hemodynamics: Uneventful            Sign Out: Acceptable CV status; No obvious hypovolemia; No obvious fluid overload   Other NRE: NONE   DID A NON-ROUTINE EVENT OCCUR? No           Last vitals:  Vitals Value Taken Time   /74 05/08/25 16:00   Temp 37.2  C (98.9  F) 05/08/25 15:45   Pulse 62 05/08/25 16:09   Resp 16 05/08/25 16:09   SpO2 94 % 05/08/25 16:09   Vitals shown include unfiled device data.    Electronically Signed By: Ayden Hernandez MD  May 8, 2025  4:13 PM

## 2025-05-08 NOTE — PROGRESS NOTES
Talked to Dr. Schuster, informed him that pt would like to cancel surgery. Surgeon states he will talk to pt.

## 2025-05-08 NOTE — PROGRESS NOTES
Device RN paged on [employer]: Pt Rhett Elizabeth- scheduled for urology procedure, has ICD, does he need anything done for it? Thank you Soraya Torres RN 3C [phone]

## 2025-05-08 NOTE — PROVIDER NOTIFICATION
Pt states he ate a donut hole this morning with his meds at about 0400. Dr. Garcia w/ anesthesia notified, states sx will be delayed for 8 hours until 1200 at the earliest.

## 2025-05-08 NOTE — OP NOTE
OPERATIVE REPORT  05/08/25    PREOPERATIVE DIAGNOSIS:   Recurrent bulbar urethral stricture    POSTOPERATIVE DIAGNOSIS:   Recurrent bulbar urethral strictures at 2 distinct sites (distal and proximal)    PROCEDURES PERFORMED:   Cystoscopy  Complex single-stage anterior urethral reconstruction of 2 separate bulbar urethral strictures, both utilizing dorsal nontransecting Heineke-Mikulicz approach    Modifier 22: This procedure involved the repair of 2 separate strictures in the bulbar urethra, both 2 cm in length.  This therefore required roughly twice the usual dissection, mobilization, and time for a dorsal nontransecting urethroplasty.  Additionally, the patient's urethra was highly friable and inflamed, which added to the time and expertise necessary for the surgery.    STAFF SURGEON: Danie Newton MD  FELLOW: Jose Miller MD  RESIDENT: Carmen Castillo MD    ANESTHESIA: General  ESTIMATED BLOOD LOSS: 50 ml  COMPLICATIONS: None.   SPECIMEN: None  IMPLANTS: 16F urethral Verdugo catheter    SIGNIFICANT FINDINGS:   2 cm distal bulbar urethral stricture, roughly 12 Slovenian in caliber  2 cm proximal bulbar urethral stricture, roughly 8 Slovenian in caliber  Both strictures were repaired via a dorsal urethrotomy and not transecting Heineke-Mikulicz urethroplasty  Cystourethroscopy demonstrated no further anterior or posterior urethral abnormalities  Friable urethral mucosa and spongiosum    BRIEF OPERATIVE INDICATIONS: Rhett Elizabeth is a(n) 64 year old male with 2 bulbar urethral strictures visualized on retrograde urethrography.  He has previously undergone dilation and DVIU for these, however his stenosis has recurred.  He is interested in operative repair and presents today for urethroplasty.  Prior to surgery we discussed all relevant risks, benefits, and alternatives, including a high likelihood of requiring a buccal mucosal graft due to his 2 separate stricture sites.  All questions were answered and informed consent  was obtained.    DESCRIPTION OF PROCEDURE:  After informed consent was obtained and preoperative antibiotics were given, the patient was taken to the operating room and placed supine on the operating table. General anesthesia was induced and he was intubated. He was placed in the high lithotomy position with all pressure points well padded. The perineum was shaved, prepped and draped in the usual sterile fashion.   We started the procedure with a flexible cystoscopy per urethra.     A vertical midline incision was made in the perineum and carried down through Colle's fascia. The bulbospongiosus muscles were split in the midline and reflected off the bulbar urethra. The urethra was circumferentially dissected off the underlying corporal bodies and dissected proximally and distally until it was free from the corporal bodies. We carried our dissection proximally dividing the perineal body and dissecting the proximal corpus spongiosum off of the pelvic floor.     A 20-Portuguese red rubber catheter was inserted through a well-lubricated urethra up to the site of obstruction.  This was not immediately obvious and we therefore pivoted to performing a cystourethroscopy in order to identify the distal stricture site.  This was visualized in the distal bulbar urethra and marked.    Holding 4-0 Vicryl sutures were placed at 12 oclock just distal to the stricture on the urethra.  The urethra was rotated 180 degrees and opened dorsally in a longitudinal fashion at the tip of the red rubber catheter using a 15 blade scalpel. Holding sutures of 4-0 Vicryl were placed at the lateral margins. The catheter was removed and urethrotomy was continued with Metzenbaum scissors for approximately 2 cm, which was the length of the stricture. The distal urethra was spatulated dorsally and calibrated at 24F with bougies both distally and proximally.    A flexible cystoscope was then inserted into the proximal urethrotomy and advanced retrograde.  We encountered the proximal stricture, which was separate from the distal bulbar stricture, nearly at the level of the external urinary sphincter.  Cystoscopically, this appeared to be roughly 8 Namibian in caliber and also quite short.    Given the distance between the 2 stricture sites as well as their short length, we opted to perform a dorsal nontransecting repair for both, thereby eschewing the need for a buccal mucosal graft.    We marked on the urethra the site of the proximal bulbar stricture and the cystoscope was removed.  We placed holding sutures of 4-0 Vicryl to rotate the proximal urethra.    We first proceeded with repair of the distal stricture. A single layered full thickness dorsal wall anastomosis was done with interrupted 5-0 PDS sutures on either side of our urethrotomy in a heineke-mikulicz fashion. We started with the left side placing sutures, and then the right side. The sutures were tied down and the anastomosis was free of tension. There was adequate hemostasis and no need for a second layer closure.     We then placed a 20 Namibian red rubber through the urethral meatus, passed the site of the distal stricture repair, and up to the obstruction in the proximal bulbar urethra.  The urethra was then opened with a dorsal urethrotomy using a fresh 15 blade scalpel over the 20 Namibian catheter.  Holding sutures of 4-0 Vicryl were placed to expose the urethral mucosa.  The urethrotomy was was continued proximally until we were satisfied that we had open the entirety of the strictured lumen.  The length of the stricture was similarly 2 cm.     The flexible cystoscope was reinserted into the urethra.  There were no additional abnormalities of the membranous urethra, prostatic urethra, or bladder.  The bladder was emptied and the cystoscope was removed.    For the proximal bulbar stricture, a single layered full thickness dorsal wall anastomosis was done with interrupted 5-0 PDS sutures on either side  of our urethrotomy in a heineke-mikulicz fashion. We started with the left side placing sutures, and then the right side.  Prior to tying down the sutures, we did passed the final 16 Syrian silicone Verdugo catheter which was guided into the bladder.  The catheter was secured by instilling the balloon with 10 mL of sterile water. The sutures were tied down and the anastomosis was free of tension.  The suture line was examined and a single additional stitch of 5-0 PDS was placed for hemostasis on the spongiosum.     Additional hemostasis was obtained with bipolar cautery. The bulbospongiosus muscles were closed with a running 3-0 Vicryl. Colle's fascia was closed with running 3-0 Vicryl. The skin was closed with interrupted 4-0 monocryl in a horizontal mattress. The patient was awakened from anesthesia, transferred to the stretcher and then to the postanesthesia care unit in stable condition. There were no complications.    Jose Miller MD  Genitourinary Reconstructive Surgery Fellow

## 2025-05-08 NOTE — DISCHARGE INSTRUCTIONS
Contacting your Doctor -   To contact a doctor, call Dr Schuster's office at [phone] (clinic)   or:  [phone] and ask for the resident on call for Urology (answered 24 hours a day)   Emergency Department:  [facility]: [phone]  [facility]: [phone] 911 if you are in need of immediate or emergent help

## 2025-05-08 NOTE — ANESTHESIA CARE TRANSFER NOTE
Patient: Rhett Elizabeth    Procedure: Procedure(s):  URETHROPLASTY  Cystoscopy flexible       Diagnosis: Postprocedural bulbous urethral stricture [N99.111]  Diagnosis Additional Information: No value filed.    Anesthesia Type:   No value filed.     Note:    Oropharynx: oropharynx clear of all foreign objects and spontaneously breathing  Level of Consciousness: awake  Oxygen Supplementation: face mask  Level of Supplemental Oxygen (L/min / FiO2): 6  Independent Airway: airway patency satisfactory and stable  Dentition: dentition unchanged  Vital Signs Stable: post-procedure vital signs reviewed and stable  Report to RN Given: handoff report given  Patient transferred to: PACU    Handoff Report: Identifed the Patient, Identified the Reponsible Provider, Reviewed the pertinent medical history, Discussed the surgical course, Reviewed Intra-OP anesthesia mangement and issues during anesthesia, Set expectations for post-procedure period and Allowed opportunity for questions and acknowledgement of understanding      Vitals:  Vitals Value Taken Time   /80 05/08/25 15:02   Temp     Pulse 62 05/08/25 15:04   Resp 22 05/08/25 15:04   SpO2 97 % 05/08/25 15:04   Vitals shown include unfiled device data.    Electronically Signed By: CHANCE Westfall CRNA  May 8, 2025  3:05 PM

## 2025-05-08 NOTE — ANESTHESIA PROCEDURE NOTES
Arterial Line Procedure Note    Pre-Procedure   Staff -        Anesthesiologist:  Doug Urbina MD       Performed By: anesthesiologist       Location: OR       Pre-Anesthestic Checklist: patient identified, IV checked, risks and benefits discussed, informed consent, monitors and equipment checked, pre-op evaluation and at physician/surgeon's request  Timeout:       Correct Patient: Yes        Correct Procedure: Yes        Correct Site: Yes        Correct Position: Yes   Line Placement:   This line was placed Post Induction starting at 5/8/2025 12:26 PM and ending at 5/8/2025 12:32 PM  Procedure   Procedure: arterial line       Laterality: left       Insertion Site: radial.  Sterile Prep        Standard elements of sterile barrier followed       Skin prep: Chloraprep  Insertion/Injection        Technique: ultrasound guided        Medical Necessity: need to minimize puncture attempts       1. Ultrasound was used to evaluate the access site.       2. Artery evaluated via ultrasound for patency/adequacy.       3. Using real-time ultrasound the needle/catheter was observed entering the artery/vein.       Catheter Type/Size: 20 G, 12 cm  Narrative         Secured by: suture       Tegaderm dressing used.       Complications: None apparent,        Arterial waveform: Yes        IBP within 10% of NIBP: Yes

## 2025-05-08 NOTE — PROGRESS NOTES
Device RN states that pt should have magnet placed over ICD when buccal graft is taken, but procedure below belt will not need magnet.

## 2025-05-08 NOTE — ANESTHESIA PROCEDURE NOTES
Airway       Patient location during procedure: OR       Procedure Start/Stop Times: 5/8/2025 12:04 PM  Staff -        Anesthesiologist:  Doug Urbina MD       CRNA: Jeannie Sen APRN CRNA       Other Anesthesia Staff: Honey Lee       Performed By: SRNAIndications and Patient Condition       Indications for airway management: elliot-procedural       Induction type:RSI       Mask difficulty assessment: 0 - not attempted    Final Airway Details       Final airway type: endotracheal airway       Successful airway: ETT - single  Endotracheal Airway Details        ETT size (mm): 7.5       Cuffed: yes       Cuff volume (mL): 8       Successful intubation technique: video laryngoscopy       VL Blade Size: MAC 4       Grade View of Cords: 1       Adjucts: stylet       Position: Right       Measured from: gums/teeth       Secured at (cm): 23       Bite block used: None    Post intubation assessment        Placement verified by: capnometry, equal breath sounds and chest rise        Number of attempts at approach: 1       Number of other approaches attempted: 0       Secured with: tape       Ease of procedure: easy       Dentition: Intact and Unchanged    Medication(s) Administered   Medication Administration Time: 5/8/2025 12:04 PM

## 2025-05-28 ENCOUNTER — PRE VISIT (OUTPATIENT)
Dept: UROLOGY | Facility: CLINIC | Age: 65
End: 2025-05-28
Payer: COMMERCIAL

## 2025-05-28 NOTE — TELEPHONE ENCOUNTER
Previsit Planning        Reason for visit: Post-op     Relevant Information: Urethroplasty 4/7/25    Records/imaging/labs/orders: Cystogram scheduled 5/30/25    Rooming: Standard

## 2025-05-28 NOTE — PROGRESS NOTES
HPI:  Rhett Elizabeth is a 64 year old male with a history of DM2, anterior urethral stricture s/p urethral dilation and DVIU being seen for post op follow up for urethroplasty of 2 separate bulbar urethral stricture DOS 5/8/25.      Medsurg History  - Multiple urethral dilations (date unknown)  - DVIU (date unknown)  - PVC Ablation (03/31/2025)    5/8/25 - single-stage anterior urethral reconstruction of 2 separate bulbar urethral strictures, both utilizing dorsal nontransecting Heineke-Mikulicz approach       Today  - Plan for cystogram and Verdugo removal.  Radiology was unable to remove catheter after instilling saline and contrast.  Patient was then sent to the office with balloon deflated.    -Denies concerns with Verdugo otherwise  -Appetite is okay  -Minimal pain    Exam:  /82 (BP Location: Right arm, Patient Position: Sitting, Cuff Size: Adult Regular)   Pulse 64   Wt 113.4 kg (250 lb)   SpO2 98%   BMI 32.10 kg/m    General: age-appropriate appearing male in NAD sitting in an exam chair  : testicles normal without atrophy or masses, penis normal without urethral discharge, and vertical midline incision healing well.    Review of Imaging:  The following imaging exams were independently viewed and interpreted by me and discussed with patient:  Cystogram today - bladder intact    Review of Labs:  The following labs were reviewed by me and discussed with the patient:  Recent Results (from the past 720 hours)   Glucose by meter    Collection Time: 05/08/25  7:05 AM   Result Value Ref Range    GLUCOSE BY METER POCT 97 70 - 99 mg/dL   Arterial Panel POCT    Collection Time: 05/08/25 12:44 PM   Result Value Ref Range    pH Arterial POCT 7.38 7.35 - 7.45    pCO2 Arterial POCT 43 35 - 45 mm Hg    pO2 Arterial POCT 98 80 - 105 mm Hg    Bicarbonate Arterial POCT 25 21 - 28 mmol/L    Sodium POCT 141 135 - 145 mmol/L    Potassium POCT 4.1 3.4 - 5.3 mmol/L    Hemoglobin POCT 14.6 13.3 - 17.7 g/dL    Glucose  Whole Blood POCT 85 70 - 99 mg/dL    Calcium, Ionized Whole Blood POCT 4.7 4.4 - 5.2 mg/dL    Base Excess/Deficit (+/-) POCT -0.1 -3.0 - 3.0 mmol/L    FIO2 POCT 50.0 %    O2 Sat, Arterial POCT 98 (H) 96 - 97 %    Lactic Acid POCT 0.7 0.7 - 2.0 mmol/L    Oxyhemoglobin Arterial POCT 95 92 - 100 %   Glucose by meter    Collection Time: 05/08/25  3:10 PM   Result Value Ref Range    GLUCOSE BY METER POCT 96 70 - 99 mg/dL   Routine UA with microscopic - No culture    Collection Time: 06/02/25  2:20 PM   Result Value Ref Range    Color Urine Yellow Colorless, Straw, Light Yellow, Yellow    Appearance Urine Cloudy (A) Clear    Glucose Urine Negative Negative mg/dL    Bilirubin Urine Negative Negative    Ketones Urine Negative Negative mg/dL    Specific Gravity Urine 1.025 1.003 - 1.035    Blood Urine Large (A) Negative    pH Urine 5.5 5.0 - 7.0    Protein Albumin Urine >=300 (A) Negative mg/dL    Urobilinogen Urine 0.2 0.2, 1.0 E.U./dL    Nitrite Urine Negative Negative    Leukocyte Esterase Urine Large (A) Negative   Urine Culture Aerobic Bacterial    Collection Time: 06/02/25  2:20 PM    Specimen: Urine, NOS   Result Value Ref Range    Culture Culture in progress    Urine Microscopic Exam    Collection Time: 06/02/25  2:20 PM   Result Value Ref Range    Bacteria Urine Many (A) None Seen /HPF    RBC Urine None Seen 0-2 /HPF /HPF    WBC Urine >100 (A) 0-5 /HPF /HPF    Squamous Epithelials Urine Few (A) None Seen /LPF    WBC Clumps Urine Present (A) None Seen /HPF         Assessment & Plan   post op follow up for urethroplasty of 2 separate bulbar urethral stricture DOS 5/8/25  Unable to remove Verdugo at radiology    Patient was prepped and draped.  Lubricants applied.  Catheter was able to slide.  I deflated the balloon again. I was unable to pull the catheter out with gentle strength.  Dr. Miller came and pulled out the catheter.  His bladder was empty before Verdugo removal.  Patient was not able to void despite drinking a few  cups of water.  I discussed with Dr. Newton, patient was cleared to go home.    - he will contact the clinic if he starts to have fevers/chills/increasing pain in the perineal area.     Doing well.  No sexual activity and no biking x 6 weeks after surgery. Discussed that he may experience mild pain with the first few erections.   3 mo cystoscopy with Dr. Newton with uroflow/PVR prior    Roger Del Real NP  [facility] [place]    ==========================      Additional Coding Information:  Postop

## 2025-05-30 ENCOUNTER — ANCILLARY PROCEDURE (OUTPATIENT)
Dept: GENERAL RADIOLOGY | Facility: CLINIC | Age: 65
End: 2025-05-30
Attending: UROLOGY
Payer: COMMERCIAL

## 2025-05-30 ENCOUNTER — OFFICE VISIT (OUTPATIENT)
Dept: UROLOGY | Facility: CLINIC | Age: 65
End: 2025-05-30
Payer: COMMERCIAL

## 2025-05-30 VITALS
WEIGHT: 250 LBS | SYSTOLIC BLOOD PRESSURE: 133 MMHG | OXYGEN SATURATION: 98 % | BODY MASS INDEX: 32.1 KG/M2 | DIASTOLIC BLOOD PRESSURE: 82 MMHG | HEART RATE: 64 BPM

## 2025-05-30 DIAGNOSIS — N99.111 POSTPROCEDURAL BULBOUS URETHRAL STRICTURE: ICD-10-CM

## 2025-05-30 DIAGNOSIS — R33.9 URINARY RETENTION: Primary | ICD-10-CM

## 2025-05-30 PROCEDURE — 51600 INJECTION FOR BLADDER X-RAY: CPT | Mod: GC | Performed by: RADIOLOGY

## 2025-05-30 PROCEDURE — 74430 CONTRAST X-RAY BLADDER: CPT | Mod: GC | Performed by: RADIOLOGY

## 2025-05-30 ASSESSMENT — PAIN SCALES - GENERAL: PAINLEVEL_OUTOF10: MODERATE PAIN (6)

## 2025-05-30 NOTE — PROGRESS NOTES
"Rhett Elizabeth is a 64 year old male patient that presents today in clinic for the following:    Chief Complaint   Patient presents with    Post Op Complications     Unable to remove SPT.        The patient's allergies and medications were reviewed as noted. A set of vitals were recorded as noted without incident. The patient does not have any other questions for the provider.    Blood pressure 133/82, pulse 64, weight 113.4 kg (250 lb), SpO2 98%. Body mass index is 32.1 kg/m .    Patient Active Problem List   Diagnosis    Neuropathic pain    Essential hypertension with goal blood pressure less than 140/90    Persistent atrial fibrillation (H)    LANEY (obstructive sleep apnea)    Type 2 diabetes mellitus with diabetic neuropathy, without long-term current use of insulin (H)    Chronic systolic congestive heart failure (H)    Drusen of macula, left    Dry eyes    S/P ICD (internal cardiac defibrillator) procedure    Recent bereavement    Acquired hypothyroidism    Hyperlipidemia LDL goal <100    Personal history of urethral stricture    Peripheral polyneuropathy    Chronic, continuous use of opioids    Long term current use of anticoagulant therapy    Chronic pain syndrome       Allergies   Allergen Reactions    Amlodipine Difficulty breathing and Other (See Comments)     Other reaction(s): Other  \"legs swell\"  Swelling of the lips and tongue  \"legs swell\"  swelling      Seasonal Allergies     Ace Inhibitors Cough     Other reaction(s): Cough       Current Outpatient Medications   Medication Sig Dispense Refill    acetaminophen (TYLENOL) 325 MG tablet Take 2 tablets (650 mg) by mouth every 4 hours as needed for mild pain. 50 tablet 0    alum & mag hydroxide-simethicone (MAALOX) 200-200-20 MG/5ML SUSP suspension Take 15 mLs by mouth daily as needed for other (GI discomfort). Mix with 15 mls of Lidocaine viscous solution 710 mL 0    bacitracin 500 UNIT/GM external ointment Apply topically 2 times daily. 425 g 0    " blood glucose (ONETOUCH VERIO IQ) test strip Use to test blood sugar 3 times daily or as directed.  Ok to substitute alternative if insurance prefers. 300 strip 1    blood glucose monitoring (ONE TOUCH ULTRA 2) meter device kit Use to test blood sugar 3 times daily or as directed. 1 kit 0    Cholecalciferol (VITAMIN D-3 PO) Take 1,000 Units by mouth daily      cyclobenzaprine (FLEXERIL) 10 MG tablet Take 1 tablet (10 mg) by mouth 3 times daily as needed for muscle spasms. 90 tablet 1    DULoxetine (CYMBALTA) 60 MG capsule TAKE ONE CAPSULE BY MOUTH ONCE DAILY (Patient taking differently: Take 60 mg by mouth every morning.) 90 capsule 1    empagliflozin (JARDIANCE) 10 MG TABS tablet Take 10 mg by mouth every evening.      finasteride (PROSCAR) 5 MG tablet Take 1 tablet (5 mg) by mouth daily. (Patient taking differently: Take 5 mg by mouth at bedtime.) 30 tablet 2    insulin pen needle (BD ARNOLD U/F) 32G X 4 MM miscellaneous Use 2 daily as directed. 200 each 1    levothyroxine (SYNTHROID/LEVOTHROID) 88 MCG tablet TAKE ONE TABLET BY MOUTH ONCE DAILY. NEED APPOINTMENT FOR FURTHER REFILLS. (Patient taking differently: Take 88 mcg by mouth every morning (before breakfast).) 90 tablet 2    lidocaine, viscous, (XYLOCAINE) 2 % solution Take 15 mLs by mouth daily as needed for moderate pain. (mix with 15 mls of alum-mag hydroxide-simethicone) 200 mL 0    liraglutide (VICTOZA PEN) 18 MG/3ML solution Inject 1.8 mg Subcutaneous daily (Patient taking differently: Inject 1.8 mg subcutaneously at bedtime.) 27 mL 5    Lutein 20 MG CAPS Take 20 mg by mouth every evening.      metFORMIN (GLUCOPHAGE) 500 MG tablet Two tabs 2 times a day (Patient taking differently: Take 1,000 mg by mouth 2 times daily (with meals). Two tabs 2 times a day) 360 tablet 1    metoprolol succinate ER (TOPROL XL) 50 MG 24 hr tablet Take 2 tablets (100 mg) by mouth daily (Patient taking differently: Take 100 mg by mouth every morning.) 90 tablet 0    Multiple  Vitamin (MULTIVITAMINS PO) Reported on 5/22/2017      Omega-3 Fatty Acids (OMEGA-3 FISH OIL PO) Take 1 g by mouth every evening. Reported on 5/5/2017      OneTouch Delica Lancets 33G MISC 1 Box 3 times daily 300 each 1    pantoprazole (PROTONIX) 40 MG EC tablet TAKE ONE TABLET BY MOUTH ONCE DAILY 90 tablet 1    pravastatin (PRAVACHOL) 10 MG tablet Take 1 tablet (10 mg) by mouth daily (Patient taking differently: Take 10 mg by mouth every morning.) 90 tablet 3    pregabalin (LYRICA) 300 MG capsule TAKE ONE CAPSULE BY MOUTH TWICE A DAY 60 capsule 4    sacubitril-valsartan (ENTRESTO) 24-26 MG per tablet Take 1 tablet by mouth every evening.      senna-docusate (SENOKOT-S/PERICOLACE) 8.6-50 MG tablet Take 1-2 tablets by mouth 2 times daily. 30 tablet 0    tolterodine (DETROL) 2 MG tablet Take 1 tablet (2 mg) by mouth every 12 hours as needed for other (Spasms). 30 tablet 0    traMADol (ULTRAM) 50 MG tablet TAKE TWO TABLETS BY MOUTH TWICE A  tablet 4    traMADol (ULTRAM) 50 MG tablet Take 2 tablets (100 mg) by mouth 2 times daily as needed for severe pain. No further refills until appointment 60 tablet 0    UNABLE TO FIND cpap      XARELTO ANTICOAGULANT 20 MG TABS tablet TAKE ONE TABLET BY MOUTH ONCE DAILY WITH DINNER 90 tablet 0       Social History     Tobacco Use    Smoking status: Never     Passive exposure: Past    Smokeless tobacco: Former     Types: Chew     Quit date: 5/2/2024    Tobacco comments:     Chew tobacco   Vaping Use    Vaping status: Never Used   Substance Use Topics    Alcohol use: Yes     Comment: occ    Drug use: No       IDRIS NICOLE RN  5/30/2025  3:30 PM

## 2025-05-30 NOTE — LETTER
5/30/2025       RE: Rhett Elizabeth  [address] [facility] [address]     Dear Colleague,    Thank you for referring your patient, Rhett Elizabeth, to the [facility] [place] at [facility]. Please see a copy of my visit note below.    HPI:  Rhett Elizabeth is a 64 year old male with a history of DM2, anterior urethral stricture s/p urethral dilation and DVIU being seen for post op follow up for urethroplasty of 2 separate bulbar urethral stricture DOS 5/8/25.      Medsurg History  - Multiple urethral dilations (date unknown)  - DVIU (date unknown)  - PVC Ablation (03/31/2025)    5/8/25 - single-stage anterior urethral reconstruction of 2 separate bulbar urethral strictures, both utilizing dorsal nontransecting Heineke-Mikulicz approach       Today  - Plan for cystogram and Verdugo removal.  Radiology was unable to remove catheter after instilling saline and contrast.  Patient was then sent to the office with balloon deflated.    -Denies concerns with Verdugo otherwise  -Appetite is okay  -Minimal pain    Exam:  /82 (BP Location: Right arm, Patient Position: Sitting, Cuff Size: Adult Regular)   Pulse 64   Wt 113.4 kg (250 lb)   SpO2 98%   BMI 32.10 kg/m    General: age-appropriate appearing male in NAD sitting in an exam chair  : testicles normal without atrophy or masses, penis normal without urethral discharge, and vertical midline incision healing well.    Review of Imaging:  The following imaging exams were independently viewed and interpreted by me and discussed with patient:  Cystogram today - bladder intact    Review of Labs:  The following labs were reviewed by me and discussed with the patient:  Recent Results (from the past 720 hours)   Glucose by meter    Collection Time: 05/08/25  7:05 AM   Result Value Ref Range    GLUCOSE BY METER POCT 97 70 - 99 mg/dL   Arterial Panel POCT    Collection Time: 05/08/25 12:44  PM   Result Value Ref Range    pH Arterial POCT 7.38 7.35 - 7.45    pCO2 Arterial POCT 43 35 - 45 mm Hg    pO2 Arterial POCT 98 80 - 105 mm Hg    Bicarbonate Arterial POCT 25 21 - 28 mmol/L    Sodium POCT 141 135 - 145 mmol/L    Potassium POCT 4.1 3.4 - 5.3 mmol/L    Hemoglobin POCT 14.6 13.3 - 17.7 g/dL    Glucose Whole Blood POCT 85 70 - 99 mg/dL    Calcium, Ionized Whole Blood POCT 4.7 4.4 - 5.2 mg/dL    Base Excess/Deficit (+/-) POCT -0.1 -3.0 - 3.0 mmol/L    FIO2 POCT 50.0 %    O2 Sat, Arterial POCT 98 (H) 96 - 97 %    Lactic Acid POCT 0.7 0.7 - 2.0 mmol/L    Oxyhemoglobin Arterial POCT 95 92 - 100 %   Glucose by meter    Collection Time: 05/08/25  3:10 PM   Result Value Ref Range    GLUCOSE BY METER POCT 96 70 - 99 mg/dL   Routine UA with microscopic - No culture    Collection Time: 06/02/25  2:20 PM   Result Value Ref Range    Color Urine Yellow Colorless, Straw, Light Yellow, Yellow    Appearance Urine Cloudy (A) Clear    Glucose Urine Negative Negative mg/dL    Bilirubin Urine Negative Negative    Ketones Urine Negative Negative mg/dL    Specific Gravity Urine 1.025 1.003 - 1.035    Blood Urine Large (A) Negative    pH Urine 5.5 5.0 - 7.0    Protein Albumin Urine >=300 (A) Negative mg/dL    Urobilinogen Urine 0.2 0.2, 1.0 E.U./dL    Nitrite Urine Negative Negative    Leukocyte Esterase Urine Large (A) Negative   Urine Culture Aerobic Bacterial    Collection Time: 06/02/25  2:20 PM    Specimen: Urine, NOS   Result Value Ref Range    Culture Culture in progress    Urine Microscopic Exam    Collection Time: 06/02/25  2:20 PM   Result Value Ref Range    Bacteria Urine Many (A) None Seen /HPF    RBC Urine None Seen 0-2 /HPF /HPF    WBC Urine >100 (A) 0-5 /HPF /HPF    Squamous Epithelials Urine Few (A) None Seen /LPF    WBC Clumps Urine Present (A) None Seen /HPF         Assessment & Plan  post op follow up for urethroplasty of 2 separate bulbar urethral stricture DOS 5/8/25  Unable to remove Verdugo at  radiology    Patient was prepped and draped.  Lubricants applied.  Catheter was able to slide.  I deflated the balloon again. I was unable to pull the catheter out with gentle strength.  Dr. Miller came and pulled out the catheter.  His bladder was empty before Verdugo removal.  Patient was not able to void despite drinking a few cups of water.  I discussed with Dr. Newton, patient was cleared to go home.    - he will contact the clinic if he starts to have fevers/chills/increasing pain in the perineal area.     Doing well.  No sexual activity and no biking x 6 weeks after surgery. Discussed that he may experience mild pain with the first few erections.   3 mo cystoscopy with Dr. Newton with uroflow/PVR prior    Roger Del Real NP  [facility] [place]    ==========================      Additional Coding Information:  Postop            Rhett Elizabeth is a 64 year old male patient that presents today in clinic for the following:    Chief Complaint   Patient presents with     Post Op Complications     Unable to remove SPT.        The patient's allergies and medications were reviewed as noted. A set of vitals were recorded as noted without incident. The patient does not have any other questions for the provider.    Blood pressure 133/82, pulse 64, weight 113.4 kg (250 lb), SpO2 98%. Body mass index is 32.1 kg/m .    Patient Active Problem List   Diagnosis     Neuropathic pain     Essential hypertension with goal blood pressure less than 140/90     Persistent atrial fibrillation (H)     LANEY (obstructive sleep apnea)     Type 2 diabetes mellitus with diabetic neuropathy, without long-term current use of insulin (H)     Chronic systolic congestive heart failure (H)     Drusen of macula, left     Dry eyes     S/P ICD (internal cardiac defibrillator) procedure     Recent bereavement     Acquired hypothyroidism     Hyperlipidemia LDL goal <100     Personal history of urethral stricture      "Peripheral polyneuropathy     Chronic, continuous use of opioids     Long term current use of anticoagulant therapy     Chronic pain syndrome       Allergies   Allergen Reactions     Amlodipine Difficulty breathing and Other (See Comments)     Other reaction(s): Other  \"legs swell\"  Swelling of the lips and tongue  \"legs swell\"  swelling       Seasonal Allergies      Ace Inhibitors Cough     Other reaction(s): Cough       Current Outpatient Medications   Medication Sig Dispense Refill     acetaminophen (TYLENOL) 325 MG tablet Take 2 tablets (650 mg) by mouth every 4 hours as needed for mild pain. 50 tablet 0     alum & mag hydroxide-simethicone (MAALOX) 200-200-20 MG/5ML SUSP suspension Take 15 mLs by mouth daily as needed for other (GI discomfort). Mix with 15 mls of Lidocaine viscous solution 710 mL 0     bacitracin 500 UNIT/GM external ointment Apply topically 2 times daily. 425 g 0     blood glucose (ONETOUCH VERIO IQ) test strip Use to test blood sugar 3 times daily or as directed.  Ok to substitute alternative if insurance prefers. 300 strip 1     blood glucose monitoring (ONE TOUCH ULTRA 2) meter device kit Use to test blood sugar 3 times daily or as directed. 1 kit 0     Cholecalciferol (VITAMIN D-3 PO) Take 1,000 Units by mouth daily       cyclobenzaprine (FLEXERIL) 10 MG tablet Take 1 tablet (10 mg) by mouth 3 times daily as needed for muscle spasms. 90 tablet 1     DULoxetine (CYMBALTA) 60 MG capsule TAKE ONE CAPSULE BY MOUTH ONCE DAILY (Patient taking differently: Take 60 mg by mouth every morning.) 90 capsule 1     empagliflozin (JARDIANCE) 10 MG TABS tablet Take 10 mg by mouth every evening.       finasteride (PROSCAR) 5 MG tablet Take 1 tablet (5 mg) by mouth daily. (Patient taking differently: Take 5 mg by mouth at bedtime.) 30 tablet 2     insulin pen needle (BD ARNOLD U/F) 32G X 4 MM miscellaneous Use 2 daily as directed. 200 each 1     levothyroxine (SYNTHROID/LEVOTHROID) 88 MCG tablet TAKE ONE " TABLET BY MOUTH ONCE DAILY. NEED APPOINTMENT FOR FURTHER REFILLS. (Patient taking differently: Take 88 mcg by mouth every morning (before breakfast).) 90 tablet 2     lidocaine, viscous, (XYLOCAINE) 2 % solution Take 15 mLs by mouth daily as needed for moderate pain. (mix with 15 mls of alum-mag hydroxide-simethicone) 200 mL 0     liraglutide (VICTOZA PEN) 18 MG/3ML solution Inject 1.8 mg Subcutaneous daily (Patient taking differently: Inject 1.8 mg subcutaneously at bedtime.) 27 mL 5     Lutein 20 MG CAPS Take 20 mg by mouth every evening.       metFORMIN (GLUCOPHAGE) 500 MG tablet Two tabs 2 times a day (Patient taking differently: Take 1,000 mg by mouth 2 times daily (with meals). Two tabs 2 times a day) 360 tablet 1     metoprolol succinate ER (TOPROL XL) 50 MG 24 hr tablet Take 2 tablets (100 mg) by mouth daily (Patient taking differently: Take 100 mg by mouth every morning.) 90 tablet 0     Multiple Vitamin (MULTIVITAMINS PO) Reported on 5/22/2017       Omega-3 Fatty Acids (OMEGA-3 FISH OIL PO) Take 1 g by mouth every evening. Reported on 5/5/2017       OneTouch Delica Lancets 33G MISC 1 Box 3 times daily 300 each 1     pantoprazole (PROTONIX) 40 MG EC tablet TAKE ONE TABLET BY MOUTH ONCE DAILY 90 tablet 1     pravastatin (PRAVACHOL) 10 MG tablet Take 1 tablet (10 mg) by mouth daily (Patient taking differently: Take 10 mg by mouth every morning.) 90 tablet 3     pregabalin (LYRICA) 300 MG capsule TAKE ONE CAPSULE BY MOUTH TWICE A DAY 60 capsule 4     sacubitril-valsartan (ENTRESTO) 24-26 MG per tablet Take 1 tablet by mouth every evening.       senna-docusate (SENOKOT-S/PERICOLACE) 8.6-50 MG tablet Take 1-2 tablets by mouth 2 times daily. 30 tablet 0     tolterodine (DETROL) 2 MG tablet Take 1 tablet (2 mg) by mouth every 12 hours as needed for other (Spasms). 30 tablet 0     traMADol (ULTRAM) 50 MG tablet TAKE TWO TABLETS BY MOUTH TWICE A  tablet 4     traMADol (ULTRAM) 50 MG tablet Take 2 tablets (100  mg) by mouth 2 times daily as needed for severe pain. No further refills until appointment 60 tablet 0     UNABLE TO FIND cpap       XARELTO ANTICOAGULANT 20 MG TABS tablet TAKE ONE TABLET BY MOUTH ONCE DAILY WITH DINNER 90 tablet 0       Social History     Tobacco Use     Smoking status: Never     Passive exposure: Past     Smokeless tobacco: Former     Types: Chew     Quit date: 5/2/2024     Tobacco comments:     Chew tobacco   Vaping Use     Vaping status: Never Used   Substance Use Topics     Alcohol use: Yes     Comment: occ     Drug use: No       IDRIS NICOLE RN  5/30/2025  3:30 PM     Again, thank you for allowing me to participate in the care of your patient.      Sincerely,    Roger Del Real NP

## 2025-06-02 ENCOUNTER — TELEPHONE (OUTPATIENT)
Dept: UROLOGY | Facility: CLINIC | Age: 65
End: 2025-06-02
Payer: COMMERCIAL

## 2025-06-02 ENCOUNTER — TELEPHONE (OUTPATIENT)
Dept: UROLOGY | Facility: CLINIC | Age: 65
End: 2025-06-02

## 2025-06-02 ENCOUNTER — LAB (OUTPATIENT)
Dept: LAB | Facility: CLINIC | Age: 65
End: 2025-06-02
Payer: COMMERCIAL

## 2025-06-02 DIAGNOSIS — R39.89 SUSPECTED UTI: Primary | ICD-10-CM

## 2025-06-02 DIAGNOSIS — R39.89 SUSPECTED UTI: ICD-10-CM

## 2025-06-02 LAB
ALBUMIN UR-MCNC: >=300 MG/DL
APPEARANCE UR: ABNORMAL
BACTERIA #/AREA URNS HPF: ABNORMAL /HPF
BILIRUB UR QL STRIP: NEGATIVE
COLOR UR AUTO: YELLOW
GLUCOSE UR STRIP-MCNC: NEGATIVE MG/DL
HGB UR QL STRIP: ABNORMAL
KETONES UR STRIP-MCNC: NEGATIVE MG/DL
LEUKOCYTE ESTERASE UR QL STRIP: ABNORMAL
NITRATE UR QL: NEGATIVE
PH UR STRIP: 5.5 [PH] (ref 5–7)
RBC #/AREA URNS AUTO: ABNORMAL /HPF
SP GR UR STRIP: 1.02 (ref 1–1.03)
SQUAMOUS #/AREA URNS AUTO: ABNORMAL /LPF
UROBILINOGEN UR STRIP-ACNC: 0.2 E.U./DL
WBC #/AREA URNS AUTO: >100 /HPF
WBC CLUMPS #/AREA URNS HPF: PRESENT /HPF

## 2025-06-02 PROCEDURE — 81001 URINALYSIS AUTO W/SCOPE: CPT

## 2025-06-02 PROCEDURE — 87086 URINE CULTURE/COLONY COUNT: CPT

## 2025-06-02 PROCEDURE — 87186 SC STD MICRODIL/AGAR DIL: CPT

## 2025-06-02 RX ORDER — CIPROFLOXACIN 500 MG/1
500 TABLET, FILM COATED ORAL 2 TIMES DAILY
Qty: 14 TABLET | Refills: 0 | Status: SHIPPED | OUTPATIENT
Start: 2025-06-02 | End: 2025-06-09

## 2025-06-04 ENCOUNTER — NURSE TRIAGE (OUTPATIENT)
Dept: NURSING | Facility: CLINIC | Age: 65
End: 2025-06-04
Payer: COMMERCIAL

## 2025-06-04 ENCOUNTER — DOCUMENTATION ONLY (OUTPATIENT)
Dept: UROLOGY | Facility: CLINIC | Age: 65
End: 2025-06-04
Payer: COMMERCIAL

## 2025-06-04 LAB — BACTERIA UR CULT: ABNORMAL

## 2025-06-04 NOTE — TELEPHONE ENCOUNTER
Nurse Triage SBAR    Is this a 2nd Level Triage? YES, LICENSED PRACTITIONER REVIEW IS REQUIRED    Situation: Pain w/ voiding, voiding every hour,  feeling tired, no strength-   pt concern he is not on correct med, - changed to cipro  6-2-25.  Pt would like an appt.      Background:   5-8-25  URETHROPLASTY  Danie Newton MD           Assessment: 5-8-25 URETHROPLASTY    5-30-25 had catheter removed in clinic, pt states- difficult removal.  Since catheter came out, having pain w/ voiding- void every hour and does not feel like he is able to completely empty bladder, denies any lower abd distention.   Has not been feeling well -concerned he still has an infection and not on the right medication- med changed couple days ago- wondering if he should go back to other med- does not remember name.     Pain: voiding- goes up to a #9  Voiding: goes every one hour, does not feel like he can empty bladder completely. Burns w/ voiding. Color: cloudy yellow.      States med was changed, concerned he should go back to the other med.  ciprofloxacin (CIPRO) 500 MG tablet   14 tablet 0 6/2/2025 6/9/2025 No  Sig - Route: Take 1 tablet (500 mg) by mouth 2 times daily for 7 days. - Oral    Feeling no strength, tired. Just doesn't feel good.    States yesterday - had brief ( few seconds of chest pain)- none today. Cardiologist is  at [facility]- pt agreed to call cardiologist to update them on symptoms.  Reviewed for any severe chest pain needs to go the ER.        Protocol Recommended Disposition:   Discuss With PCP And Callback By Nurse Within 1 Hour    Recommendation: High priority note to urology for call back to pt w/ plan.  Pt # [phone]        Reason for Disposition   SEVERE post-op pain (e.g., excruciating, pain scale 8-10) that is not controlled with pain medications    Additional Information   Negative: Chest pain   Negative: Difficulty breathing   Negative: Acting confused (e.g., disoriented, slurred speech) or  "excessively sleepy   Negative: Surgical incision symptoms and questions   Negative: Pain or burning with passing urine (urination) and male   Negative: Pain or burning with passing urine (urination) and female   Negative: Constipation   Negative: Leg (calf, thigh) pain of new-onset or getting worse   Negative: Leg swelling of new-onset or getting worse   Negative: Dizziness is severe, or persists > 24 hours after surgery   Negative: Pain, redness, swelling, or pus at IV site (current or recent)   Negative: Symptoms arising from use of a urinary catheter (Verdugo or Coude)   Negative: Cast problems or questions   Negative: Medication question   Negative: SEVERE headache (e.g., excruciating) and after spinal (epidural) anesthesia   Negative: Vomiting and persists > 4 hours   Negative: Vomiting and abdomen looks much more swollen than usual   Negative: Drinking very little and dehydration suspected (e.g., no urine > 12 hours, very dry mouth, very lightheaded)   Negative: Patient sounds very sick or weak to the triager   Negative: Sounds like a serious complication to the triager   Negative: Fever > 100.4 F (38.0 C)   Negative: Caller has URGENT question and triager unable to answer question    Answer Assessment - Initial Assessment Questions  1. SYMPTOM: \"What's the main symptom you're concerned about?\" (e.g., pain, fever, vomiting)        5-8-25 URETHROPLASTY    5-30-25 had catheter removed in clinic, pt states- difficult removal.  Since catheter came out, has not been feeling well -concerned he still has an infection and not on the right medication- med changed couple days ago- wondering if he should go back to other med- does not remember name.     Pain: voiding- goes up to a #9  Voiding: goes every one hour, does not feel like he can empty bladder completely. Burns w/ voiding. Color: cloudy yellow.      States med was changed, concerned he should go back to the other med.  ciprofloxacin (CIPRO) 500 MG tablet   14 " "tablet 0 6/2/2025 6/9/2025 No  Sig - Route: Take 1 tablet (500 mg) by mouth 2 times daily for 7 days. - Oral    Feeling no strength, tired. Just doesn't feel good.    States yesterday - had brief ( few seconds of chest pain)- none today. Cardiologist is  at [facility]- pt agreed to call cardiologist to update them on symptoms.  Reviewed for any severe chest pain needs to go the ER.        2. ONSET: \"When did   start?\"   When catheter came out 5-30-25        3. SURGERY: \"What surgery did you have?\"        URETHROPLASTY    4. DATE of SURGERY: \"When was the surgery?\"       5-8-25  5. ANESTHESIA: \"What type of anesthesia did you have?\" (e.g., general, spinal, epidural, local)        6. DRAINS: \"Were any drains place in or around the wound?\" (e.g., Hemovac, Luan-Hamilton, Penrose)      no  7. PAIN: \"Is there any pain?\" If Yes, ask: \"How bad is it?\"  (Scale 1-10; or mild, moderate, severe)   See above      8. FEVER: \"Do you have a fever?\" If Yes, ask: \"What is your temperature, how was it measured, and when did it start?\"      No  Does have sweat  9. VOMITING: \"Is there any vomiting?\" If Yes, ask: \"How many times?\"      On Friday when catheter came out  10. BLEEDING: \"Is there any bleeding?\" If Yes, ask: \"How much?\" and \"Where?\"        no  11. OTHER SYMPTOMS: \"Do you have any other symptoms?\" (e.g., drainage from wound, painful urination, constipation)    Protocols used: Post-Op Symptoms and [id number]    "

## 2025-06-04 NOTE — PROGRESS NOTES
Call placed to patient in response to Red Flag Triage RN message. Patient is having pain with urination, frequency and issues emptying his bladder after his Verdugo was removed on 5/30/25. His UC is positive for   e coli, but the susceptibilities are not back yet. He is on a 7 day course of Cipro. Recommended OTC pain medications and phenazopyridine for the pain and frequency symptoms and will monitor for the UC results later today and get back with John about it. Advised that he monitor for increased pain, fever and lack of urine output and be seen in urgent care or an ED if these symptoms develop.  He voices understanding.      Thank you,  Hayde Dupree RN, BSN   Urology Triage Nurse

## 2025-06-05 ENCOUNTER — TRANSFERRED RECORDS (OUTPATIENT)
Dept: HEALTH INFORMATION MANAGEMENT | Facility: CLINIC | Age: 65
End: 2025-06-05
Payer: COMMERCIAL

## 2025-06-05 ENCOUNTER — TELEPHONE (OUTPATIENT)
Dept: UROLOGY | Facility: CLINIC | Age: 65
End: 2025-06-05
Payer: COMMERCIAL

## 2025-06-05 DIAGNOSIS — N39.0 URINARY TRACT INFECTION WITHOUT HEMATURIA, SITE UNSPECIFIED: Primary | ICD-10-CM

## 2025-06-05 DIAGNOSIS — N39.0 RECURRENT UTI: Primary | ICD-10-CM

## 2025-06-05 RX ORDER — NITROFURANTOIN 25; 75 MG/1; MG/1
100 CAPSULE ORAL 2 TIMES DAILY
Qty: 10 CAPSULE | Refills: 0 | Status: SHIPPED | OUTPATIENT
Start: 2025-06-05 | End: 2025-06-10

## 2025-06-05 NOTE — PROGRESS NOTES
Urine culture results noted. Call placed to patient and message left to let him know that his culture is positive and that the bacteria is resistant to the previously prescribe Cipro. A new prescription for nitrofurantoin BID for 5 days has been sent to his pharmacy. Told him to stop the Cipro and start the nitrofurantoin. My direct line given for any questions.      Thank you,  Hayde Dupree RN, BSN   Urology Triage Nurse

## 2025-06-17 DIAGNOSIS — E78.5 HYPERLIPIDEMIA LDL GOAL <100: ICD-10-CM

## 2025-06-18 RX ORDER — PRAVASTATIN SODIUM 10 MG
10 TABLET ORAL DAILY
Qty: 90 TABLET | Refills: 1 | Status: SHIPPED | OUTPATIENT
Start: 2025-06-18

## 2025-06-19 ENCOUNTER — TELEPHONE (OUTPATIENT)
Dept: UROLOGY | Facility: CLINIC | Age: 65
End: 2025-06-19
Payer: COMMERCIAL

## 2025-06-19 NOTE — TELEPHONE ENCOUNTER
Spoke with Daughter about how pt is doing she sates he is doing good and that he is taking care of SPT site and all seems well right now. Has questions about upcoming procedure on July 14th at [facility] to widen the SPT tract. Wondering if would be better to do with Dr. Newton. Already scheduled with [facility] but told Dtr to ask Dr. Newton about it when he has his appt on June 30th.     Tatiana Cruz  [facility] Urology  Phone: [phone]

## 2025-06-24 NOTE — TELEPHONE ENCOUNTER
Alonzo Garcia is here today for Follow-up (3 month follow up chronic conditions) and Shoulder Pain (Left shoulder pain is getting worse)        Medications: medications verified, no change  Refills needed today? No     Tobacco history: verified     Advanced Directives: No not on file, needs to bring in a copy.    BP >140/90? No    Care Teams Updated? Yes    Patient Preference for result Communication via: [employer]    Cell Phone:   Telephone Information:   Mobile [phone]     Okay to leave a message containing results? Yes       Health Maintenance       Medicare Advantage- Medicare Wellness Visit (Yearly - January to December)  Overdue since 1/1/2025    COVID-19 Vaccine (7 - 2024-25 season)  Overdue since 5/12/2025    DTaP/Tdap/Td Vaccine (1 - Tdap)  Never done    Shingles Vaccine (1 of 2)  Never done           Following review of the above:  Patient is not proceeding with: COVID-19, Dtap/Tdap/Td, and Shingles    Note: Refer to final orders and clinician documentation.          Immunization History   Administered Date(s) Administered    COVID Moderna 0.5 mL 12Y+ 05/01/2021, 06/01/2021, 12/21/2021, 04/26/2022    COVID Moderna/Spikevax 12+ 11/12/2024    COVID Pfizer Bivalent 12Y+ 05/15/2023    Influenza, high dose, quadrivalent, PF 11/12/2019, 10/01/2020    Influenza, high-dose, trivalent, PF 11/12/2019, 11/08/2024    Influenza, unspecified formulation 11/12/2019, 10/01/2020    Pneumococcal conjugate PCV20 05/15/2023    Respiratory Syncytial Virus, recombinant 01/20/2024         PHQ 2:  PHQ 2 Score Adult PHQ 2 Score Adult PHQ 2 Interpretation Little interest or pleasure in activity?   6/24/2025   9:01 AM 0 No further screening needed 0       PHQ 9:        Referral signed.  Thank you,  Gwen Saldana MD

## 2025-06-25 ENCOUNTER — PRE VISIT (OUTPATIENT)
Dept: UROLOGY | Facility: CLINIC | Age: 65
End: 2025-06-25
Payer: COMMERCIAL

## 2025-06-25 DIAGNOSIS — E11.40 TYPE 2 DIABETES MELLITUS WITH DIABETIC NEUROPATHY, WITHOUT LONG-TERM CURRENT USE OF INSULIN (H): ICD-10-CM

## 2025-06-26 ENCOUNTER — TELEPHONE (OUTPATIENT)
Dept: FAMILY MEDICINE | Facility: CLINIC | Age: 65
End: 2025-06-26
Payer: COMMERCIAL

## 2025-06-26 RX ORDER — LIRAGLUTIDE 6 MG/ML
INJECTION SUBCUTANEOUS
Qty: 27 ML | Refills: 0 | Status: SHIPPED | OUTPATIENT
Start: 2025-06-26

## 2025-06-29 NOTE — TELEPHONE ENCOUNTER
PA Initiation    Medication: LIRAGLUTIDE 18 MG/3ML SC SOPN  Insurance Company: [employer] Non-Specialty PA's - Phone [phone] Fax [phone]  Pharmacy Filling the Rx: [facility] - [address]  Filling Pharmacy Phone: [phone]  Filling Pharmacy Fax:    Start Date: 6/29/2025

## 2025-06-30 ENCOUNTER — OFFICE VISIT (OUTPATIENT)
Dept: UROLOGY | Facility: CLINIC | Age: 65
End: 2025-06-30
Payer: COMMERCIAL

## 2025-06-30 VITALS
DIASTOLIC BLOOD PRESSURE: 79 MMHG | BODY MASS INDEX: 29.52 KG/M2 | OXYGEN SATURATION: 98 % | WEIGHT: 230 LBS | HEART RATE: 63 BPM | SYSTOLIC BLOOD PRESSURE: 117 MMHG | HEIGHT: 74 IN

## 2025-06-30 DIAGNOSIS — Z87.448 HISTORY OF URETHRAL STRICTURE: Primary | ICD-10-CM

## 2025-06-30 PROCEDURE — 3078F DIAST BP <80 MM HG: CPT | Performed by: UROLOGY

## 2025-06-30 PROCEDURE — 52000 CYSTOURETHROSCOPY: CPT | Mod: 58 | Performed by: UROLOGY

## 2025-06-30 PROCEDURE — 1126F AMNT PAIN NOTED NONE PRSNT: CPT | Performed by: UROLOGY

## 2025-06-30 PROCEDURE — 3074F SYST BP LT 130 MM HG: CPT | Performed by: UROLOGY

## 2025-06-30 ASSESSMENT — PAIN SCALES - GENERAL: PAINLEVEL_OUTOF10: NO PAIN (0)

## 2025-06-30 NOTE — PATIENT INSTRUCTIONS
Urodynamics: is a diagnostic study of pressure in the bladder, and is used in treating a number of different urological symptoms. The main goal of this study is to determine possible next steps for your treatment plan with your Urology clinic provider.           Procedure: involves the placement of three different catheters. The first is a 14 fr straight catheter, which is meant to empty the bladder and determine the amount of urine it was holding at that time. The second and third catheters will stay placed throughout the study, and both are approximately the width of spaghetti noodles. The first is a urethral catheter, and the second catheter gets placed either vaginally or rectally, depending both on anatomy and preference. Both catheters contain sensors that read the abdominal and bladder muscle movements during the procedure. You will also have three electrodes attached to your skin, which aid in giving us data on your movement during the procedure.           Your bladder will then be slowly filled with saline, while you focus on four different levels of fullness. We often use a road trip anecdote to explain these levels to patients:     1. First sensation: The first time you feel like you could use the restroom, you won't think about pulling off the highway yet though (approx 30 mins)     2. First desire: You could use the restroom now, but you'll wait for another few rest stops before pulling off the highway (approx 15-20 mins)     3. Strong desire:You will be stopping at the next available rest stop, otherwise you will no longer be able to hold it (approx 3-5 mins)     4. Capacity: you've pulled over on the side of the road and chosen somewhere to urinate there (0 mins)           Once you feel as though you've hit your bladder's capacity, we will have you void in order to see if you can empty fully and properly. There will be time during your scheduled appointment to answer any questions you may have.        "    If you are unable to keep this scheduled appointment, please call [phone] as soon as possible.  Thank you, see you soon!    AFTER YOUR CYSTOSCOPY        You have just completed a cystoscopy, or \"cysto\", which allowed your physician to learn more about your bladder (or to remove a stent placed after surgery). We suggest that you continue to avoid caffeine, fruit juice, and alcohol for the next 24 hours, however, you are encouraged to return to your normal activities.         A few things that are considered normal after your cystoscopy:     * Small amount of bleeding (or spotting) that clears within the next 24 hours     * Slight burning sensation with urination     * Sensation to of needing to avoid more frequently     * The feeling of \"air\" in your urine     * Mild discomfort that is relieved with Tylenol        Please contact our office promptly if you:     * Develop a fever above 101 degrees     * Are unable to urinate     * Develop bright red blood that does not stop     * Severe pain or swelling         Please contact our office with any concerns or questions @[email or web address].  "

## 2025-06-30 NOTE — PROGRESS NOTES
Male Cystoscopy Procedure Note     PRE-PROCEDURE DIAGNOSIS: history of urethral stricture. New urinary retention.    POST-PROCEDURE DIAGNOSIS: no urethral stricture  PROCEDURE: cystoscopy    HISTORY: Rhett Elizabeth is a 64 year old man with dyslipidemia, hypertension, MI, PVCs, AFIB w/ RVR, chronic systolic heart failure, renal disease, diabetes mellitus type II. Urethral stricture history with urethroplasty 5/8/25  and had RAMIN post-op after catheter removal.     5/8/25 -- bulbar urethroplasty two sites of non-transecting anastomotic  5/30/25 -- difficult Verdugo removal and no good x-ray (ridge of balloon was giving some resistance at proximal anastomosis)  6/5/25 -- urosepsis, RAMIN (Cr 5), +/- retention at [facility]. SPT placed. No cysto or attempt at urethral cath.   6/11/25 -- Cr normalized at 1.1    REVIEW OF OFFICE STUDIES:        DESCRIPTION OF PROCEDURE:  After informed consent was obtained, the patient was brought to the procedure room where he was placed in the supine position with all pressure points well padded.  The penis and scrotum were prepped and draped in a sterile fashion. A flexible cystoscope was introduced through a well-lubricated urethra.  The anterior urethra was free of stricture. The urinary sphincter was intact. The prostate demonstrated moderate enlargement. Bladder neck was open. Bladder was free of diverticuli, cellules, trabeculation, tumors or stones.The flexible cystoscope was removed and the findings were described to the patient.   ASSESSMENT AND PLAN:  64 year old man with recent urosepsis with RAMIN after urethroplasty. Inability to void today despite absence of any urethral stricture.  I suspect he has a flaccid bladder but an enlarged prostate could be another possibility.  Will do urodynamics and make treatment decisions based off of that.  If he has a flaccid bladder then he could qualify for a trial of InterStim but more likely would need to do long-term self  intermittent catheterization.  If he has enlarged prostate then he would be a good candidate for a robotic simple prostatectomy so that we do not damage his prior urethroplasty site.

## 2025-06-30 NOTE — LETTER
6/30/2025       RE: Rhett Elizabeth  [address]  [place] [address]     Dear Colleague,    Thank you for referring your patient, Rhett Elizabeth, to the [facility] [place] at [facility]. Please see a copy of my visit note below.    Male Cystoscopy Procedure Note     PRE-PROCEDURE DIAGNOSIS: history of urethral stricture. New urinary retention.    POST-PROCEDURE DIAGNOSIS: no urethral stricture  PROCEDURE: cystoscopy    HISTORY: Rhett Elizabeth is a 64 year old man with dyslipidemia, hypertension, MI, PVCs, AFIB w/ RVR, chronic systolic heart failure, renal disease, diabetes mellitus type II. Urethral stricture history with urethroplasty 5/8/25  and had RAMIN post-op after catheter removal.     5/8/25 -- bulbar urethroplasty two sites of non-transecting anastomotic  5/30/25 -- difficult Verdugo removal and no good x-ray (ridge of balloon was giving some resistance at proximal anastomosis)  6/5/25 -- urosepsis, RAMIN (Cr 5), +/- retention at [facility]. SPT placed. No cysto or attempt at urethral cath.   6/11/25 -- Cr normalized at 1.1    REVIEW OF OFFICE STUDIES:        DESCRIPTION OF PROCEDURE:  After informed consent was obtained, the patient was brought to the procedure room where he was placed in the supine position with all pressure points well padded.  The penis and scrotum were prepped and draped in a sterile fashion. A flexible cystoscope was introduced through a well-lubricated urethra.  The anterior urethra was free of stricture. The urinary sphincter was intact. The prostate demonstrated moderate enlargement. Bladder neck was open. Bladder was free of diverticuli, cellules, trabeculation, tumors or stones.The flexible cystoscope was removed and the findings were described to the patient.   ASSESSMENT AND PLAN:  64 year old man with recent urosepsis with RAMIN after urethroplasty. Inability to void today despite absence  of any urethral stricture.  I suspect he has a flaccid bladder but an enlarged prostate could be another possibility.  Will do urodynamics and make treatment decisions based off of that.  If he has a flaccid bladder then he could qualify for a trial of InterStim but more likely would need to do long-term self intermittent catheterization.  If he has enlarged prostate then he would be a good candidate for a robotic simple prostatectomy so that we do not damage his prior urethroplasty site.          Again, thank you for allowing me to participate in the care of your patient.      Sincerely,    Danie Newton MD

## 2025-06-30 NOTE — NURSING NOTE
"Chief Complaint   Patient presents with    Cystoscopy       Blood pressure 117/79, pulse 63, height 1.88 m (6' 2\"), weight 104.3 kg (230 lb), SpO2 98%. Body mass index is 29.53 kg/m .    Patient Active Problem List   Diagnosis    Neuropathic pain    Essential hypertension with goal blood pressure less than 140/90    Persistent atrial fibrillation (H)    LANEY (obstructive sleep apnea)    Type 2 diabetes mellitus with diabetic neuropathy, without long-term current use of insulin (H)    Chronic systolic congestive heart failure (H)    Drusen of macula, left    Dry eyes    S/P ICD (internal cardiac defibrillator) procedure    Recent bereavement    Acquired hypothyroidism    Hyperlipidemia LDL goal <100    Personal history of urethral stricture    Peripheral polyneuropathy    Chronic, continuous use of opioids    Long term current use of anticoagulant therapy    Chronic pain syndrome       Allergies   Allergen Reactions    Amlodipine Difficulty breathing and Other (See Comments)     Other reaction(s): Other  \"legs swell\"  Swelling of the lips and tongue  \"legs swell\"  swelling      Seasonal Allergies     Ace Inhibitors Cough     Other reaction(s): Cough       Current Outpatient Medications   Medication Sig Dispense Refill    acetaminophen (TYLENOL) 325 MG tablet Take 2 tablets (650 mg) by mouth every 4 hours as needed for mild pain. 50 tablet 0    alum & mag hydroxide-simethicone (MAALOX) 200-200-20 MG/5ML SUSP suspension Take 15 mLs by mouth daily as needed for other (GI discomfort). Mix with 15 mls of Lidocaine viscous solution 710 mL 0    bacitracin 500 UNIT/GM external ointment Apply topically 2 times daily. 425 g 0    blood glucose (ONETOUCH VERIO IQ) test strip Use to test blood sugar 3 times daily or as directed.  Ok to substitute alternative if insurance prefers. 300 strip 1    blood glucose monitoring (ONE TOUCH ULTRA 2) meter device kit Use to test blood sugar 3 times daily or as directed. 1 kit 0    " Cholecalciferol (VITAMIN D-3 PO) Take 1,000 Units by mouth daily      cyclobenzaprine (FLEXERIL) 10 MG tablet Take 1 tablet (10 mg) by mouth 3 times daily as needed for muscle spasms. 90 tablet 1    DULoxetine (CYMBALTA) 60 MG capsule TAKE ONE CAPSULE BY MOUTH ONCE DAILY (Patient taking differently: Take 60 mg by mouth every morning.) 90 capsule 1    insulin pen needle (BD ARNOLD U/F) 32G X 4 MM miscellaneous Use 2 daily as directed. 200 each 1    levothyroxine (SYNTHROID/LEVOTHROID) 88 MCG tablet TAKE ONE TABLET BY MOUTH ONCE DAILY. NEED APPOINTMENT FOR FURTHER REFILLS. (Patient taking differently: Take 88 mcg by mouth every morning (before breakfast).) 90 tablet 2    lidocaine, viscous, (XYLOCAINE) 2 % solution Take 15 mLs by mouth daily as needed for moderate pain. (mix with 15 mls of alum-mag hydroxide-simethicone) 200 mL 0    liraglutide (VICTOZA) 18 MG/3ML solution INJECT 1.8 MG SUBCUTANEOUSLY DAILY 27 mL 0    Lutein 20 MG CAPS Take 20 mg by mouth every evening.      metFORMIN (GLUCOPHAGE) 500 MG tablet Two tabs 2 times a day (Patient taking differently: Take 1,000 mg by mouth 2 times daily (with meals). Two tabs 2 times a day) 360 tablet 1    metoprolol succinate ER (TOPROL XL) 50 MG 24 hr tablet Take 2 tablets (100 mg) by mouth daily (Patient taking differently: Take 100 mg by mouth every morning.) 90 tablet 0    Multiple Vitamin (MULTIVITAMINS PO) Reported on 5/22/2017      Omega-3 Fatty Acids (OMEGA-3 FISH OIL PO) Take 1 g by mouth every evening. Reported on 5/5/2017      OneTouch Delica Lancets 33G MISC 1 Box 3 times daily 300 each 1    pantoprazole (PROTONIX) 40 MG EC tablet TAKE ONE TABLET BY MOUTH ONCE DAILY 90 tablet 1    pravastatin (PRAVACHOL) 10 MG tablet TAKE ONE TABLET BY MOUTH ONCE DAILY 90 tablet 1    pregabalin (LYRICA) 300 MG capsule TAKE ONE CAPSULE BY MOUTH TWICE A DAY 60 capsule 4    sacubitril-valsartan (ENTRESTO) 24-26 MG per tablet Take 1 tablet by mouth every evening.      senna-docusate  (SENOKOT-S/PERICOLACE) 8.6-50 MG tablet Take 1-2 tablets by mouth 2 times daily. 30 tablet 0    tolterodine (DETROL) 2 MG tablet Take 1 tablet (2 mg) by mouth every 12 hours as needed for other (Spasms). 30 tablet 0    traMADol (ULTRAM) 50 MG tablet TAKE TWO TABLETS BY MOUTH TWICE A  tablet 4    traMADol (ULTRAM) 50 MG tablet Take 2 tablets (100 mg) by mouth 2 times daily as needed for severe pain. No further refills until appointment 60 tablet 0    UNABLE TO FIND cpap      XARELTO ANTICOAGULANT 20 MG TABS tablet TAKE ONE TABLET BY MOUTH ONCE DAILY WITH DINNER 90 tablet 0    empagliflozin (JARDIANCE) 10 MG TABS tablet Take 10 mg by mouth every evening.      finasteride (PROSCAR) 5 MG tablet Take 1 tablet (5 mg) by mouth daily. (Patient taking differently: Take 5 mg by mouth at bedtime.) 30 tablet 2       Social History     Tobacco Use    Smoking status: Never     Passive exposure: Past    Smokeless tobacco: Former     Types: Chew     Quit date: 2024    Tobacco comments:     Chew tobacco   Vaping Use    Vaping status: Never Used   Substance Use Topics    Alcohol use: Yes     Comment: occ    Drug use: No       Invasive Procedure Safety Checklist:    Procedure: Cystoscopy    Action: Complete sections and checkboxes as appropriate.    Pre-procedure:  1. Patient ID Verified with 2 identifiers (Khushboo and  or MRN) : YES    2. Procedure and site verified with patient/designee (when able) : YES    3. Accurate consent documentation in medical record : YES    4. H&P (or appropriate assessment) documented in medical record : N/A  H&P must be up to 30 days prior to procedure an updated within 24 hours of                 Procedure as applicable.     5. Relevant diagnostic and radiology test results appropriately labeled and displayed as applicable : YES    6. Blood products, implants, devices, and/or special equipment available for the procedure as applicable : YES    7. Procedure site(s) marked with provider  initials [Exclusions: none] : NO    8. Marking not required. Reason : Yes  Procedure does not require site marking    Time Out:     Time-Out performed immediately prior to starting procedure, including verbal and active participation of all team members addressing: YES    1. Correct patient identity.  2. Confirmed that the correct side and site are marked.  3. An accurate procedure to be done.  4. Agreement on the procedure to be done.  5. Correct patient position.  6. Relevant images and results are properly labeled and appropriately displayed.  7. The need to administer antibiotics or fluids for irrigation purposes during the procedure as applicable.  8. Safety precautions based on patient history or medication use.    During Procedure: Verification of correct person, site, and procedure occurs any time the responsibility for care of the patient is transferred to another member of the care team.    Brien Garvey, EMT-P  6/30/2025  1:48 PM

## 2025-06-30 NOTE — TELEPHONE ENCOUNTER
Prior Authorization Approval    Medication: LIRAGLUTIDE 18 MG/3ML SC SOPN  Authorization Effective Date: 5/30/2025  Authorization Expiration Date: 6/29/2026  Approved Dose/Quantity:   Reference #: [id number]   Insurance Company: [employer] Non-Specialty PA's - Phone [phone] Fax [phone]  Expected CoPay: $    CoPay Card Available:      Financial Assistance Needed:   Which Pharmacy is filling the prescription: [facility], [place] - [address] [facility] [facility]  Pharmacy Notified: YES  Patient Notified: Instructed pharmacy to notify patient when script is ready to pick-up/ship      Called pharmacy to have them run it thru and tech couldn't get it to go thru for the brand Victoza but it would go thru for generic and she looked and it was available to order so she ran it thru for generic.

## 2025-07-15 ENCOUNTER — PRE VISIT (OUTPATIENT)
Dept: UROLOGY | Facility: CLINIC | Age: 65
End: 2025-07-15
Payer: COMMERCIAL

## 2025-07-15 RX ORDER — SULFAMETHOXAZOLE AND TRIMETHOPRIM 800; 160 MG/1; MG/1
1 TABLET ORAL ONCE
Status: CANCELLED | OUTPATIENT
Start: 2025-07-21

## 2025-07-15 NOTE — TELEPHONE ENCOUNTER
Reason for visit: UDS    Relevant information: Ordered by Dr. Danie Newton. recent ED admission - Septic shock via ESBL e.coli, ESBL e.coli UTI in setting of urinary retention, SP catheter w/ tract dilation and wire guidance (14 Fr)    Records/imaging/labs/order: See Epic for all relevant labs and imaging     Pt called: no need for call    At rooming: Standard Rooming, pt will have catheter in.     --  Dinesh Herman on 7/15/2025 at 9:21 AM

## 2025-07-21 ENCOUNTER — ALLIED HEALTH/NURSE VISIT (OUTPATIENT)
Dept: UROLOGY | Facility: CLINIC | Age: 65
End: 2025-07-21
Payer: COMMERCIAL

## 2025-07-21 ENCOUNTER — ANCILLARY PROCEDURE (OUTPATIENT)
Dept: RADIOLOGY | Facility: AMBULATORY SURGERY CENTER | Age: 65
End: 2025-07-21
Attending: NURSE PRACTITIONER
Payer: COMMERCIAL

## 2025-07-21 VITALS
WEIGHT: 230 LBS | HEIGHT: 74 IN | SYSTOLIC BLOOD PRESSURE: 103 MMHG | OXYGEN SATURATION: 97 % | BODY MASS INDEX: 29.52 KG/M2 | HEART RATE: 57 BPM | DIASTOLIC BLOOD PRESSURE: 64 MMHG

## 2025-07-21 DIAGNOSIS — R39.89 URINARY PROBLEM: ICD-10-CM

## 2025-07-21 DIAGNOSIS — R33.9 URINARY RETENTION: Primary | ICD-10-CM

## 2025-07-21 PROCEDURE — 51784 ANAL/URINARY MUSCLE STUDY: CPT | Performed by: NURSE PRACTITIONER

## 2025-07-21 PROCEDURE — 3078F DIAST BP <80 MM HG: CPT | Performed by: NURSE PRACTITIONER

## 2025-07-21 PROCEDURE — 1125F AMNT PAIN NOTED PAIN PRSNT: CPT | Performed by: NURSE PRACTITIONER

## 2025-07-21 PROCEDURE — 51600 INJECTION FOR BLADDER X-RAY: CPT | Performed by: NURSE PRACTITIONER

## 2025-07-21 PROCEDURE — 74430 CONTRAST X-RAY BLADDER: CPT | Performed by: NURSE PRACTITIONER

## 2025-07-21 PROCEDURE — 3074F SYST BP LT 130 MM HG: CPT | Performed by: NURSE PRACTITIONER

## 2025-07-21 PROCEDURE — 51726 COMPLEX CYSTOMETROGRAM: CPT | Performed by: NURSE PRACTITIONER

## 2025-07-21 RX ORDER — SULFAMETHOXAZOLE AND TRIMETHOPRIM 800; 160 MG/1; MG/1
1 TABLET ORAL ONCE
Status: COMPLETED | OUTPATIENT
Start: 2025-07-21 | End: 2025-07-21

## 2025-07-21 RX ADMIN — SULFAMETHOXAZOLE AND TRIMETHOPRIM 1 TABLET: 800; 160 TABLET ORAL at 08:27

## 2025-07-21 ASSESSMENT — PAIN SCALES - GENERAL: PAINLEVEL_OUTOF10: MILD PAIN (2)

## 2025-07-21 NOTE — PROGRESS NOTES
PREPROCEDURE DIAGNOSES:    1. Urinary retention despite absence of urethral stricture, s/p urethroplasty     POSTPROCEDURE DIAGNOSES:  -Numerous episodes of DO throughout filling. Pdet reaches max of ~12 cm H2O and he leaks a small amount with each. At the completion of the study, he had leaked a total of 123 mL.  -Filling ceased at fill volume of ~450 mL due to rate of leakage equaling rate of fill.   -No appreciable detrusor contraction. He appears to attempt to void entirely through Valsalva effort.  -EMG quiet during voiding attempt.  -Fluoroscopy reveals a moderately-trabeculated bladder wall without diverticulae or cellules.  No vesicoureteral reflux was observed.  The bladder neck was closed during filling and during periods of leakage.    PROCEDURE:    -Sterile urethral catheterization for measurement of postvoid residual urine volume.  -Complex filling cystometrogram with measurement of bladder and rectal pressures.  -Complex voiding cystometrogram with measurement of bladder and rectal pressures.  -Electromyography of the pelvic floor during urodynamics.  -Fluoroscopic imaging of the bladder during urodynamics, at least 3 views.    -Interpretation of urodynamics and flouroscopic imaging.      INDICATIONS FOR PROCEDURE:  Mr. Rhett Elizabeth is a pleasant 64 year old male who presents for video urodynamic assessment. VUDS is requested today by Dr. Newton to better characterize Mr. Rhett Elizabeth's voiding dysfunction.      DESCRIPTION OF PROCEDURE:  Risks, benefits, and alternatives to urodynamics were discussed with the patient and he wished to proceed.  Urodynamics are planned to better assess the primary etiology for Mr. Lucias urologic dysfunction. After informed consent was obtained, the patient was taken to the procedure room where the study was initiated. Findings below.     Next a 7F double-lumen urodynamics catheter was inserted into the bladder under sterile technique via the urethra.   A 7F abdominal manometry catheter was placed in the rectum.  EMG pads were placed on both sides of the anal verge.  The bladder was filled with 100 mL of Omnipaque at 30 mL/minute and serial pressures were recorded.  With coughing there was an appropriate rise in vesical and abdominal pressures with no change in detrusor pressure, confirming good study catheter placement.    DURING THE FILLING PHASE:    Maximum volume in bladder at the completion of study: ~450 mL.    Uninhibited detrusor contractions: Numerous episodes of DO throughout filling. Pdet reaches max of ~12 cm H2O and he leaks a small amount with each. At the completion of the study, he had leaked a total of 123 mL.  Compliance: Good, between episodes of DO.  Continence: Multiple episodes of DOI, per above.   EMG: Quiet during filling.    DURING THE VOIDING PHASE:  Filling ceased at volume of ~450 mL due to leakage roughly equal to rate of fill.   Maximum detrusor contraction with void: No appreciable detrusor contraction. He appears to attempt to void entirely through Valsalva effort.  Voided volume: 0 mL.  Postvoid Residual: 450 mL.  EMG activity    FLUOROSCOPIC IMAGING OF THE BLADDER DURING URODYNAMICS:  Please note, image numbers on UDS tracings correlate with iSite series numbers on PACS images. Fluoroscopy during today's procedure demonstrated a moderately-trabeculated bladder wall without diverticulae or cellules.  No vesicoureteral reflux was observed.  The bladder neck was closed during filling and during periods of leakage.  At the completion of the study, all catheters were removed and the patient was brought back into the consultation room to further discuss today's study results.      ASSESSMENT/PLAN:  Mr. Rhett Elizabeth is a pleasant 64 year old male who demonstrated the following findings today on urodynamic evaluation:    -Numerous episodes of DO throughout filling. Pdet reaches max of ~12 cm H2O and he leaks a small amount with each.  At the completion of the study, he had leaked a total of 123 mL.  -Filling ceased at fill volume of ~450 mL due to rate of leakage equaling rate of fill.   -No appreciable detrusor contraction. He appears to attempt to void entirely through Valsalva effort.  -EMG quiet during voiding attempt.  -Fluoroscopy reveals a moderately-trabeculated bladder wall without diverticulae or cellules.  No vesicoureteral reflux was observed.  The bladder neck was closed during filling and during periods of leakage.    The patient will follow up as scheduled with Dr. Newton to further discuss today's study results and make plans for how best to proceed.      - A single Bactrim was provided for UTI prophylaxis following completion of today's study per department protocol.  The risk of UTI with VUDS is low at ~2.5-3%.      Thank you for allowing me to participate in the care of Mr. Rhett Elizabeth and please don't hesitate to contact me with any questions or concerns.      This procedure was performed under a collaborative agreement with Dr. Danie Newton, Professor and  of Urology, [facility].    CHANCE Stephens, CNP  Department of Urology

## 2025-07-21 NOTE — NURSING NOTE
".  Chief Complaint   Patient presents with    Urodynamics Study       Blood pressure 103/64, pulse 57, height 1.88 m (6' 2\"), weight 104.3 kg (230 lb), SpO2 97%. Body mass index is 29.53 kg/m .    Patient Active Problem List   Diagnosis    Neuropathic pain    Essential hypertension with goal blood pressure less than 140/90    Persistent atrial fibrillation (H)    LANEY (obstructive sleep apnea)    Type 2 diabetes mellitus with diabetic neuropathy, without long-term current use of insulin (H)    Chronic systolic congestive heart failure (H)    Drusen of macula, left    Dry eyes    S/P ICD (internal cardiac defibrillator) procedure    Recent bereavement    Acquired hypothyroidism    Hyperlipidemia LDL goal <100    Personal history of urethral stricture    Peripheral polyneuropathy    Chronic, continuous use of opioids    Long term current use of anticoagulant therapy    Chronic pain syndrome       Allergies   Allergen Reactions    Amlodipine Difficulty breathing and Other (See Comments)     Other reaction(s): Other  \"legs swell\"  Swelling of the lips and tongue  \"legs swell\"  swelling      Seasonal Allergies     Ace Inhibitors Cough     Other reaction(s): Cough       Current Outpatient Medications   Medication Sig Dispense Refill    acetaminophen (TYLENOL) 325 MG tablet Take 2 tablets (650 mg) by mouth every 4 hours as needed for mild pain. 50 tablet 0    alum & mag hydroxide-simethicone (MAALOX) 200-200-20 MG/5ML SUSP suspension Take 15 mLs by mouth daily as needed for other (GI discomfort). Mix with 15 mls of Lidocaine viscous solution 710 mL 0    bacitracin 500 UNIT/GM external ointment Apply topically 2 times daily. 425 g 0    blood glucose (ONETOUCH VERIO IQ) test strip Use to test blood sugar 3 times daily or as directed.  Ok to substitute alternative if insurance prefers. 300 strip 1    blood glucose monitoring (ONE TOUCH ULTRA 2) meter device kit Use to test blood sugar 3 times daily or as directed. 1 kit 0    " Cholecalciferol (VITAMIN D-3 PO) Take 1,000 Units by mouth daily      cyclobenzaprine (FLEXERIL) 10 MG tablet Take 1 tablet (10 mg) by mouth 3 times daily as needed for muscle spasms. 90 tablet 1    DULoxetine (CYMBALTA) 60 MG capsule TAKE ONE CAPSULE BY MOUTH ONCE DAILY (Patient taking differently: Take 60 mg by mouth every morning.) 90 capsule 1    insulin pen needle (BD ARNOLD U/F) 32G X 4 MM miscellaneous Use 2 daily as directed. 200 each 1    levothyroxine (SYNTHROID/LEVOTHROID) 88 MCG tablet TAKE ONE TABLET BY MOUTH ONCE DAILY. NEED APPOINTMENT FOR FURTHER REFILLS. (Patient taking differently: Take 88 mcg by mouth every morning (before breakfast).) 90 tablet 2    lidocaine, viscous, (XYLOCAINE) 2 % solution Take 15 mLs by mouth daily as needed for moderate pain. (mix with 15 mls of alum-mag hydroxide-simethicone) 200 mL 0    liraglutide (VICTOZA) 18 MG/3ML solution INJECT 1.8 MG SUBCUTANEOUSLY DAILY 27 mL 0    Lutein 20 MG CAPS Take 20 mg by mouth every evening.      metFORMIN (GLUCOPHAGE) 500 MG tablet Two tabs 2 times a day (Patient taking differently: Take 1,000 mg by mouth 2 times daily (with meals). Two tabs 2 times a day) 360 tablet 1    metoprolol succinate ER (TOPROL XL) 50 MG 24 hr tablet Take 2 tablets (100 mg) by mouth daily (Patient taking differently: Take 100 mg by mouth every morning.) 90 tablet 0    Multiple Vitamin (MULTIVITAMINS PO) Reported on 5/22/2017      Omega-3 Fatty Acids (OMEGA-3 FISH OIL PO) Take 1 g by mouth every evening. Reported on 5/5/2017      OneTouch Delica Lancets 33G MISC 1 Box 3 times daily 300 each 1    pantoprazole (PROTONIX) 40 MG EC tablet TAKE ONE TABLET BY MOUTH ONCE DAILY 90 tablet 1    pravastatin (PRAVACHOL) 10 MG tablet TAKE ONE TABLET BY MOUTH ONCE DAILY 90 tablet 1    pregabalin (LYRICA) 300 MG capsule TAKE ONE CAPSULE BY MOUTH TWICE A DAY 60 capsule 4    sacubitril-valsartan (ENTRESTO) 24-26 MG per tablet Take 1 tablet by mouth every evening.      senna-docusate  (SENOKOT-S/PERICOLACE) 8.6-50 MG tablet Take 1-2 tablets by mouth 2 times daily. 30 tablet 0    tolterodine (DETROL) 2 MG tablet Take 1 tablet (2 mg) by mouth every 12 hours as needed for other (Spasms). 30 tablet 0    traMADol (ULTRAM) 50 MG tablet TAKE TWO TABLETS BY MOUTH TWICE A  tablet 4    traMADol (ULTRAM) 50 MG tablet Take 2 tablets (100 mg) by mouth 2 times daily as needed for severe pain. No further refills until appointment 60 tablet 0    UNABLE TO FIND cpap      XARELTO ANTICOAGULANT 20 MG TABS tablet TAKE ONE TABLET BY MOUTH ONCE DAILY WITH DINNER 90 tablet 0    empagliflozin (JARDIANCE) 10 MG TABS tablet Take 10 mg by mouth every evening.      finasteride (PROSCAR) 5 MG tablet Take 1 tablet (5 mg) by mouth daily. (Patient taking differently: Take 5 mg by mouth at bedtime.) 30 tablet 2       Social History     Tobacco Use    Smoking status: Never     Passive exposure: Past    Smokeless tobacco: Former     Types: Chew     Quit date: 2024    Tobacco comments:     Chew tobacco   Vaping Use    Vaping status: Never Used   Substance Use Topics    Alcohol use: Yes     Comment: occ    Drug use: No       Invasive Procedure Safety Checklist:    Procedure: Urodynamics    Action: Complete sections and checkboxes as appropriate.  Pre-procedure:  1. Patient ID Verified with 2 identifiers (Khushboo and  or MRN) : YES    2. Procedure and site verified with patient/designee (when able) : YES    3. Accurate consent documentation in medical record : YES    4. H&P (or appropriate assessment) documented in medical record : N/A  H&P must be up to 30 days prior to procedure an updated within 24 hours of Procedure as applicable.     5. Relevant diagnostic and radiology test results appropriately labeled and displayed as applicable : YES    6. Blood products, implants, devices, and/or special equipment available for the procedure as applicable : YES    7. Procedure site(s) marked with provider initials [Exclusions:  none] : NO    8. Marking not required. Reason : Yes  Procedure does not require site marking    Time Out:     Time-Out performed immediately prior to starting procedure, including verbal and active participation of all team members addressing: YES    1. Correct patient identity.  2. Confirmed that the correct side and site are marked.  3. An accurate procedure to be done.  4. Agreement on the procedure to be done.  5. Correct patient position.  6. Relevant images and results are properly labeled and appropriately displayed.  7. The need to administer antibiotics or fluids for irrigation purposes during the procedure as applicable.  8. Safety precautions based on patient history or medication use.    During Procedure: Verification of correct person, site, and procedure occurs any time the responsibility for care of the patient is transferred to another member of the care team.      The following medication was given:     MEDICATION: Bactrim (Trimethoprim / Sulfamethoxazole)  ROUTE: PO  SITE: Medication was given orally  DOSE: 800mg/160mg  LOT #: [id number]  : [employer]  EXPIRATION DATE: 2027/03  NDC#: 7872-3431-12   Was there drug waste? No    Prior to medication administration, verified patient identity using patient's name and date of birth.  Due to medication administration, patient instructed to remain in clinic for 15 minutes  afterwards, and to report any adverse reaction to me immediately.   Prior to administration, verified patient identity using patient's name and date of birth.  Due to administration, patient instructed to remain in clinic for 15 minutes  afterwards, and to report any adverse reaction to me immediately.    Drug Amount Wasted:  None.  Vial/Syringe: Single dose vial    The following medication was given:     MEDICATION:  Omnipaque (Iohexol Injection) (240mgI/mL)  ROUTE: Provider Administered  SITE: Provider Administered via catheter  DOSE: 100mL  LOT #: [id number]  : [employer]  [facility]  EXPIRATION DATE: 17 FEB 2028  NDC#: 5564-0452-41   Was there drug waste? No    Prior to injection, verified patient identity using patient's name and date of birth.  Due to injection administration, patient instructed to remain in clinic for 15 minutes  afterwards, and to report any adverse reaction to me immediately.    Drug Amount Wasted:  None.  Vial/Syringe: Single dose vial      The following medication was given: See Above    Patient did tolerate procedure well.    Patient instructed as to where to call or go for pain, fever, leakage, or decreased urine flow.     This service provided today was under the direct supervision of Adele Hand CNP, who was available if needed.    Karlie Horne  7/21/2025  7:02 AM

## 2025-07-24 ENCOUNTER — PRE VISIT (OUTPATIENT)
Dept: UROLOGY | Facility: CLINIC | Age: 65
End: 2025-07-24
Payer: COMMERCIAL

## 2025-07-24 NOTE — TELEPHONE ENCOUNTER
Previsit Planning        Reason for visit: Follow-up     Relevant Information: UDS 7/15/25    Records/imaging/labs/orders: Available    Rooming: Standard

## 2025-07-28 ENCOUNTER — OFFICE VISIT (OUTPATIENT)
Dept: UROLOGY | Facility: CLINIC | Age: 65
End: 2025-07-28
Payer: COMMERCIAL

## 2025-07-28 VITALS
OXYGEN SATURATION: 95 % | SYSTOLIC BLOOD PRESSURE: 110 MMHG | HEIGHT: 75 IN | HEART RATE: 69 BPM | BODY MASS INDEX: 28.6 KG/M2 | WEIGHT: 230 LBS | DIASTOLIC BLOOD PRESSURE: 79 MMHG

## 2025-07-28 DIAGNOSIS — N99.111 POSTPROCEDURAL BULBOUS URETHRAL STRICTURE: Primary | ICD-10-CM

## 2025-07-28 RX ORDER — NITROFURANTOIN 25; 75 MG/1; MG/1
100 CAPSULE ORAL DAILY
Qty: 30 CAPSULE | Refills: 0 | Status: SHIPPED | OUTPATIENT
Start: 2025-07-28

## 2025-07-28 ASSESSMENT — PAIN SCALES - GENERAL: PAINLEVEL_OUTOF10: NO PAIN (0)

## 2025-07-28 NOTE — LETTER
"7/28/2025       RE: Rhett Elizabeth  [address] [address]     Dear Colleague,    Thank you for referring your patient, Rhett Elizabeth, to the [facility] [place] at [facility]. Please see a copy of my visit note below.    HPI:  Rhett Elizabeth is a 64 year old male with dyslipidemia, hypertension, MI, PVCs, AFIB w/ RVR, chronic systolic heart failure, renal disease, diabetes mellitus type II. Urethral stricture history with urethroplasty 5/8/25  and had RAMIN post-op after catheter removal.     Medsurg History     5/8/25 -- bulbar urethroplasty two sites of non-transecting anastomotic    5/30/25 -- difficult Verdugo removal and no good x-ray (ridge of balloon was giving some resistance at proximal anastomosis)    6/5/25 -- urosepsis, RAMIN (Cr 5), +/- retention at [facility]. SPT placed. No cysto or attempt at urethral cath.     6/11/25 -- Cr normalized at 1.1    6/30/25 - cystoscopy shows moderate enlargement of prostate. Otherwise no stricture of urethra.    7/21/25 - UDS  -Numerous episodes of DO throughout filling. Pdet reaches max of ~12 cm H2O and he leaks a small amount with each. At the completion of the study, he had leaked a total of 123 mL.  -Filling ceased at fill volume of ~450 mL due to rate of leakage equaling rate of fill.   -No appreciable detrusor contraction. He appears to attempt to void entirely through Valsalva effort.  -EMG quiet during voiding attempt.  -Fluoroscopy reveals a moderately-trabeculated bladder wall without diverticulae or cellules.  No vesicoureteral reflux was observed.  The bladder neck was closed during filling and during periods of leakage.    Today  Here to review UDS. He has the SPT still in place.     Exam:  /79 (BP Location: Right arm, Patient Position: Sitting, Cuff Size: Adult Regular)   Pulse 69   Ht 1.905 m (6' 3\")   Wt 104.3 kg (230 lb)   SpO2 95%   BMI " 28.75 kg/m    Pleasant male in NAD  RRR  Non-labored breathing  SPT in place draining clear yellow urine  Perineal incision well-healed    Review of Imaging:  The following imaging exams were independently viewed and interpreted by me and discussed with patient:  UDS and associated fluoroscopy - trabeculated bladder, closed neck during filling, no detrusor contractility    Review of Labs:  The following labs were reviewed by me and discussed with the patient:  No results found for this or any previous visit (from the past 720 hours).    Assessment & Plan  Patient was taught to CIC today  SPT capped  Follow up in 2 weeks with UZAIR Del Real - if CIC is going well can remove SPT. This was placed by IR so would need to cut the stitch and may have a pigtail component.  Macrobid 100 mg daily for prophylaxis x1 month  An additional option is Interstim, which may work for underactive bladder. We can discuss this with the patient at his next visit - if desired can refer to Dr. Powell  The patient requires 14F straight tip male length catheters for CIC, 6x daily (180 monthly) for urinary retention. This is due to urethral stricture disease and neurogenic/underactive bladder which has been demonstrated on urodynamics.    ========================  Rona Del Real NP  [facility]     I acted as a scribe for this note. All portions of the documented history and physical were personally performed by Danie Newton MD as the attending physician.     All portions of the documented history and physical were personally performed by me as the attending physician. I have reviewed and edited the scribe's note as appropriate to reflect my personal interactions with the patient.      Danie Newton MD  [facility] [place]    ==========================    Additional Coding Information:    Problems:  4 -- two or more stable chronic illnesses    Data Reviewed  Review of external notes  as documented above     Independent interpretation of a test performed by another physician/other qualified health care professional (not separately reported) - video urodynamics    Level of risk:  4 -- diagnosis or treatment severely limited by social determinants of health    Time spent:  30 minutes spent by me on the date of the encounter doing chart review, history and exam, documentation and further activities per the note    Again, thank you for allowing me to participate in the care of your patient.      Sincerely,    Danie Newton MD

## 2025-07-28 NOTE — PROGRESS NOTES
"HPI:  Rhett Elizabeth is a 64 year old male with dyslipidemia, hypertension, MI, PVCs, AFIB w/ RVR, chronic systolic heart failure, renal disease, diabetes mellitus type II. Urethral stricture history with urethroplasty 5/8/25  and had RAMIN post-op after catheter removal.     Medsurg History     5/8/25 -- bulbar urethroplasty two sites of non-transecting anastomotic    5/30/25 -- difficult Verdugo removal and no good x-ray (ridge of balloon was giving some resistance at proximal anastomosis)    6/5/25 -- urosepsis, RAMIN (Cr 5), +/- retention at [facility]. SPT placed. No cysto or attempt at urethral cath.     6/11/25 -- Cr normalized at 1.1    6/30/25 - cystoscopy shows moderate enlargement of prostate. Otherwise no stricture of urethra.    7/21/25 - UDS  -Numerous episodes of DO throughout filling. Pdet reaches max of ~12 cm H2O and he leaks a small amount with each. At the completion of the study, he had leaked a total of 123 mL.  -Filling ceased at fill volume of ~450 mL due to rate of leakage equaling rate of fill.   -No appreciable detrusor contraction. He appears to attempt to void entirely through Valsalva effort.  -EMG quiet during voiding attempt.  -Fluoroscopy reveals a moderately-trabeculated bladder wall without diverticulae or cellules.  No vesicoureteral reflux was observed.  The bladder neck was closed during filling and during periods of leakage.    Today  Here to review UDS. He has the SPT still in place.     Exam:  /79 (BP Location: Right arm, Patient Position: Sitting, Cuff Size: Adult Regular)   Pulse 69   Ht 1.905 m (6' 3\")   Wt 104.3 kg (230 lb)   SpO2 95%   BMI 28.75 kg/m    Pleasant male in NAD  RRR  Non-labored breathing  SPT in place draining clear yellow urine  Perineal incision well-healed    Review of Imaging:  The following imaging exams were independently viewed and interpreted by me and discussed with patient:  UDS and associated fluoroscopy - trabeculated bladder, " closed neck during filling, no detrusor contractility    Review of Labs:  The following labs were reviewed by me and discussed with the patient:  No results found for this or any previous visit (from the past 720 hours).    Assessment & Plan   Patient was taught to CIC today  SPT capped  Follow up in 2 weeks with UZAIR Del Real - if CIC is going well can remove SPT. This was placed by IR so would need to cut the stitch and may have a pigtail component.  Macrobid 100 mg daily for prophylaxis x1 month  An additional option is Interstim, which may work for underactive bladder. We can discuss this with the patient at his next visit - if desired can refer to Dr. Powell  The patient requires 14F straight tip male length catheters for CIC, 6x daily (180 monthly) for urinary retention. This is due to urethral stricture disease and neurogenic/underactive bladder which has been demonstrated on urodynamics.    ========================  Rona Del Real NP  [facility]     I acted as a scribe for this note. All portions of the documented history and physical were personally performed by Danie Newton MD as the attending physician.     All portions of the documented history and physical were personally performed by me as the attending physician. I have reviewed and edited the scribe's note as appropriate to reflect my personal interactions with the patient.      Danie Newton MD  [facility] [place]    ==========================    Additional Coding Information:    Problems:  4 -- two or more stable chronic illnesses    Data Reviewed  Review of external notes as documented above     Independent interpretation of a test performed by another physician/other qualified health care professional (not separately reported) - video urodynamics    Level of risk:  4 -- diagnosis or treatment severely limited by social determinants of health    Time spent:  30 minutes spent by me on  the date of the encounter doing chart review, history and exam, documentation and further activities per the note

## 2025-07-28 NOTE — NURSING NOTE
"Chief Complaint   Patient presents with    Follow Up     Discuss UDS Results       Blood pressure 110/79, pulse 69, height 1.905 m (6' 3\"), weight 104.3 kg (230 lb), SpO2 95%. Body mass index is 28.75 kg/m .    Patient Active Problem List   Diagnosis    Neuropathic pain    Essential hypertension with goal blood pressure less than 140/90    Persistent atrial fibrillation (H)    LANEY (obstructive sleep apnea)    Type 2 diabetes mellitus with diabetic neuropathy, without long-term current use of insulin (H)    Chronic systolic congestive heart failure (H)    Drusen of macula, left    Dry eyes    S/P ICD (internal cardiac defibrillator) procedure    Recent bereavement    Acquired hypothyroidism    Hyperlipidemia LDL goal <100    Personal history of urethral stricture    Peripheral polyneuropathy    Chronic, continuous use of opioids    Long term current use of anticoagulant therapy    Chronic pain syndrome       Allergies   Allergen Reactions    Amlodipine Difficulty breathing and Other (See Comments)     Other reaction(s): Other  \"legs swell\"  Swelling of the lips and tongue  \"legs swell\"  swelling      Seasonal Allergies     Ace Inhibitors Cough     Other reaction(s): Cough       Current Outpatient Medications   Medication Sig Dispense Refill    acetaminophen (TYLENOL) 325 MG tablet Take 2 tablets (650 mg) by mouth every 4 hours as needed for mild pain. 50 tablet 0    alum & mag hydroxide-simethicone (MAALOX) 200-200-20 MG/5ML SUSP suspension Take 15 mLs by mouth daily as needed for other (GI discomfort). Mix with 15 mls of Lidocaine viscous solution 710 mL 0    bacitracin 500 UNIT/GM external ointment Apply topically 2 times daily. 425 g 0    blood glucose (ONETOUCH VERIO IQ) test strip Use to test blood sugar 3 times daily or as directed.  Ok to substitute alternative if insurance prefers. 300 strip 1    blood glucose monitoring (ONE TOUCH ULTRA 2) meter device kit Use to test blood sugar 3 times daily or as " directed. 1 kit 0    Cholecalciferol (VITAMIN D-3 PO) Take 1,000 Units by mouth daily      cyclobenzaprine (FLEXERIL) 10 MG tablet Take 1 tablet (10 mg) by mouth 3 times daily as needed for muscle spasms. 90 tablet 1    DULoxetine (CYMBALTA) 60 MG capsule TAKE ONE CAPSULE BY MOUTH ONCE DAILY (Patient taking differently: Take 60 mg by mouth every morning.) 90 capsule 1    insulin pen needle (BD ARNOLD U/F) 32G X 4 MM miscellaneous Use 2 daily as directed. 200 each 1    levothyroxine (SYNTHROID/LEVOTHROID) 88 MCG tablet TAKE ONE TABLET BY MOUTH ONCE DAILY. NEED APPOINTMENT FOR FURTHER REFILLS. (Patient taking differently: Take 88 mcg by mouth every morning (before breakfast).) 90 tablet 2    lidocaine, viscous, (XYLOCAINE) 2 % solution Take 15 mLs by mouth daily as needed for moderate pain. (mix with 15 mls of alum-mag hydroxide-simethicone) 200 mL 0    liraglutide (VICTOZA) 18 MG/3ML solution INJECT 1.8 MG SUBCUTANEOUSLY DAILY 27 mL 0    Lutein 20 MG CAPS Take 20 mg by mouth every evening.      metFORMIN (GLUCOPHAGE) 500 MG tablet Two tabs 2 times a day (Patient taking differently: Take 1,000 mg by mouth 2 times daily (with meals). Two tabs 2 times a day) 360 tablet 1    metoprolol succinate ER (TOPROL XL) 50 MG 24 hr tablet Take 2 tablets (100 mg) by mouth daily (Patient taking differently: Take 100 mg by mouth every morning.) 90 tablet 0    Multiple Vitamin (MULTIVITAMINS PO) Reported on 5/22/2017      Omega-3 Fatty Acids (OMEGA-3 FISH OIL PO) Take 1 g by mouth every evening. Reported on 5/5/2017      OneTouch Delica Lancets 33G MISC 1 Box 3 times daily 300 each 1    pantoprazole (PROTONIX) 40 MG EC tablet TAKE ONE TABLET BY MOUTH ONCE DAILY 90 tablet 1    pravastatin (PRAVACHOL) 10 MG tablet TAKE ONE TABLET BY MOUTH ONCE DAILY 90 tablet 1    pregabalin (LYRICA) 300 MG capsule TAKE ONE CAPSULE BY MOUTH TWICE A DAY 60 capsule 4    sacubitril-valsartan (ENTRESTO) 24-26 MG per tablet Take 1 tablet by mouth every  evening.      senna-docusate (SENOKOT-S/PERICOLACE) 8.6-50 MG tablet Take 1-2 tablets by mouth 2 times daily. 30 tablet 0    tolterodine (DETROL) 2 MG tablet Take 1 tablet (2 mg) by mouth every 12 hours as needed for other (Spasms). 30 tablet 0    traMADol (ULTRAM) 50 MG tablet TAKE TWO TABLETS BY MOUTH TWICE A  tablet 4    traMADol (ULTRAM) 50 MG tablet Take 2 tablets (100 mg) by mouth 2 times daily as needed for severe pain. No further refills until appointment 60 tablet 0    UNABLE TO FIND cpap      XARELTO ANTICOAGULANT 20 MG TABS tablet TAKE ONE TABLET BY MOUTH ONCE DAILY WITH DINNER 90 tablet 0       Social History     Tobacco Use    Smoking status: Never     Passive exposure: Past    Smokeless tobacco: Former     Types: Chew     Quit date: 5/2/2024    Tobacco comments:     Chew tobacco   Vaping Use    Vaping status: Never Used   Substance Use Topics    Alcohol use: Yes     Comment: occ    Drug use: No       Dinesh Herman  7/28/2025  4:12 PM

## 2025-07-29 ENCOUNTER — TELEPHONE (OUTPATIENT)
Dept: UROLOGY | Facility: CLINIC | Age: 65
End: 2025-07-29
Payer: COMMERCIAL

## 2025-07-29 DIAGNOSIS — R33.9 URINARY RETENTION: Primary | ICD-10-CM

## 2025-07-31 PROBLEM — E78.5 HYPERLIPIDEMIA LDL GOAL <100: Status: RESOLVED | Noted: 2019-05-16 | Resolved: 2025-07-31

## 2025-07-31 PROBLEM — Z63.4 RECENT BEREAVEMENT: Status: RESOLVED | Noted: 2018-06-03 | Resolved: 2025-07-31

## 2025-08-06 ENCOUNTER — TELEPHONE (OUTPATIENT)
Dept: UROLOGY | Facility: CLINIC | Age: 65
End: 2025-08-06
Payer: COMMERCIAL

## 2025-08-06 DIAGNOSIS — R33.9 URINARY RETENTION: Primary | ICD-10-CM

## 2025-08-18 ENCOUNTER — OFFICE VISIT (OUTPATIENT)
Dept: UROLOGY | Facility: CLINIC | Age: 65
End: 2025-08-18
Payer: COMMERCIAL

## 2025-08-18 VITALS
HEIGHT: 75 IN | WEIGHT: 230 LBS | SYSTOLIC BLOOD PRESSURE: 106 MMHG | DIASTOLIC BLOOD PRESSURE: 77 MMHG | BODY MASS INDEX: 28.6 KG/M2 | OXYGEN SATURATION: 96 % | HEART RATE: 67 BPM

## 2025-08-18 DIAGNOSIS — R33.9 URINARY RETENTION: Primary | ICD-10-CM

## 2025-08-18 DIAGNOSIS — N99.111 POSTPROCEDURAL BULBOUS URETHRAL STRICTURE: ICD-10-CM

## 2025-08-19 DIAGNOSIS — E11.40 TYPE 2 DIABETES MELLITUS WITH DIABETIC NEUROPATHY, WITHOUT LONG-TERM CURRENT USE OF INSULIN (H): ICD-10-CM

## 2025-08-20 RX ORDER — DULOXETIN HYDROCHLORIDE 60 MG/1
60 CAPSULE, DELAYED RELEASE ORAL DAILY
Qty: 90 CAPSULE | Refills: 0 | Status: SHIPPED | OUTPATIENT
Start: 2025-08-20

## 2025-08-26 DIAGNOSIS — F11.90 CHRONIC, CONTINUOUS USE OF OPIOIDS: ICD-10-CM

## 2025-08-27 RX ORDER — TRAMADOL HYDROCHLORIDE 50 MG/1
100 TABLET ORAL 2 TIMES DAILY
Qty: 120 TABLET | Refills: 0 | Status: SHIPPED | OUTPATIENT
Start: 2025-08-27

## (undated) DEVICE — GLOVE BIOGEL PI MICRO SZ 8.0 48580

## (undated) DEVICE — BLADE CLIPPER DISP 4406

## (undated) DEVICE — SUTURE BOOTS 051003PBX

## (undated) DEVICE — SU MONOCRYL 4-0 PS-2 27" UND Y426H

## (undated) DEVICE — LINEN TOWEL PACK X30 5481

## (undated) DEVICE — PAD CHUX UNDERPAD 23X36" 676105

## (undated) DEVICE — PLUG CATH AND DRAIN TUBE PROTECTOR DYND12200

## (undated) DEVICE — Device

## (undated) DEVICE — ESU GROUND PAD ADULT W/CORD E7507

## (undated) DEVICE — SOL NACL 0.9% INJ 1000ML BAG 2B1324X

## (undated) DEVICE — SOL WATER IRRIG 1000ML BOTTLE 2F7114

## (undated) DEVICE — VESSEL LOOPS BLUE SUPERMAXI 011022PBX

## (undated) DEVICE — LINEN TOWEL PACK X6 WHITE 5487

## (undated) DEVICE — PITCHER STERILE 1000ML  SSK9004A

## (undated) DEVICE — DRAPE GYN/UROLOGY FLUID POUCH TUR 29455

## (undated) DEVICE — DRAPE LEGGINGS CLEAR 8430

## (undated) DEVICE — SU VICRYL 4-0 RB-1 27" UND J214H

## (undated) DEVICE — SU PDS II 5-0 RB-1 DA 30" Z320H

## (undated) DEVICE — BLADE KNIFE BEAVER MICROSHARP GREEN 377515

## (undated) DEVICE — DRAPE U SPLIT 74X120" 29440

## (undated) DEVICE — ENDO SEAL BX PORT BPS-A

## (undated) DEVICE — RETR ELASTIC STAYS LONE STAR BLUNT SGL PK 3350-1G

## (undated) DEVICE — SYR BULB IRRIG DOVER 60 ML LATEX FREE 67000

## (undated) DEVICE — RETR ELASTIC STAYS LONE STAR SHARP 5MM 8/PACK 3311-8G

## (undated) DEVICE — CONNECTOR WATER VALVE PERFUSION PACK STR 020272801

## (undated) DEVICE — PREP POVIDONE-IODINE 7.5% SCRUB 4OZ BOTTLE MDS093945

## (undated) DEVICE — CUP AND LID 2PK 2OZ STERILE  SSK9006A

## (undated) DEVICE — JELLY LUBRICATING SURGILUBE 2OZ TUBE

## (undated) DEVICE — PANTIES MESH LG/XLG 2PK 706M2

## (undated) DEVICE — PREP POVIDONE-IODINE 10% SOLUTION 4OZ BOTTLE MDS093944

## (undated) DEVICE — SOL NACL 0.9% IRRIG 1000ML BOTTLE 2F7124

## (undated) DEVICE — SUCTION MANIFOLD NEPTUNE 2 SYS 4 PORT 0702-020-000

## (undated) DEVICE — PACK NEURO MINOR UMMC SNE32MNMU4

## (undated) DEVICE — ESU CORD BIPOLAR GREEN 10-4000

## (undated) DEVICE — CONTAINER SPECIMEN 4OZ STERILE 17099

## (undated) DEVICE — TUBING IRRIG CYSTO/BLADDER SET 81" LF 2C4040

## (undated) DEVICE — PACK GOWN 3/PK DISP XL SBA32GPFCB

## (undated) DEVICE — SYR 10ML LL W/O NDL 302995

## (undated) DEVICE — ADAPTER CATH CHECK-FLO 9FR FLL 050885 G15476

## (undated) DEVICE — DRAPE POUCH INSTRUMENT 1018

## (undated) DEVICE — LABEL MEDICATION SYSTEM 3303-P

## (undated) DEVICE — SU MONOCRYL 4-0 RB-1 27" Y214H

## (undated) DEVICE — PREP SKIN SCRUB TRAY 4461A

## (undated) DEVICE — LINEN GOWN XLG 5407

## (undated) DEVICE — COVER CAMERA IN-LIGHT DISP LT-C02

## (undated) DEVICE — RETR RING LONE STAR 28.3X18.3CM W/CATH CLIPSX2 3308G

## (undated) DEVICE — GLOVE BIOGEL PI MICRO SZ 7.5 48575

## (undated) DEVICE — SYR 10ML FINGER CONTROL W/O NDL 309695

## (undated) RX ORDER — CHLORHEXIDINE GLUCONATE ORAL RINSE 1.2 MG/ML
SOLUTION DENTAL
Status: DISPENSED
Start: 2025-05-08

## (undated) RX ORDER — EPHEDRINE SULFATE 50 MG/ML
INJECTION, SOLUTION INTRAMUSCULAR; INTRAVENOUS; SUBCUTANEOUS
Status: DISPENSED
Start: 2025-05-08

## (undated) RX ORDER — ACETAMINOPHEN 325 MG/1
TABLET ORAL
Status: DISPENSED
Start: 2025-05-08

## (undated) RX ORDER — OXYCODONE HYDROCHLORIDE 5 MG/1
TABLET ORAL
Status: DISPENSED
Start: 2025-05-08

## (undated) RX ORDER — LIDOCAINE HYDROCHLORIDE AND EPINEPHRINE 10; 10 MG/ML; UG/ML
INJECTION, SOLUTION INFILTRATION; PERINEURAL
Status: DISPENSED
Start: 2025-05-08

## (undated) RX ORDER — HYDROMORPHONE HYDROCHLORIDE 1 MG/ML
INJECTION, SOLUTION INTRAMUSCULAR; INTRAVENOUS; SUBCUTANEOUS
Status: DISPENSED
Start: 2025-05-08

## (undated) RX ORDER — HYDROMORPHONE HCL IN WATER/PF 6 MG/30 ML
PATIENT CONTROLLED ANALGESIA SYRINGE INTRAVENOUS
Status: DISPENSED
Start: 2025-05-08

## (undated) RX ORDER — BUPIVACAINE HYDROCHLORIDE 2.5 MG/ML
INJECTION, SOLUTION EPIDURAL; INFILTRATION; INTRACAUDAL; PERINEURAL
Status: DISPENSED
Start: 2025-05-08

## (undated) RX ORDER — SULFAMETHOXAZOLE AND TRIMETHOPRIM 800; 160 MG/1; MG/1
TABLET ORAL
Status: DISPENSED
Start: 2025-07-21

## (undated) RX ORDER — CEFAZOLIN SODIUM/WATER 2 G/20 ML
SYRINGE (ML) INTRAVENOUS
Status: DISPENSED
Start: 2025-05-08

## (undated) RX ORDER — FENTANYL CITRATE 50 UG/ML
INJECTION, SOLUTION INTRAMUSCULAR; INTRAVENOUS
Status: DISPENSED
Start: 2025-05-08

## (undated) RX ORDER — LIDOCAINE HYDROCHLORIDE 20 MG/ML
JELLY TOPICAL
Status: DISPENSED
Start: 2025-03-28